# Patient Record
Sex: FEMALE | Race: WHITE | Employment: OTHER | URBAN - METROPOLITAN AREA
[De-identification: names, ages, dates, MRNs, and addresses within clinical notes are randomized per-mention and may not be internally consistent; named-entity substitution may affect disease eponyms.]

---

## 2018-04-13 ENCOUNTER — OFFICE VISIT (OUTPATIENT)
Dept: FAMILY MEDICINE CLINIC | Age: 63
End: 2018-04-13
Payer: COMMERCIAL

## 2018-04-13 ENCOUNTER — HOSPITAL ENCOUNTER (OUTPATIENT)
Age: 63
Discharge: HOME | End: 2018-04-15
Payer: COMMERCIAL

## 2018-04-13 VITALS
RESPIRATION RATE: 16 BRPM | HEART RATE: 108 BPM | BODY MASS INDEX: 35.71 KG/M2 | TEMPERATURE: 97.7 F | DIASTOLIC BLOOD PRESSURE: 60 MMHG | OXYGEN SATURATION: 98 % | SYSTOLIC BLOOD PRESSURE: 116 MMHG | WEIGHT: 228 LBS

## 2018-04-13 DIAGNOSIS — R53.81 PHYSICAL DECONDITIONING: ICD-10-CM

## 2018-04-13 DIAGNOSIS — R41.3 MEMORY LOSS: ICD-10-CM

## 2018-04-13 DIAGNOSIS — E11.9 TYPE 2 DIABETES MELLITUS WITHOUT COMPLICATION, WITH LONG-TERM CURRENT USE OF INSULIN (HCC): Primary | Chronic | ICD-10-CM

## 2018-04-13 DIAGNOSIS — E55.9 VITAMIN D DEFICIENCY: ICD-10-CM

## 2018-04-13 DIAGNOSIS — E11.43 GASTROPARESIS DUE TO DM (HCC): ICD-10-CM

## 2018-04-13 DIAGNOSIS — R29.6 FREQUENT FALLS: ICD-10-CM

## 2018-04-13 DIAGNOSIS — E78.2 MIXED HYPERLIPIDEMIA: Chronic | ICD-10-CM

## 2018-04-13 DIAGNOSIS — Z79.4 TYPE 2 DIABETES MELLITUS WITHOUT COMPLICATION, WITH LONG-TERM CURRENT USE OF INSULIN (HCC): Primary | Chronic | ICD-10-CM

## 2018-04-13 DIAGNOSIS — G62.9 NEUROPATHY: ICD-10-CM

## 2018-04-13 DIAGNOSIS — K31.84 GASTROPARESIS DUE TO DM (HCC): ICD-10-CM

## 2018-04-13 DIAGNOSIS — R53.83 FATIGUE, UNSPECIFIED TYPE: ICD-10-CM

## 2018-04-13 DIAGNOSIS — K21.9 GASTROESOPHAGEAL REFLUX DISEASE, ESOPHAGITIS PRESENCE NOT SPECIFIED: ICD-10-CM

## 2018-04-13 DIAGNOSIS — I10 HTN (HYPERTENSION), BENIGN: Chronic | ICD-10-CM

## 2018-04-13 LAB
ALBUMIN SERPL-MCNC: 3.1 G/DL (ref 3.5–5.2)
ALP BLD-CCNC: 105 U/L (ref 35–104)
ALT SERPL-CCNC: 35 U/L (ref 0–32)
ANION GAP SERPL CALCULATED.3IONS-SCNC: 20 MMOL/L (ref 7–16)
AST SERPL-CCNC: 69 U/L (ref 0–31)
BASOPHILS ABSOLUTE: 0.06 E9/L (ref 0–0.2)
BASOPHILS RELATIVE PERCENT: 0.8 % (ref 0–2)
BILIRUB SERPL-MCNC: 1.5 MG/DL (ref 0–1.2)
BUN BLDV-MCNC: 16 MG/DL (ref 8–23)
CALCIUM SERPL-MCNC: 9.3 MG/DL (ref 8.6–10.2)
CHLORIDE BLD-SCNC: 99 MMOL/L (ref 98–107)
CHOLESTEROL, TOTAL: 169 MG/DL (ref 0–199)
CO2: 19 MMOL/L (ref 22–29)
CREAT SERPL-MCNC: 0.7 MG/DL (ref 0.5–1)
EOSINOPHILS ABSOLUTE: 0.08 E9/L (ref 0.05–0.5)
EOSINOPHILS RELATIVE PERCENT: 1.1 % (ref 0–6)
GFR AFRICAN AMERICAN: >60
GFR NON-AFRICAN AMERICAN: >60 ML/MIN/1.73
GLUCOSE BLD-MCNC: 371 MG/DL (ref 74–109)
HBA1C MFR BLD: 9.9 %
HCT VFR BLD CALC: 43.2 % (ref 34–48)
HDLC SERPL-MCNC: 39 MG/DL
HEMOGLOBIN: 14.4 G/DL (ref 11.5–15.5)
IMMATURE GRANULOCYTES #: 0.05 E9/L
IMMATURE GRANULOCYTES %: 0.7 % (ref 0–5)
IRON SATURATION: 59 % (ref 15–50)
IRON: 174 MCG/DL (ref 37–145)
LDL CHOLESTEROL CALCULATED: 96 MG/DL (ref 0–99)
LYMPHOCYTES ABSOLUTE: 2.35 E9/L (ref 1.5–4)
LYMPHOCYTES RELATIVE PERCENT: 32.2 % (ref 20–42)
MCH RBC QN AUTO: 32.1 PG (ref 26–35)
MCHC RBC AUTO-ENTMCNC: 33.3 % (ref 32–34.5)
MCV RBC AUTO: 96.2 FL (ref 80–99.9)
MONOCYTES ABSOLUTE: 0.49 E9/L (ref 0.1–0.95)
MONOCYTES RELATIVE PERCENT: 6.7 % (ref 2–12)
NEUTROPHILS ABSOLUTE: 4.27 E9/L (ref 1.8–7.3)
NEUTROPHILS RELATIVE PERCENT: 58.5 % (ref 43–80)
PDW BLD-RTO: 14.1 FL (ref 11.5–15)
PLATELET # BLD: 177 E9/L (ref 130–450)
PMV BLD AUTO: 11.9 FL (ref 7–12)
POTASSIUM SERPL-SCNC: 3.9 MMOL/L (ref 3.5–5)
RBC # BLD: 4.49 E12/L (ref 3.5–5.5)
SODIUM BLD-SCNC: 138 MMOL/L (ref 132–146)
TOTAL IRON BINDING CAPACITY: 295 MCG/DL (ref 250–450)
TOTAL PROTEIN: 7.8 G/DL (ref 6.4–8.3)
TRIGL SERPL-MCNC: 168 MG/DL (ref 0–149)
TSH SERPL DL<=0.05 MIU/L-ACNC: 3.96 UIU/ML (ref 0.27–4.2)
VLDLC SERPL CALC-MCNC: 34 MG/DL
WBC # BLD: 7.3 E9/L (ref 4.5–11.5)

## 2018-04-13 PROCEDURE — 83550 IRON BINDING TEST: CPT

## 2018-04-13 PROCEDURE — 1036F TOBACCO NON-USER: CPT | Performed by: FAMILY MEDICINE

## 2018-04-13 PROCEDURE — 82306 VITAMIN D 25 HYDROXY: CPT

## 2018-04-13 PROCEDURE — 82746 ASSAY OF FOLIC ACID SERUM: CPT

## 2018-04-13 PROCEDURE — 99204 OFFICE O/P NEW MOD 45 MIN: CPT | Performed by: FAMILY MEDICINE

## 2018-04-13 PROCEDURE — 84443 ASSAY THYROID STIM HORMONE: CPT

## 2018-04-13 PROCEDURE — 80061 LIPID PANEL: CPT

## 2018-04-13 PROCEDURE — G8427 DOCREV CUR MEDS BY ELIG CLIN: HCPCS | Performed by: FAMILY MEDICINE

## 2018-04-13 PROCEDURE — 83036 HEMOGLOBIN GLYCOSYLATED A1C: CPT | Performed by: FAMILY MEDICINE

## 2018-04-13 PROCEDURE — 3046F HEMOGLOBIN A1C LEVEL >9.0%: CPT | Performed by: FAMILY MEDICINE

## 2018-04-13 PROCEDURE — 3014F SCREEN MAMMO DOC REV: CPT | Performed by: FAMILY MEDICINE

## 2018-04-13 PROCEDURE — 80053 COMPREHEN METABOLIC PANEL: CPT

## 2018-04-13 PROCEDURE — 83540 ASSAY OF IRON: CPT

## 2018-04-13 PROCEDURE — 82607 VITAMIN B-12: CPT

## 2018-04-13 PROCEDURE — 85025 COMPLETE CBC W/AUTO DIFF WBC: CPT

## 2018-04-13 PROCEDURE — 2022F DILAT RTA XM EVC RTNOPTHY: CPT | Performed by: FAMILY MEDICINE

## 2018-04-13 PROCEDURE — 3017F COLORECTAL CA SCREEN DOC REV: CPT | Performed by: FAMILY MEDICINE

## 2018-04-13 PROCEDURE — G8417 CALC BMI ABV UP PARAM F/U: HCPCS | Performed by: FAMILY MEDICINE

## 2018-04-13 RX ORDER — OMEPRAZOLE 40 MG/1
40 CAPSULE, DELAYED RELEASE ORAL
Qty: 30 CAPSULE | Refills: 3 | Status: SHIPPED | OUTPATIENT
Start: 2018-04-13 | End: 2018-07-31 | Stop reason: SDUPTHER

## 2018-04-13 ASSESSMENT — ENCOUNTER SYMPTOMS
CONSTIPATION: 1
DIARRHEA: 0
VOMITING: 0
WHEEZING: 1
BACK PAIN: 1
NAUSEA: 1
ABDOMINAL PAIN: 1

## 2018-04-13 ASSESSMENT — PATIENT HEALTH QUESTIONNAIRE - PHQ9
SUM OF ALL RESPONSES TO PHQ9 QUESTIONS 1 & 2: 1
1. LITTLE INTEREST OR PLEASURE IN DOING THINGS: 1
2. FEELING DOWN, DEPRESSED OR HOPELESS: 0
SUM OF ALL RESPONSES TO PHQ QUESTIONS 1-9: 1

## 2018-04-14 LAB
FOLATE: 12 NG/ML (ref 4.8–24.2)
VITAMIN B-12: 480 PG/ML (ref 211–946)

## 2018-04-15 LAB — VITAMIN D 25-HYDROXY: 33 NG/ML (ref 30–100)

## 2018-04-26 RX ORDER — INSULIN GLARGINE 100 [IU]/ML
48 INJECTION, SOLUTION SUBCUTANEOUS NIGHTLY
Qty: 1 VIAL | Refills: 0 | Status: SHIPPED | OUTPATIENT
Start: 2018-04-26 | End: 2018-05-02 | Stop reason: CLARIF

## 2018-04-27 ENCOUNTER — HOSPITAL ENCOUNTER (OUTPATIENT)
Dept: MRI IMAGING | Age: 63
Discharge: HOME | End: 2018-04-29
Payer: COMMERCIAL

## 2018-04-27 DIAGNOSIS — R41.3 MEMORY LOSS: ICD-10-CM

## 2018-04-27 DIAGNOSIS — R29.6 FREQUENT FALLS: ICD-10-CM

## 2018-04-27 PROCEDURE — 70551 MRI BRAIN STEM W/O DYE: CPT

## 2018-05-02 ENCOUNTER — TELEPHONE (OUTPATIENT)
Dept: FAMILY MEDICINE CLINIC | Age: 63
End: 2018-05-02

## 2018-05-03 ENCOUNTER — TELEPHONE (OUTPATIENT)
Dept: FAMILY MEDICINE CLINIC | Age: 63
End: 2018-05-03

## 2018-05-03 RX ORDER — SIMVASTATIN 20 MG
20 TABLET ORAL NIGHTLY
Qty: 30 TABLET | Refills: 2 | Status: SHIPPED | OUTPATIENT
Start: 2018-05-03 | End: 2018-07-22 | Stop reason: SDUPTHER

## 2018-05-17 ENCOUNTER — OFFICE VISIT (OUTPATIENT)
Dept: FAMILY MEDICINE CLINIC | Age: 63
End: 2018-05-17
Payer: COMMERCIAL

## 2018-05-17 VITALS
DIASTOLIC BLOOD PRESSURE: 69 MMHG | WEIGHT: 230 LBS | RESPIRATION RATE: 16 BRPM | OXYGEN SATURATION: 93 % | BODY MASS INDEX: 36.1 KG/M2 | SYSTOLIC BLOOD PRESSURE: 124 MMHG | HEIGHT: 67 IN | HEART RATE: 106 BPM

## 2018-05-17 DIAGNOSIS — Z91.81 AT RISK FOR FALL DUE TO COMORBID CONDITION: ICD-10-CM

## 2018-05-17 DIAGNOSIS — I10 HTN (HYPERTENSION), BENIGN: Chronic | ICD-10-CM

## 2018-05-17 DIAGNOSIS — Z12.31 ENCOUNTER FOR SCREENING MAMMOGRAM FOR BREAST CANCER: ICD-10-CM

## 2018-05-17 DIAGNOSIS — E78.2 MIXED HYPERLIPIDEMIA: Chronic | ICD-10-CM

## 2018-05-17 DIAGNOSIS — Z12.11 SCREENING FOR COLON CANCER: ICD-10-CM

## 2018-05-17 DIAGNOSIS — E11.9 TYPE 2 DIABETES MELLITUS WITHOUT COMPLICATION, WITH LONG-TERM CURRENT USE OF INSULIN (HCC): Primary | Chronic | ICD-10-CM

## 2018-05-17 DIAGNOSIS — Z79.4 TYPE 2 DIABETES MELLITUS WITHOUT COMPLICATION, WITH LONG-TERM CURRENT USE OF INSULIN (HCC): Primary | Chronic | ICD-10-CM

## 2018-05-17 DIAGNOSIS — K59.00 CONSTIPATION, UNSPECIFIED CONSTIPATION TYPE: ICD-10-CM

## 2018-05-17 LAB
CREATININE URINE POCT: 200
MICROALBUMIN/CREAT 24H UR: 80 MG/G{CREAT}
MICROALBUMIN/CREAT UR-RTO: NORMAL

## 2018-05-17 PROCEDURE — 1036F TOBACCO NON-USER: CPT | Performed by: FAMILY MEDICINE

## 2018-05-17 PROCEDURE — 99214 OFFICE O/P EST MOD 30 MIN: CPT | Performed by: FAMILY MEDICINE

## 2018-05-17 PROCEDURE — 82044 UR ALBUMIN SEMIQUANTITATIVE: CPT | Performed by: FAMILY MEDICINE

## 2018-05-17 PROCEDURE — G8427 DOCREV CUR MEDS BY ELIG CLIN: HCPCS | Performed by: FAMILY MEDICINE

## 2018-05-17 PROCEDURE — 2022F DILAT RTA XM EVC RTNOPTHY: CPT | Performed by: FAMILY MEDICINE

## 2018-05-17 PROCEDURE — G8417 CALC BMI ABV UP PARAM F/U: HCPCS | Performed by: FAMILY MEDICINE

## 2018-05-17 PROCEDURE — 3017F COLORECTAL CA SCREEN DOC REV: CPT | Performed by: FAMILY MEDICINE

## 2018-05-17 PROCEDURE — 3046F HEMOGLOBIN A1C LEVEL >9.0%: CPT | Performed by: FAMILY MEDICINE

## 2018-05-17 RX ORDER — VENLAFAXINE HYDROCHLORIDE 37.5 MG/1
CAPSULE, EXTENDED RELEASE ORAL
Refills: 0 | COMMUNITY
Start: 2018-05-07 | End: 2018-06-08 | Stop reason: SDUPTHER

## 2018-05-17 RX ORDER — POLYETHYLENE GLYCOL 3350 17 G/17G
17 POWDER, FOR SOLUTION ORAL DAILY
Qty: 510 G | Refills: 0 | Status: SHIPPED | OUTPATIENT
Start: 2018-05-17 | End: 2018-06-16

## 2018-05-17 RX ORDER — METFORMIN HYDROCHLORIDE 500 MG/1
TABLET, EXTENDED RELEASE ORAL
Refills: 1 | COMMUNITY
Start: 2018-05-07 | End: 2018-11-12 | Stop reason: SDUPTHER

## 2018-05-18 ENCOUNTER — TELEPHONE (OUTPATIENT)
Dept: FAMILY MEDICINE CLINIC | Age: 63
End: 2018-05-18

## 2018-05-18 ASSESSMENT — ENCOUNTER SYMPTOMS
BACK PAIN: 1
WHEEZING: 0
NAUSEA: 1
SINUS PRESSURE: 0
ABDOMINAL PAIN: 1
CONSTIPATION: 1
VOMITING: 0
SINUS PAIN: 0
DIARRHEA: 0

## 2018-05-22 ENCOUNTER — TELEPHONE (OUTPATIENT)
Dept: FAMILY MEDICINE CLINIC | Age: 63
End: 2018-05-22

## 2018-05-25 ENCOUNTER — TELEPHONE (OUTPATIENT)
Dept: FAMILY MEDICINE CLINIC | Age: 63
End: 2018-05-25

## 2018-05-25 RX ORDER — MELOXICAM 7.5 MG/1
7.5 TABLET ORAL 2 TIMES DAILY
Qty: 60 TABLET | Refills: 3 | Status: SHIPPED | OUTPATIENT
Start: 2018-05-25 | End: 2018-07-13 | Stop reason: ALTCHOICE

## 2018-05-25 RX ORDER — CYCLOBENZAPRINE HCL 5 MG
5 TABLET ORAL 3 TIMES DAILY PRN
Qty: 30 TABLET | Refills: 0 | Status: SHIPPED | OUTPATIENT
Start: 2018-05-25 | End: 2018-06-08 | Stop reason: SDUPTHER

## 2018-06-08 ENCOUNTER — TELEPHONE (OUTPATIENT)
Dept: FAMILY MEDICINE CLINIC | Age: 63
End: 2018-06-08

## 2018-06-08 RX ORDER — CYCLOBENZAPRINE HCL 5 MG
5 TABLET ORAL 3 TIMES DAILY PRN
Qty: 30 TABLET | Refills: 0 | Status: SHIPPED | OUTPATIENT
Start: 2018-06-08 | End: 2018-06-18

## 2018-06-08 RX ORDER — LISINOPRIL 20 MG/1
20 TABLET ORAL DAILY
Qty: 30 TABLET | Refills: 2 | Status: SHIPPED | OUTPATIENT
Start: 2018-06-08 | End: 2018-08-29 | Stop reason: SDUPTHER

## 2018-06-08 RX ORDER — VENLAFAXINE HYDROCHLORIDE 37.5 MG/1
CAPSULE, EXTENDED RELEASE ORAL
Qty: 30 CAPSULE | Refills: 2 | Status: SHIPPED | OUTPATIENT
Start: 2018-06-08 | End: 2018-09-21 | Stop reason: SDUPTHER

## 2018-06-14 ENCOUNTER — TELEPHONE (OUTPATIENT)
Dept: FAMILY MEDICINE CLINIC | Age: 63
End: 2018-06-14

## 2018-06-14 DIAGNOSIS — G89.29 OTHER CHRONIC PAIN: Primary | ICD-10-CM

## 2018-06-21 ENCOUNTER — TELEPHONE (OUTPATIENT)
Dept: ADMINISTRATIVE | Age: 63
End: 2018-06-21

## 2018-07-08 ENCOUNTER — APPOINTMENT (OUTPATIENT)
Dept: ULTRASOUND IMAGING | Age: 63
End: 2018-07-08
Payer: COMMERCIAL

## 2018-07-08 ENCOUNTER — APPOINTMENT (OUTPATIENT)
Dept: CT IMAGING | Age: 63
End: 2018-07-08
Payer: COMMERCIAL

## 2018-07-08 ENCOUNTER — HOSPITAL ENCOUNTER (EMERGENCY)
Age: 63
Discharge: HOME | End: 2018-07-08
Attending: EMERGENCY MEDICINE
Payer: COMMERCIAL

## 2018-07-08 VITALS
RESPIRATION RATE: 16 BRPM | HEART RATE: 106 BPM | WEIGHT: 235 LBS | HEIGHT: 67 IN | DIASTOLIC BLOOD PRESSURE: 112 MMHG | SYSTOLIC BLOOD PRESSURE: 188 MMHG | BODY MASS INDEX: 36.88 KG/M2 | TEMPERATURE: 98.3 F | OXYGEN SATURATION: 91 %

## 2018-07-08 DIAGNOSIS — R18.8 CIRRHOSIS OF LIVER WITH ASCITES, UNSPECIFIED HEPATIC CIRRHOSIS TYPE (HCC): ICD-10-CM

## 2018-07-08 DIAGNOSIS — R60.9 PERIPHERAL EDEMA: ICD-10-CM

## 2018-07-08 DIAGNOSIS — N39.0 URINARY TRACT INFECTION WITHOUT HEMATURIA, SITE UNSPECIFIED: Primary | ICD-10-CM

## 2018-07-08 DIAGNOSIS — K74.60 CIRRHOSIS OF LIVER WITH ASCITES, UNSPECIFIED HEPATIC CIRRHOSIS TYPE (HCC): ICD-10-CM

## 2018-07-08 LAB
ALBUMIN SERPL-MCNC: 2.9 G/DL (ref 3.5–5.2)
ALP BLD-CCNC: 115 U/L (ref 35–104)
ALT SERPL-CCNC: 22 U/L (ref 0–32)
ANION GAP SERPL CALCULATED.3IONS-SCNC: 12 MMOL/L (ref 7–16)
AST SERPL-CCNC: 40 U/L (ref 0–31)
BACTERIA: ABNORMAL /HPF
BILIRUB SERPL-MCNC: 1.2 MG/DL (ref 0–1.2)
BILIRUBIN URINE: NEGATIVE
BLOOD, URINE: ABNORMAL
BUN BLDV-MCNC: 10 MG/DL (ref 8–23)
CALCIUM SERPL-MCNC: 8.6 MG/DL (ref 8.6–10.2)
CHLORIDE BLD-SCNC: 102 MMOL/L (ref 98–107)
CLARITY: ABNORMAL
CO2: 24 MMOL/L (ref 22–29)
COLOR: ABNORMAL
CREAT SERPL-MCNC: 0.6 MG/DL (ref 0.5–1)
EKG ATRIAL RATE: 107 BPM
EKG P AXIS: 51 DEGREES
EKG P-R INTERVAL: 150 MS
EKG Q-T INTERVAL: 368 MS
EKG QRS DURATION: 78 MS
EKG QTC CALCULATION (BAZETT): 491 MS
EKG R AXIS: -2 DEGREES
EKG T AXIS: 65 DEGREES
EKG VENTRICULAR RATE: 107 BPM
EPITHELIAL CELLS, UA: ABNORMAL /HPF
GFR AFRICAN AMERICAN: >60
GFR NON-AFRICAN AMERICAN: >60 ML/MIN/1.73
GLUCOSE BLD-MCNC: 278 MG/DL (ref 74–109)
GLUCOSE URINE: >=1000 MG/DL
HCT VFR BLD CALC: 39.7 % (ref 34–48)
HEMOGLOBIN: 13.6 G/DL (ref 11.5–15.5)
KETONES, URINE: NEGATIVE MG/DL
LACTIC ACID: 3.2 MMOL/L (ref 0.5–2.2)
LEUKOCYTE ESTERASE, URINE: ABNORMAL
LIPASE: 38 U/L (ref 13–60)
MCH RBC QN AUTO: 32.5 PG (ref 26–35)
MCHC RBC AUTO-ENTMCNC: 34.3 % (ref 32–34.5)
MCV RBC AUTO: 95 FL (ref 80–99.9)
NITRITE, URINE: POSITIVE
PDW BLD-RTO: 14.6 FL (ref 11.5–15)
PH UA: 6 (ref 5–9)
PLATELET # BLD: 156 E9/L (ref 130–450)
PMV BLD AUTO: 10.5 FL (ref 7–12)
POTASSIUM SERPL-SCNC: 3.7 MMOL/L (ref 3.5–5)
PROTEIN UA: NEGATIVE MG/DL
RBC # BLD: 4.18 E12/L (ref 3.5–5.5)
RBC UA: ABNORMAL /HPF (ref 0–2)
SODIUM BLD-SCNC: 138 MMOL/L (ref 132–146)
SPECIFIC GRAVITY UA: >=1.03 (ref 1–1.03)
TOTAL PROTEIN: 7.4 G/DL (ref 6.4–8.3)
TROPONIN: <0.01 NG/ML (ref 0–0.03)
UROBILINOGEN, URINE: 4 E.U./DL
WBC # BLD: 5.7 E9/L (ref 4.5–11.5)
WBC UA: ABNORMAL /HPF (ref 0–5)

## 2018-07-08 PROCEDURE — 93970 EXTREMITY STUDY: CPT

## 2018-07-08 PROCEDURE — 6360000002 HC RX W HCPCS: Performed by: EMERGENCY MEDICINE

## 2018-07-08 PROCEDURE — 93005 ELECTROCARDIOGRAM TRACING: CPT | Performed by: PHYSICIAN ASSISTANT

## 2018-07-08 PROCEDURE — 99284 EMERGENCY DEPT VISIT MOD MDM: CPT

## 2018-07-08 PROCEDURE — 84484 ASSAY OF TROPONIN QUANT: CPT

## 2018-07-08 PROCEDURE — 83690 ASSAY OF LIPASE: CPT

## 2018-07-08 PROCEDURE — 81001 URINALYSIS AUTO W/SCOPE: CPT

## 2018-07-08 PROCEDURE — 74177 CT ABD & PELVIS W/CONTRAST: CPT

## 2018-07-08 PROCEDURE — 80053 COMPREHEN METABOLIC PANEL: CPT

## 2018-07-08 PROCEDURE — 6360000004 HC RX CONTRAST MEDICATION: Performed by: RADIOLOGY

## 2018-07-08 PROCEDURE — 2580000003 HC RX 258: Performed by: EMERGENCY MEDICINE

## 2018-07-08 PROCEDURE — 85027 COMPLETE CBC AUTOMATED: CPT

## 2018-07-08 PROCEDURE — 96365 THER/PROPH/DIAG IV INF INIT: CPT

## 2018-07-08 PROCEDURE — 83605 ASSAY OF LACTIC ACID: CPT

## 2018-07-08 RX ORDER — ONDANSETRON 4 MG/1
4 TABLET, FILM COATED ORAL EVERY 8 HOURS PRN
Qty: 20 TABLET | Refills: 0 | Status: SHIPPED | OUTPATIENT
Start: 2018-07-08 | End: 2018-10-22

## 2018-07-08 RX ORDER — FUROSEMIDE 20 MG/1
40 TABLET ORAL DAILY
Qty: 7 TABLET | Refills: 0 | Status: SHIPPED | OUTPATIENT
Start: 2018-07-08 | End: 2018-07-13 | Stop reason: SDUPTHER

## 2018-07-08 RX ORDER — CEFDINIR 300 MG/1
300 CAPSULE ORAL 2 TIMES DAILY
Qty: 20 CAPSULE | Refills: 0 | Status: SHIPPED | OUTPATIENT
Start: 2018-07-08 | End: 2018-07-18

## 2018-07-08 RX ORDER — 0.9 % SODIUM CHLORIDE 0.9 %
1000 INTRAVENOUS SOLUTION INTRAVENOUS ONCE
Status: COMPLETED | OUTPATIENT
Start: 2018-07-08 | End: 2018-07-08

## 2018-07-08 RX ADMIN — SODIUM CHLORIDE 1000 ML: 9 INJECTION, SOLUTION INTRAVENOUS at 20:00

## 2018-07-08 RX ADMIN — CEFTRIAXONE 1 G: 1 INJECTION, POWDER, FOR SOLUTION INTRAMUSCULAR; INTRAVENOUS at 21:29

## 2018-07-08 RX ADMIN — IOPAMIDOL 110 ML: 755 INJECTION, SOLUTION INTRAVENOUS at 20:58

## 2018-07-08 ASSESSMENT — PAIN SCALES - GENERAL: PAINLEVEL_OUTOF10: 10

## 2018-07-08 ASSESSMENT — PAIN DESCRIPTION - PAIN TYPE: TYPE: ACUTE PAIN

## 2018-07-08 NOTE — ED PROVIDER NOTES
HPI:   Tello Casey is a 58 y.o. female presenting to the ED for abdominal pain, bloated, leg swelling, dysuria, beginning a few months ago. The complaint has been constant, moderate in severity, and worsened by nothing. Pt is having leg swelling for a couple of months but for the past couple of days her abdomen and bilateral legs began to swell. pt states she is having difficulty urinating. Pt has diabetes. pt has urinary urgency and states she has an appointment for this week with her PCP. Pt reports her psychic told her to come in so she decided to listen and be seen. Pt denies fever, chills, nausea, vomiting, diarrhea, LOC, SOB, cough, hematuria, headache, dizziness. ROS:   Pertinent positives and negatives are stated within HPI, all other systems reviewed and are negative.    --------------------------------------------- PAST HISTORY ---------------------------------------------  Past Medical History:  has a past medical history of Diabetes mellitus ([facility] [address] Hyperlipidemia; Hypertension; and Thyroid disease. Past Surgical History:  has a past surgical history that includes Hysterectomy and Cholecystectomy. Social History:  reports that she has never smoked. She has never used smokeless tobacco. She reports that she does not drink alcohol or use drugs. Family History: family history includes Diabetes in her sister; Heart Disease in her sister; Heart Disease (age of onset: 28) in her mother. The patients home medications have been reviewed.     Allergies: Metformin and Sertraline    -------------------------------------------------- RESULTS -------------------------------------------------  All laboratory and radiology results have been personally reviewed by myself   LABS:  Results for orders placed or performed during the hospital encounter of 07/08/18   CBC   Result Value Ref Range    WBC 5.7 4.5 - 11.5 E9/L    RBC 4.18 3.50 - 5.50 E12/L    Hemoglobin 13.6 11.5 - 15.5 g/dL    Hematocrit 39.7 treated with Rocephin for UTI, given Omnicef for home. Patient with bloating no real abdominal pain here. Findings consistent with cirrhosis. Patient will follow up with primary care physician. We instructed her to go to gastroenterology 2. We told her that she should be seen by both within the next 2-3 days. We gave her warning signs for when to return. Paracentesis was offered but she deferred    Counseling: The emergency provider has spoken with the patient and discussed todays results, in addition to providing specific details for the plan of care and counseling regarding the diagnosis and prognosis. Questions are answered at this time and they are agreeable with the plan.      --------------------------------- IMPRESSION AND DISPOSITION ---------------------------------    IMPRESSION  1. Urinary tract infection without hematuria, site unspecified    2. Peripheral edema    3. Cirrhosis of liver with ascites, unspecified hepatic cirrhosis type ([facility] [address]        DISPOSITION  Disposition: Discharge to home  Patient condition is good    7/8/18, 7:21 PM.    This note is prepared by Clotilde Smith, acting as Scribe for DEV Bai 19, DO:  The scribe's documentation has been prepared under my direction and personally reviewed by me in its entirety. I confirm that the note above accurately reflects all work, treatment, procedures, and medical decision making performed by me.            Brett Arroyo DO  07/09/18 [address] [facility],[address] Merlinda Sine, DO  07/09/18 0112

## 2018-07-08 NOTE — ED NOTES
FIRST PROVIDER CONTACT ASSESSMENT NOTE      Department of Emergency Medicine   7/8/18  7:12 PM    Chief Complaint: Abdominal Pain (pt having leg swelling for a couple of months but for the past couple of days her abdomen and bilateral legs began to swell. pt states she is having difficulty urinating); Bloated; Leg Swelling; and Dysuria      History of Present Illness:    Zak Silva is a 58 y.o. female who presents to the ED by private car for abdominal pain / leg swelling   Focused Screening Exam:  Constitutional:  Alert, appears stated age and is in no distress.   Heart rrr   Lungs Clear     *ALLERGIES*     Metformin and Sertraline     ED Triage Vitals [07/08/18 1911]   BP Temp Temp Source Pulse Resp SpO2 Height Weight   (!) 192/93 98.9 °F (37.2 °C) Oral 110 14 94 % 5' 7\" (1.702 m) 235 lb (106.6 kg)        Initial Plan of Care:  Initiate Treatment-Testing, Proceed toTreatment Area When Bed Available for ED Attending/MLP to Continue Care    -----------------END OF FIRST PROVIDER CONTACT ASSESSMENT NOTE--------------  Electronically signed by AMENA Cary   DD: 7/8/18     AMENA Cary  07/08/18 1913

## 2018-07-09 NOTE — ED NOTES
Dr. Maulik Martinez notified of patient's VS, especially, BP, and he is OK with her being discharged.      Tami Canseco RN  07/08/18 [id number]

## 2018-07-09 NOTE — ED NOTES
Discharge instructions given, medications and follow up instructions reviewed. Patient verbalized understanding, no other noted or stated problems at this time. Patient will follow up with physicians as directed.         Bakari Liao RN  07/08/18 0702

## 2018-07-13 ENCOUNTER — HOSPITAL ENCOUNTER (OUTPATIENT)
Age: 63
Discharge: HOME | End: 2018-07-15
Payer: COMMERCIAL

## 2018-07-13 ENCOUNTER — OFFICE VISIT (OUTPATIENT)
Dept: FAMILY MEDICINE CLINIC | Age: 63
End: 2018-07-13
Payer: COMMERCIAL

## 2018-07-13 VITALS
HEART RATE: 107 BPM | SYSTOLIC BLOOD PRESSURE: 134 MMHG | OXYGEN SATURATION: 98 % | RESPIRATION RATE: 20 BRPM | BODY MASS INDEX: 37.04 KG/M2 | HEIGHT: 67 IN | DIASTOLIC BLOOD PRESSURE: 78 MMHG | WEIGHT: 236 LBS | TEMPERATURE: 98 F

## 2018-07-13 DIAGNOSIS — K74.60 CIRRHOSIS OF LIVER WITH ASCITES, UNSPECIFIED HEPATIC CIRRHOSIS TYPE (HCC): ICD-10-CM

## 2018-07-13 DIAGNOSIS — Z12.31 ENCOUNTER FOR SCREENING MAMMOGRAM FOR BREAST CANCER: ICD-10-CM

## 2018-07-13 DIAGNOSIS — E11.9 TYPE 2 DIABETES MELLITUS WITHOUT COMPLICATION, WITH LONG-TERM CURRENT USE OF INSULIN (HCC): Primary | Chronic | ICD-10-CM

## 2018-07-13 DIAGNOSIS — R18.8 CIRRHOSIS OF LIVER WITH ASCITES, UNSPECIFIED HEPATIC CIRRHOSIS TYPE (HCC): ICD-10-CM

## 2018-07-13 DIAGNOSIS — I10 HTN (HYPERTENSION), BENIGN: Chronic | ICD-10-CM

## 2018-07-13 DIAGNOSIS — E78.2 MIXED HYPERLIPIDEMIA: Chronic | ICD-10-CM

## 2018-07-13 DIAGNOSIS — J31.0 CHRONIC RHINITIS, UNSPECIFIED TYPE: ICD-10-CM

## 2018-07-13 DIAGNOSIS — Z79.4 TYPE 2 DIABETES MELLITUS WITHOUT COMPLICATION, WITH LONG-TERM CURRENT USE OF INSULIN (HCC): Primary | Chronic | ICD-10-CM

## 2018-07-13 DIAGNOSIS — N30.00 ACUTE CYSTITIS WITHOUT HEMATURIA: ICD-10-CM

## 2018-07-13 LAB
ANION GAP SERPL CALCULATED.3IONS-SCNC: 13 MMOL/L (ref 7–16)
BILIRUBIN, POC: NORMAL
BLOOD URINE, POC: NORMAL
BUN BLDV-MCNC: 7 MG/DL (ref 8–23)
CALCIUM SERPL-MCNC: 8.4 MG/DL (ref 8.6–10.2)
CHLORIDE BLD-SCNC: 98 MMOL/L (ref 98–107)
CLARITY, POC: NORMAL
CO2: 24 MMOL/L (ref 22–29)
COLOR, POC: YELLOW
CREAT SERPL-MCNC: 0.6 MG/DL (ref 0.5–1)
GFR AFRICAN AMERICAN: >60
GFR NON-AFRICAN AMERICAN: >60 ML/MIN/1.73
GLUCOSE BLD-MCNC: 276 MG/DL (ref 74–109)
GLUCOSE URINE, POC: 500
HBA1C MFR BLD: 8.1 %
KETONES, POC: NORMAL
LEUKOCYTE EST, POC: NORMAL
NITRITE, POC: NORMAL
PH, POC: 5.5
POTASSIUM SERPL-SCNC: 4.5 MMOL/L (ref 3.5–5)
PROTEIN, POC: NORMAL
SODIUM BLD-SCNC: 135 MMOL/L (ref 132–146)
SPECIFIC GRAVITY, POC: 1.02
UROBILINOGEN, POC: 1

## 2018-07-13 PROCEDURE — G8417 CALC BMI ABV UP PARAM F/U: HCPCS | Performed by: FAMILY MEDICINE

## 2018-07-13 PROCEDURE — 87088 URINE BACTERIA CULTURE: CPT

## 2018-07-13 PROCEDURE — 1036F TOBACCO NON-USER: CPT | Performed by: FAMILY MEDICINE

## 2018-07-13 PROCEDURE — 83036 HEMOGLOBIN GLYCOSYLATED A1C: CPT | Performed by: FAMILY MEDICINE

## 2018-07-13 PROCEDURE — 81002 URINALYSIS NONAUTO W/O SCOPE: CPT | Performed by: FAMILY MEDICINE

## 2018-07-13 PROCEDURE — G8427 DOCREV CUR MEDS BY ELIG CLIN: HCPCS | Performed by: FAMILY MEDICINE

## 2018-07-13 PROCEDURE — 99214 OFFICE O/P EST MOD 30 MIN: CPT | Performed by: FAMILY MEDICINE

## 2018-07-13 PROCEDURE — 3017F COLORECTAL CA SCREEN DOC REV: CPT | Performed by: FAMILY MEDICINE

## 2018-07-13 PROCEDURE — 80048 BASIC METABOLIC PNL TOTAL CA: CPT

## 2018-07-13 PROCEDURE — 2022F DILAT RTA XM EVC RTNOPTHY: CPT | Performed by: FAMILY MEDICINE

## 2018-07-13 PROCEDURE — 3045F PR MOST RECENT HEMOGLOBIN A1C LEVEL 7.0-9.0%: CPT | Performed by: FAMILY MEDICINE

## 2018-07-13 RX ORDER — AMITRIPTYLINE HYDROCHLORIDE 10 MG/1
10 TABLET, FILM COATED ORAL NIGHTLY PRN
Qty: 30 TABLET | Refills: 3 | Status: SHIPPED | OUTPATIENT
Start: 2018-07-13 | End: 2018-09-14 | Stop reason: SDUPTHER

## 2018-07-13 RX ORDER — MONTELUKAST SODIUM 10 MG/1
10 TABLET ORAL NIGHTLY
Qty: 30 TABLET | Refills: 2 | Status: SHIPPED | OUTPATIENT
Start: 2018-07-13 | End: 2018-10-05 | Stop reason: SDUPTHER

## 2018-07-13 RX ORDER — FUROSEMIDE 20 MG/1
40 TABLET ORAL DAILY
Qty: 60 TABLET | Refills: 2 | Status: SHIPPED | OUTPATIENT
Start: 2018-07-13 | End: 2018-08-31 | Stop reason: ALTCHOICE

## 2018-07-13 NOTE — PROGRESS NOTES
06/27/1969    DTaP/Tdap/Td vaccine (1 - Tdap) 06/27/1973    Pneumococcal med risk (1 of 1 - PPSV23) 06/27/1973    Breast cancer screen  06/27/2004    Shingles Vaccine (1 of 2 - 2 Dose Series) 06/27/2004    Cervical cancer screen  07/20/2015           REVIEW OF SYSTEMS  Review of Systems   Constitutional: Positive for fatigue. Negative for chills and fever. HENT: Negative for congestion, postnasal drip, sinus pain and sinus pressure. Eyes: Positive for visual disturbance. Respiratory: Negative for wheezing. Cardiovascular: Positive for leg swelling. Negative for chest pain. Gastrointestinal: Positive for abdominal distention, abdominal pain, constipation and nausea. Negative for diarrhea and vomiting. Genitourinary: Positive for dysuria and frequency. Musculoskeletal: Positive for arthralgias, back pain, gait problem and neck pain. Skin: Negative for rash. Allergic/Immunologic: Negative for environmental allergies. Neurological: Positive for dizziness, weakness, light-headedness and numbness. Negative for syncope. Psychiatric/Behavioral: Positive for dysphoric mood. The patient is not nervous/anxious. PHYSICAL EXAM  /78   Pulse 107   Temp 98 °F (36.7 °C) (Oral)   Resp 20   Ht 5' 7\" (1.702 m)   Wt 236 lb (107 kg)   SpO2 98%   BMI 36.96 kg/m²   Physical Exam   Constitutional: She is oriented to person, place, and time. She appears well-developed and well-nourished. obese   HENT:   Head: Normocephalic and atraumatic. Right Ear: External ear normal.   Left Ear: External ear normal.   Nose: Nose normal.   Mouth/Throat: Oropharynx is clear and moist.   TMs intact bilaterally with no erythema or effusion. Eyes: Conjunctivae and EOM are normal.   Neck: Normal range of motion. Neck supple. No thyromegaly present. Cardiovascular: Normal rate, regular rhythm and normal heart sounds. Exam reveals no gallop and no friction rub. No murmur heard.   Pulmonary/Chest: Effort Acute cystitis without hematuria  POCT Urinalysis no Micro    Urine Culture   3. HTN (hypertension), benign     4. Mixed hyperlipidemia     5. Cirrhosis of liver with ascites, unspecified hepatic cirrhosis type (HCC)  furosemide (LASIX) 20 MG tablet    BASIC METABOLIC PANEL   6. Chronic rhinitis, unspecified type  montelukast (SINGULAIR) 10 MG tablet   7. Encounter for screening mammogram for breast cancer       A1c is at 8.1 today. We discussed her DM in detail today. She has not been taking her medication as prescribed as she states she cannot take care of her self when she is in so much pain. She wishes to be referred to pain management. Her pain is all over. I again discussed with her the importance of taking medication as prescribed and her chronic conditions. She has GI appointment. BP under good control today. Continue lasix, will obtain BMP to ensure no K supplementation needed. Obtain urine culture as well to ensure UTI being treated appropriately. Return in one month for re-evaluation. Problem list reviewed and simplified/updated  HM reviewed today and counseled as appropriate    Call or go to ED immediately if symptoms worsen or persist.  Future Appointments  Date Time Provider Erick Klein   8/17/2018 2:00 PM DO Woodrow Reinoso [facility]     Or sooner if necessary.       Educational materials and/or home exercises printed for patient's review and were included in patient instructions on his/her After Visit Summary and given to patient at the end of visit.       Counseled regarding above diagnosis, including possible risks and complications,  especially if left uncontrolled.     Counseled regarding the possible side effects, risks, benefits and alternatives to treatment; patient and/or guardian verbalizes understanding, agrees, feels comfortable with and wishes to proceed with above treatment plan.     Advised patient to call with any new medication issues, and read all Rx info from pharmacy to assure aware of all possible risks and side effects of medication before taking.     Reviewed age and gender appropriate health screening exams and vaccinations. Advised patient regarding importance of keeping up with recommended health maintenance and to schedule as soon as possible if overdue, as this is important in assessing for undiagnosed pathology, especially cancer, as well as protecting against potentially harmful/life threatening disease.          Patient and/or guardian verbalizes understanding and agrees with above counseling, assessment and plan.     All questions answered. Abilio Pavon, [address], DO  7/13/18    NOTE: This report was transcribed using voice recognition software.  Every effort was made to ensure accuracy; however, inadvertent computerized transcription errors may be present

## 2018-07-15 LAB — URINE CULTURE, ROUTINE: NORMAL

## 2018-07-16 PROBLEM — K74.60 CIRRHOSIS OF LIVER WITH ASCITES (HCC): Status: ACTIVE | Noted: 2018-07-16

## 2018-07-16 PROBLEM — R18.8 CIRRHOSIS OF LIVER WITH ASCITES (HCC): Status: ACTIVE | Noted: 2018-07-16

## 2018-07-16 ASSESSMENT — ENCOUNTER SYMPTOMS
NAUSEA: 1
DIARRHEA: 0
ABDOMINAL PAIN: 1
BACK PAIN: 1
SINUS PAIN: 0
ABDOMINAL DISTENTION: 1
SINUS PRESSURE: 0
WHEEZING: 0
VOMITING: 0
CONSTIPATION: 1

## 2018-07-17 RX ORDER — INSULIN ASPART 100 [IU]/ML
INJECTION, SOLUTION INTRAVENOUS; SUBCUTANEOUS
Qty: 5 PEN | Refills: 3 | Status: SHIPPED | OUTPATIENT
Start: 2018-07-17 | End: 2018-10-17 | Stop reason: SDUPTHER

## 2018-07-23 RX ORDER — SIMVASTATIN 20 MG
TABLET ORAL
Qty: 30 TABLET | Refills: 2 | Status: ON HOLD | OUTPATIENT
Start: 2018-07-23 | End: 2018-09-12 | Stop reason: HOSPADM

## 2018-07-31 DIAGNOSIS — K21.9 GASTROESOPHAGEAL REFLUX DISEASE, ESOPHAGITIS PRESENCE NOT SPECIFIED: ICD-10-CM

## 2018-08-01 RX ORDER — OMEPRAZOLE 40 MG/1
40 CAPSULE, DELAYED RELEASE ORAL
Qty: 30 CAPSULE | Refills: 3 | Status: SHIPPED | OUTPATIENT
Start: 2018-08-01 | End: 2018-09-14

## 2018-08-14 ENCOUNTER — OFFICE VISIT (OUTPATIENT)
Dept: PAIN MANAGEMENT | Age: 63
End: 2018-08-14
Payer: COMMERCIAL

## 2018-08-14 ENCOUNTER — HOSPITAL ENCOUNTER (OUTPATIENT)
Age: 63
Discharge: HOME | End: 2018-08-16
Payer: COMMERCIAL

## 2018-08-14 VITALS
DIASTOLIC BLOOD PRESSURE: 84 MMHG | HEIGHT: 67 IN | WEIGHT: 223 LBS | HEART RATE: 88 BPM | SYSTOLIC BLOOD PRESSURE: 138 MMHG | BODY MASS INDEX: 35 KG/M2 | TEMPERATURE: 98.6 F | RESPIRATION RATE: 18 BRPM

## 2018-08-14 DIAGNOSIS — M47.816 LUMBAR FACET ARTHROPATHY: ICD-10-CM

## 2018-08-14 DIAGNOSIS — M19.012 PRIMARY OSTEOARTHRITIS OF BOTH SHOULDERS: ICD-10-CM

## 2018-08-14 DIAGNOSIS — M47.812 CERVICAL FACET JOINT SYNDROME: ICD-10-CM

## 2018-08-14 DIAGNOSIS — M54.16 LUMBAR RADICULOPATHY: ICD-10-CM

## 2018-08-14 DIAGNOSIS — M16.0 PRIMARY OSTEOARTHRITIS OF BOTH HIPS: ICD-10-CM

## 2018-08-14 DIAGNOSIS — M47.816 LUMBAR SPONDYLOSIS: ICD-10-CM

## 2018-08-14 DIAGNOSIS — M19.011 PRIMARY OSTEOARTHRITIS OF BOTH SHOULDERS: ICD-10-CM

## 2018-08-14 DIAGNOSIS — M47.812 SPONDYLOSIS OF CERVICAL REGION WITHOUT MYELOPATHY OR RADICULOPATHY: Primary | ICD-10-CM

## 2018-08-14 LAB — SPECIFIC GRAVITY UA: 1.01 (ref 1–1.03)

## 2018-08-14 PROCEDURE — G0480 DRUG TEST DEF 1-7 CLASSES: HCPCS

## 2018-08-14 PROCEDURE — G8427 DOCREV CUR MEDS BY ELIG CLIN: HCPCS | Performed by: PAIN MEDICINE

## 2018-08-14 PROCEDURE — 80307 DRUG TEST PRSMV CHEM ANLYZR: CPT

## 2018-08-14 PROCEDURE — G8417 CALC BMI ABV UP PARAM F/U: HCPCS | Performed by: PAIN MEDICINE

## 2018-08-14 PROCEDURE — 81005 URINALYSIS: CPT

## 2018-08-14 PROCEDURE — 1036F TOBACCO NON-USER: CPT | Performed by: PAIN MEDICINE

## 2018-08-14 PROCEDURE — 99204 OFFICE O/P NEW MOD 45 MIN: CPT | Performed by: PAIN MEDICINE

## 2018-08-14 PROCEDURE — 3017F COLORECTAL CA SCREEN DOC REV: CPT | Performed by: PAIN MEDICINE

## 2018-08-14 NOTE — PROGRESS NOTES
[address]        [address] [facility], [place], [address] [facility]      [phone]          Consult Note      Patient:  ARIS Greenberg 1955    Date of Service:  18    Requesting Physician:  Anu Mcconnell DO    Reason for Consult:      Patient presents with complaints of neck/shoulder/low back and Right pain that started a long time ago     HISTORY OF PRESENT ILLNESS:      Pain is constant and is described as aching. Pain does radiate to right thigh. She  does not have numbness, tingling, weakness and does not have bladder or bowel dysfunction. Alleviating factors include: ice. Aggravating factors include:  walking, standing, bending, lying down. She has been on anticoagulation medications to include ASA and has not been on herbal supplements. She is diabetic. Imaging: none    Previous treatments: medications. .      Past Medical History:   Diagnosis Date    Diabetes mellitus ([facility] [address]     Hyperlipidemia     Hypertension     Liver cirrhosis ([facility] [address]     Thyroid disease        Past Surgical History:   Procedure Laterality Date    CHOLECYSTECTOMY      GALLBLADDER SURGERY      HYSTERECTOMY      TONSILLECTOMY         Prior to Admission medications    Medication Sig Start Date End Date Taking?  Authorizing Provider   omeprazole (PRILOSEC) 40 MG delayed release capsule TAKE 1 CAPSULE BY MOUTH DAILY (WITH BREAKFAST) 18  Yes Annie Betancourt DO   pantoprazole (PROTONIX) 40 MG tablet  18  Yes Historical Provider, MD   polyethylene glycol (GLYCOLAX) powder  18  Yes Historical Provider, MD   simvastatin (ZOCOR) 20 MG tablet TAKE 1 TABLET BY MOUTH EVERY DAY IN THE EVENING 18  Yes Annie Betancourt DO   NOVOLOG FLEXPEN 100 UNIT/ML injection pen USE FOR SLIDING SCALE 3 TIMES DAILY BEFORE MEALS **MAX 60 UNITS PER DAY** 18  Yes Annie Betancourt DO   montelukast (SINGULAIR) 10 MG tablet Take 1 tablet by mouth nightly 18  Yes Annie Betancourt, DO   amitriptyline (ELAVIL) 10 MG tablet Take 1 tablet by mouth nightly as needed for Sleep 7/13/18  Yes Annie Betancourt DO   ondansetron (ZOFRAN) 4 MG tablet Take 1 tablet by mouth every 8 hours as needed for Nausea or Vomiting 7/8/18  Yes Finn Stoddard, DO   lisinopril (PRINIVIL;ZESTRIL) 20 MG tablet Take 1 tablet by mouth daily 6/8/18  Yes Annie Betancourt DO   venlafaxine (EFFEXOR XR) 37.5 MG extended release capsule TAKE 1 CAPSULE BY MOUTH EVERY DAY PLEASE SCHEDULE APPT 6/8/18  Yes Jael Chapa, DO   metFORMIN (GLUCOPHAGE-XR) 500 MG extended release tablet TAKE 1 TABLET BY MOUTH EVERY DAY 5/7/18  Yes Historical Provider, MD   insulin glargine (TOUJEO SOLOSTAR) 300 UNIT/ML injection pen Inject 48 Units into the skin nightly 5/2/18  Yes Annie Betancourt DO   acetaminophen 650 MG TABS Take 650 mg by mouth every 4 hours as needed for Fever (Temp greater than 100.5, for pain score 1-3). 12/18/14  Yes Samantha Pandya MD   gabapentin (NEURONTIN) 300 MG capsule Take 300 mg by mouth 4 times daily. Yes Historical Provider, MD   hydrochlorothiazide (HYDRODIURIL) 25 MG tablet Take 25 mg by mouth daily. Yes Historical Provider, MD   furosemide (LASIX) 20 MG tablet Take 2 tablets by mouth daily for 30 doses 7/13/18 8/12/18  Annie Betancourt DO       Allergies   Allergen Reactions    Metformin      GI Upset    Sertraline Other (See Comments)       Social History     Social History    Marital status: Legally      Spouse name: N/A    Number of children: N/A    Years of education: N/A     Occupational History    Not on file.      Social History Main Topics    Smoking status: Never Smoker    Smokeless tobacco: Never Used    Alcohol use No    Drug use: No    Sexual activity: Not on file     Other Topics Concern    Not on file     Social History Narrative    No narrative on file       Family History   Problem Relation Age of Onset    Heart Disease Mother 28    Heart Disease Sister     Diabetes Sister        REVIEW OF SYSTEMS:     Patient specifically denies fever/chills, chest pain, shortness of breath, new bowel or bladder complaints. All other review of systems was negative. PHYSICAL EXAMINATION:      /84   Pulse 88   Temp 98.6 °F (37 °C)   Resp 18   Ht 5' 7\" (1.702 m)   Wt 223 lb (101.2 kg)   BMI 34.93 kg/m²     General:      General appearance:   pleasant and well-hydrated. , in mild distress and A & O x3  Build:Overweight  Function:Rises from a seated position with difficulty    HEENT:    Head:normocephalic and atraumatic  Pupils:regular, round and equal.  Sclera: icterus absent,     Lungs:    Breathing:Breathing Pattern: regular, no distress    Abdomen:    Shape:obese and non-distended  Tenderness:none  Guarding:none    Cervical spine:    Inspection:normal  Palpation:tenderness paravertebral muscles, facet loading, left, right and positive. Range of motion:abnormal moderately flexion, extension rotation bilateral and is  painful. Lumbar spine:    Spine inspection:normal   CVA tenderness:No   Palpation:tenderness paravertebral muscles, facet loading, left, right, positive and tenderness. Right worse than Left  Range of motion:abnormal moderately Lateral bending, flexion, extension rotation bilateral and is  Painful.  Right worse than Left    Musculoskeletal:    Trigger points in trapezius:absent bilaterally  Trigger points in rhomboids:absent bilaterally  Trigger points in Paraveteral:absent bilaterally  Trigger points in supraspinatus/infraspinatus:absent  Spurling's:negative right, negative left    Duncan's:negative right, negative left   SI joint tenderness:negative right, negative left              AILX test:not done right, not done             left  Piriformis tenderness:negative right, negative left  Trochanteric bursa tenderness:negative right, negative left  SLR:negative right, negative left, sitting     Extremities:    Tremors:None bilaterally

## 2018-08-14 NOTE — PROGRESS NOTES
8/14/2018    Tello Casey presents to the [address] [place] [address] on 8/14/2018. Oscar Hatch is complaining of pain in back neck hips and leg rt . The pain is constant. The pain is described as aching. Pain is rated today at a 9 on the VAS scale. She took her last dose of neurontin  on 08/7/2018.       She has not been on anticoagulation medication  /84   Pulse 88   Temp 98.6 °F (37 °C)   Resp 18   Ht 5' 7\" (1.702 m)   Wt 223 lb (101.2 kg)   BMI 34.93 kg/m²

## 2018-08-17 ENCOUNTER — OFFICE VISIT (OUTPATIENT)
Dept: FAMILY MEDICINE CLINIC | Age: 63
End: 2018-08-17
Payer: COMMERCIAL

## 2018-08-17 VITALS
HEART RATE: 88 BPM | OXYGEN SATURATION: 98 % | BODY MASS INDEX: 34.53 KG/M2 | WEIGHT: 220 LBS | DIASTOLIC BLOOD PRESSURE: 86 MMHG | HEIGHT: 67 IN | SYSTOLIC BLOOD PRESSURE: 132 MMHG

## 2018-08-17 DIAGNOSIS — E11.9 TYPE 2 DIABETES MELLITUS WITHOUT COMPLICATION, WITH LONG-TERM CURRENT USE OF INSULIN (HCC): Primary | Chronic | ICD-10-CM

## 2018-08-17 DIAGNOSIS — Z12.31 ENCOUNTER FOR SCREENING MAMMOGRAM FOR BREAST CANCER: ICD-10-CM

## 2018-08-17 DIAGNOSIS — R18.8 CIRRHOSIS OF LIVER WITH ASCITES, UNSPECIFIED HEPATIC CIRRHOSIS TYPE (HCC): ICD-10-CM

## 2018-08-17 DIAGNOSIS — M47.812 CERVICAL FACET JOINT SYNDROME: ICD-10-CM

## 2018-08-17 DIAGNOSIS — Z79.4 TYPE 2 DIABETES MELLITUS WITHOUT COMPLICATION, WITH LONG-TERM CURRENT USE OF INSULIN (HCC): Primary | Chronic | ICD-10-CM

## 2018-08-17 DIAGNOSIS — K74.60 CIRRHOSIS OF LIVER WITH ASCITES, UNSPECIFIED HEPATIC CIRRHOSIS TYPE (HCC): ICD-10-CM

## 2018-08-17 DIAGNOSIS — B36.9 FUNGAL SKIN INFECTION: ICD-10-CM

## 2018-08-17 LAB
7-AMINOCLONAZEPAM, URINE: <5 NG/ML
ALPHA-HYDROXYALPRAZOLAM, URINE: <5 NG/ML
ALPHA-HYDROXYMIDAZOLAM, URINE: <20 NG/ML
ALPRAZOLAM, URINE: <5 NG/ML
CHLORDIAZEPOXIDE, URINE: <20 NG/ML
CLONAZEPAM, URINE: <5 NG/ML
DIAZEPAM, URINE: <20 NG/ML
LORAZEPAM, URINE: <20 NG/ML
Lab: NORMAL
MIDAZOLAM, URINE: <20 NG/ML
NORDIAZEPAM, URINE: <20 NG/ML
OXAZEPAM, URINE: <20 NG/ML
REPORT: NORMAL
TEMAZEPAM, URINE: <20 NG/ML
THIS TEST SENT TO: NORMAL

## 2018-08-17 PROCEDURE — G8427 DOCREV CUR MEDS BY ELIG CLIN: HCPCS | Performed by: FAMILY MEDICINE

## 2018-08-17 PROCEDURE — 3045F PR MOST RECENT HEMOGLOBIN A1C LEVEL 7.0-9.0%: CPT | Performed by: FAMILY MEDICINE

## 2018-08-17 PROCEDURE — 2022F DILAT RTA XM EVC RTNOPTHY: CPT | Performed by: FAMILY MEDICINE

## 2018-08-17 PROCEDURE — 3017F COLORECTAL CA SCREEN DOC REV: CPT | Performed by: FAMILY MEDICINE

## 2018-08-17 PROCEDURE — 1036F TOBACCO NON-USER: CPT | Performed by: FAMILY MEDICINE

## 2018-08-17 PROCEDURE — 99214 OFFICE O/P EST MOD 30 MIN: CPT | Performed by: FAMILY MEDICINE

## 2018-08-17 PROCEDURE — G8417 CALC BMI ABV UP PARAM F/U: HCPCS | Performed by: FAMILY MEDICINE

## 2018-08-17 RX ORDER — NYSTATIN 100000 U/G
CREAM TOPICAL
Qty: 1 TUBE | Refills: 0 | Status: SHIPPED | OUTPATIENT
Start: 2018-08-17 | End: 2018-10-22

## 2018-08-17 NOTE — PROGRESS NOTES
Mayur Montes  : 1955    Chief Complaint:     Chief Complaint   Patient presents with    Diabetes       HPI  Mayur Montes 58 y.o. presents for   Chief Complaint   Patient presents with    Diabetes     Diabetes  Patient has not been taking her insulin as prescribed. She has been taking her short-acting insulin 60 units at a time. Did explain that this is too much insulin especially if she is not checking her sugars before she takes her insulin. She has been taking her basal insulin at night 48 units nightly. She did bring in a sugar log however she is not sure which sugars are fasting in which or not. Most of the sugars are ranging in the high 200s to 300s. There is a 500 as well. She has not been watching her diet. Cirrhosis  She does follow with GI. She is going have surgery for banding of her esophageal varices. She has been feeling well. She did have a paracentesis and much fluid was drained. He feels improved since having this procedure. Fungal skin infection  She wishes for much nystatin cream as she does have the powder and has helped. She also did see pain management. He did recommend physical therapy which patient is hesitant about due to cost.    All questions were answered to patients satisfaction.     Past Medical History:   Diagnosis Date    Diabetes mellitus ([address]     Hyperlipidemia     Hypertension     Liver cirrhosis ([facility] [address]     Thyroid disease        Past Surgical History:   Procedure Laterality Date    CHOLECYSTECTOMY      GALLBLADDER SURGERY      HYSTERECTOMY      TONSILLECTOMY         Social History     Social History    Marital status: Legally      Spouse name: N/A    Number of children: N/A    Years of education: N/A     Social History Main Topics    Smoking status: Never Smoker    Smokeless tobacco: Never Used    Alcohol use No    Drug use: No    Sexual activity: Not Asked     Other Topics Concern    None     Social History Narrative    current facility-administered medications for this visit. Allergies   Allergen Reactions    Metformin      GI Upset    Sertraline Other (See Comments)       Health Maintenance Due   Topic Date Due    Hepatitis C screen  1955    Diabetic retinal exam  06/27/1964    HIV screen  06/27/1969    DTaP/Tdap/Td vaccine (1 - Tdap) 06/27/1973    Pneumococcal med risk (1 of 1 - PPSV23) 06/27/1973    Breast cancer screen  06/27/2004    Shingles Vaccine (1 of 2 - 2 Dose Series) 06/27/2004    Cervical cancer screen  07/20/2015           REVIEW OF SYSTEMS  Review of Systems   Constitutional: Positive for fatigue. Negative for chills and fever. HENT: Negative for congestion, postnasal drip, sinus pain and sinus pressure. Eyes: Negative for visual disturbance. Respiratory: Negative for wheezing. Cardiovascular: Negative for chest pain and leg swelling. Gastrointestinal: Positive for constipation. Negative for abdominal distention, abdominal pain, diarrhea, nausea and vomiting. Genitourinary: Positive for dysuria and frequency. Musculoskeletal: Positive for arthralgias, back pain, gait problem and neck pain. Skin: Negative for rash. Allergic/Immunologic: Negative for environmental allergies. Neurological: Positive for weakness and numbness. Negative for dizziness, syncope and light-headedness. Psychiatric/Behavioral: Negative for dysphoric mood. The patient is not nervous/anxious. PHYSICAL EXAM  /86   Pulse 88   Ht 5' 7\" (1.702 m)   Wt 220 lb (99.8 kg)   SpO2 98%   BMI 34.46 kg/m²   Physical Exam   Constitutional: She is oriented to person, place, and time. She appears well-developed and well-nourished. obese   HENT:   Head: Normocephalic and atraumatic. Right Ear: External ear normal.   Left Ear: External ear normal.   Nose: Nose normal.   Mouth/Throat: Oropharynx is clear and moist.   TMs intact bilaterally with no erythema or effusion.    Eyes: Conjunctivae and EOM are normal.   Neck: Normal range of motion. Neck supple. No thyromegaly present. Cardiovascular: Normal rate, regular rhythm and normal heart sounds. Exam reveals no gallop and no friction rub. No murmur heard. Pulmonary/Chest: Effort normal and breath sounds normal. She has no wheezes. Abdominal: Soft. She exhibits no distension and no mass. There is no tenderness. There is no rebound and no guarding. Musculoskeletal: Normal range of motion. She exhibits edema (1+ LE). She exhibits no tenderness. Lymphadenopathy:     She has no cervical adenopathy. Neurological: She is alert and oriented to person, place, and time. Uses cane currently   Skin: Skin is warm and dry. No rash noted. Psychiatric: She has a normal mood and affect. Her behavior is normal.   Nursing note and vitals reviewed. Laboratory:   All laboratory and radiology results have been personally reviewed by myself    Lab Results   Component Value Date     07/13/2018    K 4.5 07/13/2018    CL 98 07/13/2018    CO2 24 07/13/2018    BUN 7 07/13/2018    CREATININE 0.6 07/13/2018    PROT 7.4 07/08/2018    LABALBU 2.9 07/08/2018    CALCIUM 8.4 07/13/2018    GFRAA >60 07/13/2018    LABGLOM >60 07/13/2018    GLUCOSE 276 07/13/2018    AST 40 07/08/2018    ALT 22 07/08/2018    ALKPHOS 115 07/08/2018    BILITOT 1.2 07/08/2018    TSH 3.960 04/13/2018    CHOL 169 04/13/2018    TRIG 168 04/13/2018    HDL 39 04/13/2018    LDLCALC 96 04/13/2018    LABA1C 8.1 07/13/2018        Lab Results   Component Value Date    CHOL 169 04/13/2018    CHOL 169 12/18/2014     Lab Results   Component Value Date    TRIG 168 (H) 04/13/2018    TRIG 148 12/18/2014     Lab Results   Component Value Date    HDL 39 04/13/2018    HDL 42 12/18/2014     Lab Results   Component Value Date    LDLCALC 96 04/13/2018    LDLCALC 97 12/18/2014       Lab Results   Component Value Date    LABA1C 8.1 07/13/2018    LABA1C 9.9 04/13/2018     Lab Results   Component Value Date LDLCALC 96 04/13/2018    CREATININE 0.6 07/13/2018       ASSESSMENT/PLAN:     Diagnosis Orders   1. Type 2 diabetes mellitus without complication, with long-term current use of insulin (HCC)  Discuss medications and insulin dosing in detail today. At this point, I do recommend sliding scale insulin rather than her taking 60 units of the short acting at one time. We'll also increase Toujeo to 55 units at night. She'll follow-up in one month for reevaluation she'll also call with her sugar readings in the meantime. 2. Cirrhosis of liver with ascites, unspecified hepatic cirrhosis type ([facility] [address]  follows with GI much improved since paracentesis is going to get banding surgery as well      3. Fungal skin infection  Nystatin cream given today      4. Cervical facet joint syndrome ([facility] [address]  Follows with pain management is going to physical therapy      5. Encounter for screening mammogram for breast cancer  WALT Digital Screen Bilateral [[id number]]       Problem list reviewed and simplified/updated  HM reviewed today and counseled as appropriate    Call or go to ED immediately if symptoms worsen or persist.  Future Appointments  Date Time Provider Erick Klein   10/2/2018 2:00 PM Agatha Tan MD [facility]   10/18/2018 2:00 PM DO Woodrow Arnett [facility]     Or sooner if necessary.       Educational materials and/or home exercises printed for patient's review and were included in patient instructions on his/her After Visit Summary and given to patient at the end of visit.       Counseled regarding above diagnosis, including possible risks and complications,  especially if left uncontrolled.     Counseled regarding the possible side effects, risks, benefits and alternatives to treatment; patient and/or guardian verbalizes understanding, agrees, feels comfortable with and wishes to proceed with above treatment plan.     Advised patient to call with any new medication issues, and read all Rx info from pharmacy

## 2018-08-18 LAB
6AM URINE: <10 NG/ML
CODEINE, URINE: <20 NG/ML
HYDROCODONE, URINE: <20 NG/ML
HYDROMORPHONE, URINE: <20 NG/ML
MORPHINE URINE: <20 NG/ML
NORHYDROCODONE, URINE: <20 NG/ML
NOROXYCODONE, URINE: <20 NG/ML
NOROXYMORPHONE, URINE: <20 NG/ML
OXYCODONE, URINE CONFIRMATION: <20 NG/ML
OXYMORPHONE, URINE: <20 NG/ML

## 2018-08-20 ASSESSMENT — ENCOUNTER SYMPTOMS
DIARRHEA: 0
SINUS PRESSURE: 0
CONSTIPATION: 1
ABDOMINAL DISTENTION: 0
WHEEZING: 0
ABDOMINAL PAIN: 0
NAUSEA: 0
SINUS PAIN: 0
BACK PAIN: 1
VOMITING: 0

## 2018-08-22 ENCOUNTER — HOSPITAL ENCOUNTER (OUTPATIENT)
Dept: GENERAL RADIOLOGY | Age: 63
Discharge: HOME | End: 2018-08-24
Payer: COMMERCIAL

## 2018-08-22 ENCOUNTER — NURSE ONLY (OUTPATIENT)
Dept: FAMILY MEDICINE CLINIC | Age: 63
End: 2018-08-22
Payer: COMMERCIAL

## 2018-08-22 ENCOUNTER — TELEPHONE (OUTPATIENT)
Dept: FAMILY MEDICINE CLINIC | Age: 63
End: 2018-08-22

## 2018-08-22 DIAGNOSIS — Z12.31 ENCOUNTER FOR SCREENING MAMMOGRAM FOR BREAST CANCER: ICD-10-CM

## 2018-08-22 DIAGNOSIS — R35.0 URINE FREQUENCY: Primary | ICD-10-CM

## 2018-08-22 LAB
BILIRUBIN, POC: NORMAL
BLOOD URINE, POC: NORMAL
CLARITY, POC: NORMAL
COLOR, POC: YELLOW
GLUCOSE URINE, POC: >=1000
KETONES, POC: NORMAL
LEUKOCYTE EST, POC: NORMAL
NITRITE, POC: NEGATIVE
PH, POC: 5.5
PROTEIN, POC: >=300
SPECIFIC GRAVITY, POC: >=1.03
UROBILINOGEN, POC: 1

## 2018-08-22 PROCEDURE — 81002 URINALYSIS NONAUTO W/O SCOPE: CPT | Performed by: FAMILY MEDICINE

## 2018-08-22 PROCEDURE — 77063 BREAST TOMOSYNTHESIS BI: CPT

## 2018-08-22 RX ORDER — SULFAMETHOXAZOLE AND TRIMETHOPRIM 800; 160 MG/1; MG/1
1 TABLET ORAL 2 TIMES DAILY
Qty: 6 TABLET | Refills: 0 | Status: SHIPPED | OUTPATIENT
Start: 2018-08-22 | End: 2018-08-25

## 2018-08-27 ENCOUNTER — TELEPHONE (OUTPATIENT)
Dept: FAMILY MEDICINE CLINIC | Age: 63
End: 2018-08-27

## 2018-08-28 ENCOUNTER — TELEPHONE (OUTPATIENT)
Dept: FAMILY MEDICINE CLINIC | Age: 63
End: 2018-08-28

## 2018-08-28 ENCOUNTER — HOSPITAL ENCOUNTER (OUTPATIENT)
Dept: PHYSICAL THERAPY | Age: 63
Setting detail: THERAPIES SERIES
Discharge: HOME | End: 2018-08-28
Payer: COMMERCIAL

## 2018-08-28 PROCEDURE — 97162 PT EVAL MOD COMPLEX 30 MIN: CPT

## 2018-08-28 PROCEDURE — G8978 MOBILITY CURRENT STATUS: HCPCS

## 2018-08-28 PROCEDURE — G8979 MOBILITY GOAL STATUS: HCPCS

## 2018-08-28 RX ORDER — BLOOD-GLUCOSE METER
1 KIT MISCELLANEOUS DAILY PRN
Qty: 1 KIT | Refills: 0 | Status: SHIPPED | OUTPATIENT
Start: 2018-08-28 | End: 2019-09-18

## 2018-08-28 NOTE — PROGRESS NOTES
Physical Therapy  Initial Assessment  Date: 2018  Patient Name: Abel Allison  MRN: [id number]  : 1955          Restrictions       Subjective   General  Chart Reviewed: Yes  Patient assessed for rehabilitation services?: Yes  Additional Pertinent Hx: Patient presents to PT with complaint of back/hip pain, dysfunctional gait and hx of falls or near falls. Patient is a diabetic andcites \"neuropathy\" as one reason for her altered gait and falls. Has consulted with pain management. Has orderes for xrays but these have not been done to date. Family / Caregiver Present: Yes  Referring Practitioner: Dr Maryann White   Referral Date : 18  Diagnosis: Back Pain   Follows Commands: Within Functional Limits  PT Visit Information  Onset Date: 18  PT Insurance Information: [employer]  Subjective  Subjective: Current complaints: Pain in the sacral region and bilateral LE's in the knee area. Patient reports difficulty with ambulation and balance   Pain Screening  Patient Currently in Pain: Yes  Vital Signs  Patient Currently in Pain: Yes    Vision/Hearing  Vision  Vision: Within Functional Limits  Hearing  Hearing: Within functional limits    Orientation  Orientation  Overall Orientation Status: Within Functional Limits    Social/Functional History  Social/Functional History  Lives With: Son  Type of Home: House  Home Layout: Two level; Work area in [employer] From: [employer] Responsibilities: Yes  Active : No  Occupation: Retired  Objective     Observation/Palpation  Posture: Fair  Observation: Patient was guarded and uncertain when doing transitional movements, transfers and ambulation. Steps were small with slow mayra. Patient sought hand holds or external support frequently.  Patient also expressed feeling dizzy several times while doing transitional movements In spite of this patient was able to ambulate with only SBA to CGAand perform transfers and other functional movements     AROM RLE (degrees)  RLE AROM: WFL  AROM LLE (degrees)  LLE AROM : WFL  Spine  Lumbar: Limited all ranges due to patient's expressed feeling of fear of falling due to poor balance     Strength RLE  Comment: 3+ to 4-/5   Strength LLE  Comment: 3+ to 4-/5   Strength Other  Other: trunk/core 3 to 3+/5   Tone RLE  RLE Tone: Normotonic  Tone LLE  LLE Tone: Normotonic  Motor Control  Gross Motor?: WFL     Sensation  Overall Sensation Status: Impaired                                     Assessment   Conditions Requiring Skilled Therapeutic Intervention  Body structures, Functions, Activity limitations: Decreased functional mobility ; Decreased endurance;Decreased ROM; Decreased strength;Decreased coordination  Assessment: Dysfunctional gait with general LE weakness and altered sensation bilateral lower legs and feet   Prognosis: Fair  Decision Making: Medium Complexity  REQUIRES PT FOLLOW UP: Yes         Plan   Plan  Times per week: 2  Plan weeks: 5  Current Treatment Recommendations: Strengthening, Balance Training, Functional Mobility Training, Home Exercise Program, Modalities    G-Code  PT G-Codes  Functional Limitation: Mobility: Walking and moving around  Mobility: Walking and Moving Around Current Status (): At least 20 percent but less than 40 percent impaired, limited or restricted  Mobility: Walking and Moving Around Goal Status ():  At least 1 percent but less than 20 percent impaired, limited or restricted    OutComes Score                                           Goals          Therapy Time   Individual Concurrent Group Co-treatment   Time In 1300         Time Out 1350         Minutes 50         Timed Code Treatment Minutes: 27 Minutes       Jonna Uribe PT

## 2018-08-29 RX ORDER — LISINOPRIL 20 MG/1
TABLET ORAL
Qty: 30 TABLET | Refills: 2 | Status: SHIPPED | OUTPATIENT
Start: 2018-08-29 | End: 2018-10-18 | Stop reason: SDUPTHER

## 2018-08-30 ENCOUNTER — HOSPITAL ENCOUNTER (OUTPATIENT)
Dept: PHYSICAL THERAPY | Age: 63
Setting detail: THERAPIES SERIES
Discharge: HOME | End: 2018-08-30
Payer: COMMERCIAL

## 2018-08-30 ENCOUNTER — OFFICE VISIT (OUTPATIENT)
Dept: FAMILY MEDICINE CLINIC | Age: 63
End: 2018-08-30
Payer: COMMERCIAL

## 2018-08-30 VITALS
RESPIRATION RATE: 16 BRPM | DIASTOLIC BLOOD PRESSURE: 71 MMHG | HEART RATE: 102 BPM | BODY MASS INDEX: 33.59 KG/M2 | OXYGEN SATURATION: 96 % | HEIGHT: 67 IN | WEIGHT: 214 LBS | SYSTOLIC BLOOD PRESSURE: 108 MMHG

## 2018-08-30 DIAGNOSIS — B35.1 ONYCHOMYCOSIS: ICD-10-CM

## 2018-08-30 DIAGNOSIS — E11.42 TYPE 2 DIABETES MELLITUS WITH DIABETIC POLYNEUROPATHY, WITH LONG-TERM CURRENT USE OF INSULIN (HCC): Primary | Chronic | ICD-10-CM

## 2018-08-30 DIAGNOSIS — L60.0 INGROWN LEFT BIG TOENAIL: ICD-10-CM

## 2018-08-30 DIAGNOSIS — Z79.4 TYPE 2 DIABETES MELLITUS WITH DIABETIC POLYNEUROPATHY, WITH LONG-TERM CURRENT USE OF INSULIN (HCC): Primary | Chronic | ICD-10-CM

## 2018-08-30 PROCEDURE — 99214 OFFICE O/P EST MOD 30 MIN: CPT | Performed by: FAMILY MEDICINE

## 2018-08-30 PROCEDURE — 3017F COLORECTAL CA SCREEN DOC REV: CPT | Performed by: FAMILY MEDICINE

## 2018-08-30 PROCEDURE — 1036F TOBACCO NON-USER: CPT | Performed by: FAMILY MEDICINE

## 2018-08-30 PROCEDURE — G8417 CALC BMI ABV UP PARAM F/U: HCPCS | Performed by: FAMILY MEDICINE

## 2018-08-30 PROCEDURE — 2022F DILAT RTA XM EVC RTNOPTHY: CPT | Performed by: FAMILY MEDICINE

## 2018-08-30 PROCEDURE — G8427 DOCREV CUR MEDS BY ELIG CLIN: HCPCS | Performed by: FAMILY MEDICINE

## 2018-08-30 PROCEDURE — 3045F PR MOST RECENT HEMOGLOBIN A1C LEVEL 7.0-9.0%: CPT | Performed by: FAMILY MEDICINE

## 2018-08-30 RX ORDER — LANCETS 28 GAUGE
1 EACH MISCELLANEOUS DAILY
Qty: 100 EACH | Refills: 3 | Status: SHIPPED | OUTPATIENT
Start: 2018-08-30 | End: 2019-09-18

## 2018-08-30 NOTE — PROGRESS NOTES
lisinopril (PRINIVIL;ZESTRIL) 20 MG tablet TAKE 1 TABLET BY MOUTH EVERY DAY 30 tablet 2    glucose monitoring kit (FREESTYLE) monitoring kit 1 kit by Does not apply route daily as needed (glucose) 1 kit 0    insulin glargine (TOUJEO SOLOSTAR) 300 UNIT/ML injection pen Inject 55 Units into the skin nightly 3 pen 3    nystatin (MYCOSTATIN) 330827 UNIT/GM cream Apply topically 2 times daily. 1 Tube 0    Insulin Pen Needle 31G X 5 MM MISC 1 each by Does not apply route daily 100 each 3    omeprazole (PRILOSEC) 40 MG delayed release capsule TAKE 1 CAPSULE BY MOUTH DAILY (WITH BREAKFAST) 30 capsule 3    pantoprazole (PROTONIX) 40 MG tablet       polyethylene glycol (GLYCOLAX) powder       simvastatin (ZOCOR) 20 MG tablet TAKE 1 TABLET BY MOUTH EVERY DAY IN THE EVENING 30 tablet 2    NOVOLOG FLEXPEN 100 UNIT/ML injection pen USE FOR SLIDING SCALE 3 TIMES DAILY BEFORE MEALS **MAX 60 UNITS PER DAY** 5 pen 3    montelukast (SINGULAIR) 10 MG tablet Take 1 tablet by mouth nightly 30 tablet 2    amitriptyline (ELAVIL) 10 MG tablet Take 1 tablet by mouth nightly as needed for Sleep 30 tablet 3    furosemide (LASIX) 20 MG tablet Take 2 tablets by mouth daily for 30 doses 60 tablet 2    ondansetron (ZOFRAN) 4 MG tablet Take 1 tablet by mouth every 8 hours as needed for Nausea or Vomiting 20 tablet 0    venlafaxine (EFFEXOR XR) 37.5 MG extended release capsule TAKE 1 CAPSULE BY MOUTH EVERY DAY PLEASE SCHEDULE APPT 30 capsule 2    metFORMIN (GLUCOPHAGE-XR) 500 MG extended release tablet TAKE 1 TABLET BY MOUTH EVERY DAY  1    acetaminophen 650 MG TABS Take 650 mg by mouth every 4 hours as needed for Fever (Temp greater than 100.5, for pain score 1-3). 120 tablet 3    gabapentin (NEURONTIN) 300 MG capsule Take 300 mg by mouth 4 times daily.  hydrochlorothiazide (HYDRODIURIL) 25 MG tablet Take 25 mg by mouth daily. No current facility-administered medications for this visit.         Allergies   Allergen Reactions    Metformin      GI Upset    Sertraline Other (See Comments)       Health Maintenance Due   Topic Date Due    Hepatitis C screen  1955    Diabetic retinal exam  06/27/1964    HIV screen  06/27/1969    DTaP/Tdap/Td vaccine (1 - Tdap) 06/27/1973    Pneumococcal med risk (1 of 1 - PPSV23) 06/27/1973    Shingles Vaccine (1 of 2 - 2 Dose Series) 06/27/2004    Cervical cancer screen  07/20/2015           REVIEW OF SYSTEMS  Review of Systems   Constitutional: Positive for fatigue. Negative for chills and fever. HENT: Negative for congestion, postnasal drip, sinus pain and sinus pressure. Eyes: Negative for visual disturbance. Respiratory: Negative for wheezing. Cardiovascular: Negative for chest pain and leg swelling. Gastrointestinal: Positive for constipation. Negative for abdominal distention, abdominal pain, diarrhea, nausea and vomiting. Genitourinary: Negative for dysuria and frequency. Musculoskeletal: Positive for arthralgias, back pain, gait problem and neck pain. Left toe issue   Skin: Negative for rash. Allergic/Immunologic: Negative for environmental allergies. Neurological: Positive for numbness. Negative for dizziness, syncope, weakness and light-headedness. Psychiatric/Behavioral: Negative for dysphoric mood. The patient is not nervous/anxious. PHYSICAL EXAM  /71 (Site: Left Arm)   Pulse 102   Resp 16   Ht 5' 7\" (1.702 m)   Wt 214 lb (97.1 kg)   SpO2 96%   BMI 33.52 kg/m²   Physical Exam   Constitutional: She is oriented to person, place, and time. She appears well-developed and well-nourished. obese   HENT:   Head: Normocephalic and atraumatic. Right Ear: External ear normal.   Left Ear: External ear normal.   Nose: Nose normal.   Mouth/Throat: Oropharynx is clear and moist.   TMs intact bilaterally with no erythema or effusion. Eyes: Conjunctivae and EOM are normal.   Neck: Normal range of motion. Neck supple.    Cardiovascular: Normal rate, regular rhythm and normal heart sounds. Exam reveals no gallop and no friction rub. No murmur heard. Pulmonary/Chest: Effort normal and breath sounds normal. She has no wheezes. Abdominal: Soft. She exhibits no distension and no mass. There is no tenderness. There is no rebound and no guarding. Musculoskeletal: Normal range of motion. She exhibits edema (1+ LE). She exhibits no tenderness. Neurological: She is alert and oriented to person, place, and time. Uses cane currently   Skin: Skin is warm and dry. No rash noted. Onychomycosis noted bilateral toes. Left great toe does have a blood blister on the medial portion. Difficult to assess if there is erythema to the blister. There is an ingrown toenail noted as well medial portion of the toe   Psychiatric: She has a normal mood and affect. Her behavior is normal.   Nursing note and vitals reviewed. Laboratory:   All laboratory and radiology results have been personally reviewed by myself    Lab Results   Component Value Date     07/13/2018    K 4.5 07/13/2018    CL 98 07/13/2018    CO2 24 07/13/2018    BUN 7 07/13/2018    CREATININE 0.6 07/13/2018    PROT 7.4 07/08/2018    LABALBU 2.9 07/08/2018    CALCIUM 8.4 07/13/2018    GFRAA >60 07/13/2018    LABGLOM >60 07/13/2018    GLUCOSE 276 07/13/2018    AST 40 07/08/2018    ALT 22 07/08/2018    ALKPHOS 115 07/08/2018    BILITOT 1.2 07/08/2018    TSH 3.960 04/13/2018    CHOL 169 04/13/2018    TRIG 168 04/13/2018    HDL 39 04/13/2018    LDLCALC 96 04/13/2018    LABA1C 8.1 07/13/2018        Lab Results   Component Value Date    CHOL 169 04/13/2018    CHOL 169 12/18/2014     Lab Results   Component Value Date    TRIG 168 (H) 04/13/2018    TRIG 148 12/18/2014     Lab Results   Component Value Date    HDL 39 04/13/2018    HDL 42 12/18/2014     Lab Results   Component Value Date    LDLCALC 96 04/13/2018    LDLCALC 97 12/18/2014       Lab Results   Component Value Date    LABA1C 8.1

## 2018-08-30 NOTE — PROGRESS NOTES
[facility]  Phone: [phone] Fax: [phone]     Physical Therapy  Cancellation/No-show Note  Patient Name:  Suman Linn  :  1955   Date:  2018    For today's appointment patient:  [x]  Cancelled  []  Rescheduled appointment  []  No-show     Reason given by patient:  []  Patient ill  []  Conflicting appointment  []  No transportation    []  Conflict with work  []  No reason given  [x]  Other:     Comments: Next PT appointment 2018      Electronically signed by:  Sheila Pugh, [address] [place] [address]

## 2018-08-31 ASSESSMENT — ENCOUNTER SYMPTOMS
NAUSEA: 0
WHEEZING: 0
SINUS PRESSURE: 0
SINUS PAIN: 0
DIARRHEA: 0
ABDOMINAL DISTENTION: 0
CONSTIPATION: 1
ABDOMINAL PAIN: 0
VOMITING: 0
BACK PAIN: 1

## 2018-09-05 ENCOUNTER — HOSPITAL ENCOUNTER (OUTPATIENT)
Dept: PHYSICAL THERAPY | Age: 63
Setting detail: THERAPIES SERIES
Discharge: HOME | End: 2018-09-05
Payer: COMMERCIAL

## 2018-09-07 ENCOUNTER — APPOINTMENT (OUTPATIENT)
Dept: GENERAL RADIOLOGY | Age: 63
DRG: 064 | End: 2018-09-07
Payer: COMMERCIAL

## 2018-09-07 ENCOUNTER — APPOINTMENT (OUTPATIENT)
Dept: CT IMAGING | Age: 63
DRG: 064 | End: 2018-09-07
Payer: COMMERCIAL

## 2018-09-07 ENCOUNTER — HOSPITAL ENCOUNTER (INPATIENT)
Age: 63
LOS: 4 days | Discharge: HOME HEALTH | DRG: 064 | End: 2018-09-12
Attending: EMERGENCY MEDICINE | Admitting: INTERNAL MEDICINE
Payer: COMMERCIAL

## 2018-09-07 DIAGNOSIS — L03.90 CELLULITIS, UNSPECIFIED CELLULITIS SITE: ICD-10-CM

## 2018-09-07 DIAGNOSIS — A41.9 SEPTICEMIA (HCC): ICD-10-CM

## 2018-09-07 DIAGNOSIS — R50.9 FEVER, UNSPECIFIED FEVER CAUSE: ICD-10-CM

## 2018-09-07 DIAGNOSIS — R56.9 SEIZURE (HCC): ICD-10-CM

## 2018-09-07 DIAGNOSIS — R41.89 UNRESPONSIVE EPISODE: Primary | ICD-10-CM

## 2018-09-07 LAB
ALBUMIN SERPL-MCNC: 3.3 G/DL (ref 3.5–5.2)
ALP BLD-CCNC: 161 U/L (ref 35–104)
ALT SERPL-CCNC: 38 U/L (ref 0–32)
ANION GAP SERPL CALCULATED.3IONS-SCNC: 14 MMOL/L (ref 7–16)
APTT: 32.1 SEC (ref 24.5–35.1)
AST SERPL-CCNC: 85 U/L (ref 0–31)
BASOPHILS ABSOLUTE: 0.06 E9/L (ref 0–0.2)
BASOPHILS RELATIVE PERCENT: 0.5 % (ref 0–2)
BILIRUB SERPL-MCNC: 1.3 MG/DL (ref 0–1.2)
BUN BLDV-MCNC: 10 MG/DL (ref 8–23)
CALCIUM SERPL-MCNC: 9.2 MG/DL (ref 8.6–10.2)
CHLORIDE BLD-SCNC: 94 MMOL/L (ref 98–107)
CHP ED QC CHECK: YES
CO2: 21 MMOL/L (ref 22–29)
CREAT SERPL-MCNC: 0.6 MG/DL (ref 0.5–1)
EKG ATRIAL RATE: 122 BPM
EKG P AXIS: 63 DEGREES
EKG P-R INTERVAL: 148 MS
EKG Q-T INTERVAL: 324 MS
EKG QRS DURATION: 84 MS
EKG QTC CALCULATION (BAZETT): 461 MS
EKG R AXIS: -6 DEGREES
EKG T AXIS: 76 DEGREES
EKG VENTRICULAR RATE: 122 BPM
EOSINOPHILS ABSOLUTE: 0.02 E9/L (ref 0.05–0.5)
EOSINOPHILS RELATIVE PERCENT: 0.2 % (ref 0–6)
GFR AFRICAN AMERICAN: >60
GFR NON-AFRICAN AMERICAN: >60 ML/MIN/1.73
GLUCOSE BLD-MCNC: 363 MG/DL (ref 74–109)
GLUCOSE BLD-MCNC: 365 MG/DL
HCT VFR BLD CALC: 43.6 % (ref 34–48)
HEMOGLOBIN: 15.2 G/DL (ref 11.5–15.5)
IMMATURE GRANULOCYTES #: 0.08 E9/L
IMMATURE GRANULOCYTES %: 0.7 % (ref 0–5)
INR BLD: 1.3
LYMPHOCYTES ABSOLUTE: 1.4 E9/L (ref 1.5–4)
LYMPHOCYTES RELATIVE PERCENT: 11.6 % (ref 20–42)
MCH RBC QN AUTO: 32.6 PG (ref 26–35)
MCHC RBC AUTO-ENTMCNC: 34.9 % (ref 32–34.5)
MCV RBC AUTO: 93.6 FL (ref 80–99.9)
METER GLUCOSE: 365 MG/DL (ref 70–110)
MONOCYTES ABSOLUTE: 0.58 E9/L (ref 0.1–0.95)
MONOCYTES RELATIVE PERCENT: 4.8 % (ref 2–12)
NEUTROPHILS ABSOLUTE: 9.97 E9/L (ref 1.8–7.3)
NEUTROPHILS RELATIVE PERCENT: 82.2 % (ref 43–80)
PDW BLD-RTO: 13.6 FL (ref 11.5–15)
PLATELET # BLD: 138 E9/L (ref 130–450)
PMV BLD AUTO: 11.3 FL (ref 7–12)
POTASSIUM SERPL-SCNC: 4.7 MMOL/L (ref 3.5–5)
PROTHROMBIN TIME: 15 SEC (ref 9.3–12.4)
RBC # BLD: 4.66 E12/L (ref 3.5–5.5)
SODIUM BLD-SCNC: 129 MMOL/L (ref 132–146)
TOTAL PROTEIN: 8.5 G/DL (ref 6.4–8.3)
TROPONIN: <0.01 NG/ML (ref 0–0.03)
WBC # BLD: 12.1 E9/L (ref 4.5–11.5)

## 2018-09-07 PROCEDURE — 71045 X-RAY EXAM CHEST 1 VIEW: CPT

## 2018-09-07 PROCEDURE — 84484 ASSAY OF TROPONIN QUANT: CPT

## 2018-09-07 PROCEDURE — 96374 THER/PROPH/DIAG INJ IV PUSH: CPT

## 2018-09-07 PROCEDURE — 6360000004 HC RX CONTRAST MEDICATION: Performed by: RADIOLOGY

## 2018-09-07 PROCEDURE — 80053 COMPREHEN METABOLIC PANEL: CPT

## 2018-09-07 PROCEDURE — 82962 GLUCOSE BLOOD TEST: CPT

## 2018-09-07 PROCEDURE — 70496 CT ANGIOGRAPHY HEAD: CPT

## 2018-09-07 PROCEDURE — 0042T CT BRAIN PERFUSION: CPT

## 2018-09-07 PROCEDURE — 85025 COMPLETE CBC W/AUTO DIFF WBC: CPT

## 2018-09-07 PROCEDURE — 82550 ASSAY OF CK (CPK): CPT

## 2018-09-07 PROCEDURE — 87077 CULTURE AEROBIC IDENTIFY: CPT

## 2018-09-07 PROCEDURE — 93005 ELECTROCARDIOGRAM TRACING: CPT | Performed by: EMERGENCY MEDICINE

## 2018-09-07 PROCEDURE — 82140 ASSAY OF AMMONIA: CPT

## 2018-09-07 PROCEDURE — 85730 THROMBOPLASTIN TIME PARTIAL: CPT

## 2018-09-07 PROCEDURE — 36415 COLL VENOUS BLD VENIPUNCTURE: CPT

## 2018-09-07 PROCEDURE — 87186 SC STD MICRODIL/AGAR DIL: CPT

## 2018-09-07 PROCEDURE — 70498 CT ANGIOGRAPHY NECK: CPT

## 2018-09-07 PROCEDURE — 99285 EMERGENCY DEPT VISIT HI MDM: CPT

## 2018-09-07 PROCEDURE — 87149 DNA/RNA DIRECT PROBE: CPT

## 2018-09-07 PROCEDURE — 85610 PROTHROMBIN TIME: CPT

## 2018-09-07 PROCEDURE — 87040 BLOOD CULTURE FOR BACTERIA: CPT

## 2018-09-07 PROCEDURE — 70450 CT HEAD/BRAIN W/O DYE: CPT

## 2018-09-07 PROCEDURE — 83690 ASSAY OF LIPASE: CPT

## 2018-09-07 RX ORDER — ACETAMINOPHEN 500 MG
1000 TABLET ORAL ONCE
Status: COMPLETED | OUTPATIENT
Start: 2018-09-07 | End: 2018-09-08

## 2018-09-07 RX ORDER — SODIUM CHLORIDE 9 MG/ML
INJECTION, SOLUTION INTRAVENOUS CONTINUOUS
Status: DISCONTINUED | OUTPATIENT
Start: 2018-09-07 | End: 2018-09-12

## 2018-09-07 RX ORDER — LEVETIRACETAM 10 MG/ML
INJECTION INTRAVASCULAR
Status: COMPLETED
Start: 2018-09-07 | End: 2018-09-07

## 2018-09-07 RX ORDER — 0.9 % SODIUM CHLORIDE 0.9 %
1000 INTRAVENOUS SOLUTION INTRAVENOUS ONCE
Status: COMPLETED | OUTPATIENT
Start: 2018-09-07 | End: 2018-09-08

## 2018-09-07 RX ORDER — LEVETIRACETAM 10 MG/ML
1000 INJECTION INTRAVASCULAR ONCE
Status: COMPLETED | OUTPATIENT
Start: 2018-09-07 | End: 2018-09-07

## 2018-09-07 RX ADMIN — LEVETIRACETAM 1000 MG: 10 INJECTION INTRAVASCULAR at 23:03

## 2018-09-07 RX ADMIN — IOPAMIDOL 100 ML: 755 INJECTION, SOLUTION INTRAVENOUS at 22:30

## 2018-09-07 NOTE — FLOWSHEET NOTE
[facility]  Phone: [phone] Fax: [phone]     Physical Therapy  Out Patient Initial Evaluation    Date:  2018     Patient Name:  Hazel Medel    :  1955  MRN: [id number]    DIAGNOSIS:  Multi Site Pain Issues   EVALUATION DATE:  2018   REFERRING PHYSICIAN:  Dr Dodd Breaker:  2018     PROBLEMS FOUND DURING EVALUATION  · Pain: Affects multiple sites including, but not limited to, lumbar and LS areas, bilateral knees and bilateral shoulders. Pain is rated as Constant with intensity and focus area of pain varying   · Altered/dysfunctional gait  · Deficits of strength bilateral LE's   · Deficits of Coordination and Balance     SHORT TERM GOALS  · Patient will be able to engage in consistent and progressive active exercise while reporting no significant increase in acute back pain   · Establish HEP    LONG TERM GOALS  · Patient will be able to maintain ADL's and functional mobility while being able to effectively control back pain at 3/10 or less  · Gait Quality will be Fair or better over functional distances on a consistent basis   · Strength Bilateral LE's Fair or better  · General coordination and Balance Fair or better  · Patient will be able to maintain and self direct an appropriate follow up independent exercise program     PATIENT GOALS  · Control pain and improve mobility     REHAB POTENTIAL:  Fair    FREQUENCY/DURATION:  2 times per week 5 weeks     PLAN OF CARE:  Progressive exercise Gait/Balance activity Postural education HEP       Thank you for the opportunity to work with your patient. If you have questions or comments, please feel free to contact me by phone or fax.       Electronically Signed by Mendel Lenis PT [id number]        ___________________________________  2018     Physician     Date

## 2018-09-08 ENCOUNTER — APPOINTMENT (OUTPATIENT)
Dept: ULTRASOUND IMAGING | Age: 63
DRG: 064 | End: 2018-09-08
Payer: COMMERCIAL

## 2018-09-08 ENCOUNTER — APPOINTMENT (OUTPATIENT)
Dept: CT IMAGING | Age: 63
DRG: 064 | End: 2018-09-08
Payer: COMMERCIAL

## 2018-09-08 PROBLEM — M47.816 LUMBAR SPONDYLOSIS: Chronic | Status: ACTIVE | Noted: 2018-08-14

## 2018-09-08 PROBLEM — R41.82 ALTERED MENTAL STATUS: Status: ACTIVE | Noted: 2018-09-08

## 2018-09-08 PROBLEM — R18.8 CIRRHOSIS OF LIVER WITH ASCITES (HCC): Chronic | Status: ACTIVE | Noted: 2018-07-16

## 2018-09-08 PROBLEM — M16.0 PRIMARY OSTEOARTHRITIS OF BOTH HIPS: Chronic | Status: ACTIVE | Noted: 2018-08-14

## 2018-09-08 PROBLEM — K74.60 CIRRHOSIS OF LIVER WITH ASCITES (HCC): Chronic | Status: ACTIVE | Noted: 2018-07-16

## 2018-09-08 PROBLEM — M54.16 LUMBAR RADICULOPATHY: Chronic | Status: ACTIVE | Noted: 2018-08-14

## 2018-09-08 PROBLEM — M47.816 LUMBAR FACET ARTHROPATHY: Chronic | Status: ACTIVE | Noted: 2018-08-14

## 2018-09-08 LAB
AMMONIA: <10 UMOL/L (ref 11–51)
BACTERIA: ABNORMAL /HPF
BILIRUBIN URINE: NEGATIVE
BLOOD, URINE: ABNORMAL
CLARITY: ABNORMAL
COLOR: YELLOW
GLUCOSE URINE: >=1000 MG/DL
KETONES, URINE: ABNORMAL MG/DL
LACTIC ACID: 2.3 MMOL/L (ref 0.5–2.2)
LACTIC ACID: 2.9 MMOL/L (ref 0.5–2.2)
LACTIC ACID: 3.9 MMOL/L (ref 0.5–2.2)
LACTIC ACID: 4.2 MMOL/L (ref 0.5–2.2)
LEUKOCYTE ESTERASE, URINE: NEGATIVE
LIPASE: 25 U/L (ref 13–60)
LIPASE: 35 U/L (ref 13–60)
METER GLUCOSE: 245 MG/DL (ref 70–110)
METER GLUCOSE: 262 MG/DL (ref 70–110)
METER GLUCOSE: 307 MG/DL (ref 70–110)
METER GLUCOSE: 361 MG/DL (ref 70–110)
NITRITE, URINE: NEGATIVE
PH UA: 6 (ref 5–9)
PROTEIN UA: NEGATIVE MG/DL
RBC UA: ABNORMAL /HPF (ref 0–2)
SPECIFIC GRAVITY UA: 1.01 (ref 1–1.03)
TOTAL CK: 148 U/L (ref 20–180)
UROBILINOGEN, URINE: 0.2 E.U./DL
WBC UA: ABNORMAL /HPF (ref 0–5)

## 2018-09-08 PROCEDURE — 70450 CT HEAD/BRAIN W/O DYE: CPT

## 2018-09-08 PROCEDURE — 36415 COLL VENOUS BLD VENIPUNCTURE: CPT

## 2018-09-08 PROCEDURE — 93971 EXTREMITY STUDY: CPT

## 2018-09-08 PROCEDURE — 2060000000 HC ICU INTERMEDIATE R&B

## 2018-09-08 PROCEDURE — 87186 SC STD MICRODIL/AGAR DIL: CPT

## 2018-09-08 PROCEDURE — 2580000003 HC RX 258: Performed by: EMERGENCY MEDICINE

## 2018-09-08 PROCEDURE — 2580000003 HC RX 258: Performed by: RADIOLOGY

## 2018-09-08 PROCEDURE — 99221 1ST HOSP IP/OBS SF/LOW 40: CPT | Performed by: SURGERY

## 2018-09-08 PROCEDURE — 70498 CT ANGIOGRAPHY NECK: CPT

## 2018-09-08 PROCEDURE — 70496 CT ANGIOGRAPHY HEAD: CPT

## 2018-09-08 PROCEDURE — 6360000002 HC RX W HCPCS: Performed by: INTERNAL MEDICINE

## 2018-09-08 PROCEDURE — 83690 ASSAY OF LIPASE: CPT

## 2018-09-08 PROCEDURE — 6360000002 HC RX W HCPCS: Performed by: EMERGENCY MEDICINE

## 2018-09-08 PROCEDURE — 81001 URINALYSIS AUTO W/SCOPE: CPT

## 2018-09-08 PROCEDURE — 82962 GLUCOSE BLOOD TEST: CPT

## 2018-09-08 PROCEDURE — 87088 URINE BACTERIA CULTURE: CPT

## 2018-09-08 PROCEDURE — 93880 EXTRACRANIAL BILAT STUDY: CPT

## 2018-09-08 PROCEDURE — 6370000000 HC RX 637 (ALT 250 FOR IP): Performed by: EMERGENCY MEDICINE

## 2018-09-08 PROCEDURE — 83605 ASSAY OF LACTIC ACID: CPT

## 2018-09-08 PROCEDURE — 97161 PT EVAL LOW COMPLEX 20 MIN: CPT

## 2018-09-08 PROCEDURE — 99253 IP/OBS CNSLTJ NEW/EST LOW 45: CPT | Performed by: PSYCHIATRY & NEUROLOGY

## 2018-09-08 PROCEDURE — 74177 CT ABD & PELVIS W/CONTRAST: CPT

## 2018-09-08 PROCEDURE — 96375 TX/PRO/DX INJ NEW DRUG ADDON: CPT

## 2018-09-08 PROCEDURE — 87077 CULTURE AEROBIC IDENTIFY: CPT

## 2018-09-08 PROCEDURE — 6370000000 HC RX 637 (ALT 250 FOR IP): Performed by: INTERNAL MEDICINE

## 2018-09-08 PROCEDURE — 6360000004 HC RX CONTRAST MEDICATION: Performed by: RADIOLOGY

## 2018-09-08 PROCEDURE — 6370000000 HC RX 637 (ALT 250 FOR IP): Performed by: PSYCHIATRY & NEUROLOGY

## 2018-09-08 RX ORDER — METHIMAZOLE 5 MG/1
2.5 TABLET ORAL
Status: DISCONTINUED | OUTPATIENT
Start: 2018-09-08 | End: 2018-09-12 | Stop reason: HOSPADM

## 2018-09-08 RX ORDER — VENLAFAXINE HYDROCHLORIDE 37.5 MG/1
37.5 CAPSULE, EXTENDED RELEASE ORAL
Status: DISCONTINUED | OUTPATIENT
Start: 2018-09-08 | End: 2018-09-12 | Stop reason: HOSPADM

## 2018-09-08 RX ORDER — 0.9 % SODIUM CHLORIDE 0.9 %
30 INTRAVENOUS SOLUTION INTRAVENOUS ONCE
Status: DISCONTINUED | OUTPATIENT
Start: 2018-09-08 | End: 2018-09-12 | Stop reason: HOSPADM

## 2018-09-08 RX ORDER — SODIUM CHLORIDE 0.9 % (FLUSH) 0.9 %
10 SYRINGE (ML) INJECTION EVERY 12 HOURS SCHEDULED
Status: DISCONTINUED | OUTPATIENT
Start: 2018-09-08 | End: 2018-09-12 | Stop reason: HOSPADM

## 2018-09-08 RX ORDER — DEXTROSE MONOHYDRATE 25 G/50ML
12.5 INJECTION, SOLUTION INTRAVENOUS PRN
Status: DISCONTINUED | OUTPATIENT
Start: 2018-09-08 | End: 2018-09-12 | Stop reason: HOSPADM

## 2018-09-08 RX ORDER — SODIUM CHLORIDE 0.9 % (FLUSH) 0.9 %
10 SYRINGE (ML) INJECTION PRN
Status: COMPLETED | OUTPATIENT
Start: 2018-09-08 | End: 2018-09-08

## 2018-09-08 RX ORDER — ACETAMINOPHEN 325 MG/1
650 TABLET ORAL EVERY 4 HOURS PRN
Status: DISCONTINUED | OUTPATIENT
Start: 2018-09-08 | End: 2018-09-12 | Stop reason: HOSPADM

## 2018-09-08 RX ORDER — 0.9 % SODIUM CHLORIDE 0.9 %
1000 INTRAVENOUS SOLUTION INTRAVENOUS ONCE
Status: COMPLETED | OUTPATIENT
Start: 2018-09-08 | End: 2018-09-08

## 2018-09-08 RX ORDER — LORAZEPAM 2 MG/ML
1 INJECTION INTRAMUSCULAR ONCE
Status: COMPLETED | OUTPATIENT
Start: 2018-09-08 | End: 2018-09-09

## 2018-09-08 RX ORDER — LISINOPRIL 20 MG/1
20 TABLET ORAL DAILY
Status: DISCONTINUED | OUTPATIENT
Start: 2018-09-08 | End: 2018-09-12 | Stop reason: HOSPADM

## 2018-09-08 RX ORDER — ASPIRIN 81 MG/1
81 TABLET ORAL DAILY
Status: DISCONTINUED | OUTPATIENT
Start: 2018-09-08 | End: 2018-09-08

## 2018-09-08 RX ORDER — CLOPIDOGREL 300 MG/1
300 TABLET, FILM COATED ORAL ONCE
Status: COMPLETED | OUTPATIENT
Start: 2018-09-08 | End: 2018-09-08

## 2018-09-08 RX ORDER — INSULIN GLARGINE 100 [IU]/ML
44 INJECTION, SOLUTION SUBCUTANEOUS NIGHTLY
Status: DISCONTINUED | OUTPATIENT
Start: 2018-09-08 | End: 2018-09-12 | Stop reason: HOSPADM

## 2018-09-08 RX ORDER — SIMVASTATIN 20 MG
20 TABLET ORAL NIGHTLY
Status: DISCONTINUED | OUTPATIENT
Start: 2018-09-08 | End: 2018-09-09

## 2018-09-08 RX ORDER — DEXTROSE MONOHYDRATE 50 MG/ML
100 INJECTION, SOLUTION INTRAVENOUS PRN
Status: DISCONTINUED | OUTPATIENT
Start: 2018-09-08 | End: 2018-09-12 | Stop reason: HOSPADM

## 2018-09-08 RX ORDER — ONDANSETRON 2 MG/ML
4 INJECTION INTRAMUSCULAR; INTRAVENOUS EVERY 6 HOURS PRN
Status: DISCONTINUED | OUTPATIENT
Start: 2018-09-08 | End: 2018-09-12 | Stop reason: HOSPADM

## 2018-09-08 RX ORDER — NICOTINE POLACRILEX 4 MG
15 LOZENGE BUCCAL PRN
Status: DISCONTINUED | OUTPATIENT
Start: 2018-09-08 | End: 2018-09-12 | Stop reason: HOSPADM

## 2018-09-08 RX ORDER — MELOXICAM 7.5 MG/1
7.5 TABLET ORAL DAILY
COMMUNITY
End: 2018-09-20 | Stop reason: SDUPTHER

## 2018-09-08 RX ORDER — LABETALOL HYDROCHLORIDE 5 MG/ML
10 INJECTION, SOLUTION INTRAVENOUS EVERY 4 HOURS PRN
Status: DISCONTINUED | OUTPATIENT
Start: 2018-09-08 | End: 2018-09-12 | Stop reason: HOSPADM

## 2018-09-08 RX ORDER — PANTOPRAZOLE SODIUM 40 MG/1
40 TABLET, DELAYED RELEASE ORAL
Status: DISCONTINUED | OUTPATIENT
Start: 2018-09-08 | End: 2018-09-12 | Stop reason: HOSPADM

## 2018-09-08 RX ORDER — CLOPIDOGREL BISULFATE 75 MG/1
75 TABLET ORAL DAILY
Status: DISCONTINUED | OUTPATIENT
Start: 2018-09-09 | End: 2018-09-12 | Stop reason: HOSPADM

## 2018-09-08 RX ORDER — MONTELUKAST SODIUM 10 MG/1
10 TABLET ORAL NIGHTLY
Status: DISCONTINUED | OUTPATIENT
Start: 2018-09-08 | End: 2018-09-12 | Stop reason: HOSPADM

## 2018-09-08 RX ORDER — SODIUM CHLORIDE 0.9 % (FLUSH) 0.9 %
10 SYRINGE (ML) INJECTION
Status: ACTIVE | OUTPATIENT
Start: 2018-09-08 | End: 2018-09-08

## 2018-09-08 RX ORDER — HYDROCHLOROTHIAZIDE 25 MG/1
25 TABLET ORAL DAILY
Status: DISCONTINUED | OUTPATIENT
Start: 2018-09-08 | End: 2018-09-12 | Stop reason: HOSPADM

## 2018-09-08 RX ORDER — SODIUM CHLORIDE 0.9 % (FLUSH) 0.9 %
10 SYRINGE (ML) INJECTION PRN
Status: DISCONTINUED | OUTPATIENT
Start: 2018-09-08 | End: 2018-09-12 | Stop reason: HOSPADM

## 2018-09-08 RX ADMIN — INSULIN LISPRO 10 UNITS: 100 INJECTION, SOLUTION INTRAVENOUS; SUBCUTANEOUS at 09:39

## 2018-09-08 RX ADMIN — SODIUM CHLORIDE: 9 INJECTION, SOLUTION INTRAVENOUS at 04:33

## 2018-09-08 RX ADMIN — INSULIN LISPRO 3 UNITS: 100 INJECTION, SOLUTION INTRAVENOUS; SUBCUTANEOUS at 21:34

## 2018-09-08 RX ADMIN — LISINOPRIL 20 MG: 20 TABLET ORAL at 09:39

## 2018-09-08 RX ADMIN — SODIUM CHLORIDE 1000 ML: 9 INJECTION, SOLUTION INTRAVENOUS at 01:15

## 2018-09-08 RX ADMIN — ENOXAPARIN SODIUM 40 MG: 40 INJECTION SUBCUTANEOUS at 09:40

## 2018-09-08 RX ADMIN — IOPAMIDOL 60 ML: 755 INJECTION, SOLUTION INTRAVENOUS at 21:13

## 2018-09-08 RX ADMIN — HYDROCHLOROTHIAZIDE 25 MG: 25 TABLET ORAL at 09:39

## 2018-09-08 RX ADMIN — INSULIN LISPRO 4 UNITS: 100 INJECTION, SOLUTION INTRAVENOUS; SUBCUTANEOUS at 16:34

## 2018-09-08 RX ADMIN — CLOPIDOGREL BISULFATE 300 MG: 300 TABLET, FILM COATED ORAL at 23:18

## 2018-09-08 RX ADMIN — MONTELUKAST SODIUM 10 MG: 10 TABLET, FILM COATED ORAL at 21:34

## 2018-09-08 RX ADMIN — Medication 10 ML: at 02:12

## 2018-09-08 RX ADMIN — SODIUM CHLORIDE 1000 ML: 9 INJECTION, SOLUTION INTRAVENOUS at 01:31

## 2018-09-08 RX ADMIN — INSULIN GLARGINE 44 UNITS: 100 INJECTION, SOLUTION SUBCUTANEOUS at 21:34

## 2018-09-08 RX ADMIN — VENLAFAXINE HYDROCHLORIDE 37.5 MG: 37.5 CAPSULE, EXTENDED RELEASE ORAL at 09:39

## 2018-09-08 RX ADMIN — PANTOPRAZOLE SODIUM 40 MG: 40 TABLET, DELAYED RELEASE ORAL at 06:06

## 2018-09-08 RX ADMIN — ONDANSETRON 4 MG: 2 INJECTION INTRAMUSCULAR; INTRAVENOUS at 08:56

## 2018-09-08 RX ADMIN — ASPIRIN 81 MG: 81 TABLET ORAL at 10:19

## 2018-09-08 RX ADMIN — PIPERACILLIN SODIUM AND TAZOBACTAM SODIUM 4.5 G: 4; .5 INJECTION, POWDER, LYOPHILIZED, FOR SOLUTION INTRAVENOUS at 03:04

## 2018-09-08 RX ADMIN — IOPAMIDOL 100 ML: 755 INJECTION, SOLUTION INTRAVENOUS at 02:11

## 2018-09-08 RX ADMIN — ACETAMINOPHEN 650 MG: 325 TABLET, FILM COATED ORAL at 21:36

## 2018-09-08 RX ADMIN — VANCOMYCIN HYDROCHLORIDE 2000 MG: 10 INJECTION, POWDER, LYOPHILIZED, FOR SOLUTION INTRAVENOUS at 04:33

## 2018-09-08 RX ADMIN — METHIMAZOLE 2.5 MG: 5 TABLET ORAL at 04:33

## 2018-09-08 RX ADMIN — ACETAMINOPHEN 1000 MG: 500 TABLET, FILM COATED ORAL at 00:15

## 2018-09-08 RX ADMIN — INSULIN LISPRO 8 UNITS: 100 INJECTION, SOLUTION INTRAVENOUS; SUBCUTANEOUS at 13:52

## 2018-09-08 RX ADMIN — SIMVASTATIN 20 MG: 20 TABLET, FILM COATED ORAL at 21:34

## 2018-09-08 ASSESSMENT — PAIN SCALES - GENERAL
PAINLEVEL_OUTOF10: 0
PAINLEVEL_OUTOF10: 0
PAINLEVEL_OUTOF10: 9
PAINLEVEL_OUTOF10: 0

## 2018-09-08 ASSESSMENT — PAIN DESCRIPTION - DESCRIPTORS: DESCRIPTORS: ACHING;DISCOMFORT

## 2018-09-08 ASSESSMENT — PAIN DESCRIPTION - LOCATION: LOCATION: LEG;HIP

## 2018-09-08 ASSESSMENT — PAIN DESCRIPTION - PAIN TYPE: TYPE: ACUTE PAIN

## 2018-09-08 ASSESSMENT — PAIN DESCRIPTION - ORIENTATION: ORIENTATION: RIGHT;LEFT

## 2018-09-08 NOTE — PROGRESS NOTES
Dr. Ary Jenkins consulted for vascular, pt added to census. Dr. James Farris consulted for neurology, pt added to census.

## 2018-09-08 NOTE — ED NOTES
Assumed care of pt. VS obtained. Swallow screen completed. Pt drowsy. When asked what year it was, pt states \"6282\". Pt has strong grasp to right hand, not following command with left hand or bilateral legs. Pt medicated for fever. Pt made aware physician ordered gaming, pt clearly refusing. Son at bedside. Pt awaiting dispo.       Valri Galeazzi, RN  09/08/18 9854

## 2018-09-08 NOTE — H&P
Department of Internal Medicine  MD Svetlana Fisher  [id number]  1955  CHIEF COMPLAINT:  Altered mental status   History Obtained From:  patient, electronic medical record  HISTORY OF PRESENT ILLNESS:    The patient is a 58 y.o. female who presents with possible CVA. She was in the theater with her son watching movie and went unresponsive and EMS was called. She gained consciousness soon and apparently had expressive aphasia and right sided weakness. No previous h/o TIA, CVA or seizures. She denies any fever, chest pain or SOB. No nausea or vomiting    Past Medical History:        Diagnosis Date    Diabetes mellitus ([facility] [address]     GERD (gastroesophageal reflux disease)     Hyperlipidemia     Hypertension     Liver cirrhosis (HCC)     Polyp of esophagus     Thyroid disease      Past Surgical History:        Procedure Laterality Date    CHOLECYSTECTOMY      COLONOSCOPY      HYSTERECTOMY      TONSILLECTOMY      UPPER GASTROINTESTINAL ENDOSCOPY       Meds at Home:  Prescriptions Prior to Admission: meloxicam (MOBIC) 7.5 MG tablet, Take 7.5 mg by mouth daily  furosemide (LASIX) 20 MG tablet, Take 20 mg by mouth 2 times daily  Milk Thistle 1000 MG CAPS, Take by mouth daily Ld 9/4/2018  methimazole (TAPAZOLE) 5 MG tablet, Take 2.5 mg by mouth three times a week mon-wed-fri  FREESTYLE LANCETS MISC, 1 each by Does not apply route daily  blood glucose test strips (ASCENSIA AUTODISC VI;ONE TOUCH ULTRA TEST VI) strip, 1 each by In Vitro route daily As needed. lisinopril (PRINIVIL;ZESTRIL) 20 MG tablet, TAKE 1 TABLET BY MOUTH EVERY DAY  glucose monitoring kit (FREESTYLE) monitoring kit, 1 kit by Does not apply route daily as needed (glucose)  insulin glargine (TOUJEO SOLOSTAR) 300 UNIT/ML injection pen, Inject 55 Units into the skin nightly  nystatin (MYCOSTATIN) 292294 UNIT/GM cream, Apply topically 2 times daily.   Insulin Pen Needle 31G X 5 MM MISC, 1 each by Does not apply route daily  omeprazole (PRILOSEC) 40 MG delayed release capsule, TAKE 1 CAPSULE BY MOUTH DAILY (WITH BREAKFAST)  polyethylene glycol (GLYCOLAX) powder, daily   simvastatin (ZOCOR) 20 MG tablet, TAKE 1 TABLET BY MOUTH EVERY DAY IN THE EVENING  NOVOLOG FLEXPEN 100 UNIT/ML injection pen, USE FOR SLIDING SCALE 3 TIMES DAILY BEFORE MEALS **MAX 60 UNITS PER DAY**  montelukast (SINGULAIR) 10 MG tablet, Take 1 tablet by mouth nightly  amitriptyline (ELAVIL) 10 MG tablet, Take 1 tablet by mouth nightly as needed for Sleep  ondansetron (ZOFRAN) 4 MG tablet, Take 1 tablet by mouth every 8 hours as needed for Nausea or Vomiting  venlafaxine (EFFEXOR XR) 37.5 MG extended release capsule, TAKE 1 CAPSULE BY MOUTH EVERY DAY PLEASE SCHEDULE APPT  metFORMIN (GLUCOPHAGE-XR) 500 MG extended release tablet, TAKE 1 TABLET BY MOUTH EVERY DAY  gabapentin (NEURONTIN) 300 MG capsule, Take 300 mg by mouth 4 times daily. Instructed to take am of procedure. hydrochlorothiazide (HYDRODIURIL) 25 MG tablet, Take 25 mg by mouth daily. Current Medications:     hydrochlorothiazide  25 mg Oral Daily    insulin glargine  44 Units Subcutaneous Nightly    lisinopril  20 mg Oral Daily    methimazole  2.5 mg Oral Once per day on Mon Wed Fri    montelukast  10 mg Oral Nightly    pantoprazole  40 mg Oral QAM AC    simvastatin  20 mg Oral Nightly    venlafaxine  37.5 mg Oral Daily with breakfast    sodium chloride flush  10 mL Intravenous 2 times per day    aspirin  81 mg Oral Daily    enoxaparin  40 mg Subcutaneous Daily    insulin lispro  0-12 Units Subcutaneous TID WC    insulin lispro  0-6 Units Subcutaneous Nightly    sodium chloride  30 mL/kg Intravenous Once      Allergies:    Patient has no known allergies.   Social History:    Social History     Social History    Marital status:      Spouse name: N/A    Number of children: N/A    Years of education: N/A     Social History Main Topics    Smoking status: Never Smoker    Smokeless awake, alert, not in apparent acute distress. EYES:  No pallor or icterus. ENT:  Normocephalic, without obvious abnormality, atraumatic, tongue moist  NECK:  supple. JVD not appreciated  LUNGS:  No increased work of breathing, clear to auscultation bilaterally  CHEST: unremarkable  CARDIOVASCULAR:  S1 S2 regular rate and rhythm  ABDOMEN:  Normal bowel sounds, soft, non-tender, no masses palpable  MUSCULOSKELETAL:  Moves all extremeties equally bilaterally. NEUROLOGIC:  Awake, alert and oriented. Minimal rle weakness, speech good  SKIN:  unremarkable  EXTREMETIES: atraumatic, no cyanosis or edema  Rectal: deferred  Genitalia:  deferred  DATA:    Recent Labs      18   2221   WBC  12.1*   HGB  15.2   PLT  138     Recent Labs      18   2220  18   2221   NA   --   129*   K   --   4.7   CL   --   94*   CO2   --   21*   BUN   --   10   CREATININE   --   0.6   GLUCOSE  365  363*     Recent Labs      18   2221   AST  85*   ALT  38*   ALKPHOS  161*   BILITOT  1.3*     Recent Labs      18   2221   CKTOTAL  148   TROPONINI  <0.01     No results for input(s): BNP in the last 72 hours. Recent Labs      18   2221   PROT  8.5*   INR  1.3     Recent Labs      18   2221   APTT  32.1     Lab Results   Component Value Date    TSH 3.960 2018     Ct Head Wo Contrast    Result Date: 2018  Patient MRN:  [id number] : 1955 Age: 58 years Gender: Female Order Date:  2018 10:30 PM TECHNIQUE/NUMBER OF IMAGES/COMPARISON/CLINICAL HISTORY: CT brain Sequential axial images were obtained sagittal and coronal reconstructions 67-year-old female patient with history of stroke. FINDINGS: There are unremarkable appearance for peripheral CSF space and ventricular system. There is no focal mass effect or midline shift. There is evidence for a sizable area of an acute or recent insult in progression to the brain parenchyma.  There is no indication for an acute intracranial hemorrhagic event. No indication for an acute intracranial process. Ct Abdomen Pelvis W Iv Contrast Additional Contrast? None    Result Date: 9/8/2018  Initial report created on 9/8/2018 2:41:40 AM EDT EXAM:   CT Abdomen and Pelvis With Intravenous Contrast CLINICAL HISTORY:   58years old, female; Pain; Abdominal pain; Localized; Right TECHNIQUE:   Axial computed tomography images of the abdomen and pelvis with intravenous contrast.  All CT scans at this facility use at least one of these dose optimization techniques: automated exposure control; mA and/or kV adjustment per patient size (includes targeted exams where dose is matched to clinical indication); or iterative reconstruction. Coronal and sagittal reformatted images were created and reviewed. CONTRAST:   100 mL of ISOVUE 370 administered intravenously. COMPARISON:   CT ABDOMEN PELVIS W CONTRAST 7/8/2018 8:50 PM FINDINGS:   Lung bases:  Subtle haziness is seen within the lower hemithoraces bilaterally. Mildly increased linear markings present bilaterally as well. These findings could represent mild pulmonary edema. ABDOMEN:   Liver: There is a mild nodular contour of the liver suggesting cirrhosis. There is a small amount of fluid seen adjacent to the liver. Gallbladder and bile ducts: There are gallstones seen within the contracted gallbladder. There are no inflammatory changes seen to suggest was diagnosed. No ductal dilation. Pancreas: There is some haziness seen adjacent to the pancreatic tail. Some strandy opacities are seen in Gerota's fascia bilaterally. Pancreatitis cannot be excluded. No ductal dilation. Spleen:  Unremarkable. No splenomegaly. Adrenals:  Unremarkable. No mass. Kidneys and ureters: There is no evidence for ureteral obstruction. Stomach and bowel:  Diverticula are present on the descending and sigmoid colon. There are no findings to suggest diverticulitis. No obstruction.   PELVIS:   Appendix:  The appendix is visualized and is normal in configuration. Bladder:  A Mckeon catheter is present. There is intraluminal air density present likely secondary to catheterization. However, cystitis cannot be entirely excluded in the appropriate clinical setting. Reproductive:  Status post hysterectomy. ABDOMEN and PELVIS:   Intraperitoneal space:  Unremarkable. No free air. No significant fluid collection. Bones/joints:  No acute fracture. No dislocation. Soft tissues:  Unremarkable. Vasculature:  Unremarkable. No abdominal aortic aneurysm. Lymph nodes: There are small mesenteric lymph nodes seen that appear below CT criteria for lymphadenopathy. Mild mesenteric lymphadenitis cannot be entirely excluded. 1.  Haziness is seen adjacent to the pancreatic tail. Pancreatitis cannot be excluded. 2.  There is a nodular contour of the liver suggesting cirrhosis. Small amount of fluid seen adjacent to the area   3. Appendix is visualized and is normal in configuration. 4.  Small lymph nodes are seen within the right flank possibly represent mild mesenteric lymphadenitis. 5.  Gallstones are seen within the contracted gallbladder. 6.  Haziness seen in the lower hemithoraces and increased linear markings in the lung bases could represent mild pulmonary edema. This report has been electronically signed by Johny Shine MD.    Xr Chest Portable    Result Date: 2018  Patient MRN:  [id number] : 1955 Age: 58 years Gender: Female Order Date:  2018 10:30 PM TECHNIQUE/NUMBER OF IMAGES/COMPARISON/CLINICAL HISTORY: Chest AP 1 image one view Comparison study of 2014 History weakness stroke. FINDINGS: Lungs are normally expanded. No infiltrates, consolidations or pleural effusions are observed. There is no perihilar vascular congestion. There is no pleural effusions     No acute cardiopulmonary process.     Cta Neck W Contrast    Result Date: 2018  Patient MRN:  [id number] : 1955 2018  Patient MRN:  [id number] : 1955 Age: 58 years Gender: Female Order Date:  2018 10:30 PM TECHNIQUE/NUMBER OF IMAGES/COMPARISON/CLINICAL HISTORY: CT brain perfusion After IV contrast administration, multi sequential axial images were obtained for computer generation of color map images of the CT perfusion parameters: MTT, TTD, CBF, CBV. History stroke FINDINGS: There is a diffuse increased mean transit time throughout both cerebral hemispheres not forming a specific vascular territory which are also matching to a certain degree by the time to drain. There appears to be slightly more prominent increase in the mean transit time towards the left temporal region but also seen to the prior to the degree in the right temporal area. These appear symmetric and also with some diminished cerebral blood flow in both sides. However there is is no compromise cerebral blood flow. There are some abnormalities in the CT perfusion parameters but fairly diffuse loss of the brain parenchyma both cerebral hemispheres, although more prominent in some areas, seen in the temporal regions, left more than right. As these findings are not limited to a specific vascular territory, they appear to be related to imbalance of the cerebral circulation. These findings will be correlated with the CTA head/CTA neck findings.        Medications     dextrose      sodium chloride 100 mL/hr at 18 0433      hydrochlorothiazide  25 mg Oral Daily    insulin glargine  44 Units Subcutaneous Nightly    lisinopril  20 mg Oral Daily    methimazole  2.5 mg Oral Once per day on     montelukast  10 mg Oral Nightly    pantoprazole  40 mg Oral QAM AC    simvastatin  20 mg Oral Nightly    venlafaxine  37.5 mg Oral Daily with breakfast    sodium chloride flush  10 mL Intravenous 2 times per day    aspirin  81 mg Oral Daily    enoxaparin  40 mg Subcutaneous Daily    insulin lispro  0-12 Units Subcutaneous TID WC    insulin lispro  0-6 Units Subcutaneous Nightly    sodium chloride  30 mL/kg Intravenous Once      DIET CARB CONTROL; Carb Control: 4 carbs/meal (approximate 1800 kcals/day); No Added Salt (3-4 GM)    ASSESSMENT  PLAN/RECOMMENDATIONS:  -Altered mental status ? TIA  -Rt ICA stenosis vascular surgery consulted  -Cirrhosis of liver with ascites ([facility] [address]  -Diabetes mellitus, type 2 ([facility] [address]  -History of hyperthyroidism  -HTN (hypertension), benign  -Mixed hyperlipidemia  -Primary osteoarthritis of both hips  Consult neuro  PT ordered   DVT  prophylaxis        Electronically signed by Chet Pena MD on 9/8/2018 at 11:24 AM

## 2018-09-08 NOTE — VIRTUAL HEALTH
components within normal limits   POCT GLUCOSE - Normal   TROPONIN   APTT   URINALYSIS   LACTIC ACID, PLASMA   AMMONIA       IV tPA INCLUSION criteria met No:  Diagnosis of ischemic stroke causing a measurable neurologic deficit. Time from last known well(or stroke onset) is:  *Less than 3 hours or  *Between 3-4.5 hours and the patient is > or = [de-identified]years old, on no anticoagulants       regardless of INR, NIHSS equal or < 25 and has no history of prior stroke with diabetes. *Adult aged 25 years or older. The following EXCLUSION criteria/contraindications were noted: Uncertain is stroke. Assessment:  Working Diagnosis: Stroke mimic    Unclear etiology of episodic. Possible seizure given sudden unresponsiveness and urinary incontinence. No perfusion deficits on CTP. Recommendations:    No TPA as uncertain of stroke diagnosis. This patient IS NOT a candidate for endovascular treatment    Recommend admission for additional workup include MRI and EEG. Consultation completed by Tanja Koyanagi via telephone.       Electronically signed by Tanja Koyanagi on 9/7/2018 at 10:59 PM

## 2018-09-08 NOTE — CONSULTS
Vascular Surgery Consult Note      Chief Complaint: Possibility of recent stroke    HISTORY OF PRESENT ILLNESS:                The patient is a 58 y.o. female who presents to the hospital currently the patient still is experiencing right arm and right leg weakness. Her expressive a fascia has improved however she still is having some trouble expressing her thoughts. She denies any current chest pain shortness of breath or discomfort. She believes she had one episode of this approximate 1-2 months ago. She was worked up in the emergency room and at that time TPA was not administered it was felt that the patient possibly had a seizure secondary to her altered mental status and urinary incontinence.  This was via the tele-stroke assessment    Past Medical History:   Diagnosis Date    Diabetes mellitus ([facility] [address]     GERD (gastroesophageal reflux disease)     Hyperlipidemia     Hypertension     Liver cirrhosis ([facility] [address]     Polyp of esophagus     Thyroid disease         Past Surgical History:   Procedure Laterality Date    CHOLECYSTECTOMY      COLONOSCOPY      HYSTERECTOMY      TONSILLECTOMY      UPPER GASTROINTESTINAL ENDOSCOPY         Current Medications:     Current Facility-Administered Medications:     hydrochlorothiazide (HYDRODIURIL) tablet 25 mg, 25 mg, Oral, Daily, Juaquin Eaton MD, 25 mg at 09/08/18 0939    insulin glargine (LANTUS) injection vial 44 Units, 44 Units, Subcutaneous, Nightly, Juaquin Eaton MD    lisinopril (PRINIVIL;ZESTRIL) tablet 20 mg, 20 mg, Oral, Daily, Juaquin Eaton MD, 20 mg at 09/08/18 8611    methimazole (TAPAZOLE) tablet 2.5 mg, 2.5 mg, Oral, Once per day on Mon Wed Fri, Juaquin Eaton MD, 2.5 mg at 09/08/18 0433    montelukast (SINGULAIR) tablet 10 mg, 10 mg, Oral, Nightly, Juaquin Eaton MD    pantoprazole (PROTONIX) tablet 40 mg, 40 mg, Oral, QAM AC, Juaquin Eaton MD, 40 mg at 09/08/18 0606    ondansetron (ZOFRAN) injection 4 mg, 4 mg, Intravenous, Q6H PRN, Sunita Syed MD, 4 mg at 09/08/18 0746    simvastatin (ZOCOR) tablet 20 mg, 20 mg, Oral, Nightly, Sunita Syed MD    venlafaxine [facility] XR) extended release capsule 37.5 mg, 37.5 mg, Oral, Daily with breakfast, Sunita Syed MD, 37.5 mg at 09/08/18 9114    sodium chloride flush 0.9 % injection 10 mL, 10 mL, Intravenous, 2 times per day, Sunita Syed MD    sodium chloride flush 0.9 % injection 10 mL, 10 mL, Intravenous, PRN, Sunita Syed MD    aspirin EC tablet 81 mg, 81 mg, Oral, Daily, Sunita Syed MD, 81 mg at 09/08/18 1019    enoxaparin (LOVENOX) injection 40 mg, 40 mg, Subcutaneous, Daily, Sunita Syed MD, 40 mg at 09/08/18 0940    acetaminophen (TYLENOL) tablet 650 mg, 650 mg, Oral, Q4H PRN, Sunita Syed MD    insulin lispro (HUMALOG) injection vial 0-12 Units, 0-12 Units, Subcutaneous, TID WC, Sunita Syed MD, 10 Units at 09/08/18 3949    insulin lispro (HUMALOG) injection vial 0-6 Units, 0-6 Units, Subcutaneous, Nightly, Sunita Syed MD    glucose (GLUTOSE) 40 % oral gel 15 g, 15 g, Oral, PRN, Sunita Syed MD    dextrose 50 % solution 12.5 g, 12.5 g, Intravenous, PRN, Sunita Syed MD    glucagon (rDNA) injection 1 mg, 1 mg, Intramuscular, PRN, Sunita Syed MD    dextrose 5 % solution, 100 mL/hr, Intravenous, PRN, Sunita Syed MD    labetalol (NORMODYNE;TRANDATE) injection 10 mg, 10 mg, Intravenous, Q4H PRN, Sunita Syed MD    0.9 % sodium chloride bolus, 30 mL/kg, Intravenous, Once, Neymar Plata MD    0.9 % sodium chloride infusion, , Intravenous, Continuous, Neymar Plata MD, Last Rate: 100 mL/hr at 09/08/18 1829    Allergies:  Patient has no known allergies. Social History     Social History    Marital status:      Spouse name: N/A    Number of children: N/A    Years of education: N/A     Occupational History    Not on file.      Social History Main Topics    Smoking status: Never Smoker    Smokeless tobacco: Trachea is midline no jugular venous distention is noted no masses are noted. Normal range of motion   LUNGS:  No leg breathing or wheezing   CARDIOVASCULAR: Regular rate and rhythm  ABDOMEN: Soft nontender no rebound or guarding, positive bowel sounds no pulsatile abdominal masses. No organosplenomegaly that I can appreciate. EXTREMITIES: Right upper extremity demonstrates 4-5 motor. Right lower extremity demonstrates 4-5 motor. Palpable brachial radial pulses are noted bilaterally. DP and PTs are palpable at 1-2+. Motor and sensation are intact except for diminished strength C prior statement  MUSKULOSKELETAL  adequately aligned spine, please refer to extremities   NEURO: Grossly aphasia is noted. But the majority of her speech appears to be adequate. See above   Psychiatric: Mental examination revealed the patient was oriented to person, place, and time. The patient was able to demonstrate adequate judgment and reason, without any hallucinations abnormal affect or abnormal behavior on today's exam.      LABS:    Lab Results   Component Value Date    WBC 12.1 (H) 09/07/2018    HGB 15.2 09/07/2018    HCT 43.6 09/07/2018     09/07/2018    PROTIME 15.0 (H) 09/07/2018    INR 1.3 09/07/2018    APTT 32.1 09/07/2018    K 4.7 09/07/2018    BUN 10 09/07/2018    CREATININE 0.6 09/07/2018       RADIOLOGY:  CT Head WO Contrast   Final Result   No indication for an acute intracranial process. CT Brain Perfusion   Final Result      There are some abnormalities in the CT perfusion parameters but fairly   diffuse loss of the brain parenchyma both cerebral hemispheres,   although more prominent in some areas, seen in the temporal regions,   left more than right. As these findings are not limited to a specific vascular territory,   they appear to be related to imbalance of the cerebral circulation. These findings will be correlated with the CTA head/CTA neck findings.             CTA NECK W CONTRAST   Final Result   Severe anatomic stenosis in the most proximal left ICA. Moderate anatomic stenosis in the proximal right ICA. Preliminary report given to Dr. Rossana iWlkes. ALERT:  THIS IS AN ABNORMAL REPORT      CT ABDOMEN PELVIS W IV CONTRAST Additional Contrast? None   Final Result     1. Haziness is seen adjacent to the pancreatic tail. Pancreatitis cannot be    excluded. 2.  There is a nodular contour of the liver suggesting cirrhosis. Small    amount of fluid seen adjacent to the area     3. Appendix is visualized and is normal in configuration. 4.  Small lymph nodes are seen within the right flank possibly represent mild    mesenteric lymphadenitis. 5.  Gallstones are seen within the contracted gallbladder. 6.  Haziness seen in the lower hemithoraces and increased linear markings in    the lung bases could represent mild pulmonary edema. This report has been electronically signed by Corrinne Kicks MD.      XR CHEST PORTABLE   Final Result   No acute cardiopulmonary process. CTA HEAD W CONTRAST    (Results Pending)   MRI Brain WO Contrast    (Results Pending)   MRA Head WO Contrast    (Results Pending)   MRA Neck WO Contrast    (Results Pending)       IMPRESSION:   Active Hospital Problems    Diagnosis    Altered mental status [R41.82]    Primary osteoarthritis of both hips [M16.0]    Cirrhosis of liver with ascites ([address] [K74.60, R18.8]    History of hyperthyroidism [Z86.39]    Diabetes mellitus, type 2 ([address] [E11.9]    HTN (hypertension), benign [I10]    Mixed hyperlipidemia [E78.2]       PLAN:  #1 right upper and right lower extremity weakness with improving expressive aphasia. At this point I reviewed her carotid studies lateral calcific atherosclerotic disease at the level of the carotid bulb and internal carotid vessel. It's hard to delineate the exact degree of stenosis.  Plan is carotid ultrasound examination continue with risk reduction therapy will see what

## 2018-09-08 NOTE — ED PROVIDER NOTES
Department of Emergency Medicine   ED  Provider Note  Admit Date/RoomTime: 9/7/2018 10:14 PM  ED Room: 15/15        9/7/18  10:18 PM    Stroke Alert called: Yes    HISTORY OF PRESENT ILLNESS:  (Nurses Notes Reviewed)    Chief Complaint:   Cerebrovascular Accident (unresponsive episode in movie theater, last well known at 2130, ems reports left sided weakness. )      Source of history provided by:  patient and EMS personnel. History/Exam Limitations: none. Pierre Collins is a 58 y.o. old female presenting to the emergency department by EMS for possible stroke. Patient was with her son watching a movie. Last well-known was 45 minutes prior to arrival. EMS reports the patient went unresponsive. They were called, on arrival, she did lose control of her bladder, there concern she had expressive aphasia and a right sided weakness. No previous history of seizures. She is on aspirin for anticoagulation. No other information is available at this time. Code Status on file: No Order. NIH Stroke Scale at time of initial evaluation:   NIH/MNHISS Stroke Scale  Interval: Hand-off/transfer  Level of Consciousness (1a. ): Drowsy  LOC Questions (1b. ): Answers one correctly  LOC Commands (1c. ): Obeys both correctly  Best Gaze (2. ): Normal  Visual (3. ): No visual loss  Facial Palsy (4. ): Normal  Motor Arm, Left (5a. ): Some effort against gravity  Motor Arm, Right (5b. ): Drift, but does not hit bed  Motor Leg, Left (6a. ): Some effort against gravity  Motor Leg, Right (6b. ): Some effort against gravity  Limb Ataxia (7. ): Absent  Sensory (8. ): Normal  Best Language (9. ): Mild to moderate aphasia  Dysarthria (10. ): Normal  Extinction and Inattention (11): No neglect  Total: 10      Past Medical History:  has a past medical history of Diabetes mellitus ([address] GERD (gastroesophageal reflux disease); Hyperlipidemia; Hypertension; Liver cirrhosis ([address] Polyp of esophagus; and Thyroid disease.     Past Surgical glycol (GLYCOLAX) powder daily  7/19/18   Historical Provider, MD   simvastatin (ZOCOR) 20 MG tablet TAKE 1 TABLET BY MOUTH EVERY DAY IN THE EVENING 7/23/18   Annie Betancourt DO   NOVOLOG FLEXPEN 100 UNIT/ML injection pen USE FOR SLIDING SCALE 3 TIMES DAILY BEFORE MEALS **MAX 60 UNITS PER DAY** 7/17/18   Annie Betancourt DO   montelukast (SINGULAIR) 10 MG tablet Take 1 tablet by mouth nightly 7/13/18   Annie Betancourt DO   amitriptyline (ELAVIL) 10 MG tablet Take 1 tablet by mouth nightly as needed for Sleep 7/13/18   Estee Betancourt DO   ondansetron (ZOFRAN) 4 MG tablet Take 1 tablet by mouth every 8 hours as needed for Nausea or Vomiting 7/8/18   Rebecca Stoddard,    venlafaxine (EFFEXOR XR) 37.5 MG extended release capsule TAKE 1 CAPSULE BY MOUTH EVERY DAY PLEASE SCHEDULE APPT 6/8/18   Estee Betancourt DO   metFORMIN (GLUCOPHAGE-XR) 500 MG extended release tablet TAKE 1 TABLET BY MOUTH EVERY DAY 5/7/18   Historical Provider, MD   gabapentin (NEURONTIN) 300 MG capsule Take 300 mg by mouth 4 times daily. Instructed to take am of procedure. Historical Provider, MD   hydrochlorothiazide (HYDRODIURIL) 25 MG tablet Take 25 mg by mouth daily. Historical Provider, MD       Allergies: Patient has no known allergies. Review of Systems:   Pertinent positives and negatives are stated within HPI, all other systems reviewed and are negative.    ---------------------------------------------------PHYSICAL EXAM--------------------------------------    Constitutional/General: Alert and oriented x3, well appearing, non toxic in NAD  Head: Normocephalic and atraumatic  Eyes: PERRL, EOMI  Mouth: Oropharynx clear, handling secretions, no trismus. Neck: Supple, full ROM, non tender to palpation in the midline, no stridor, no crepitus, no meningeal signs  Pulmonary: Lungs clear to auscultation bilaterally, no wheezes, rales, or rhonchi. Not in respiratory distress  Cardiovascular:  Regular rate. Regular rhythm.  No 85 (H) 0 - 31 U/L   Troponin   Result Value Ref Range    Troponin <0.01 0.00 - 0.03 ng/mL   Protime-INR   Result Value Ref Range    Protime 15.0 (H) 9.3 - 12.4 sec    INR 1.3    APTT   Result Value Ref Range    aPTT 32.1 24.5 - 35.1 sec   Urinalysis   Result Value Ref Range    Color, UA Yellow Straw/Yellow    Clarity, UA SLCLOUDY Clear    Glucose, Ur >=1000 (A) Negative mg/dL    Bilirubin Urine Negative Negative    Ketones, Urine TRACE (A) Negative mg/dL    Specific Gravity, UA 1.010 1.005 - 1.030    Blood, Urine TRACE (A) Negative    pH, UA 6.0 5.0 - 9.0    Protein, UA Negative Negative mg/dL    Urobilinogen, Urine 0.2 <2.0 E.U./dL    Nitrite, Urine Negative Negative    Leukocyte Esterase, Urine Negative Negative   Lactic Acid, Plasma   Result Value Ref Range    Lactic Acid 4.2 (HH) 0.5 - 2.2 mmol/L   AMMONIA   Result Value Ref Range    Ammonia <10.0 (L) 11.0 - 51.0 umol/L   Microscopic Urinalysis   Result Value Ref Range    WBC, UA 2-5 0 - 5 /HPF    RBC, UA 1-3 0 - 2 /HPF    Bacteria, UA MANY (A) /HPF   POCT Glucose   Result Value Ref Range    Glucose 365 mg/dL    QC OK? yes    POCT Glucose   Result Value Ref Range    Meter Glucose 365 (H) 70 - 110 mg/dL       RADIOLOGY:  Interpreted by Radiologist.  CT ABDOMEN PELVIS W IV CONTRAST Additional Contrast? None   Final Result     1. Haziness is seen adjacent to the pancreatic tail. Pancreatitis cannot be    excluded. 2.  There is a nodular contour of the liver suggesting cirrhosis. Small    amount of fluid seen adjacent to the area     3. Appendix is visualized and is normal in configuration. 4.  Small lymph nodes are seen within the right flank possibly represent mild    mesenteric lymphadenitis. 5.  Gallstones are seen within the contracted gallbladder. 6.  Haziness seen in the lower hemithoraces and increased linear markings in    the lung bases could represent mild pulmonary edema.       This report has been electronically signed by Norma Castillo Domingo Gutierrez MD.      XR CHEST PORTABLE   Final Result   No acute cardiopulmonary process. CTA NECK W CONTRAST   Final Result   Severe anatomic stenosis in the most proximal left ICA. Moderate anatomic stenosis in the proximal right ICA. Preliminary report given to Dr. Rakel Mckee. ALERT:  THIS IS AN ABNORMAL REPORT      CT Head WO Contrast   Final Result   No indication for an acute intracranial process. CT Brain Perfusion   Final Result      There are some abnormalities in the CT perfusion parameters but fairly   diffuse loss of the brain parenchyma both cerebral hemispheres,   although more prominent in some areas, seen in the temporal regions,   left more than right. As these findings are not limited to a specific vascular territory,   they appear to be related to imbalance of the cerebral circulation. These findings will be correlated with the CTA head/CTA neck findings. CTA HEAD W CONTRAST    (Results Pending)       EKG Interpretation  Interpreted by emergency department physician. Sinus, rate 103, no STEMI        ------------------------- NURSING NOTES AND VITALS REVIEWED ---------------------------   The nursing notes within the ED encounter and vital signs as below have been reviewed. /66   Pulse 115   Temp 98.9 °F (37.2 °C)   Resp 25   SpO2 97%   Oxygen Saturation Interpretation: Normal    The patients available past medical records and past encounters were reviewed.         ------------------------------ ED COURSE/MEDICAL DECISION MAKING----------------------  Medications   0.9 % sodium chloride infusion (not administered)   0.9 % sodium chloride bolus (1,000 mLs Intravenous New Bag 9/8/18 0115)   cefepime (MAXIPIME) 2 g IVPB minibag (not administered)   iopamidol (ISOVUE-370) 76 % injection 100 mL (100 mLs Intravenous Given 9/7/18 2230)   levetiracetam (KEPPRA) 1000 mg/100 mL IVPB (0 mg Intravenous Stopped 9/7/18 2318)   acetaminophen (TYLENOL) tablet 1,000

## 2018-09-08 NOTE — CONSULTS
Inpatient consult to Neurology  Consult performed by: Nadya Berry ordered by: Luis BROWN        Neurology Consult Note    9/8/2018     REASON FOR CONSULTATION: speech problem     HISTORY OF PRESENT ILLNESS:   A 58year old woman was in movie theater when she developed speech difficulty. Per RNs she fluctuates since then. She says the left side is affected (which I guess is related to her confusion of right and left). Throughout the interview she can hold a normal conversations but sometimes she says something unintelligible. She referred to a year as \"two hundred 4 and 3\". Past Medical History:   Diagnosis Date    Diabetes mellitus ([facility]     GERD (gastroesophageal reflux disease)     Hyperlipidemia     Hypertension     Liver cirrhosis (HCC)     Polyp of esophagus     Thyroid disease         Past Surgical History:   Procedure Laterality Date    CHOLECYSTECTOMY      COLONOSCOPY      HYSTERECTOMY      TONSILLECTOMY      UPPER GASTROINTESTINAL ENDOSCOPY         Prior to Admission medications    Medication Sig Start Date End Date Taking? Authorizing Provider   meloxicam (MOBIC) 7.5 MG tablet Take 7.5 mg by mouth daily   Yes Historical Provider, MD   furosemide (LASIX) 20 MG tablet Take 20 mg by mouth 2 times daily    Historical Provider, MD   Milk Thistle 1000 MG CAPS Take by mouth daily Ld 9/4/2018    Historical Provider, MD   methimazole (TAPAZOLE) 5 MG tablet Take 2.5 mg by mouth three times a week mon-wed-fri    Historical Provider, MD   FREESTYLE LANCETS MISC 1 each by Does not apply route daily 8/30/18   Annie Betancourt DO   blood glucose test strips (ASCENSIA AUTODISC VI;ONE TOUCH ULTRA TEST VI) strip 1 each by In Vitro route daily As needed.  8/30/18   Annie Betancourt DO   lisinopril (PRINIVIL;ZESTRIL) 20 MG tablet TAKE 1 TABLET BY MOUTH EVERY DAY 8/29/18   Annie Betancourt DO   glucose monitoring kit (FREESTYLE) monitoring kit 1 kit by Does not apply route daily as needed (glucose) 8/28/18   Annie Betancourt DO   insulin glargine (TOUJEO SOLOSTAR) 300 UNIT/ML injection pen Inject 55 Units into the skin nightly 8/17/18   Annie Betancourt DO   nystatin (MYCOSTATIN) 410726 UNIT/GM cream Apply topically 2 times daily. 8/17/18   Annie Betancourt DO   Insulin Pen Needle 31G X 5 MM MISC 1 each by Does not apply route daily 8/15/18   Annie Betancourt DO   omeprazole (PRILOSEC) 40 MG delayed release capsule TAKE 1 CAPSULE BY MOUTH DAILY (WITH BREAKFAST) 8/1/18   Annie Betancourt DO   polyethylene glycol (GLYCOLAX) powder daily  7/19/18   Historical Provider, MD   simvastatin (ZOCOR) 20 MG tablet TAKE 1 TABLET BY MOUTH EVERY DAY IN THE EVENING 7/23/18   Annie Betancourt DO   NOVOLOG FLEXPEN 100 UNIT/ML injection pen USE FOR SLIDING SCALE 3 TIMES DAILY BEFORE MEALS **MAX 60 UNITS PER DAY** 7/17/18   Annie Betancourt DO   montelukast (SINGULAIR) 10 MG tablet Take 1 tablet by mouth nightly 7/13/18   Annie Betancourt DO   amitriptyline (ELAVIL) 10 MG tablet Take 1 tablet by mouth nightly as needed for Sleep 7/13/18   Annie Betancourt DO   ondansetron (ZOFRAN) 4 MG tablet Take 1 tablet by mouth every 8 hours as needed for Nausea or Vomiting 7/8/18   Deanna Stoddard, DO   venlafaxine (EFFEXOR XR) 37.5 MG extended release capsule TAKE 1 CAPSULE BY MOUTH EVERY DAY PLEASE SCHEDULE APPT 6/8/18   Louie Betancourt,    metFORMIN (GLUCOPHAGE-XR) 500 MG extended release tablet TAKE 1 TABLET BY MOUTH EVERY DAY 5/7/18   Historical Provider, MD   gabapentin (NEURONTIN) 300 MG capsule Take 300 mg by mouth 4 times daily. Instructed to take am of procedure. Historical Provider, MD   hydrochlorothiazide (HYDRODIURIL) 25 MG tablet Take 25 mg by mouth daily. Historical Provider, MD       Allergies:  Patient has no known allergies.      Family History   Problem Relation Age of Onset    Heart Disease Mother 28    Heart Disease Sister     Diabetes Sister        Social History   Substance Use Topics    Smoking status: Never Smoker    Smokeless tobacco: Never Used    Alcohol use No          ROS:  GENERAL:  No weight change, change in appetite, fever or chills. HEENT:  No headache, blurred or double vision. CARDIOPULMONARY:  No chest pain, palpitations or shortness of breath. GASTROINTESTINAL:  No anorexia, nausea or vomiting, no diarrhea  GENITOURINARY:  No dysuria  ENDOCRINE:  No heat/cold intolerance, no excessive thirst.  HEMATOLOGY/ONCOLOGY:  No bruising or bleeding. MUSCULOSKELETAL: No swelling. PSYCHIATRIC:  No change in personality, affect or depression. EXAMINATION:  /69   Pulse 110   Temp 99.2 °F (37.3 °C) (Temporal)   Resp 20   Ht 5' 7\" (1.702 m)   Wt 212 lb (96.2 kg)   SpO2 95%   BMI 33.20 kg/m²   GEN: NAD  HEENT: Neck supple, no carotid, ophthalmic or ocular bruits  Cardiovascular: RRR, 2+ distal pulses x 4 ext  Pulm: CTA  Abd: +BS, Soft, ND/NT   Ext: No L.E. edema bilat     AAOx3, follows commands, has aphasia. Called hammock in stroke card as \"swer giuseppe\". PERRL, EOMI, VFF, normal saccades and pursuit, no gaze preference/nystagmus. Intact facial sensation, subtle right sided central facial pasly. Intact hearing bilaterally. Tongue midline. Symmetric palate elevation. Neck muscles and shoulder shrug 5/5. Sensation: intact all over to LT, no neglect. Motor: Normal bulk and tone, Right upper and lower ext drift/orbiting. DTRs: +2 biceps/triceps/BR/Knees, +1 ankles, plantars down B/L. Coordination: intact F-N cannot do H-S.     ASSESSMENT:  Aphasia + right sided hemiparesis since last night  LDL was 96 in April 2018. HbA1C was 8.1 in July 218. She had a brain MRI in April 2018 for frequent falls. No acute intracranial disease and minimal microvascular ischemic disease  At home she in on Zocor 20 mg daily. Here she was started on ASA 81 mg. CTH today did not show any acute intracranial disease.  CTA however showed severe anatomic stenosis in the most proximal left

## 2018-09-08 NOTE — PROGRESS NOTES
Physical Therapy    Facility/Department: [facility]  Initial Assessment    NAME: Pierre Collins  : 1955  MRN: [id number]    Date of Service: 2018  Evaluating Therapist: Luci Leyden, DPT  Equipment Recommendation is to be determined at rehab     Room #: 4092N   DIAGNOSIS: AMS  PRECAUTIONS: falls    Social:  Pt lives with her son in a 2 floor plan 13 steps and 2 rails (wide) to second floor bed and bath. Prior to admission pt walked with no AD but does admit to falls in the past due to low back pain. Initial Evaluation  Date: 18  Treatment  Date:   Short Term/ Long Term   Goals   AM-PAC raw score      Was pt agreeable to Eval/treatment? yes     Does pt have pain? Pt reports chronic low back pain      Bed Mobility  Rolling: min A  Supine to sit: min A   Sit to supine: min A   Scooting: min A   SBA   Transfers Sit to stand: Min A   Stand to sit: min A   Stand pivot: min  A  SBA   Ambulation    3' feet with no AD with min A  >150' with AD as needed and SBA   Stair negotiation: ascended and descended  TBA  13 steps with 1 rail with SBA     Pt is alert and Oriented x3 with slowed and slurred speech. She reports that she is still having trouble \"getting her words out\"   LUE and LLE AROM is WFL and strength is 4/5 throughout     R shoulder AROM is grossly 75% elbow, wrist, hand are WFL  RLE AROM is grossly 75% throughout     RUE strength:  Shoulder 3-/5 elbow, wrist, hand 4-/5  RLE strength is 3-/5 throughout        ASSESSMENT  Pt displays functional ability as noted in the objective portion of this evaluation. Comments/Treatment:  Pt with latent processing and states that she is still having trouble \"saying what she wants\". L great toe and distal tip of 2nd toe are discolored and black. Pt needed assistance going from supine to sitting due to posterior lean at times. Min A with transfers and stand pivot transfer from bed to chair.   Pt reports decreased pain in low back

## 2018-09-09 ENCOUNTER — APPOINTMENT (OUTPATIENT)
Dept: MRI IMAGING | Age: 63
DRG: 064 | End: 2018-09-09
Payer: COMMERCIAL

## 2018-09-09 LAB
ANION GAP SERPL CALCULATED.3IONS-SCNC: 15 MMOL/L (ref 7–16)
BUN BLDV-MCNC: 9 MG/DL (ref 8–23)
CALCIUM SERPL-MCNC: 8.1 MG/DL (ref 8.6–10.2)
CHLORIDE BLD-SCNC: 95 MMOL/L (ref 98–107)
CHOLESTEROL, TOTAL: 127 MG/DL (ref 0–199)
CO2: 22 MMOL/L (ref 22–29)
CREAT SERPL-MCNC: 0.6 MG/DL (ref 0.5–1)
GFR AFRICAN AMERICAN: >60
GFR NON-AFRICAN AMERICAN: >60 ML/MIN/1.73
GLUCOSE BLD-MCNC: 206 MG/DL (ref 74–109)
HBA1C MFR BLD: 10.4 % (ref 4–5.6)
HCT VFR BLD CALC: 37.7 % (ref 34–48)
HDLC SERPL-MCNC: 31 MG/DL
HEMOGLOBIN: 12.8 G/DL (ref 11.5–15.5)
LACTIC ACID: 1.7 MMOL/L (ref 0.5–2.2)
LDL CHOLESTEROL CALCULATED: 69 MG/DL (ref 0–99)
MCH RBC QN AUTO: 32.8 PG (ref 26–35)
MCHC RBC AUTO-ENTMCNC: 34 % (ref 32–34.5)
MCV RBC AUTO: 96.7 FL (ref 80–99.9)
METER GLUCOSE: 186 MG/DL (ref 70–110)
METER GLUCOSE: 228 MG/DL (ref 70–110)
METER GLUCOSE: 239 MG/DL (ref 70–110)
METER GLUCOSE: 252 MG/DL (ref 70–110)
PDW BLD-RTO: 14.3 FL (ref 11.5–15)
PLATELET # BLD: 104 E9/L (ref 130–450)
PMV BLD AUTO: 11.4 FL (ref 7–12)
POTASSIUM SERPL-SCNC: 3.6 MMOL/L (ref 3.5–5)
RBC # BLD: 3.9 E12/L (ref 3.5–5.5)
SODIUM BLD-SCNC: 132 MMOL/L (ref 132–146)
TRIGL SERPL-MCNC: 136 MG/DL (ref 0–149)
VLDLC SERPL CALC-MCNC: 27 MG/DL
WBC # BLD: 7.7 E9/L (ref 4.5–11.5)

## 2018-09-09 PROCEDURE — G8988 SELF CARE GOAL STATUS: HCPCS

## 2018-09-09 PROCEDURE — 85027 COMPLETE CBC AUTOMATED: CPT

## 2018-09-09 PROCEDURE — 6370000000 HC RX 637 (ALT 250 FOR IP): Performed by: CLINICAL NURSE SPECIALIST

## 2018-09-09 PROCEDURE — 80061 LIPID PANEL: CPT

## 2018-09-09 PROCEDURE — 97166 OT EVAL MOD COMPLEX 45 MIN: CPT

## 2018-09-09 PROCEDURE — G8987 SELF CARE CURRENT STATUS: HCPCS

## 2018-09-09 PROCEDURE — 87040 BLOOD CULTURE FOR BACTERIA: CPT

## 2018-09-09 PROCEDURE — 80048 BASIC METABOLIC PNL TOTAL CA: CPT

## 2018-09-09 PROCEDURE — 83036 HEMOGLOBIN GLYCOSYLATED A1C: CPT

## 2018-09-09 PROCEDURE — 70551 MRI BRAIN STEM W/O DYE: CPT

## 2018-09-09 PROCEDURE — 2060000000 HC ICU INTERMEDIATE R&B

## 2018-09-09 PROCEDURE — 97535 SELF CARE MNGMENT TRAINING: CPT

## 2018-09-09 PROCEDURE — 70544 MR ANGIOGRAPHY HEAD W/O DYE: CPT

## 2018-09-09 PROCEDURE — 36415 COLL VENOUS BLD VENIPUNCTURE: CPT

## 2018-09-09 PROCEDURE — 70547 MR ANGIOGRAPHY NECK W/O DYE: CPT

## 2018-09-09 PROCEDURE — 2580000003 HC RX 258: Performed by: INTERNAL MEDICINE

## 2018-09-09 PROCEDURE — 6360000002 HC RX W HCPCS: Performed by: INTERNAL MEDICINE

## 2018-09-09 PROCEDURE — 6370000000 HC RX 637 (ALT 250 FOR IP): Performed by: PSYCHIATRY & NEUROLOGY

## 2018-09-09 PROCEDURE — 82962 GLUCOSE BLOOD TEST: CPT

## 2018-09-09 PROCEDURE — 6360000002 HC RX W HCPCS: Performed by: PSYCHIATRY & NEUROLOGY

## 2018-09-09 PROCEDURE — 6370000000 HC RX 637 (ALT 250 FOR IP): Performed by: INTERNAL MEDICINE

## 2018-09-09 PROCEDURE — 99232 SBSQ HOSP IP/OBS MODERATE 35: CPT | Performed by: CLINICAL NURSE SPECIALIST

## 2018-09-09 RX ORDER — ATORVASTATIN CALCIUM 40 MG/1
40 TABLET, FILM COATED ORAL NIGHTLY
Status: DISCONTINUED | OUTPATIENT
Start: 2018-09-09 | End: 2018-09-12 | Stop reason: HOSPADM

## 2018-09-09 RX ADMIN — HYDROCHLOROTHIAZIDE 25 MG: 25 TABLET ORAL at 10:44

## 2018-09-09 RX ADMIN — LORAZEPAM 1 MG: 2 INJECTION INTRAMUSCULAR; INTRAVENOUS at 07:25

## 2018-09-09 RX ADMIN — ASPIRIN 325 MG: 325 TABLET, COATED ORAL at 10:44

## 2018-09-09 RX ADMIN — CLOPIDOGREL 75 MG: 75 TABLET, FILM COATED ORAL at 10:44

## 2018-09-09 RX ADMIN — Medication 10 ML: at 21:15

## 2018-09-09 RX ADMIN — INSULIN LISPRO 2 UNITS: 100 INJECTION, SOLUTION INTRAVENOUS; SUBCUTANEOUS at 21:26

## 2018-09-09 RX ADMIN — CEFEPIME HYDROCHLORIDE 2 G: 2 INJECTION, POWDER, FOR SOLUTION INTRAVENOUS at 21:15

## 2018-09-09 RX ADMIN — LISINOPRIL 20 MG: 20 TABLET ORAL at 10:43

## 2018-09-09 RX ADMIN — INSULIN LISPRO 2 UNITS: 100 INJECTION, SOLUTION INTRAVENOUS; SUBCUTANEOUS at 10:45

## 2018-09-09 RX ADMIN — ATORVASTATIN CALCIUM 40 MG: 40 TABLET, FILM COATED ORAL at 21:15

## 2018-09-09 RX ADMIN — Medication 10 ML: at 10:44

## 2018-09-09 RX ADMIN — ENOXAPARIN SODIUM 40 MG: 40 INJECTION SUBCUTANEOUS at 10:44

## 2018-09-09 RX ADMIN — PANTOPRAZOLE SODIUM 40 MG: 40 TABLET, DELAYED RELEASE ORAL at 06:13

## 2018-09-09 RX ADMIN — VENLAFAXINE HYDROCHLORIDE 37.5 MG: 37.5 CAPSULE, EXTENDED RELEASE ORAL at 10:43

## 2018-09-09 RX ADMIN — MONTELUKAST SODIUM 10 MG: 10 TABLET, FILM COATED ORAL at 21:15

## 2018-09-09 RX ADMIN — INSULIN LISPRO 6 UNITS: 100 INJECTION, SOLUTION INTRAVENOUS; SUBCUTANEOUS at 16:48

## 2018-09-09 RX ADMIN — VANCOMYCIN HYDROCHLORIDE 1000 MG: 1 INJECTION, POWDER, LYOPHILIZED, FOR SOLUTION INTRAVENOUS at 21:15

## 2018-09-09 RX ADMIN — INSULIN GLARGINE 44 UNITS: 100 INJECTION, SOLUTION SUBCUTANEOUS at 21:26

## 2018-09-09 ASSESSMENT — PAIN SCALES - GENERAL
PAINLEVEL_OUTOF10: 0

## 2018-09-09 NOTE — PROGRESS NOTES
Occupational Therapy Initial Evaluation     Date:2018  Patient Name: Re Cheatham  MRN: [id number]  : 1955  Room: [address] [place], [place] [address]     Evaluating OT: Mark West OTR/L [id number]     AM PAC ADL Score:  15/24  G code: CK     Diagnosis: AMS Possible L Lacunar Infarct   Surgery: none   Past Medical History: DM, HTN   Precautions: Falls      Home Living: Pt lives with son  in a 2 story home  with 4 steps to enter and one hand  Rail, bed/bath on 2nd floor   Bathroom setup: tub/shower   Equipment owned: none     Prior Level of Function: pt was independent  with ADLs independent  with IADLs; using no device  for ambulation. Driving: pt does not currently drive   Occupation: pt is retired     Pain Level: pt c/o on mild headache  this session     Cognition: oriented x 2 needed cues to correctly identify the date ; follows 2 step directions with cues. Pt has latent processing and some word finding problems   Fair  Problem solving skill  Good  Memory   Fair  Sequencing  Additional Comments: pt had difficulty following verbal directions and with sequencing when up and using the ww     Sensory:   Hearing: WFL  Vision: WFL     Glasses: wears them all the time      UE Assessment:  Hand Dominance: Right [x]  Left []     ROM  Strength  Additional Info:    RUE  AROM shld flex 100 degrees   shld abd 80   Elbow,wrist and hand WFL  Proximal - 3/5  Distal 3+/5 Good   and Fair  FMC/dexterity noted during ADL tasks     LUE WFL  4-/5 Good   and good  FMC/dexterity noted during ADL tasks   Sensation: WFL   Tone: none   Edema:none     Functional Assessment:   Initial  Status  Comments   Feeding  Min A- assist to open small packages on try     Grooming  Min A     Upper Body Dressing Min A      Lower Body Dressing Mod A - assist to don slipper socks and for standing balance when pulling up brief     Bathing NT    Toileting  NT- catheter in place     Bed Mobility  Supine to Sit: Min A   Sit to Supine:Antonio     Functional Transfers Rehab Potential: good     Patient / Family Goal: to go home     Short term goals  Time Frame: 5-7 days   Goal 1: improve self feeding to set up   Goal 2: improve grooming to set up   Goal 3: improve UB dressing to set up  Goal 4: improve functional transfers to [facility] with assistive device as needed   Goal 4: educate pt on ROM strength/coordinaton exercises to RUE     Patient and/or family understands diagnosis, prognosis and plan of care: yes    [] Malnutrition indicators have been identified and nursing has been notified to ensure a dietitian consult is ordered.      Time in: 1000   Time out: 1030   Total Tx Time: 30   eval  Mins: 10   tx  mins :826 [facility] OTR/L [id number]

## 2018-09-09 NOTE — PROGRESS NOTES
Mayur Montes is a 58 y.o. right handed female     Patient was admitted with right sided weakness and aphasia   Over the previous day, her s/s have improved and worsened and then improved again   She was started on DAPT    Vascular surgery has been monitoring her left carotid stenosis  MRI obtained - read by radiology as unremarkable for an acute event   Careful investigation questions a tiny infarct left internal capsule eluding to a stuttering lacunar syndrome     Her son is at the bedisde and has noted improvement this morning    Patient agrees   Speaking well and has noted improved weakness on her right side    She does report being about \"12%\" improved     No chest pain or palpitations  No SOB  No vertigo, lightheadedness or loss of consciousness  No falls, tripping or stumbling  No incontinence of bowels or bladder  No itching or bruising appreciated    ROS otherwise negative     Prior to Visit Medications    Medication Sig Taking? Authorizing Provider   meloxicam (MOBIC) 7.5 MG tablet Take 7.5 mg by mouth daily Yes Historical Provider, MD   furosemide (LASIX) 20 MG tablet Take 20 mg by mouth 2 times daily  Historical Provider, MD   Milk Thistle 1000 MG CAPS Take by mouth daily Ld 9/4/2018  Historical Provider, MD   methimazole (TAPAZOLE) 5 MG tablet Take 2.5 mg by mouth three times a week mon-wed-fri  Historical Provider, MD   FREESTYLE LANCETS MISC 1 each by Does not apply route daily  Annie Betancourt DO   blood glucose test strips (ASCENSIA AUTODISC VI;ONE TOUCH ULTRA TEST VI) strip 1 each by In Vitro route daily As needed.   Annie Betancourt DO   lisinopril (PRINIVIL;ZESTRIL) 20 MG tablet TAKE 1 TABLET BY MOUTH EVERY DAY  Annie Betancourt DO   glucose monitoring kit (FREESTYLE) monitoring kit 1 kit by Does not apply route daily as needed (glucose)  Annie Betancourt DO   insulin glargine (TOUJEO SOLOSTAR) 300 UNIT/ML injection pen Inject 55 Units into the skin nightly  Annie Betancourt DO   nystatin (MYCOSTATIN) 758438 UNIT/GM cream Apply topically 2 times daily. Annie Betancourt DO   Insulin Pen Needle 31G X 5 MM MISC 1 each by Does not apply route daily  Jaspreet Bobo DO   omeprazole (PRILOSEC) 40 MG delayed release capsule TAKE 1 CAPSULE BY MOUTH DAILY (WITH BREAKFAST)  Annie Betancourt DO   polyethylene glycol (GLYCOLAX) powder daily   Historical Provider, MD   simvastatin (ZOCOR) 20 MG tablet TAKE 1 TABLET BY MOUTH EVERY DAY IN THE EVENING  Annie Betancourt DO   NOVOLOG FLEXPEN 100 UNIT/ML injection pen USE FOR SLIDING SCALE 3 TIMES DAILY BEFORE MEALS **MAX 60 UNITS PER DAY**  Annie Betancourt DO   montelukast (SINGULAIR) 10 MG tablet Take 1 tablet by mouth nightly  Annie Betancourt DO   amitriptyline (ELAVIL) 10 MG tablet Take 1 tablet by mouth nightly as needed for Sleep  Jaspreet Bobo DO   ondansetron (ZOFRAN) 4 MG tablet Take 1 tablet by mouth every 8 hours as needed for Nausea or Vomiting  Salvador Stoddard,    venlafaxine (EFFEXOR XR) 37.5 MG extended release capsule TAKE 1 CAPSULE BY MOUTH EVERY DAY PLEASE SCHEDULE APPT  Annie Betancourt DO   metFORMIN (GLUCOPHAGE-XR) 500 MG extended release tablet TAKE 1 TABLET BY MOUTH EVERY DAY  Historical Provider, MD   gabapentin (NEURONTIN) 300 MG capsule Take 300 mg by mouth 4 times daily. Instructed to take am of procedure. Historical Provider, MD   hydrochlorothiazide (HYDRODIURIL) 25 MG tablet Take 25 mg by mouth daily.   Historical Provider, MD       Allergies as of 09/07/2018    (No Known Allergies)       Objective:     BP (!) 148/78   Pulse 93   Temp 99.1 °F (37.3 °C) (Temporal)   Resp 20   Ht 5' 7\" (1.702 m)   Wt 212 lb (96.2 kg)   SpO2 96%   BMI 33.20 kg/m²      General appearance: alert, appears stated age and cooperative  Head: Normocephalic, without obvious abnormality, atraumatic  Neck: no adenopathy, no carotid bruit and limited ROM  Lungs: clear to auscultation bilaterally  Heart: regular rate and rhythm  Extremities: no options

## 2018-09-09 NOTE — PROGRESS NOTES
Terrence sent to Dr. Shaista Laurent notifying him in change in neuro status/speech and RRT to be called.

## 2018-09-09 NOTE — PROGRESS NOTES
no rebound or guarding positive bowel sounds   Extremity: Right upper extremity weakness right lower extremity weakness. Palpable bilateral DP and PT pulses. See skin examination for great toe exam  NeuroExpressive aphasia and right-sided weakness see above    LABS:    Lab Results   Component Value Date    WBC 7.7 09/09/2018    HGB 12.8 09/09/2018    HCT 37.7 09/09/2018     (L) 09/09/2018    PROTIME 15.0 (H) 09/07/2018    INR 1.3 09/07/2018    APTT 32.1 09/07/2018    K 3.6 09/09/2018    BUN 9 09/09/2018    CREATININE 0.6 09/09/2018       RADIOLOGY:  MRA Neck WO Contrast   Final Result   1. Moderate atherosclerotic disease . Severely compromised study   secondary to patient motion   2. Estimated stenosis by NASCET criteria is is not hemodynamically   significant               MRA Head WO Contrast   Final Result   Unremarkable MRA of the head. MRI Brain WO Contrast   Final Result   Diffuse atrophy likely age related   Findings compatible with small vessel ischemic changes. CTA HEAD W CONTRAST   Final Result   Unremarkable CTA of the brain. CTA NECK W CONTRAST   Final Result   Severe atherosclerosis for a very short length in the most proximal   segment of the left internal carotid artery. Moderate atherosclerosis between 50-70% in the proximal segment of the   right internal carotid artery. CT Head WO Contrast   Final Result      1. There are 2 vague areas of difficult to perceive of hypodensity in   the brain parenchyma one located in subcortical region of the left   posterior frontal convexity bladder in the deep left basal ganglia   region. 2. They can represent acute ischemic insult. They were not conspicuous   seen on the previous study of September 7, at 22:25.       ALERT:  THIS IS AN ABNORMAL REPORT      US DUP LOWER EXTREMITY LEFT XIOMARA   Final Result   No evidence for deep venous thrombosis               US CAROTID ARTERY BILATERAL   Final Result Atherosclerotic disease. Estimated stenosis by NASCET criteria in the proximal right carotid   artery is between 50-69%   Estimated stenosis by NASCET criteria in the proximal left carotid   artery is between 70% and 99%. CT Head WO Contrast   Final Result   No indication for an acute intracranial process. CT Brain Perfusion   Final Result      There are some abnormalities in the CT perfusion parameters but fairly   diffuse loss of the brain parenchyma both cerebral hemispheres,   although more prominent in some areas, seen in the temporal regions,   left more than right. As these findings are not limited to a specific vascular territory,   they appear to be related to imbalance of the cerebral circulation. These findings will be correlated with the CTA head/CTA neck findings. CTA NECK W CONTRAST   Final Result   Severe anatomic stenosis in the most proximal left ICA. Moderate anatomic stenosis in the proximal right ICA. Preliminary report given to Dr. Rakel Mckee. ALERT:  THIS IS AN ABNORMAL REPORT      CT ABDOMEN PELVIS W IV CONTRAST Additional Contrast? None   Final Result     1. Haziness is seen adjacent to the pancreatic tail. Pancreatitis cannot be    excluded. 2.  There is a nodular contour of the liver suggesting cirrhosis. Small    amount of fluid seen adjacent to the area     3. Appendix is visualized and is normal in configuration. 4.  Small lymph nodes are seen within the right flank possibly represent mild    mesenteric lymphadenitis. 5.  Gallstones are seen within the contracted gallbladder. 6.  Haziness seen in the lower hemithoraces and increased linear markings in    the lung bases could represent mild pulmonary edema. This report has been electronically signed by Shelbie Tidwell MD.      XR CHEST PORTABLE   Final Result   No acute cardiopulmonary process.       CTA HEAD W CONTRAST    (Results Pending)       · ASSESSMENT/PLAN:#1

## 2018-09-09 NOTE — PROGRESS NOTES
diverticulitis. No obstruction. PELVIS:   Appendix:  The appendix is visualized and is normal in configuration. Bladder:  A Mckeon catheter is present. There is intraluminal air density present likely secondary to catheterization. However, cystitis cannot be entirely excluded in the appropriate clinical setting. Reproductive:  Status post hysterectomy. ABDOMEN and PELVIS:   Intraperitoneal space:  Unremarkable. No free air. No significant fluid collection. Bones/joints:  No acute fracture. No dislocation. Soft tissues:  Unremarkable. Vasculature:  Unremarkable. No abdominal aortic aneurysm. Lymph nodes: There are small mesenteric lymph nodes seen that appear below CT criteria for lymphadenopathy. Mild mesenteric lymphadenitis cannot be entirely excluded. 1.  Haziness is seen adjacent to the pancreatic tail. Pancreatitis cannot be excluded. 2.  There is a nodular contour of the liver suggesting cirrhosis. Small amount of fluid seen adjacent to the area   3. Appendix is visualized and is normal in configuration. 4.  Small lymph nodes are seen within the right flank possibly represent mild mesenteric lymphadenitis. 5.  Gallstones are seen within the contracted gallbladder. 6.  Haziness seen in the lower hemithoraces and increased linear markings in the lung bases could represent mild pulmonary edema. This report has been electronically signed by Becki Oro MD.    Xr Chest Portable    Result Date: 2018  Patient MRN:  [id number] : 1955 Age: 58 years Gender: Female Order Date:  2018 10:30 PM TECHNIQUE/NUMBER OF IMAGES/COMPARISON/CLINICAL HISTORY: Chest AP 1 image one view Comparison study of 2014 History weakness stroke. FINDINGS: Lungs are normally expanded. No infiltrates, consolidations or pleural effusions are observed. There is no perihilar vascular congestion. There is no pleural effusions     No acute cardiopulmonary process.     [address] [place] [address] Contrast    Result Date: 2018  Patient MRN:  [id number] : 1955 Age: 58 years Gender: Female Order Date:  2018 9:15 PM TECHNIQUE/NUMBER OF IMAGES/COMPARISON/CLINICAL HISTORY: CT of the After IV contrast or sedation sequential axial images were obtained sagittal and coronal reconstructions History is stroke. This study is part of the CT of the head. Reviewed the recent CTA of the neck dated the . FINDINGS: As observed on the previous study, there is a point of the severe anatomic stenosis in the most proximal segment of the left ICA which can be quantified close to 90% for a very short length. This is in the area where heavy calcified plaques are present. The stenosis caused by noncalcified plaque. In the proximal right vertebral artery is a moderate central stenosis. This can be quantified between 50-70%. There is no evidence for obstruction of the vertebral arteries. The left vertebral artery is a very small vessel developmental basis. Severe atherosclerosis for a very short length in the most proximal segment of the left internal carotid artery. Moderate atherosclerosis between 50-70% in the proximal segment of the right internal carotid artery. Cta Neck W Contrast    Result Date: 2018  Patient MRN:  [id number] : 1955 Age: 58 years Gender: Female Order Date:  2018 10:30 PM TECHNIQUE/NUMBER OF IMAGES/COMPARISON/CLINICAL HISTORY: CT abdomen After IV contrast administration axial images were obtained with sagittal and coronal MIP reconstructions of the major arteries of the neck including 3-D volume rendering images. Images: [id number]. FINDINGS: Right carotid arterial system: Atherosclerotic changes are seen in the right bulbar region, anatomic stenosis is in moderate degree of separate peripheral close to 50 % 1 accounting proximal and distal vessel diameter. Left carotid arterial system: Calcified plaques are seen in the left frontal region towards the ICA.  Additional no calcified plaques are also seen in that area. There appears to be early or severe stenosis for a very short length in the most proximal segment of the left ICA. Right vertebral artery: There is no obstruction or significant degree of stenosis from the origin of the vessel to the formation of the posterior arch. See the dominant vessel in the formation of the basilar artery. Left vertebral artery: Origin is directly from the arch of the aorta. There is known dominant vessel. There is no evidence for obstruction. Is difficult to be traced after the entrance into the lower, it branches very early, one of the branches is the left PICA. This represents a relatively variation. Arch of the aorta has unremarkable appearance. Minimal groundglass densities seen in the upper lungs adequately represent minimal pneumonitis or some degree of alveolitis or even developing pulmonary edema to be correlated clinically. Arch of the aorta appears unremarkable Parotid glands, submandibular glands and airways appear unremarkable and as well the thyroid gland. Severe anatomic stenosis in the most proximal left ICA. Moderate anatomic stenosis in the proximal right ICA. Preliminary report given to Dr. Kiel Banda. ALERT:  THIS IS AN ABNORMAL REPORT    Us Dup Lower Extremity Left Xiomara    Result Date: 2018  Patient MRN:  [id number] : 1955 Age: 58 years Gender: Female Order Date:  2018 3:00 PM EXAM: US DUP LOWER EXTREMITY LEFT XIOMARA NUMBER OF IMAGES:  31 INDICATION:  LEG SWELLING, PAIN, DVT SUSPECTED COMPARISON: None There is no evidence for deep venous thrombosis There is good compressibility, there is good augmentation, there is good color flow.      No evidence for deep venous thrombosis     Ct Brain Perfusion    Result Date: 2018  Patient MRN:  [id number] : 1955 Age: 58 years Gender: Female Order Date:  2018 10:30 PM TECHNIQUE/NUMBER OF IMAGES/COMPARISON/CLINICAL HISTORY: CT brain perfusion After IV contrast and M3 branches. Right anterior cerebral artery: There is normal appearance for the right A1 and A2 segments. There is a normal-appearing anterior communicating artery. Left internal carotid arterial system: There is no evidence for obstruction or stenosis from the entrance into the petrous bone to the intracranial bifurcation. Some calcifications are seen in the cavernous segment but there is no anatomic stenosis. Left middle cerebral artery: There is normal appearance for the left M1 M2 and M3 branches. Left anterior cerebral artery: There is normal appearance for the left A1 and A2 branches. Vertebrobasilar arterial system: Dominant artery is the right vertebral artery. The basilar artery has normal appearance. There is a dominant right posterior communicating artery which supplies the right P2 circulation. The tip of the basilar artery continues as a dominant left P1 segment. There is minimal contribution from a left posterior communicating artery. The left P1 forms the left P2 circulation. No indication for disruption of the blood-brain barrier. Unremarkable CTA of the brain. Us Carotid Artery Bilateral    Result Date: 2018  Patient MRN:  [id number] : 1955 Age: 58 years Gender: Female Order Date:  2018 3:00 PM EXAM: US CAROTID ARTERY BILATERAL NUMBER OF IMAGES:  48 INDICATION: Stenosis COMPARISON: CTA of the neck FINDINGS: Velocities are within normal limits. ICA\CCA ratios are within normal limits. The right vertebral artery doppler images demonstrate  antegrade flow. The left vertebral artery doppler images demonstrate  antegrade flow. Grey scale images demonstrate severe plaque identified in the right and left carotid arteries. Atherosclerotic disease. Estimated stenosis by NASCET criteria in the proximal right carotid artery is between 50-69% Estimated stenosis by NASCET criteria in the proximal left carotid artery is between 70% and 99%.        Medications     dextrose      sodium chloride 100 mL/hr at 09/08/18 0433      hydrochlorothiazide  25 mg Oral Daily    insulin glargine  44 Units Subcutaneous Nightly    lisinopril  20 mg Oral Daily    methimazole  2.5 mg Oral Once per day on Mon Wed Fri    montelukast  10 mg Oral Nightly    pantoprazole  40 mg Oral QAM AC    simvastatin  20 mg Oral Nightly    venlafaxine  37.5 mg Oral Daily with breakfast    sodium chloride flush  10 mL Intravenous 2 times per day    enoxaparin  40 mg Subcutaneous Daily    insulin lispro  0-12 Units Subcutaneous TID WC    insulin lispro  0-6 Units Subcutaneous Nightly    sodium chloride  30 mL/kg Intravenous Once    aspirin  325 mg Oral Daily    clopidogrel  75 mg Oral Daily      DIET CARB CONTROL; Carb Control: 4 carbs/meal (approximate 1800 kcals/day); No Added Salt (3-4 GM)    ASSESSMENT  PLAN/RECOMMENDATIONS:  -Altered mental status ? TIA  -Rt ICA stenosis vascular surgery consulted  -Cirrhosis of liver with ascites ([facility] [address]  -Diabetes mellitus, type 2 ([facility] [address]  -History of hyperthyroidism  -HTN (hypertension), benign  -Mixed hyperlipidemia  -Primary osteoarthritis of both hips  Consulted neuro  PT ordered   DVT  prophylaxis      Electronically signed by Poonam Kelly MD on 9/9/2018 at 11:51 AM

## 2018-09-10 PROBLEM — N39.0 UTI DUE TO KLEBSIELLA SPECIES: Status: ACTIVE | Noted: 2018-09-10

## 2018-09-10 PROBLEM — B96.89 UTI DUE TO KLEBSIELLA SPECIES: Status: ACTIVE | Noted: 2018-09-10

## 2018-09-10 PROBLEM — R78.81 BACTEREMIA DUE TO GRAM-NEGATIVE BACTERIA: Status: ACTIVE | Noted: 2018-09-10

## 2018-09-10 LAB
METER GLUCOSE: 190 MG/DL (ref 70–110)
METER GLUCOSE: 219 MG/DL (ref 70–110)
METER GLUCOSE: 260 MG/DL (ref 70–110)
METER GLUCOSE: 299 MG/DL (ref 70–110)
ORGANISM: ABNORMAL
URINE CULTURE, ROUTINE: ABNORMAL
URINE CULTURE, ROUTINE: ABNORMAL

## 2018-09-10 PROCEDURE — 6360000002 HC RX W HCPCS: Performed by: INTERNAL MEDICINE

## 2018-09-10 PROCEDURE — 6370000000 HC RX 637 (ALT 250 FOR IP): Performed by: INTERNAL MEDICINE

## 2018-09-10 PROCEDURE — 82962 GLUCOSE BLOOD TEST: CPT

## 2018-09-10 PROCEDURE — 99232 SBSQ HOSP IP/OBS MODERATE 35: CPT | Performed by: NURSE PRACTITIONER

## 2018-09-10 PROCEDURE — 2580000003 HC RX 258: Performed by: INTERNAL MEDICINE

## 2018-09-10 PROCEDURE — 2060000000 HC ICU INTERMEDIATE R&B

## 2018-09-10 PROCEDURE — 97535 SELF CARE MNGMENT TRAINING: CPT

## 2018-09-10 PROCEDURE — 97530 THERAPEUTIC ACTIVITIES: CPT

## 2018-09-10 PROCEDURE — 6370000000 HC RX 637 (ALT 250 FOR IP): Performed by: CLINICAL NURSE SPECIALIST

## 2018-09-10 PROCEDURE — 2580000003 HC RX 258: Performed by: EMERGENCY MEDICINE

## 2018-09-10 PROCEDURE — 6370000000 HC RX 637 (ALT 250 FOR IP): Performed by: PSYCHIATRY & NEUROLOGY

## 2018-09-10 RX ADMIN — SODIUM CHLORIDE: 9 INJECTION, SOLUTION INTRAVENOUS at 13:28

## 2018-09-10 RX ADMIN — ENOXAPARIN SODIUM 40 MG: 40 INJECTION SUBCUTANEOUS at 08:07

## 2018-09-10 RX ADMIN — Medication 10 ML: at 20:45

## 2018-09-10 RX ADMIN — INSULIN LISPRO 6 UNITS: 100 INJECTION, SOLUTION INTRAVENOUS; SUBCUTANEOUS at 16:57

## 2018-09-10 RX ADMIN — INSULIN LISPRO 2 UNITS: 100 INJECTION, SOLUTION INTRAVENOUS; SUBCUTANEOUS at 08:10

## 2018-09-10 RX ADMIN — INSULIN GLARGINE 44 UNITS: 100 INJECTION, SOLUTION SUBCUTANEOUS at 20:46

## 2018-09-10 RX ADMIN — ASPIRIN 325 MG: 325 TABLET, COATED ORAL at 08:07

## 2018-09-10 RX ADMIN — HYDROCHLOROTHIAZIDE 25 MG: 25 TABLET ORAL at 08:07

## 2018-09-10 RX ADMIN — INSULIN LISPRO 4 UNITS: 100 INJECTION, SOLUTION INTRAVENOUS; SUBCUTANEOUS at 11:29

## 2018-09-10 RX ADMIN — MONTELUKAST SODIUM 10 MG: 10 TABLET, FILM COATED ORAL at 20:44

## 2018-09-10 RX ADMIN — ATORVASTATIN CALCIUM 40 MG: 40 TABLET, FILM COATED ORAL at 20:44

## 2018-09-10 RX ADMIN — VENLAFAXINE HYDROCHLORIDE 37.5 MG: 37.5 CAPSULE, EXTENDED RELEASE ORAL at 08:07

## 2018-09-10 RX ADMIN — CEFTRIAXONE SODIUM 2 G: 2 INJECTION, POWDER, FOR SOLUTION INTRAMUSCULAR; INTRAVENOUS at 16:55

## 2018-09-10 RX ADMIN — PANTOPRAZOLE SODIUM 40 MG: 40 TABLET, DELAYED RELEASE ORAL at 05:18

## 2018-09-10 RX ADMIN — LISINOPRIL 20 MG: 20 TABLET ORAL at 08:07

## 2018-09-10 RX ADMIN — INSULIN LISPRO 3 UNITS: 100 INJECTION, SOLUTION INTRAVENOUS; SUBCUTANEOUS at 20:46

## 2018-09-10 RX ADMIN — CLOPIDOGREL 75 MG: 75 TABLET, FILM COATED ORAL at 08:07

## 2018-09-10 RX ADMIN — CEFEPIME HYDROCHLORIDE 2 G: 2 INJECTION, POWDER, FOR SOLUTION INTRAVENOUS at 08:07

## 2018-09-10 RX ADMIN — METHIMAZOLE 2.5 MG: 5 TABLET ORAL at 08:07

## 2018-09-10 RX ADMIN — SODIUM CHLORIDE: 9 INJECTION, SOLUTION INTRAVENOUS at 23:52

## 2018-09-10 ASSESSMENT — PAIN SCALES - GENERAL
PAINLEVEL_OUTOF10: 0

## 2018-09-10 NOTE — PROGRESS NOTES
8/29/18   Annie Betancourt DO   glucose monitoring kit (FREESTYLE) monitoring kit 1 kit by Does not apply route daily as needed (glucose) 8/28/18   Annie Betancourt DO   insulin glargine (TOUJEO SOLOSTAR) 300 UNIT/ML injection pen Inject 55 Units into the skin nightly 8/17/18   Annie Betancourt DO   nystatin (MYCOSTATIN) 652502 UNIT/GM cream Apply topically 2 times daily. 8/17/18   Annie Betancourt DO   Insulin Pen Needle 31G X 5 MM MISC 1 each by Does not apply route daily 8/15/18   Annie Betancourt DO   omeprazole (PRILOSEC) 40 MG delayed release capsule TAKE 1 CAPSULE BY MOUTH DAILY (WITH BREAKFAST) 8/1/18   Annie Betancourt DO   polyethylene glycol (GLYCOLAX) powder daily  7/19/18   Historical Provider, MD   simvastatin (ZOCOR) 20 MG tablet TAKE 1 TABLET BY MOUTH EVERY DAY IN THE EVENING 7/23/18   Annie Betancourt DO   NOVOLOG FLEXPEN 100 UNIT/ML injection pen USE FOR SLIDING SCALE 3 TIMES DAILY BEFORE MEALS **MAX 60 UNITS PER DAY** 7/17/18   Annie Betancourt DO   montelukast (SINGULAIR) 10 MG tablet Take 1 tablet by mouth nightly 7/13/18   Annie Betancourt DO   amitriptyline (ELAVIL) 10 MG tablet Take 1 tablet by mouth nightly as needed for Sleep 7/13/18   Annie Betancourt DO   ondansetron (ZOFRAN) 4 MG tablet Take 1 tablet by mouth every 8 hours as needed for Nausea or Vomiting 7/8/18   Neema Stoddard, DO   venlafaxine (EFFEXOR XR) 37.5 MG extended release capsule TAKE 1 CAPSULE BY MOUTH EVERY DAY PLEASE SCHEDULE APPT 6/8/18   Daryle Hoyle Mellott, DO   metFORMIN (GLUCOPHAGE-XR) 500 MG extended release tablet TAKE 1 TABLET BY MOUTH EVERY DAY 5/7/18   Historical Provider, MD   gabapentin (NEURONTIN) 300 MG capsule Take 300 mg by mouth 4 times daily. Instructed to take am of procedure. Historical Provider, MD   hydrochlorothiazide (HYDRODIURIL) 25 MG tablet Take 25 mg by mouth daily. Historical Provider, MD       Allergies:  Patient has no known allergies.     Social History:   TOBACCO:   reports that she polyps  · Neck: supple and non-tender without mass, no thyromegaly or thyroid nodules, no cervical lymphadenopathy  · Cardio- RR no MRG  · pulm- CTAB  · Abdomen: soft, non-tender, non-distended, normal bowel sounds, no masses or organomegaly  · Extremities: no cyanosis, clubbing or edema  · Musculoskeletal: normal range of motion, no joint swelling, deformity or tenderness  · Neurologic: reflexes normal and symmetric, no cranial nerve deficit, gait, coordination and speech normal   Labs and Imaging Studies   Basic Labs  Recent Labs      09/07/18 2220 09/07/18 2221 09/09/18 0618   NA   --   129*  132   K   --   4.7  3.6   CL   --   94*  95*   CO2   --   21*  22   BUN   --   10  9   CREATININE   --   0.6  0.6   GLUCOSE  365  363*  206*   CALCIUM   --   9.2  8.1*       Recent Labs      09/07/18 2221 09/09/18 0618   WBC  12.1*  7.7   RBC  4.66  3.90   HGB  15.2  12.8   HCT  43.6  37.7   MCV  93.6  96.7   MCH  32.6  32.8   MCHC  34.9*  34.0   RDW  13.6  14.3   PLT  138  104*   MPV  11.3  11.4       CBC:   Lab Results   Component Value Date    WBC 7.7 09/09/2018    RBC 3.90 09/09/2018    HGB 12.8 09/09/2018    HCT 37.7 09/09/2018    MCV 96.7 09/09/2018    RDW 14.3 09/09/2018     09/09/2018     U/A:  No components found for: Leif Ou, USPGRAV, UPH, UPROTEIN, UGLUCOSE, UKETONE, UBILI, UBLOOD, [place], [place], [place], [facility], [place], [facility], [place], [facility], [place]    Imaging Studies:  CT abdomen reviewed- pancreatic edema? MICROBIOLOGY- BC- 1 SET CONS  2 ND SET- GNB  UC- Klebsiella    Assessment and Plan     Anjali Koenig is a 58 y.o. female  Who presents with bacteremia and UTI.      1.severe sepsis with polymicrobial bacteremia (cons and GNB)- LLE cellulitis, pt has no lines/hardware- CONS only in 1 set and no Gi symptoms,she reports dysuria and had UT sxs for 2 months she has LFT elvelation and CVA tenderness on the right   -rpt BC, Switch to iv rocephin 2 g q24   -DC cefepime

## 2018-09-10 NOTE — PROGRESS NOTES
Mobility  Supine to Sit: Min A   Sit to Supine:Antonio  SBA  Supine to sit    Functional Transfers Sit to stand- Min A  SBA-Min A  Due to safety,, Educated pt on safety with proper hand placement, pt is slightly impulsive      Functional Mobility Min/Mod A -pt had loss of balance and decreased ability to sequence directions to use ww  Min A   With mod cues for walker management, tolerating household distance       Sit balance: SBA Fair+  Stand balance: CGA using walker   Endurance/Activity tolerance: Fair+    Comments: Upon arrival pt was supine in bed visiting with her son & agreed to participate. At end of session pt was seated in chair with son still present, tray table in front, call light within reach. Encouraged pt to stay up in chair for a while to increase her overall tolerance. · Pt has made Good progress towards set goals. · Continue with current plan of care    Time in: 3:15pm  Time out: 3:38pm  Total Tx Time: 23 minutes    Aung Leach. [address], HUNT/L [id number]  I have read the above note and agree with the documentation.   Mark West OTR/L [id number]

## 2018-09-10 NOTE — PROGRESS NOTES
importance of increasing activity and performing ex as instructed to promote an optimal outcome in her recovery . Pt reported understanding     Pt was left B/S chair with call light left by patient. Son present     Chair/bed alarm: NA     Time in: 2500  Time out: 1538      Continue with physical therapy current plan of care.     Manuel Chang PTA [id number]

## 2018-09-10 NOTE — PROGRESS NOTES
Subjective:  Feeling better, no weakness No CP, SOB, F, V, D, P      Objective:    /81   Pulse 102   Temp 98.2 °F (36.8 °C) (Temporal)   Resp 16   Ht 5' 7\" (1.702 m)   Wt 212 lb (96.2 kg)   SpO2 96%   BMI 33.20 kg/m²     24HR INTAKE/OUTPUT:    Intake/Output Summary (Last 24 hours) at 09/10/18 0830  Last data filed at 09/10/18 0631   Gross per 24 hour   Intake             1933 ml   Output             2550 ml   Net             -617 ml       Heart:  RRR, no murmurs, gallops, or rubs.   Lungs:  CTA bilaterally, no wheeze, rales or rhonchi  Abd: bowel sounds present, nontender, nondistended, no masses  Extrem:  No clubbing, cyanosis, or edema    Most Recent Labs  Lab Results   Component Value Date    WBC 7.7 09/09/2018    HGB 12.8 09/09/2018    HCT 37.7 09/09/2018     (L) 09/09/2018     09/09/2018    K 3.6 09/09/2018    CL 95 (L) 09/09/2018    CREATININE 0.6 09/09/2018    BUN 9 09/09/2018    CO2 22 09/09/2018    GLUCOSE 206 (H) 09/09/2018    ALT 38 (H) 09/07/2018    AST 85 (H) 09/07/2018    INR 1.3 09/07/2018    TSH 3.960 04/13/2018    LABA1C 10.4 (H) 09/09/2018       Assessment    Patient Active Problem List   Diagnosis    Diabetes mellitus, type 2 (HCC)    HTN (hypertension), benign    Mixed hyperlipidemia    History of hyperthyroidism    Cirrhosis of liver with ascites (HCC)    Spondylosis of cervical region without myelopathy or radiculopathy    Cervical facet joint syndrome (HCC)    Lumbar facet arthropathy (HCC)    Lumbar radiculopathy    Primary osteoarthritis of both hips    Lumbar spondylosis    Altered mental status    Unresponsive episode    Bacteremia/UTI     Plan:      -Altered mental status/weakness-- ?TIA, neuro following  -UTI/Bacteremia on Cefepime, ID consulted  -Rt ICA stenosis vascular surgery consulted  -Cirrhosis of liver with ascites ([facility] [address]  -Diabetes mellitus, type 2 ([address]  -History of hyperthyroidism  -HTN (hypertension), benign  -Mixed

## 2018-09-11 ENCOUNTER — APPOINTMENT (OUTPATIENT)
Dept: GENERAL RADIOLOGY | Age: 63
DRG: 064 | End: 2018-09-11
Payer: COMMERCIAL

## 2018-09-11 ENCOUNTER — APPOINTMENT (OUTPATIENT)
Dept: INTERVENTIONAL RADIOLOGY/VASCULAR | Age: 63
DRG: 064 | End: 2018-09-11
Payer: COMMERCIAL

## 2018-09-11 LAB
METER GLUCOSE: 156 MG/DL (ref 70–110)
METER GLUCOSE: 221 MG/DL (ref 70–110)
METER GLUCOSE: 247 MG/DL (ref 70–110)
METER GLUCOSE: 282 MG/DL (ref 70–110)
TSH SERPL DL<=0.05 MIU/L-ACNC: 6.45 UIU/ML (ref 0.27–4.2)

## 2018-09-11 PROCEDURE — 6370000000 HC RX 637 (ALT 250 FOR IP): Performed by: PSYCHIATRY & NEUROLOGY

## 2018-09-11 PROCEDURE — 73630 X-RAY EXAM OF FOOT: CPT

## 2018-09-11 PROCEDURE — 6370000000 HC RX 637 (ALT 250 FOR IP): Performed by: CLINICAL NURSE SPECIALIST

## 2018-09-11 PROCEDURE — 99232 SBSQ HOSP IP/OBS MODERATE 35: CPT | Performed by: NURSE PRACTITIONER

## 2018-09-11 PROCEDURE — 6360000002 HC RX W HCPCS: Performed by: INTERNAL MEDICINE

## 2018-09-11 PROCEDURE — 36415 COLL VENOUS BLD VENIPUNCTURE: CPT

## 2018-09-11 PROCEDURE — 6370000000 HC RX 637 (ALT 250 FOR IP): Performed by: INTERNAL MEDICINE

## 2018-09-11 PROCEDURE — 2580000003 HC RX 258: Performed by: EMERGENCY MEDICINE

## 2018-09-11 PROCEDURE — 2580000003 HC RX 258: Performed by: INTERNAL MEDICINE

## 2018-09-11 PROCEDURE — 6370000000 HC RX 637 (ALT 250 FOR IP): Performed by: PODIATRIST

## 2018-09-11 PROCEDURE — 93922 UPR/L XTREMITY ART 2 LEVELS: CPT

## 2018-09-11 PROCEDURE — 97535 SELF CARE MNGMENT TRAINING: CPT

## 2018-09-11 PROCEDURE — 2060000000 HC ICU INTERMEDIATE R&B

## 2018-09-11 PROCEDURE — 97530 THERAPEUTIC ACTIVITIES: CPT

## 2018-09-11 PROCEDURE — 82962 GLUCOSE BLOOD TEST: CPT

## 2018-09-11 PROCEDURE — 80074 ACUTE HEPATITIS PANEL: CPT

## 2018-09-11 PROCEDURE — 99232 SBSQ HOSP IP/OBS MODERATE 35: CPT | Performed by: SURGERY

## 2018-09-11 PROCEDURE — 84443 ASSAY THYROID STIM HORMONE: CPT

## 2018-09-11 PROCEDURE — 6370000000 HC RX 637 (ALT 250 FOR IP)

## 2018-09-11 RX ORDER — ASPIRIN 325 MG
TABLET ORAL
Status: DISPENSED
Start: 2018-09-11 | End: 2018-09-11

## 2018-09-11 RX ORDER — IBUPROFEN 200 MG
400 TABLET ORAL EVERY 6 HOURS PRN
Status: DISCONTINUED | OUTPATIENT
Start: 2018-09-11 | End: 2018-09-12 | Stop reason: HOSPADM

## 2018-09-11 RX ADMIN — LISINOPRIL 20 MG: 20 TABLET ORAL at 09:59

## 2018-09-11 RX ADMIN — INSULIN LISPRO 2 UNITS: 100 INJECTION, SOLUTION INTRAVENOUS; SUBCUTANEOUS at 10:01

## 2018-09-11 RX ADMIN — ATORVASTATIN CALCIUM 40 MG: 40 TABLET, FILM COATED ORAL at 20:53

## 2018-09-11 RX ADMIN — CEFTRIAXONE SODIUM 2 G: 2 INJECTION, POWDER, FOR SOLUTION INTRAMUSCULAR; INTRAVENOUS at 18:05

## 2018-09-11 RX ADMIN — HYDROCHLOROTHIAZIDE 25 MG: 25 TABLET ORAL at 09:59

## 2018-09-11 RX ADMIN — IBUPROFEN 400 MG: 200 TABLET, FILM COATED ORAL at 20:53

## 2018-09-11 RX ADMIN — IBUPROFEN 400 MG: 200 TABLET, FILM COATED ORAL at 11:42

## 2018-09-11 RX ADMIN — Medication 10 ML: at 20:54

## 2018-09-11 RX ADMIN — COLLAGENASE SANTYL: 250 OINTMENT TOPICAL at 10:14

## 2018-09-11 RX ADMIN — PANTOPRAZOLE SODIUM 40 MG: 40 TABLET, DELAYED RELEASE ORAL at 05:32

## 2018-09-11 RX ADMIN — INSULIN LISPRO 4 UNITS: 100 INJECTION, SOLUTION INTRAVENOUS; SUBCUTANEOUS at 11:29

## 2018-09-11 RX ADMIN — INSULIN LISPRO 3 UNITS: 100 INJECTION, SOLUTION INTRAVENOUS; SUBCUTANEOUS at 21:01

## 2018-09-11 RX ADMIN — ASPIRIN 325 MG: 325 TABLET, COATED ORAL at 10:06

## 2018-09-11 RX ADMIN — VENLAFAXINE HYDROCHLORIDE 37.5 MG: 37.5 CAPSULE, EXTENDED RELEASE ORAL at 10:00

## 2018-09-11 RX ADMIN — Medication 10 ML: at 10:02

## 2018-09-11 RX ADMIN — ENOXAPARIN SODIUM 40 MG: 40 INJECTION SUBCUTANEOUS at 10:00

## 2018-09-11 RX ADMIN — INSULIN GLARGINE 44 UNITS: 100 INJECTION, SOLUTION SUBCUTANEOUS at 21:01

## 2018-09-11 RX ADMIN — CLOPIDOGREL 75 MG: 75 TABLET, FILM COATED ORAL at 10:00

## 2018-09-11 RX ADMIN — MONTELUKAST SODIUM 10 MG: 10 TABLET, FILM COATED ORAL at 20:53

## 2018-09-11 RX ADMIN — INSULIN LISPRO 4 UNITS: 100 INJECTION, SOLUTION INTRAVENOUS; SUBCUTANEOUS at 18:05

## 2018-09-11 RX ADMIN — SODIUM CHLORIDE: 9 INJECTION, SOLUTION INTRAVENOUS at 09:59

## 2018-09-11 RX ADMIN — VANCOMYCIN HYDROCHLORIDE 1.5 G: 10 INJECTION, POWDER, LYOPHILIZED, FOR SOLUTION INTRAVENOUS at 14:33

## 2018-09-11 ASSESSMENT — PAIN DESCRIPTION - PAIN TYPE: TYPE: ACUTE PAIN

## 2018-09-11 ASSESSMENT — PAIN SCALES - GENERAL
PAINLEVEL_OUTOF10: 0
PAINLEVEL_OUTOF10: 6
PAINLEVEL_OUTOF10: 0
PAINLEVEL_OUTOF10: 9

## 2018-09-11 ASSESSMENT — PAIN DESCRIPTION - DESCRIPTORS: DESCRIPTORS: ACHING;CONSTANT;DISCOMFORT

## 2018-09-11 ASSESSMENT — PAIN DESCRIPTION - ORIENTATION: ORIENTATION: LEFT

## 2018-09-11 ASSESSMENT — PAIN DESCRIPTION - LOCATION: LOCATION: LEG

## 2018-09-11 ASSESSMENT — PAIN DESCRIPTION - PROGRESSION: CLINICAL_PROGRESSION: GRADUALLY WORSENING

## 2018-09-11 NOTE — PROGRESS NOTES
Consult received,chart reviewed. Will discuss with Dr. Ab Murphy and will advise. Thank you for consult.

## 2018-09-11 NOTE — PROGRESS NOTES
[facility]  [facility] Medicine Residency Program  Progress Note  - House Team 1    Patient:  Reanna Mendenhall 58 y.o. female MRN: [id number]     Date of Service: 9/11/2018     CC: UTI    Subjective       The patient was met lying in bed. Today she complains of a burning and squeezing sensation in her left lower leg. She says the pain increases when she is trying to walk. She also describes a left sided pain \"from inside\" around T6 anteriorly. She denies itching, fevers, chills, constipation or diarrhea. Objective     Physical Exam:  · Vitals: BP (!) 179/83   Pulse 95   Temp 97.3 °F (36.3 °C) (Temporal)   Resp 18   Ht 5' 7\" (1.702 m)   Wt 212 lb (96.2 kg)   SpO2 91%   BMI 33.20 kg/m²     · I & O - 24hr: No intake/output data recorded. · General Appearance: alert, appears stated age and cooperative  · HEENT:  Head: Normocephalic, no lesions, without obvious abnormality. · Lung: clear to auscultation bilaterally  · Heart: regular rate and rhythm, S1, S2 normal, no murmur, click, rub or gallop  · Abdomen: soft, non-tender; bowel sounds normal; no masses,  no organomegaly  · Extremities:  extremities normal, atraumatic, no cyanosis or edema  · Musculokeletal: No joint swelling, no muscle tenderness. ROM normal in all joints of extremities.    · Neurologic: Mental status: Alert, oriented, thought content appropriate    Pertinent Labs & Imaging Studies     CBC with Differential:    Lab Results   Component Value Date    WBC 7.7 09/09/2018    RBC 3.90 09/09/2018    HGB 12.8 09/09/2018    HCT 37.7 09/09/2018     09/09/2018    MCV 96.7 09/09/2018    MCH 32.8 09/09/2018    MCHC 34.0 09/09/2018    RDW 14.3 09/09/2018    LYMPHOPCT 11.6 09/07/2018    MONOPCT 4.8 09/07/2018    BASOPCT 0.5 09/07/2018    MONOSABS 0.58 09/07/2018    LYMPHSABS 1.40 09/07/2018    EOSABS 0.02 09/07/2018    BASOSABS 0.06 09/07/2018     Hepatic Function Panel:    Lab Results   Component Value Date    ALKPHOS 161 09/07/2018    ALT 38 09/07/2018    AST 85 09/07/2018    PROT 8.5 09/07/2018    BILITOT 1.3 09/07/2018    LABALBU 3.3 09/07/2018       Assessment and Plan     Maury Leija is a 58 y.o. female  Who presents with UTI and possible bacteremia. 1.Bacteremia/Sepsis    -Left lower leg cellulitis    -right CVA tenderness   -3 UTIs in 2 months   -has no lines/hardware   - con- only in 1 set and no GI symptoms              -awaiting repeat blood cultures   -on IV rocephin 2 g q24               - awaiting hepatitis panel  2. Left lower leg cellulitis   -deeper red today, same surface area   -on IV rocephin 2g q24  3.  Left toe blisters and wounds 2/2 diabetic neuropathy   -podiatry onboard and treating this afternoon      [address] [facility], [place]  Attending physician: Dr. Matthew Carranza

## 2018-09-11 NOTE — CONSULTS
[address]                   [address] [facility], [address]                                   CONSULTATION    PATIENT NAME: Cece Kelley                      :        1955  MED REC NO:   [id number]                            ROOM:       8507  ACCOUNT NO:   [de-identified]                           ADMIT DATE: 2018  00:00  PROVIDER:     Taras Henry DPM    CONSULT DATE:  2018    PRIMARY CARE PROVIDER:  Liz Christina DO.    HISTORY OF PRESENT ILLNESS:  This 54-year-old female presents today with  trauma, left first and second digits when she was cutting her nails, which  caused this trauma. Wound  developed. She is diabetic. The patient has  no other pedal complaints at this time. PAST MEDICAL HISTORY:  Includes thyroid disease, polyp of the esophagus,  liver cirrhosis, hypertension, hyperlipidemia, GERD, diabetes mellitus. PAST FAMILY HISTORY:  Please see chart. PAST SOCIAL HISTORY:  Does not smoke. MEDICATIONS:  Please see chart. ALLERGIES:  No known drug allergies. OBJECTIVE:  VITAL SIGNS:  Temperature 97.9, pulse 98, respiratory rate 16, blood  pressure 142/84. VASCULAR:  DP and PT are 1/4 bilaterally. CFT is less than 3. Warm to  cold temperature noted. NEUROLOGIC:  Diminished, bilateral feet. DERMATOLOGIC:  Left first and second digits showed positive eschars. There  is no pus. There is no odor. There is no increase in temperature. Pain  on palpation is noted. ASSESSMENT:  1. Trauma of first and second digits, ulcerations. 2.  Pain in limb, left first and second digits. 3.  Edema, left first and second digits. 4.  NIDDM with neuropathy. PLAN:  1. Evaluation and management. 2.  Educate. 3.  Noninvasive arterial studies. 4.  _____ apply Santyl, 4 x 4 and Edin dressing lightly, left first and  second digits daily. Heel cushions are needed to bilateral heels.   X-rays  needed of left foot to help to evaluate, manage, and treat accordingly and  we will follow. If there are any problems or questions, please contact  myself, Dr. Jose Lew, as soon as possible. Thank you for the consultation.         Kevin Wallace DPM    D: 09/11/2018 7:09:44       T: 09/11/2018 8:45:52     [email or web address]  Job#: [id number]     Doc#: [id number]    CC:

## 2018-09-11 NOTE — PROGRESS NOTES
OT BEDSIDE TREATMENT NOTE      Date:2018  Patient Name: Renita Munguia  MRN: [id number]  : 1955  Room: [facility]7/850-A     Per OT Eval:   Evaluating OT: Jayjay MCKINLEY/L [id number]      AM PAC ADL Score:  20/24    G code: CK      Diagnosis: AMS Possible L Lacunar Infarct   Surgery: none   Past Medical History: DM, HTN   Precautions: Falls      Home Living: Pt lives with son  in a 2 story home  with 4 steps to enter and one hand  Rail, bed/bath on 2nd floor   Bathroom setup: tub/shower   Equipment owned: none      Prior Level of Function: pt was independent  with ADLs independent  with IADLs; using no device  for ambulation. Driving: pt does not currently drive   Occupation: pt is retired     Pain: denied any pain, reported mild weakness in her R leg only     Cognition: Alert & oriented x 3, pleasant & cooperative     Goals:  Goal 1: improve self feeding to set up   Goal 2: improve grooming to set up   Goal 3: improve UB dressing to set up  Goal 4: improve functional transfers to [facility] with assistive device as needed   Goal 4: educate pt on ROM strength/coordinaton exercises to RUE     Functional Assessment:    Initial  Status  Current Status    Feeding  Min A- assist to open small packages on try  Independent  Seated in chair   Grooming  Min A  SBA  Standing at the sink to wash her hands. Seated pt combed hair. Upper Body Dressing Min A   SBA  [facility] gown as a robe        Lower Body Dressing Mod A - assist to don slipper socks and for standing balance when pulling up brief  Min A  Don/doff L sock and demo'd ability to doff R sock.       Bathing NT  NT   Toileting  NT- catheter in place  Supervision  Completed while seated.       Bed Mobility  Supine to Sit: Min A   Sit to Supine:Antonio  SBA  Supine to sit    Functional Transfers Sit to stand- Min A  SBA  Commode transfer completed with SBA     Functional Mobility Min/Mod A -pt had loss of balance and decreased ability to sequence directions to

## 2018-09-11 NOTE — PROGRESS NOTES
Physical Therapy  Facility/Department: [address] NEURO IMCU  Daily Treatment Note  NAME: Nina Argueta  : 1955  MRN: [id number]    Date of Service: 2018  Evaluating Therapist: Darrion Vásquez DPT  Equipment Recommendation is to be determined at rehab      Room #: 6685F            DIAGNOSIS: AMS  PRECAUTIONS: falls     Social:  Pt lives with her son in a 2 floor plan 13 steps and 2 rails (wide) to second floor bed and bath. Prior to admission pt walked with no AD but does admit to falls in the past due to low back pain.                  Initial Evaluation  Date: 18  Treatment  Date:  18 Short Term/ Long Term   Goals   AM-PAC raw score      Was pt agreeable to Eval/treatment? yes  yes     Does pt have pain? Pt reports chronic low back pain  9/10 L side     Bed Mobility  Rolling: min A  Supine to sit: min A   Sit to supine: min A   Scooting: min A  Rolling: SBA  Supine to sit: SBA   Sit to supine: NT  Scooting: SBA  SBA   Transfers Sit to stand: Min A   Stand to sit: min A   Stand pivot: min  A Sit to stand: SBA   Stand to sit: SBA   Stand pivot: SBA w/ ww  SBA   Ambulation    3' feet with no AD with min A 100 ft with ww with SBA >150' with AD as needed and SBA   Stair negotiation: ascended and descended  TBA  TBA 13 steps with 1 rail with SBA       Patient education  Pt was educated on hand placement during transfers    Patient response to education:   Pt verbalized understanding Pt demonstrated skill Pt requires further education in this area   x X w/ cues x     Additional Comments: Pt supine in bed upon entering room. Pt sat EOB approx 5 minutes with supervision. Pt performed 2x10 reps LAQ, ankle pumps and seated hip flexion. Pt required cueing for hand placement during sit<>stand transfers. Pt ambulated with slow steady gait with no lob noted requiring occasional cues for ww management while negotiating obstacles in room.  Pt left seated in bedside chair with all needs met and

## 2018-09-11 NOTE — PLAN OF CARE
Problem: Falls - Risk of:  Goal: Will remain free from falls  Will remain free from falls   Outcome: Met This Shift      Problem: Neurological  Goal: Maximum potential motor/sensory/cognitive function  Outcome: Met This Shift

## 2018-09-11 NOTE — PROGRESS NOTES
Subjective:  Feeling better. Notes difficulty sleeping last night. Pain sin l leg No CP, SOB, F, V, D, P     Objective:    BP (!) 160/88   Pulse 98   Temp 98.5 °F (36.9 °C) (Oral)   Resp 18   Ht 5' 7\" (1.702 m)   Wt 212 lb (96.2 kg)   SpO2 95%   BMI 33.20 kg/m²     24HR INTAKE/OUTPUT:    Intake/Output Summary (Last 24 hours) at 09/11/18 1143  Last data filed at 09/11/18 0410   Gross per 24 hour   Intake             1690 ml   Output             2600 ml   Net             -910 ml       Heart:  RRR, no murmurs, gallops, or rubs.   Lungs:  CTA bilaterally, no wheeze, rales or rhonchi  Abd: bowel sounds present, nontender, nondistended, no masses  Extrem:  No clubbing, cyanosis, or edema    Most Recent Labs  Lab Results   Component Value Date    WBC 7.7 09/09/2018    HGB 12.8 09/09/2018    HCT 37.7 09/09/2018     (L) 09/09/2018     09/09/2018    K 3.6 09/09/2018    CL 95 (L) 09/09/2018    CREATININE 0.6 09/09/2018    BUN 9 09/09/2018    CO2 22 09/09/2018    GLUCOSE 206 (H) 09/09/2018    ALT 38 (H) 09/07/2018    AST 85 (H) 09/07/2018    INR 1.3 09/07/2018    TSH 3.960 04/13/2018    LABA1C 10.4 (H) 09/09/2018       Assessment    Patient Active Problem List   Diagnosis    Diabetes mellitus, type 2 ([address]    HTN (hypertension), benign    Mixed hyperlipidemia    History of hyperthyroidism    Cirrhosis of liver with ascites (HCC)    Spondylosis of cervical region without myelopathy or radiculopathy    Cervical facet joint syndrome (HCC)    Lumbar facet arthropathy (HCC)    Lumbar radiculopathy    Primary osteoarthritis of both hips    Lumbar spondylosis    Altered mental status    Unresponsive episode    Stroke due to embolism of left middle cerebral artery ([address]    UTI due to Klebsiella species    Bacteremia due to Gram-negative bacteria       Plan:    -Altered mental status/weakness-- ?TIA, neuro following  -sepsis/UTI/Bacteremia/LLE cellulits/wounds changed to Rocephin, ID consulted  -Rt ICA stenosis vascular surgery consulted-->conservative mgmt  -Cirrhosis of liver with ascites ([facility]  -Diabetes mellitus, type 2 ([facility] [address] [facility]  -History of hyperthyroidism  -HTN (hypertension), benign  -Mixed hyperlipidemia  -Primary osteoarthritis of both hips  ASA/Plavix  Cont dm meds/ MBS/SSI  PMR ordered, LLE US ordered  D/C planning  PT/OT ordered   DVT  prophylaxis      Alicia Ortiz MD  11:43 AM  9/11/2018

## 2018-09-11 NOTE — PROGRESS NOTES
Vascular Surgery Progress Note    Pt is being seen in f/u today regarding recent stroke    Subjective:  Marika Del Valle is a 58 y.o. female overall she states she's feeling better. She states she's able to speak without much difficulty anymore. She has right-sided weakness that remains. But otherwise she is doing well new finding his left lower extremity cellulitis. This is a new finding on physical examination as well. This was not present before.     Current Medications:    dextrose      sodium chloride 100 mL/hr at 09/11/18 0959      ibuprofen, ondansetron, sodium chloride flush, acetaminophen, glucose, dextrose, glucagon (rDNA), dextrose, labetalol    collagenase   Topical Daily    aspirin        vancomycin  1,500 mg Intravenous Q12H    cefTRIAXone (ROCEPHIN) IV  2 g Intravenous Q24H    atorvastatin  40 mg Oral Nightly    hydrochlorothiazide  25 mg Oral Daily    insulin glargine  44 Units Subcutaneous Nightly    lisinopril  20 mg Oral Daily    methimazole  2.5 mg Oral Once per day on Mon Wed Fri    montelukast  10 mg Oral Nightly    pantoprazole  40 mg Oral QAM AC    venlafaxine  37.5 mg Oral Daily with breakfast    sodium chloride flush  10 mL Intravenous 2 times per day    enoxaparin  40 mg Subcutaneous Daily    insulin lispro  0-12 Units Subcutaneous TID WC    insulin lispro  0-6 Units Subcutaneous Nightly    sodium chloride  30 mL/kg Intravenous Once    aspirin  325 mg Oral Daily    clopidogrel  75 mg Oral Daily        PHYSICAL EXAM:    BP (!) 160/88   Pulse 98   Temp 98.5 °F (36.9 °C) (Oral)   Resp 18   Ht 5' 7\" (1.702 m)   Wt 212 lb (96.2 kg)   SpO2 95%   BMI 33.20 kg/m²     Intake/Output Summary (Last 24 hours) at 09/11/18 1456  Last data filed at 09/11/18 1341   Gross per 24 hour   Intake             1810 ml   Output             1200 ml   Net              610 ml          General: Alert and oriented answers questions appropriately currently no acute distress   Skin: Skin: Is warm normal in configuration. 4.  Small lymph nodes are seen within the right flank possibly represent mild    mesenteric lymphadenitis. 5.  Gallstones are seen within the contracted gallbladder. 6.  Haziness seen in the lower hemithoraces and increased linear markings in    the lung bases could represent mild pulmonary edema. This report has been electronically signed by Shiraz Bo MD.      XR CHEST PORTABLE   Final Result   No acute cardiopulmonary process. VL MARY BILATERAL LIMITED 1-2 LEVELS    (Results Pending)   XR FOOT LEFT (MIN 3 VIEWS)    (Results Pending)       ASSESSMENT/PLAN:   · Problem right-sided weakness with intermittent aphasia. At this point she has improved from the prior examination. She's currently on dual antiplatelet therapy. I reviewed the MRI scans demonstrating no evidence of acute stroke. I've also read the neurology notes which they feel is possibly a left internal capsule infarct with stuttering glucan or syndrome. From our standpoint we'll continue to follow. Left extremity cellulitis this is a new finding. She had a small area of discoloration of her toe she has palpable bilateral DP and PT pulses. She's currently on antibiotics.  We'll defer to primary service        Electronically signed by Dayana Metz MD on 9/11/2018 at 2:56 PM

## 2018-09-12 VITALS
OXYGEN SATURATION: 97 % | DIASTOLIC BLOOD PRESSURE: 76 MMHG | SYSTOLIC BLOOD PRESSURE: 158 MMHG | HEIGHT: 67 IN | HEART RATE: 84 BPM | BODY MASS INDEX: 33.27 KG/M2 | TEMPERATURE: 98 F | RESPIRATION RATE: 18 BRPM | WEIGHT: 212 LBS

## 2018-09-12 PROBLEM — A41.9 SEPSIS (HCC): Status: RESOLVED | Noted: 2018-09-12 | Resolved: 2018-09-12

## 2018-09-12 PROBLEM — A41.9 SEPSIS (HCC): Status: ACTIVE | Noted: 2018-09-12

## 2018-09-12 LAB
HAV IGM SER IA-ACNC: NORMAL
HEPATITIS B CORE IGM ANTIBODY: NORMAL
HEPATITIS B SURFACE ANTIGEN INTERPRETATION: NORMAL
HEPATITIS C ANTIBODY INTERPRETATION: NORMAL
LV EF: 65 %
LVEF MODALITY: NORMAL
METER GLUCOSE: 203 MG/DL (ref 70–110)
METER GLUCOSE: 210 MG/DL (ref 70–110)
T4 FREE: 1.17 NG/DL (ref 0.93–1.7)
VANCOMYCIN TROUGH: 12 MCG/ML (ref 5–16)

## 2018-09-12 PROCEDURE — 2580000003 HC RX 258: Performed by: INTERNAL MEDICINE

## 2018-09-12 PROCEDURE — 82962 GLUCOSE BLOOD TEST: CPT

## 2018-09-12 PROCEDURE — 2500000003 HC RX 250 WO HCPCS: Performed by: INTERNAL MEDICINE

## 2018-09-12 PROCEDURE — 6360000002 HC RX W HCPCS: Performed by: INTERNAL MEDICINE

## 2018-09-12 PROCEDURE — 36415 COLL VENOUS BLD VENIPUNCTURE: CPT

## 2018-09-12 PROCEDURE — 99232 SBSQ HOSP IP/OBS MODERATE 35: CPT | Performed by: NURSE PRACTITIONER

## 2018-09-12 PROCEDURE — 6370000000 HC RX 637 (ALT 250 FOR IP): Performed by: PSYCHIATRY & NEUROLOGY

## 2018-09-12 PROCEDURE — 6370000000 HC RX 637 (ALT 250 FOR IP): Performed by: INTERNAL MEDICINE

## 2018-09-12 PROCEDURE — 84439 ASSAY OF FREE THYROXINE: CPT

## 2018-09-12 PROCEDURE — 93306 TTE W/DOPPLER COMPLETE: CPT

## 2018-09-12 PROCEDURE — 99232 SBSQ HOSP IP/OBS MODERATE 35: CPT | Performed by: SURGERY

## 2018-09-12 PROCEDURE — 2580000003 HC RX 258: Performed by: EMERGENCY MEDICINE

## 2018-09-12 PROCEDURE — 80202 ASSAY OF VANCOMYCIN: CPT

## 2018-09-12 RX ORDER — HYDROCHLOROTHIAZIDE 25 MG/1
25 TABLET ORAL DAILY
Qty: 30 TABLET | Refills: 3 | Status: SHIPPED | OUTPATIENT
Start: 2018-09-13 | End: 2018-10-12 | Stop reason: SINTOL

## 2018-09-12 RX ORDER — ASPIRIN 81 MG/1
81 TABLET ORAL DAILY
Qty: 90 TABLET | Refills: 1 | Status: SHIPPED | OUTPATIENT
Start: 2018-09-13 | End: 2020-02-05

## 2018-09-12 RX ORDER — CLOPIDOGREL BISULFATE 75 MG/1
75 TABLET ORAL DAILY
Qty: 30 TABLET | Refills: 3 | Status: SHIPPED | OUTPATIENT
Start: 2018-09-13 | End: 2018-12-12 | Stop reason: SDUPTHER

## 2018-09-12 RX ORDER — ATORVASTATIN CALCIUM 40 MG/1
40 TABLET, FILM COATED ORAL NIGHTLY
Qty: 30 TABLET | Refills: 3 | Status: SHIPPED | OUTPATIENT
Start: 2018-09-12 | End: 2018-12-26 | Stop reason: SDUPTHER

## 2018-09-12 RX ORDER — POLYETHYLENE GLYCOL 3350 17 G/17G
17 POWDER, FOR SOLUTION ORAL DAILY PRN
Status: DISCONTINUED | OUTPATIENT
Start: 2018-09-12 | End: 2018-09-12 | Stop reason: HOSPADM

## 2018-09-12 RX ORDER — AMOXICILLIN AND CLAVULANATE POTASSIUM 562.5; 437.5; 62.5 MG/1; MG/1; MG/1
1 TABLET, FILM COATED, EXTENDED RELEASE ORAL 2 TIMES DAILY
Qty: 24 TABLET | Refills: 0 | Status: SHIPPED | OUTPATIENT
Start: 2018-09-12 | End: 2018-09-12 | Stop reason: HOSPADM

## 2018-09-12 RX ORDER — LACTULOSE 10 G/15ML
30 SOLUTION ORAL DAILY
Status: ON HOLD | COMMUNITY
End: 2019-01-14 | Stop reason: HOSPADM

## 2018-09-12 RX ORDER — AMOXICILLIN AND CLAVULANATE POTASSIUM 562.5; 437.5; 62.5 MG/1; MG/1; MG/1
1 TABLET, FILM COATED, EXTENDED RELEASE ORAL 2 TIMES DAILY
Qty: 24 TABLET | Refills: 0 | Status: SHIPPED | OUTPATIENT
Start: 2018-09-12 | End: 2018-09-13

## 2018-09-12 RX ADMIN — CLOPIDOGREL 75 MG: 75 TABLET, FILM COATED ORAL at 07:47

## 2018-09-12 RX ADMIN — ENOXAPARIN SODIUM 40 MG: 40 INJECTION SUBCUTANEOUS at 07:47

## 2018-09-12 RX ADMIN — CEFTRIAXONE SODIUM 2 G: 2 INJECTION, POWDER, FOR SOLUTION INTRAMUSCULAR; INTRAVENOUS at 13:31

## 2018-09-12 RX ADMIN — METHIMAZOLE 2.5 MG: 5 TABLET ORAL at 07:47

## 2018-09-12 RX ADMIN — SODIUM CHLORIDE: 9 INJECTION, SOLUTION INTRAVENOUS at 10:16

## 2018-09-12 RX ADMIN — PANTOPRAZOLE SODIUM 40 MG: 40 TABLET, DELAYED RELEASE ORAL at 06:36

## 2018-09-12 RX ADMIN — ASPIRIN 325 MG: 325 TABLET, COATED ORAL at 07:47

## 2018-09-12 RX ADMIN — VANCOMYCIN HYDROCHLORIDE 1.5 G: 10 INJECTION, POWDER, LYOPHILIZED, FOR SOLUTION INTRAVENOUS at 02:23

## 2018-09-12 RX ADMIN — VENLAFAXINE HYDROCHLORIDE 37.5 MG: 37.5 CAPSULE, EXTENDED RELEASE ORAL at 07:46

## 2018-09-12 RX ADMIN — HYDROCHLOROTHIAZIDE 25 MG: 25 TABLET ORAL at 07:47

## 2018-09-12 RX ADMIN — INSULIN LISPRO 4 UNITS: 100 INJECTION, SOLUTION INTRAVENOUS; SUBCUTANEOUS at 06:40

## 2018-09-12 RX ADMIN — VANCOMYCIN HYDROCHLORIDE 1.5 G: 10 INJECTION, POWDER, LYOPHILIZED, FOR SOLUTION INTRAVENOUS at 13:31

## 2018-09-12 RX ADMIN — COLLAGENASE SANTYL: 250 OINTMENT TOPICAL at 07:47

## 2018-09-12 RX ADMIN — INSULIN LISPRO 4 UNITS: 100 INJECTION, SOLUTION INTRAVENOUS; SUBCUTANEOUS at 11:44

## 2018-09-12 RX ADMIN — LABETALOL HYDROCHLORIDE 10 MG: 5 INJECTION INTRAVENOUS at 11:44

## 2018-09-12 RX ADMIN — LISINOPRIL 20 MG: 20 TABLET ORAL at 07:47

## 2018-09-12 RX ADMIN — POLYETHYLENE GLYCOL 3350 17 G: 17 POWDER, FOR SOLUTION ORAL at 12:24

## 2018-09-12 RX ADMIN — IBUPROFEN 400 MG: 200 TABLET, FILM COATED ORAL at 07:47

## 2018-09-12 ASSESSMENT — PAIN SCALES - GENERAL
PAINLEVEL_OUTOF10: 0
PAINLEVEL_OUTOF10: 0
PAINLEVEL_OUTOF10: 4

## 2018-09-12 ASSESSMENT — PAIN DESCRIPTION - PROGRESSION
CLINICAL_PROGRESSION: GRADUALLY WORSENING
CLINICAL_PROGRESSION: GRADUALLY WORSENING

## 2018-09-12 NOTE — PROGRESS NOTES
09/07/2018    ALT 38 09/07/2018    AST 85 09/07/2018    PROT 8.5 09/07/2018    BILITOT 1.3 09/07/2018    LABALBU 3.3 09/07/2018        Assessment and Plan     Gissel Foley is a 58 y.o. female  Who presents with UTI, possible bacteremia, and now cellulitis. 1.Bacteremia/Sepsis               -Left lower leg cellulitis               -right CVA tenderness              -3 UTIs in 2 months              -has no lines/hardware              - con- only in 1 set and no GI symptoms              -awaiting repeat blood cultures              -on IV rocephin 2 g q24, add vanco 1.5 in D5 q12              - for discharge can switch to oral Augmentin extended-release 1 g twice daily for 12 days more  2. Left lower leg cellulitis              -deeper red today, same surface area              -on IV rocephin 2g q24  3.  Left toe blisters and wounds 2/2 diabetic neuropathy              -podiatry onboard    Original note by  Idalia Burris, MS3  Necessarily irritating and changes made by myself   José Luis Campos M.D.

## 2018-09-12 NOTE — PROGRESS NOTES
Tello Casey is a 58 y.o. right handed female     No family at the bedside today     (+)rash to leg with pain   (+)R sided weakness   No chest pain or palpitations  No SOB  No vertigo, lightheadedness or loss of consciousness  No falls, tripping or stumbling  No incontinence of bowels or bladder  No itching or bruising appreciated    ROS otherwise negative     Objective:     BP (!) 185/92   Pulse 87   Temp 98.1 °F (36.7 °C)   Resp 20   Ht 5' 7\" (1.702 m)   Wt 212 lb (96.2 kg)   SpO2 94%   BMI 33.20 kg/m²      General appearance: alert, appears stated age and cooperative  Head: Normocephalic, without obvious abnormality, atraumatic  Neck: no adenopathy, no carotid bruit and limited ROM  Lungs: clear to auscultation bilaterally  Heart: regular rate and rhythm  Extremities: no cyanosis or edema  Pulses: 2+ and symmetric  Skin: no rashes or lesions    Mental Status: Alert, oriented x4, thought content appropriate    Speech: clear  Language: no aphasia noted for several days     Follows all 1, 2 and 3-step commands    No anomia or paraphasias appreciated     Cranial Nerves:  I: smell    II: visual acuity     II: visual fields Full to finger counting    II: pupils PERRL   III,VII: ptosis None   III,IV,VI: extraocular muscles  EOMI without nystagmus    V: mastication Normal   V: facial light touch sensation  Normal today   V,VII: corneal reflex     VII: facial muscle function - upper  Normal   VII: facial muscle function - lower Normal   VIII: hearing Normal   IX: soft palate elevation  Normal   IX,X: gag reflex    XI: trapezius strength  5/5   XI: sternocleidomastoid strength 5/5   XI: neck extension strength  5/5   XII: tongue strength  Normal     Motor:  LUE, LLE 5/5  RUE 4/5, RLE 3+/5  (+) RLE drift appreciated  Normal bulk and tone    Sensory:  LT appreciated in all limbs symmetrically     Coordination:   FN, FFM and ALESSIA symmetrical    Gait not tested    No Babinski    Laboratory/Radiology:     Results for diffuse loss of the brain parenchyma both cerebral hemispheres,   although more prominent in some areas, seen in the temporal regions,   left more than right. As these findings are not limited to a specific vascular territory,   they appear to be related to imbalance of the cerebral circulation. These findings will be correlated with the CTA head/CTA neck findings. CTA NECK W CONTRAST   Final Result   Severe anatomic stenosis in the most proximal left ICA. Moderate anatomic stenosis in the proximal right ICA. Preliminary report given to Dr. Cooper Shannon. ALERT:  THIS IS AN ABNORMAL REPORT      CT ABDOMEN PELVIS W IV CONTRAST Additional Contrast? None   Final Result     1. Haziness is seen adjacent to the pancreatic tail. Pancreatitis cannot be    excluded. 2.  There is a nodular contour of the liver suggesting cirrhosis. Small    amount of fluid seen adjacent to the area     3. Appendix is visualized and is normal in configuration. 4.  Small lymph nodes are seen within the right flank possibly represent mild    mesenteric lymphadenitis. 5.  Gallstones are seen within the contracted gallbladder. 6.  Haziness seen in the lower hemithoraces and increased linear markings in    the lung bases could represent mild pulmonary edema. This report has been electronically signed by Livia Oliveira MD.      XR CHEST PORTABLE   Final Result   No acute cardiopulmonary process. CTA HEAD W CONTRAST    (Results Pending)     MRI Brain:  Diffuse atrophy likely age related  Findings compatible with small vessel ischemic changes. CTA Neck:  Severe atherosclerosis for a very short length in the most proximal  segment of the left internal carotid artery.     Moderate atherosclerosis between 50-70% in the proximal segment of the  right internal carotid artery. I personally reviewed the patient's lab and imaging studies at this time.     Assessment:     Patient was admitted with

## 2018-09-12 NOTE — PROGRESS NOTES
Vascular Surgery Progress Note    Pt is being seen in f/u today regarding carotid artery disease    Subjective:  Ryanne Powers is a 58 y.o. female carotid artery disease. She has a history of recent dysphagia. At this point she states is improved significantly. She still has residual right-sided weakness in her arm and right leg. She was also found to have cellulitis of the left lower extremity. I'd seen her prior to her echocardiogram    Current Medications:    dextrose        polyethylene glycol, ibuprofen, ondansetron, sodium chloride flush, acetaminophen, glucose, dextrose, glucagon (rDNA), dextrose, labetalol    collagenase   Topical Daily    vancomycin  1,500 mg Intravenous Q12H    cefTRIAXone (ROCEPHIN) IV  2 g Intravenous Q24H    atorvastatin  40 mg Oral Nightly    hydrochlorothiazide  25 mg Oral Daily    insulin glargine  44 Units Subcutaneous Nightly    lisinopril  20 mg Oral Daily    methimazole  2.5 mg Oral Once per day on Mon Wed Fri    montelukast  10 mg Oral Nightly    pantoprazole  40 mg Oral QAM AC    venlafaxine  37.5 mg Oral Daily with breakfast    sodium chloride flush  10 mL Intravenous 2 times per day    enoxaparin  40 mg Subcutaneous Daily    insulin lispro  0-12 Units Subcutaneous TID WC    insulin lispro  0-6 Units Subcutaneous Nightly    sodium chloride  30 mL/kg Intravenous Once    aspirin  325 mg Oral Daily    clopidogrel  75 mg Oral Daily        PHYSICAL EXAM:    BP (!) 158/76   Pulse 84   Temp 98 °F (36.7 °C) (Oral)   Resp 18   Ht 5' 7\" (1.702 m)   Wt 212 lb (96.2 kg)   SpO2 97%   BMI 33.20 kg/m²     Intake/Output Summary (Last 24 hours) at 09/12/18 1721  Last data filed at 09/12/18 1410   Gross per 24 hour   Intake             3664 ml   Output                0 ml   Net             3664 ml          General: Alert and oriented answers questions appropriate she was no acute distress her speech was appropriate   Skin: Warm and dry.  Left lower extremity the   right internal carotid artery. CT Head WO Contrast   Final Result      1. There are 2 vague areas of difficult to perceive of hypodensity in   the brain parenchyma one located in subcortical region of the left   posterior frontal convexity bladder in the deep left basal ganglia   region. 2. They can represent acute ischemic insult. They were not conspicuous   seen on the previous study of September 7, at 22:25. ALERT:  THIS IS AN ABNORMAL REPORT      US DUP LOWER EXTREMITY LEFT XIOMARA   Final Result   No evidence for deep venous thrombosis               US CAROTID ARTERY BILATERAL   Final Result   Atherosclerotic disease. Estimated stenosis by NASCET criteria in the proximal right carotid   artery is between 50-69%   Estimated stenosis by NASCET criteria in the proximal left carotid   artery is between 70% and 99%. CT Head WO Contrast   Final Result   No indication for an acute intracranial process. CT Brain Perfusion   Final Result      There are some abnormalities in the CT perfusion parameters but fairly   diffuse loss of the brain parenchyma both cerebral hemispheres,   although more prominent in some areas, seen in the temporal regions,   left more than right. As these findings are not limited to a specific vascular territory,   they appear to be related to imbalance of the cerebral circulation. These findings will be correlated with the CTA head/CTA neck findings. CTA NECK W CONTRAST   Final Result   Severe anatomic stenosis in the most proximal left ICA. Moderate anatomic stenosis in the proximal right ICA. Preliminary report given to Dr. Grady Salvador. ALERT:  THIS IS AN ABNORMAL REPORT      CT ABDOMEN PELVIS W IV CONTRAST Additional Contrast? None   Final Result     1. Haziness is seen adjacent to the pancreatic tail. Pancreatitis cannot be    excluded. 2.  There is a nodular contour of the liver suggesting cirrhosis.   Small amount of fluid seen adjacent to the area     3. Appendix is visualized and is normal in configuration. 4.  Small lymph nodes are seen within the right flank possibly represent mild    mesenteric lymphadenitis. 5.  Gallstones are seen within the contracted gallbladder. 6.  Haziness seen in the lower hemithoraces and increased linear markings in    the lung bases could represent mild pulmonary edema. This report has been electronically signed by Ary Pappsa MD.      XR CHEST PORTABLE   Final Result   No acute cardiopulmonary process. VL MARY BILATERAL LIMITED 1-2 LEVELS    (Results Pending)       ASSESSMENT/PLAN:   · #1 recent stroke. At this point we'll follow up in the office we'll see how she does in the next week or so I reviewed her carotid studies while she was in the hospital I also discussed this with the patient and the patient's family. Currently she will remain on risk reduction therapy.         Electronically signed by Mari Rowe MD on 9/12/2018 at 5:21 PM

## 2018-09-12 NOTE — PROGRESS NOTES
Subjective:  Feeling better No CP, SOB, F, V, D, P     Objective:    BP (!) 185/92   Pulse 87   Temp 98.1 °F (36.7 °C)   Resp 20   Ht 5' 7\" (1.702 m)   Wt 212 lb (96.2 kg)   SpO2 94%   BMI 33.20 kg/m²     24HR INTAKE/OUTPUT:    Intake/Output Summary (Last 24 hours) at 09/12/18 1035  Last data filed at 09/12/18 0529   Gross per 24 hour   Intake             3484 ml   Output                0 ml   Net             3484 ml       Heart:  RRR, no murmurs, gallops, or rubs. Lungs:  CTA bilaterally, no wheeze, rales or rhonchi  Abd: bowel sounds present, nontender, nondistended, no masses  Extrem:  No clubbing, cyanosis, or edema    Most Recent Labs  Lab Results   Component Value Date    WBC 7.7 09/09/2018    HGB 12.8 09/09/2018    HCT 37.7 09/09/2018     (L) 09/09/2018     09/09/2018    K 3.6 09/09/2018    CL 95 (L) 09/09/2018    CREATININE 0.6 09/09/2018    BUN 9 09/09/2018    CO2 22 09/09/2018    GLUCOSE 206 (H) 09/09/2018    ALT 38 (H) 09/07/2018    AST 85 (H) 09/07/2018    INR 1.3 09/07/2018    TSH 6.450 (H) 09/11/2018    LABA1C 10.4 (H) 09/09/2018       Assessment    Principal Problem:    Altered mental status  Active Problems:    UTI due to Klebsiella species    Diabetes mellitus, type 2 (HCC)    HTN (hypertension), benign    Mixed hyperlipidemia    History of hyperthyroidism    Cirrhosis of liver with ascites (HCC)    Primary osteoarthritis of both hips    Unresponsive episode    Stroke due to embolism of left middle cerebral artery (HCC)    Sepsis ([facility] [address]  Resolved Problems:    * No resolved hospital problems.  *      Plan:    -Altered mental status/weakness-- Stroke on MRI per neuro, neuro following  -sepsis/UTI/LLE cellulits/wounds changed to Rocephin/ VancID consulted  -Rt ICA stenosis vascular surgery consulted-->conservative mgmt  -Cirrhosis of liver with ascites ([address]  -Diabetes mellitus, type 2 ([address] poorly controlled  -History of hyperthyroidism  -HTN (hypertension), benign  -Mixed

## 2018-09-12 NOTE — PROGRESS NOTES
Patient's son called regarding Amoxicillin dose, says he could not get prescription filled as insurance would not pay for this dose. Instructed patient's son to try to get ahold of Dr Islas Pap office regarding prescription, he states he has a number from the card she gave him; otherwise try to get ahold of Dr Cherry Jackson regarding the situation.

## 2018-09-13 ENCOUNTER — TELEPHONE (OUTPATIENT)
Dept: FAMILY MEDICINE CLINIC | Age: 63
End: 2018-09-13

## 2018-09-13 PROBLEM — I65.29 CAROTID STENOSIS: Status: ACTIVE | Noted: 2018-09-13

## 2018-09-13 LAB
BLOOD CULTURE, ROUTINE: ABNORMAL
BLOOD CULTURE, ROUTINE: ABNORMAL
CULTURE, BLOOD 2: ABNORMAL
CULTURE, BLOOD 2: ABNORMAL
ORGANISM: ABNORMAL

## 2018-09-13 RX ORDER — AMOXICILLIN AND CLAVULANATE POTASSIUM 875; 125 MG/1; MG/1
1 TABLET, FILM COATED ORAL 2 TIMES DAILY WITH MEALS
Qty: 24 TABLET | Refills: 0 | Status: SHIPPED | OUTPATIENT
Start: 2018-09-13 | End: 2018-09-25

## 2018-09-13 NOTE — DISCHARGE SUMMARY
Physician Discharge Summary     Patient ID:  Nina Argueta  [id number]  58 y.o.  1955    Admit date: 9/7/2018    Discharge date and time: 9/12/2018    Admission Diagnoses:   Patient Active Problem List   Diagnosis    Diabetes mellitus, type 2 ([facility] [address]    HTN (hypertension), benign    Mixed hyperlipidemia    History of hyperthyroidism    Cirrhosis of liver with ascites ([facility] [address]    Spondylosis of cervical region without myelopathy or radiculopathy    Cervical facet joint syndrome (HCC)    Lumbar facet arthropathy (HCC)    Lumbar radiculopathy    Primary osteoarthritis of both hips    Lumbar spondylosis    Altered mental status    Stroke due to embolism of left middle cerebral artery ([facility] [address]    UTI due to Klebsiella species    Carotid stenosis       Discharge Diagnoses: stroke, carotid stenosis, UTI, Cellulits    Consults: ID, neurology and vascular surgery    Procedures: none    Hospital Course: Admitted w change in MS. Found to have UTI, small ischemic stroke, cellulitis. Treated w IV Cefipime and Rocephin. D/C'd on Augmentin. ASA and Plavix per neuro and vascular.     Discharge Exam:  See progress note from today    Disposition: home    Patient Instructions:   Discharge Medication List as of 9/12/2018  5:02 PM      START taking these medications    Details   amoxicillin-clavulanate (AUGMENTIN XR) 1000-62.5 MG per extended release tablet Take 1 tablet by mouth 2 times daily for 12 days, Disp-24 tablet, R-0Normal      aspirin 81 MG EC tablet Take 1 tablet by mouth daily, Disp-90 tablet, R-1Normal      atorvastatin (LIPITOR) 40 MG tablet Take 1 tablet by mouth nightly, Disp-30 tablet, R-3Normal      clopidogrel (PLAVIX) 75 MG tablet Take 1 tablet by mouth daily, Disp-30 tablet, R-3Normal         CONTINUE these medications which have CHANGED    Details   hydrochlorothiazide (HYDRODIURIL) 25 MG tablet Take 1 tablet by mouth daily, Disp-30 tablet, R-3Normal         CONTINUE these medications which have NOT SCHEDULE APPT, Disp-30 capsule, R-2Normal      metFORMIN (GLUCOPHAGE-XR) 500 MG extended release tablet TAKE 1 TABLET BY MOUTH EVERY DAY, R-1Historical Med         STOP taking these medications       furosemide (LASIX) 20 MG tablet Comments:   Reason for Stopping:         simvastatin (ZOCOR) 20 MG tablet Comments:   Reason for Stopping:         gabapentin (NEURONTIN) 300 MG capsule Comments:   Reason for Stopping:             Activity: activity as tolerated  Diet: diabetic diet    Follow-up with PCP, neuro, Podiatry, Vacular in 2-4 wks.     Note that over 30 minutes was spent in preparing discharge papers, discussing discharge with patient, medication review, etc.    Signed:  Yung Henry MD  9/13/2018  7:33 AM

## 2018-09-14 ENCOUNTER — OFFICE VISIT (OUTPATIENT)
Dept: FAMILY MEDICINE CLINIC | Age: 63
End: 2018-09-14
Payer: COMMERCIAL

## 2018-09-14 ENCOUNTER — TELEPHONE (OUTPATIENT)
Dept: FAMILY MEDICINE CLINIC | Age: 63
End: 2018-09-14

## 2018-09-14 VITALS
WEIGHT: 215 LBS | SYSTOLIC BLOOD PRESSURE: 98 MMHG | DIASTOLIC BLOOD PRESSURE: 64 MMHG | HEART RATE: 96 BPM | BODY MASS INDEX: 33.67 KG/M2 | OXYGEN SATURATION: 98 % | RESPIRATION RATE: 18 BRPM

## 2018-09-14 DIAGNOSIS — E11.42 TYPE 2 DIABETES MELLITUS WITH DIABETIC POLYNEUROPATHY, WITH LONG-TERM CURRENT USE OF INSULIN (HCC): Chronic | ICD-10-CM

## 2018-09-14 DIAGNOSIS — Z79.4 TYPE 2 DIABETES MELLITUS WITH DIABETIC POLYNEUROPATHY, WITH LONG-TERM CURRENT USE OF INSULIN (HCC): Chronic | ICD-10-CM

## 2018-09-14 DIAGNOSIS — I65.23 BILATERAL CAROTID ARTERY STENOSIS: ICD-10-CM

## 2018-09-14 DIAGNOSIS — I63.412 STROKE DUE TO EMBOLISM OF LEFT MIDDLE CEREBRAL ARTERY (HCC): ICD-10-CM

## 2018-09-14 DIAGNOSIS — R18.8 CIRRHOSIS OF LIVER WITH ASCITES, UNSPECIFIED HEPATIC CIRRHOSIS TYPE (HCC): Chronic | ICD-10-CM

## 2018-09-14 DIAGNOSIS — K74.60 CIRRHOSIS OF LIVER WITH ASCITES, UNSPECIFIED HEPATIC CIRRHOSIS TYPE (HCC): Chronic | ICD-10-CM

## 2018-09-14 DIAGNOSIS — I10 HTN (HYPERTENSION), BENIGN: Chronic | ICD-10-CM

## 2018-09-14 DIAGNOSIS — Z09 HOSPITAL DISCHARGE FOLLOW-UP: Primary | ICD-10-CM

## 2018-09-14 DIAGNOSIS — N30.00 ACUTE CYSTITIS WITHOUT HEMATURIA: ICD-10-CM

## 2018-09-14 DIAGNOSIS — Z86.39 HISTORY OF HYPERTHYROIDISM: Chronic | ICD-10-CM

## 2018-09-14 LAB
BLOOD CULTURE, ROUTINE: NORMAL
CULTURE, BLOOD 2: NORMAL

## 2018-09-14 PROCEDURE — 99496 TRANSJ CARE MGMT HIGH F2F 7D: CPT | Performed by: FAMILY MEDICINE

## 2018-09-14 PROCEDURE — 1111F DSCHRG MED/CURRENT MED MERGE: CPT | Performed by: FAMILY MEDICINE

## 2018-09-14 RX ORDER — AMITRIPTYLINE HYDROCHLORIDE 25 MG/1
25 TABLET, FILM COATED ORAL NIGHTLY PRN
Qty: 30 TABLET | Refills: 2 | Status: SHIPPED | OUTPATIENT
Start: 2018-09-14 | End: 2018-10-31 | Stop reason: SDUPTHER

## 2018-09-14 ASSESSMENT — ENCOUNTER SYMPTOMS
ABDOMINAL DISTENTION: 0
SINUS PAIN: 0
CONSTIPATION: 1
DIARRHEA: 0
ABDOMINAL PAIN: 0
NAUSEA: 0
WHEEZING: 0
BACK PAIN: 1
VOMITING: 0
SINUS PRESSURE: 0

## 2018-09-14 NOTE — PROGRESS NOTES
Positive for arthralgias, back pain, gait problem and neck pain. Left toe issue   Skin: Negative for rash. Allergic/Immunologic: Negative for environmental allergies. Neurological: Positive for numbness. Negative for dizziness, syncope, weakness and light-headedness. Psychiatric/Behavioral: Negative for dysphoric mood. The patient is not nervous/anxious. Vitals:    09/14/18 1324   BP: 98/64   Site: Left Upper Arm   Pulse: 96   Resp: 18   SpO2: 98%   Weight: 215 lb (97.5 kg)     Body mass index is 33.67 kg/m². Wt Readings from Last 3 Encounters:   09/14/18 215 lb (97.5 kg)   09/08/18 212 lb (96.2 kg)   08/30/18 214 lb (97.1 kg)     BP Readings from Last 3 Encounters:   09/14/18 98/64   09/12/18 (!) 158/76   08/30/18 108/71       Physical Exam   Constitutional: She is oriented to person, place, and time. She appears well-developed and well-nourished. obese   HENT:   Head: Normocephalic and atraumatic. Right Ear: External ear normal.   Left Ear: External ear normal.   Nose: Nose normal.   Mouth/Throat: Oropharynx is clear and moist.   TMs intact bilaterally with no erythema or effusion. Eyes: Conjunctivae and EOM are normal.   Neck: Normal range of motion. Neck supple. Cardiovascular: Normal rate, regular rhythm and normal heart sounds. Exam reveals no gallop and no friction rub. No murmur heard. Pulmonary/Chest: Effort normal and breath sounds normal. She has no wheezes. Abdominal: Soft. She exhibits no distension and no mass. There is no tenderness. There is no rebound and no guarding. Musculoskeletal: Normal range of motion. She exhibits edema (1+ LE). She exhibits no tenderness. ROM intact throughout   Neurological: She is alert and oriented to person, place, and time. Uses cane currently. Muscle strength 5/5 bilateral UE and LE. Sensation grossly intact throughout   Skin: Skin is warm and dry. No rash noted. Onychomycosis noted bilateral toes.     Psychiatric: She has a

## 2018-09-17 ENCOUNTER — TELEPHONE (OUTPATIENT)
Dept: FAMILY MEDICINE CLINIC | Age: 63
End: 2018-09-17

## 2018-09-18 NOTE — CONSULTS
Inpatient consult to Physical Medicine Rehab  Consult performed by: George Bernstein ordered by: Aurelio Caban      Case discussed with inpatient rehab admissions coordinator. Chart reviewed. Please see the rehab admissions coordinators note for details.      Vincent Mendez MD

## 2018-09-21 ENCOUNTER — TELEPHONE (OUTPATIENT)
Dept: FAMILY MEDICINE CLINIC | Age: 63
End: 2018-09-21

## 2018-09-21 RX ORDER — VENLAFAXINE HYDROCHLORIDE 37.5 MG/1
CAPSULE, EXTENDED RELEASE ORAL
Qty: 30 CAPSULE | Refills: 2 | Status: SHIPPED | OUTPATIENT
Start: 2018-09-21 | End: 2018-11-06 | Stop reason: SDUPTHER

## 2018-09-21 RX ORDER — MELOXICAM 7.5 MG/1
TABLET ORAL
Qty: 30 TABLET | Refills: 3 | Status: SHIPPED | OUTPATIENT
Start: 2018-09-21 | End: 2018-10-05

## 2018-09-24 ENCOUNTER — TELEPHONE (OUTPATIENT)
Dept: FAMILY MEDICINE CLINIC | Age: 63
End: 2018-09-24

## 2018-09-24 NOTE — TELEPHONE ENCOUNTER
Pt son left vm states pt wounds on legs are better but pt now has wound starting on her toe and not sure if she needs an abx or not. Please advise.

## 2018-09-25 ENCOUNTER — APPOINTMENT (OUTPATIENT)
Dept: GENERAL RADIOLOGY | Age: 63
End: 2018-09-25
Payer: COMMERCIAL

## 2018-09-25 ENCOUNTER — HOSPITAL ENCOUNTER (EMERGENCY)
Age: 63
Discharge: HOME | End: 2018-09-25
Attending: EMERGENCY MEDICINE
Payer: COMMERCIAL

## 2018-09-25 ENCOUNTER — APPOINTMENT (OUTPATIENT)
Dept: CT IMAGING | Age: 63
End: 2018-09-25
Payer: COMMERCIAL

## 2018-09-25 ENCOUNTER — TELEPHONE (OUTPATIENT)
Dept: FAMILY MEDICINE CLINIC | Age: 63
End: 2018-09-25

## 2018-09-25 VITALS
WEIGHT: 212 LBS | TEMPERATURE: 98.4 F | RESPIRATION RATE: 16 BRPM | SYSTOLIC BLOOD PRESSURE: 114 MMHG | HEART RATE: 98 BPM | BODY MASS INDEX: 33.27 KG/M2 | DIASTOLIC BLOOD PRESSURE: 60 MMHG | HEIGHT: 67 IN | OXYGEN SATURATION: 93 %

## 2018-09-25 DIAGNOSIS — S09.90XA CLOSED HEAD INJURY, INITIAL ENCOUNTER: Primary | ICD-10-CM

## 2018-09-25 PROCEDURE — 99284 EMERGENCY DEPT VISIT MOD MDM: CPT

## 2018-09-25 PROCEDURE — 70450 CT HEAD/BRAIN W/O DYE: CPT

## 2018-09-25 PROCEDURE — 73502 X-RAY EXAM HIP UNI 2-3 VIEWS: CPT

## 2018-09-25 PROCEDURE — 72220 X-RAY EXAM SACRUM TAILBONE: CPT

## 2018-09-25 RX ORDER — INSULIN GLARGINE 300 U/ML
INJECTION, SOLUTION SUBCUTANEOUS
Qty: 5 PEN | Refills: 3 | Status: SHIPPED | OUTPATIENT
Start: 2018-09-25 | End: 2018-09-26 | Stop reason: SDUPTHER

## 2018-09-25 ASSESSMENT — PAIN SCALES - GENERAL: PAINLEVEL_OUTOF10: 5

## 2018-09-25 ASSESSMENT — PAIN DESCRIPTION - ONSET: ONSET: SUDDEN

## 2018-09-25 ASSESSMENT — PAIN DESCRIPTION - ORIENTATION: ORIENTATION: LEFT

## 2018-09-25 ASSESSMENT — PAIN DESCRIPTION - FREQUENCY: FREQUENCY: CONTINUOUS

## 2018-09-25 ASSESSMENT — PAIN DESCRIPTION - LOCATION: LOCATION: COCCYX;BUTTOCKS

## 2018-09-25 ASSESSMENT — PAIN DESCRIPTION - PAIN TYPE: TYPE: ACUTE PAIN

## 2018-09-25 ASSESSMENT — PAIN DESCRIPTION - DESCRIPTORS: DESCRIPTORS: SORE

## 2018-09-25 ASSESSMENT — PAIN DESCRIPTION - PROGRESSION: CLINICAL_PROGRESSION: NOT CHANGED

## 2018-09-25 NOTE — ED PROVIDER NOTES
Department of Emergency Medicine   ED  Provider Note  Admit Date/RoomTime: 9/25/2018 12:17 PM  ED Room: 18/18                HPI:  9/25/18, Time: 12:35 PM  .       Bibi Wheatley is a 58 y.o. female presenting to the ED after a fall, beginning prior to arrival.  The complaint has been constant, mild in severity, and worsened by nothing. Mechanical fall, landed on left buttocks area and hit head. No loc. On plavix. Denies neuro sx. Denies other injuries. Glascow Coma Scale at time of initial examination  Best Eye Response 4 - Opens eyes on own   Best Verbal Response 5 - Alert and oriented   Best Motor Response 6 - Follows simple motor commands   Total 15       Review of Systems:   Pertinent positives and negatives are stated within HPI, all other systems reviewed and are negative.              --------------------------------------------- PAST HISTORY ---------------------------------------------  Past Medical History:  has a past medical history of Cerebral artery occlusion with cerebral infarction ([facility] [address] Diabetes mellitus ([facility] [address] GERD (gastroesophageal reflux disease); Hyperlipidemia; Hypertension; Liver cirrhosis ([facility] [address] Polyp of esophagus; and Thyroid disease. Past Surgical History:  has a past surgical history that includes Hysterectomy; Cholecystectomy; Tonsillectomy; Colonoscopy; and Upper gastrointestinal endoscopy. Social History:  reports that she has never smoked. She has never used smokeless tobacco. She reports that she does not drink alcohol or use drugs. Family History: family history includes Diabetes in her sister; Heart Disease in her sister; Heart Disease (age of onset: 28) in her mother. The patients home medications have been reviewed. Allergies: Patient has no known allergies. ------------------------- NURSING NOTES AND VITALS REVIEWED ---------------------------   The nursing notes within the ED encounter and vital signs as below have been reviewed.    BP ---------------------------------------------------PHYSICAL EXAM--------------------------------------      Constitutional/General: Alert and oriented x3, well appearing, non toxic in NAD  Head: Normocephalic and atraumatic  Eyes: PERRL, EOMI  Mouth: Oropharynx clear, handling secretions, no trismus. No dental trauma, no oral trauma  Neck:  No crepitus, no palpable lacerations, abrasions, deformities, or stepoffs. Back: No midline cervical, thoracic, lumbar spine tenderness. Tenderness along the sacrum and left gluteus. No Stepoffs, abrasions, lacerations, or deformities. Pulmonary: Lungs clear to auscultation bilaterally, no wheezes, rales, or rhonchi. Not in respiratory distress  Chest: no chest wall tenderness, no crepitus  Cardiovascular:  Regular rate and rhythm, no murmurs, gallops, or rubs. 2+ distal pulses  Abdomen: Soft, non tender, non distended, +BS, no rebound, guarding, or rigidity. No pulsatile masses appreciated  Extremities: Moves all extremities x 4. Warm and well perfused, no clubbing, cyanosis, or edema. Capillary refill <3 seconds  Skin: warm and dry without rash  Neurologic: GCS 15, CN 2-12 grossly intact, no focal deficits, symmetric strength 5/5 in the upper and lower extremities bilaterally        ------------------------------ ED COURSE/MEDICAL DECISION MAKING----------------------  Medications - No data to display      Medical Decision Making:    Fall  Imaging reassuring  She feels fine  Normal neuro exam        This patient's ED course included: re-evaluation prior to disposition, complex medical decision making and emergency management and a personal history and physicial eaxmination    This patient has remained hemodynamically stable during their ED course. Counseling: The emergency provider has spoken with the patient and discussed todays results, in addition to providing specific details for the plan of care and counseling regarding the diagnosis and prognosis.

## 2018-09-26 ENCOUNTER — OFFICE VISIT (OUTPATIENT)
Dept: FAMILY MEDICINE CLINIC | Age: 63
End: 2018-09-26
Payer: COMMERCIAL

## 2018-09-26 VITALS
RESPIRATION RATE: 14 BRPM | HEART RATE: 102 BPM | OXYGEN SATURATION: 93 % | BODY MASS INDEX: 31.71 KG/M2 | DIASTOLIC BLOOD PRESSURE: 60 MMHG | WEIGHT: 202 LBS | TEMPERATURE: 97.5 F | SYSTOLIC BLOOD PRESSURE: 104 MMHG | HEIGHT: 67 IN

## 2018-09-26 DIAGNOSIS — M16.12 PRIMARY OSTEOARTHRITIS OF LEFT HIP: ICD-10-CM

## 2018-09-26 DIAGNOSIS — M54.16 LUMBAR RADICULOPATHY: Chronic | ICD-10-CM

## 2018-09-26 DIAGNOSIS — Z91.81 HISTORY OF RECENT FALL: Primary | ICD-10-CM

## 2018-09-26 PROCEDURE — G8417 CALC BMI ABV UP PARAM F/U: HCPCS | Performed by: NURSE PRACTITIONER

## 2018-09-26 PROCEDURE — G8427 DOCREV CUR MEDS BY ELIG CLIN: HCPCS | Performed by: NURSE PRACTITIONER

## 2018-09-26 PROCEDURE — 3017F COLORECTAL CA SCREEN DOC REV: CPT | Performed by: NURSE PRACTITIONER

## 2018-09-26 PROCEDURE — 1111F DSCHRG MED/CURRENT MED MERGE: CPT | Performed by: NURSE PRACTITIONER

## 2018-09-26 PROCEDURE — 99214 OFFICE O/P EST MOD 30 MIN: CPT | Performed by: NURSE PRACTITIONER

## 2018-09-26 PROCEDURE — 1036F TOBACCO NON-USER: CPT | Performed by: NURSE PRACTITIONER

## 2018-09-26 PROCEDURE — G8598 ASA/ANTIPLAT THER USED: HCPCS | Performed by: NURSE PRACTITIONER

## 2018-09-26 RX ORDER — TRAMADOL HYDROCHLORIDE 50 MG/1
50 TABLET ORAL EVERY 8 HOURS PRN
Qty: 21 TABLET | Refills: 0 | Status: SHIPPED | OUTPATIENT
Start: 2018-09-26 | End: 2018-10-03

## 2018-09-26 NOTE — PATIENT INSTRUCTIONS
Patient Education          tramadol  Pronunciation:  TRAM a dol  Brand:  ConZip, Ultram, Ultram ER  What is the most important information I should know about tramadol? You should not take tramadol if you have severe breathing problems, a blockage in your stomach or intestines, or if you have recently used alcohol, sedatives, tranquilizers, narcotic medication, or an MAO inhibitor (isocarboxazid, linezolid, methylene blue injection, phenelzine, rasagiline, selegiline, tranylcypromine, and others). Tramadol can slow or stop your breathing, and may be habit-forming. MISUSE OF THIS MEDICINE CAN CAUSE ADDICTION, OVERDOSE, OR DEATH, especially in a child or other person using the medicine without a prescription. Tramadol should not be given to a child younger than 15years old. Ultram ER should not be given to anyone younger than 25years old. Taking tramadol during pregnancy may cause life-threatening withdrawal symptoms in the . Fatal side effects can occur if you use tramadol with alcohol, or with other drugs that cause drowsiness or slow your breathing. What is tramadol? Tramadol is a narcotic-like pain reliever. Tramadol is used to treat moderate to severe pain. The extended-release form of this medicine is for around-the-clock treatment of pain. This form of tramadol is not  for use on an as-needed basis for pain. Tramadol may also be used for purposes not listed in this medication guide. What should I discuss with my healthcare provider before taking tramadol? You should not take tramadol if you are allergic to it, or if you have:  · severe asthma or breathing problems;  · a blockage in your stomach or intestines;  · if you have recently used alcohol, sedatives, tranquilizers, or narcotic medications; or  · if you have used an MAO inhibitor in the past 14 days (such as isocarboxazid, linezolid, methylene blue injection, phenelzine, rasagiline, selegiline, or tranylcypromine).   Tramadol should coma.  What should I avoid while taking tramadol? Do not drink alcohol. Dangerous side effects or death could occur. Tramadol may impair your thinking or reactions. Avoid driving or operating machinery until you know how this medicine will affect you. Dizziness or severe drowsiness can cause falls or other accidents. What are the possible side effects of tramadol? Get emergency medical help if you have signs of an allergic reaction (hives, difficult breathing, swelling in your face or throat) or a severe skin reaction (fever, sore throat, burning in your eyes, skin pain, red or purple skin rash that spreads and causes blistering and peeling). Like other narcotic medicines, tramadol can slow your breathing. Death may occur if breathing becomes too weak. A person caring for you should seek emergency medical attention if you have slow breathing with long pauses, blue colored lips, or if you are hard to wake up. Call your doctor at once if you have:  · noisy breathing, sighing, shallow breathing;  · a slow heart rate or weak pulse;  · a light-headed feeling, like you might pass out;  · seizure (convulsions);  · missed menstrual periods;  · impotence, sexual problems, loss of interest in sex; or  · low cortisol levels --nausea, vomiting, loss of appetite, dizziness, worsening tiredness or weakness. Seek medical attention right away if you have symptoms of serotonin syndrome, such as: agitation, hallucinations, fever, sweating, shivering, fast heart rate, muscle stiffness, twitching, loss of coordination, nausea, vomiting, or diarrhea. Serious side effects may be more likely in older adults and those who are overweight, malnourished, or debilitated. Long-term use of opioid medication may affect fertility (ability to have children) in men or women. It is not known whether opioid effects on fertility are permanent.   Common side effects may include:  · headache, dizziness, drowsiness, tired feeling;  · constipation, diarrhea, nausea, vomiting, stomach pain;  · feeling nervous or anxious; or  · itching, sweating, flushing (warmth, redness, or tingly feeling). This is not a complete list of side effects and others may occur. Call your doctor for medical advice about side effects. You may report side effects to FDA at 1-523-FDA-6717. What other drugs will affect tramadol? Narcotic (opioid) medication can interact with many other drugs and cause dangerous side effects or death. Be sure your doctor knows if you also use:  · other narcotic medications --opioid pain medicine or prescription cough medicine;  · a sedative like Valium --diazepam, alprazolam, lorazepam, Ativan, Klonopin, Restoril, Tranxene, Versed, Xanax, and others;  · drugs that make you sleepy or slow your breathing --a sleeping pill, muscle relaxer, tranquilizer, antidepressant, or antipsychotic medicine; o  · drugs that affect serotonin levels in your body --medicine for depression, Parkinson's disease, migraine headaches, serious infections, or prevention of nausea and vomiting. This list is not complete. Other drugs may interact with tramadol, including prescription and over-the-counter medicines, vitamins, and herbal products. Not all possible interactions are listed in this medication guide. Where can I get more information? Your pharmacist can provide more information about tramadol. Remember, keep this and all other medicines out of the reach of children, never share your medicines with others, and use this medication only for the indication prescribed. Every effort has been made to ensure that the information provided by [address] is accurate, up-to-date, and complete, but no guarantee is made to that effect. Drug information contained herein may be time sensitive.  [facility] information has been compiled for use by healthcare practitioners and consumers in the [place] and therefore [facility] does not warrant that uses outside of the [place] are appropriate, unless specifically indicated otherwise. [facility] drug information does not endorse drugs, diagnose patients or recommend therapy. [employer] drug information is an informational resource designed to assist licensed healthcare practitioners in caring for their patients and/or to serve consumers viewing this service as a supplement to, and not a substitute for, the expertise, skill, knowledge and judgment of healthcare practitioners. The absence of a warning for a given drug or drug combination in no way should be construed to indicate that the drug or drug combination is safe, effective or appropriate for any given patient. [facility] does not assume any responsibility for any aspect of healthcare administered with the aid of information [facility] provides. The information contained herein is not intended to cover all possible uses, directions, precautions, warnings, drug interactions, allergic reactions, or adverse effects. If you have questions about the drugs you are taking, check with your doctor, nurse or pharmacist.  Copyright 1721-4874 [address]: .. Revision date: 12/12/2017. Care instructions adapted under license by [facility] ([facility]). If you have questions about a medical condition or this instruction, always ask your healthcare professional. [place] [address] any warranty or liability for your use of this information.

## 2018-09-28 ENCOUNTER — TELEPHONE (OUTPATIENT)
Dept: VASCULAR SURGERY | Age: 63
End: 2018-09-28

## 2018-09-30 ASSESSMENT — ENCOUNTER SYMPTOMS
COLOR CHANGE: 0
EYE ITCHING: 0
SINUS PRESSURE: 0
VOICE CHANGE: 0
COUGH: 0
SINUS PAIN: 0
WHEEZING: 0
SORE THROAT: 0
EYE PAIN: 0
VOMITING: 0
RHINORRHEA: 0
EYE REDNESS: 0
BACK PAIN: 1
PHOTOPHOBIA: 0
TROUBLE SWALLOWING: 0
CONSTIPATION: 0
DIARRHEA: 0
SHORTNESS OF BREATH: 0
ABDOMINAL PAIN: 0

## 2018-10-01 ENCOUNTER — APPOINTMENT (OUTPATIENT)
Dept: CT IMAGING | Age: 63
End: 2018-10-01
Payer: COMMERCIAL

## 2018-10-01 ENCOUNTER — HOSPITAL ENCOUNTER (EMERGENCY)
Age: 63
Discharge: HOME | End: 2018-10-01
Attending: EMERGENCY MEDICINE
Payer: COMMERCIAL

## 2018-10-01 VITALS
HEART RATE: 96 BPM | HEIGHT: 67 IN | DIASTOLIC BLOOD PRESSURE: 71 MMHG | RESPIRATION RATE: 16 BRPM | OXYGEN SATURATION: 94 % | WEIGHT: 202 LBS | SYSTOLIC BLOOD PRESSURE: 137 MMHG | BODY MASS INDEX: 31.71 KG/M2 | TEMPERATURE: 97.7 F

## 2018-10-01 DIAGNOSIS — I95.1 ORTHOSTATIC HYPOTENSION: ICD-10-CM

## 2018-10-01 DIAGNOSIS — N30.00 ACUTE CYSTITIS WITHOUT HEMATURIA: ICD-10-CM

## 2018-10-01 DIAGNOSIS — G45.9 TIA (TRANSIENT ISCHEMIC ATTACK): Primary | ICD-10-CM

## 2018-10-01 LAB
ANION GAP SERPL CALCULATED.3IONS-SCNC: 16 MMOL/L (ref 7–16)
APTT: 33.7 SEC (ref 24.5–35.1)
BACTERIA: ABNORMAL /HPF
BASOPHILS ABSOLUTE: 0.05 E9/L (ref 0–0.2)
BASOPHILS RELATIVE PERCENT: 0.8 % (ref 0–2)
BILIRUBIN URINE: NEGATIVE
BLOOD, URINE: ABNORMAL
BUN BLDV-MCNC: 15 MG/DL (ref 8–23)
CALCIUM SERPL-MCNC: 9.6 MG/DL (ref 8.6–10.2)
CASTS: ABNORMAL /LPF
CHLORIDE BLD-SCNC: 93 MMOL/L (ref 98–107)
CLARITY: ABNORMAL
CO2: 22 MMOL/L (ref 22–29)
COLOR: ABNORMAL
CREAT SERPL-MCNC: 0.9 MG/DL (ref 0.5–1)
EKG ATRIAL RATE: 97 BPM
EKG P AXIS: 51 DEGREES
EKG P-R INTERVAL: 150 MS
EKG Q-T INTERVAL: 404 MS
EKG QRS DURATION: 94 MS
EKG QTC CALCULATION (BAZETT): 513 MS
EKG R AXIS: -7 DEGREES
EKG T AXIS: 58 DEGREES
EKG VENTRICULAR RATE: 97 BPM
EOSINOPHILS ABSOLUTE: 0.09 E9/L (ref 0.05–0.5)
EOSINOPHILS RELATIVE PERCENT: 1.4 % (ref 0–6)
EPITHELIAL CELLS, UA: ABNORMAL /HPF
GFR AFRICAN AMERICAN: >60
GFR NON-AFRICAN AMERICAN: >60 ML/MIN/1.73
GLUCOSE BLD-MCNC: 332 MG/DL (ref 74–109)
GLUCOSE URINE: 100 MG/DL
HCT VFR BLD CALC: 39.5 % (ref 34–48)
HEMOGLOBIN: 13.6 G/DL (ref 11.5–15.5)
IMMATURE GRANULOCYTES #: 0.03 E9/L
IMMATURE GRANULOCYTES %: 0.5 % (ref 0–5)
INR BLD: 1.2
KETONES, URINE: ABNORMAL MG/DL
LEUKOCYTE ESTERASE, URINE: ABNORMAL
LYMPHOCYTES ABSOLUTE: 2.03 E9/L (ref 1.5–4)
LYMPHOCYTES RELATIVE PERCENT: 31.8 % (ref 20–42)
MCH RBC QN AUTO: 33.2 PG (ref 26–35)
MCHC RBC AUTO-ENTMCNC: 34.4 % (ref 32–34.5)
MCV RBC AUTO: 96.3 FL (ref 80–99.9)
MONOCYTES ABSOLUTE: 0.61 E9/L (ref 0.1–0.95)
MONOCYTES RELATIVE PERCENT: 9.6 % (ref 2–12)
NEUTROPHILS ABSOLUTE: 3.57 E9/L (ref 1.8–7.3)
NEUTROPHILS RELATIVE PERCENT: 55.9 % (ref 43–80)
NITRITE, URINE: NEGATIVE
PDW BLD-RTO: 14.8 FL (ref 11.5–15)
PH UA: 5.5 (ref 5–9)
PLATELET # BLD: 155 E9/L (ref 130–450)
PMV BLD AUTO: 12.5 FL (ref 7–12)
POTASSIUM SERPL-SCNC: 4.2 MMOL/L (ref 3.5–5)
PROTEIN UA: 30 MG/DL
PROTHROMBIN TIME: 13.9 SEC (ref 9.3–12.4)
RBC # BLD: 4.1 E12/L (ref 3.5–5.5)
RBC UA: ABNORMAL /HPF (ref 0–2)
SODIUM BLD-SCNC: 131 MMOL/L (ref 132–146)
SPECIFIC GRAVITY UA: >=1.03 (ref 1–1.03)
TROPONIN: <0.01 NG/ML (ref 0–0.03)
UROBILINOGEN, URINE: 2 E.U./DL
WBC # BLD: 6.4 E9/L (ref 4.5–11.5)
WBC UA: >20 /HPF (ref 0–5)

## 2018-10-01 PROCEDURE — 81001 URINALYSIS AUTO W/SCOPE: CPT

## 2018-10-01 PROCEDURE — 84484 ASSAY OF TROPONIN QUANT: CPT

## 2018-10-01 PROCEDURE — 70450 CT HEAD/BRAIN W/O DYE: CPT

## 2018-10-01 PROCEDURE — 85730 THROMBOPLASTIN TIME PARTIAL: CPT

## 2018-10-01 PROCEDURE — 2580000003 HC RX 258: Performed by: EMERGENCY MEDICINE

## 2018-10-01 PROCEDURE — 80048 BASIC METABOLIC PNL TOTAL CA: CPT

## 2018-10-01 PROCEDURE — 85025 COMPLETE CBC W/AUTO DIFF WBC: CPT

## 2018-10-01 PROCEDURE — 85610 PROTHROMBIN TIME: CPT

## 2018-10-01 PROCEDURE — 6360000002 HC RX W HCPCS: Performed by: EMERGENCY MEDICINE

## 2018-10-01 PROCEDURE — 99285 EMERGENCY DEPT VISIT HI MDM: CPT

## 2018-10-01 PROCEDURE — 96365 THER/PROPH/DIAG IV INF INIT: CPT

## 2018-10-01 RX ORDER — CEFDINIR 300 MG/1
300 CAPSULE ORAL 2 TIMES DAILY
Qty: 20 CAPSULE | Refills: 0 | Status: SHIPPED | OUTPATIENT
Start: 2018-10-01 | End: 2018-10-11

## 2018-10-01 RX ADMIN — CEFTRIAXONE 1 G: 1 INJECTION, POWDER, FOR SOLUTION INTRAMUSCULAR; INTRAVENOUS at 15:32

## 2018-10-01 ASSESSMENT — PAIN DESCRIPTION - LOCATION: LOCATION: BACK

## 2018-10-01 ASSESSMENT — ENCOUNTER SYMPTOMS
BACK PAIN: 0
NAUSEA: 0
VOMITING: 0
COUGH: 0
SHORTNESS OF BREATH: 0
BLOOD IN STOOL: 0
ABDOMINAL PAIN: 0

## 2018-10-01 ASSESSMENT — PAIN SCALES - WONG BAKER: WONGBAKER_NUMERICALRESPONSE: 2

## 2018-10-01 ASSESSMENT — PAIN DESCRIPTION - PAIN TYPE: TYPE: ACUTE PAIN

## 2018-10-01 ASSESSMENT — PAIN SCALES - GENERAL: PAINLEVEL_OUTOF10: 8

## 2018-10-01 ASSESSMENT — PAIN DESCRIPTION - DESCRIPTORS: DESCRIPTORS: CRAMPING

## 2018-10-02 ENCOUNTER — OFFICE VISIT (OUTPATIENT)
Dept: PAIN MANAGEMENT | Age: 63
End: 2018-10-02
Payer: COMMERCIAL

## 2018-10-02 VITALS
SYSTOLIC BLOOD PRESSURE: 120 MMHG | HEART RATE: 103 BPM | BODY MASS INDEX: 31.71 KG/M2 | RESPIRATION RATE: 18 BRPM | WEIGHT: 202 LBS | HEIGHT: 67 IN | DIASTOLIC BLOOD PRESSURE: 73 MMHG | OXYGEN SATURATION: 98 %

## 2018-10-02 DIAGNOSIS — M54.16 LUMBAR RADICULOPATHY: ICD-10-CM

## 2018-10-02 DIAGNOSIS — M47.816 LUMBAR SPONDYLOSIS: ICD-10-CM

## 2018-10-02 DIAGNOSIS — M47.816 LUMBAR FACET ARTHROPATHY: ICD-10-CM

## 2018-10-02 DIAGNOSIS — M47.812 CERVICAL FACET JOINT SYNDROME: ICD-10-CM

## 2018-10-02 DIAGNOSIS — M16.0 PRIMARY OSTEOARTHRITIS OF BOTH HIPS: ICD-10-CM

## 2018-10-02 DIAGNOSIS — M47.812 SPONDYLOSIS OF CERVICAL REGION WITHOUT MYELOPATHY OR RADICULOPATHY: Primary | ICD-10-CM

## 2018-10-02 PROCEDURE — 1036F TOBACCO NON-USER: CPT | Performed by: PAIN MEDICINE

## 2018-10-02 PROCEDURE — G8417 CALC BMI ABV UP PARAM F/U: HCPCS | Performed by: PAIN MEDICINE

## 2018-10-02 PROCEDURE — 3017F COLORECTAL CA SCREEN DOC REV: CPT | Performed by: PAIN MEDICINE

## 2018-10-02 PROCEDURE — G8598 ASA/ANTIPLAT THER USED: HCPCS | Performed by: PAIN MEDICINE

## 2018-10-02 PROCEDURE — G8427 DOCREV CUR MEDS BY ELIG CLIN: HCPCS | Performed by: PAIN MEDICINE

## 2018-10-02 PROCEDURE — G8484 FLU IMMUNIZE NO ADMIN: HCPCS | Performed by: PAIN MEDICINE

## 2018-10-02 PROCEDURE — 99214 OFFICE O/P EST MOD 30 MIN: CPT | Performed by: PAIN MEDICINE

## 2018-10-02 PROCEDURE — 1111F DSCHRG MED/CURRENT MED MERGE: CPT | Performed by: PAIN MEDICINE

## 2018-10-02 RX ORDER — METHIMAZOLE 5 MG/1
2.5 TABLET ORAL
Qty: 12 TABLET | Refills: 2 | Status: SHIPPED | OUTPATIENT
Start: 2018-10-03 | End: 2018-10-22

## 2018-10-03 ENCOUNTER — OFFICE VISIT (OUTPATIENT)
Dept: VASCULAR SURGERY | Age: 63
End: 2018-10-03
Payer: COMMERCIAL

## 2018-10-03 VITALS — SYSTOLIC BLOOD PRESSURE: 110 MMHG | HEART RATE: 84 BPM | DIASTOLIC BLOOD PRESSURE: 70 MMHG

## 2018-10-03 DIAGNOSIS — I65.22 STENOSIS OF LEFT CAROTID ARTERY: Primary | ICD-10-CM

## 2018-10-03 PROCEDURE — G8427 DOCREV CUR MEDS BY ELIG CLIN: HCPCS | Performed by: SURGERY

## 2018-10-03 PROCEDURE — 3017F COLORECTAL CA SCREEN DOC REV: CPT | Performed by: SURGERY

## 2018-10-03 PROCEDURE — G8417 CALC BMI ABV UP PARAM F/U: HCPCS | Performed by: SURGERY

## 2018-10-03 PROCEDURE — 99213 OFFICE O/P EST LOW 20 MIN: CPT | Performed by: SURGERY

## 2018-10-03 PROCEDURE — 1111F DSCHRG MED/CURRENT MED MERGE: CPT | Performed by: SURGERY

## 2018-10-03 PROCEDURE — G8598 ASA/ANTIPLAT THER USED: HCPCS | Performed by: SURGERY

## 2018-10-03 PROCEDURE — 1036F TOBACCO NON-USER: CPT | Performed by: SURGERY

## 2018-10-03 PROCEDURE — G8482 FLU IMMUNIZE ORDER/ADMIN: HCPCS | Performed by: SURGERY

## 2018-10-04 ENCOUNTER — TELEPHONE (OUTPATIENT)
Dept: ADMINISTRATIVE | Age: 63
End: 2018-10-04

## 2018-10-05 DIAGNOSIS — I63.9 CEREBROVASCULAR ACCIDENT (CVA), UNSPECIFIED MECHANISM (HCC): Primary | ICD-10-CM

## 2018-10-05 DIAGNOSIS — R18.8 CIRRHOSIS OF LIVER WITH ASCITES, UNSPECIFIED HEPATIC CIRRHOSIS TYPE (HCC): ICD-10-CM

## 2018-10-05 DIAGNOSIS — K74.60 CIRRHOSIS OF LIVER WITH ASCITES, UNSPECIFIED HEPATIC CIRRHOSIS TYPE (HCC): ICD-10-CM

## 2018-10-05 DIAGNOSIS — J31.0 CHRONIC RHINITIS: ICD-10-CM

## 2018-10-05 RX ORDER — FUROSEMIDE 20 MG/1
TABLET ORAL
Qty: 60 TABLET | Refills: 2 | OUTPATIENT
Start: 2018-10-05

## 2018-10-05 RX ORDER — MONTELUKAST SODIUM 10 MG/1
TABLET ORAL
Qty: 30 TABLET | Refills: 2 | Status: SHIPPED | OUTPATIENT
Start: 2018-10-05 | End: 2018-10-31 | Stop reason: SDUPTHER

## 2018-10-05 NOTE — TELEPHONE ENCOUNTER
Ayesha from [facility] called was trying to order patient a bedside commode for patient from [facility], [facility] is requesting a order and DX of need from PCP to be faxed to them at [phone], please advise

## 2018-10-05 NOTE — TELEPHONE ENCOUNTER
Med list updated - mobic removed   She is not on lasix and this was stopped in the hospital - this was removed from med list  Bedside commode ordered

## 2018-10-10 ENCOUNTER — TELEPHONE (OUTPATIENT)
Dept: FAMILY MEDICINE CLINIC | Age: 63
End: 2018-10-10

## 2018-10-10 DIAGNOSIS — I63.9 CEREBROVASCULAR ACCIDENT (CVA), UNSPECIFIED MECHANISM (HCC): Primary | ICD-10-CM

## 2018-10-10 NOTE — PROGRESS NOTES
Vascular Surgery Outpatient Progress Note      Chief Complaint   Patient presents with    Circulatory Problem     Follow up carotid stenosis       HISTORY OF PRESENT ILLNESS:                This is a 58 y.o. female who presents to the hospital currently not experiencing any more right arm or right leg weakness. Her expressive a fascia has improved. She denies any current chest pain shortness of breath or discomfort. She believes she had one episode of this approximate 1-2 months ago as well as a new symptom that brought her to the hospital for admission. Duane Bourdon She was worked up in the emergency room and at that time TPA was not administered it was felt that the patient possibly had a seizure secondary to her altered mental status and urinary incontinence. This was via the tele-stroke assessment. She now presents with a history of the above and denies any new right-sided left-sided weakness numbness or vision changes. She is currently ambulating. She also had a history of cellulitis of the left lower extremity which is resolved. She was identified to have a critical high-grade left carotid artery stenosis on CT angiogram which was reviewed with the patient and the family at the bedside during today's office visit    Past Medical History:        Diagnosis Date    Cerebral artery occlusion with cerebral infarction ([address]     Diabetes mellitus ([address]     GERD (gastroesophageal reflux disease)     Hyperlipidemia     Hypertension     Liver cirrhosis ([address]     Polyp of esophagus     Thyroid disease      Past Surgical History:        Procedure Laterality Date    CHOLECYSTECTOMY      COLONOSCOPY      HYSTERECTOMY      TONSILLECTOMY      UPPER GASTROINTESTINAL ENDOSCOPY       Current Medications:   Prior to Admission medications    Medication Sig Start Date End Date Taking?  Authorizing Provider   methimazole (TAPAZOLE) 5 MG tablet Take 0.5 tablets by mouth three times a week mon-wed-fri 10/3/18  Yes Adan Bryson

## 2018-10-12 ENCOUNTER — OFFICE VISIT (OUTPATIENT)
Dept: CARDIOLOGY CLINIC | Age: 63
End: 2018-10-12
Payer: COMMERCIAL

## 2018-10-12 VITALS
HEIGHT: 67 IN | BODY MASS INDEX: 31.39 KG/M2 | WEIGHT: 200 LBS | HEART RATE: 109 BPM | DIASTOLIC BLOOD PRESSURE: 40 MMHG | SYSTOLIC BLOOD PRESSURE: 72 MMHG

## 2018-10-12 DIAGNOSIS — E11.42 TYPE 2 DIABETES MELLITUS WITH DIABETIC POLYNEUROPATHY, WITH LONG-TERM CURRENT USE OF INSULIN (HCC): Chronic | ICD-10-CM

## 2018-10-12 DIAGNOSIS — Z79.4 TYPE 2 DIABETES MELLITUS WITH DIABETIC POLYNEUROPATHY, WITH LONG-TERM CURRENT USE OF INSULIN (HCC): Chronic | ICD-10-CM

## 2018-10-12 DIAGNOSIS — E66.8 MODERATE OBESITY: ICD-10-CM

## 2018-10-12 DIAGNOSIS — Z01.810 PREOPERATIVE CARDIOVASCULAR EXAMINATION: ICD-10-CM

## 2018-10-12 DIAGNOSIS — I10 HTN (HYPERTENSION), BENIGN: Chronic | ICD-10-CM

## 2018-10-12 DIAGNOSIS — I65.22 STENOSIS OF LEFT CAROTID ARTERY: Primary | ICD-10-CM

## 2018-10-12 DIAGNOSIS — I63.422 CEREBROVASCULAR ACCIDENT (CVA) DUE TO EMBOLISM OF LEFT ANTERIOR CEREBRAL ARTERY (HCC): ICD-10-CM

## 2018-10-12 DIAGNOSIS — E78.2 MIXED HYPERLIPIDEMIA: Chronic | ICD-10-CM

## 2018-10-12 PROCEDURE — 93000 ELECTROCARDIOGRAM COMPLETE: CPT | Performed by: INTERNAL MEDICINE

## 2018-10-12 PROCEDURE — G8417 CALC BMI ABV UP PARAM F/U: HCPCS | Performed by: INTERNAL MEDICINE

## 2018-10-12 PROCEDURE — G8427 DOCREV CUR MEDS BY ELIG CLIN: HCPCS | Performed by: INTERNAL MEDICINE

## 2018-10-12 PROCEDURE — G8484 FLU IMMUNIZE NO ADMIN: HCPCS | Performed by: INTERNAL MEDICINE

## 2018-10-12 PROCEDURE — 99244 OFF/OP CNSLTJ NEW/EST MOD 40: CPT | Performed by: INTERNAL MEDICINE

## 2018-10-12 PROCEDURE — 2022F DILAT RTA XM EVC RTNOPTHY: CPT | Performed by: INTERNAL MEDICINE

## 2018-10-12 PROCEDURE — 3017F COLORECTAL CA SCREEN DOC REV: CPT | Performed by: INTERNAL MEDICINE

## 2018-10-12 RX ORDER — TRAMADOL HYDROCHLORIDE 50 MG/1
50 TABLET ORAL EVERY 6 HOURS PRN
Status: ON HOLD | COMMUNITY
End: 2018-10-29 | Stop reason: ALTCHOICE

## 2018-10-12 NOTE — PROGRESS NOTES
Surgical History:  Past Surgical History:   Procedure Laterality Date    CHOLECYSTECTOMY      COLONOSCOPY      HYSTERECTOMY      TONSILLECTOMY      UPPER GASTROINTESTINAL ENDOSCOPY         Family History:  Family History   Problem Relation Age of Onset    Heart Disease Mother 28    Heart Disease Sister     Diabetes Sister        Social History:  Social History     Social History    Marital status:      Spouse name: N/A    Number of children: N/A    Years of education: N/A     Occupational History    Not on file. Social History Main Topics    Smoking status: Never Smoker    Smokeless tobacco: Never Used    Alcohol use No    Drug use: No    Sexual activity: Not on file     Other Topics Concern    Not on file     Social History Narrative    No narrative on file       Allergies:  No Known Allergies    Current Medications:  Current Outpatient Prescriptions   Medication Sig Dispense Refill    traMADol (ULTRAM) 50 MG tablet Take 50 mg by mouth every 6 hours as needed for Pain. [facility] Misc. Devices (ADJUST BATH/SHOWER SEAT) MISC Use daily 1 each 0    montelukast (SINGULAIR) 10 MG tablet TAKE 1 TABLET BY MOUTH EVERY DAY AT NIGHT 30 tablet 2    Misc.  Devices (COMMODE BEDSIDE) MISC Use daily 1 each 0    methimazole (TAPAZOLE) 5 MG tablet Take 0.5 tablets by mouth three times a week mon-wed-fri 12 tablet 2    insulin glargine (TOUJEO SOLOSTAR) 300 UNIT/ML injection pen Inject 60 Units into the skin nightly 1 pen 0    venlafaxine (EFFEXOR XR) 37.5 MG extended release capsule TAKE ONE CAPSULE BY MOUTH EVERY DAY 30 capsule 2    amitriptyline (ELAVIL) 25 MG tablet Take 1 tablet by mouth nightly as needed for Sleep 30 tablet 2    lactulose (CHRONULAC) 10 GM/15ML solution Take 30 g by mouth daily      aspirin 81 MG EC tablet Take 1 tablet by mouth daily 90 tablet 1    atorvastatin (LIPITOR) 40 MG tablet Take 1 tablet by mouth nightly 30 tablet 3    clopidogrel (PLAVIX) 75 MG tablet Take 1

## 2018-10-15 ENCOUNTER — TELEPHONE (OUTPATIENT)
Dept: VASCULAR SURGERY | Age: 63
End: 2018-10-15

## 2018-10-17 ENCOUNTER — HOSPITAL ENCOUNTER (OUTPATIENT)
Dept: GENERAL RADIOLOGY | Age: 63
Discharge: HOME | End: 2018-10-19
Payer: COMMERCIAL

## 2018-10-17 ENCOUNTER — OFFICE VISIT (OUTPATIENT)
Dept: ENDOCRINOLOGY | Age: 63
End: 2018-10-17
Payer: COMMERCIAL

## 2018-10-17 ENCOUNTER — HOSPITAL ENCOUNTER (OUTPATIENT)
Age: 63
Discharge: HOME | End: 2018-10-19
Payer: COMMERCIAL

## 2018-10-17 ENCOUNTER — HOSPITAL ENCOUNTER (OUTPATIENT)
Age: 63
Discharge: HOME | End: 2018-10-17
Payer: COMMERCIAL

## 2018-10-17 VITALS
HEIGHT: 67 IN | HEART RATE: 95 BPM | OXYGEN SATURATION: 99 % | WEIGHT: 201 LBS | BODY MASS INDEX: 31.55 KG/M2 | SYSTOLIC BLOOD PRESSURE: 106 MMHG | DIASTOLIC BLOOD PRESSURE: 70 MMHG

## 2018-10-17 DIAGNOSIS — E05.90 HYPERTHYROIDISM: Primary | ICD-10-CM

## 2018-10-17 DIAGNOSIS — E05.90 HYPERTHYROIDISM: ICD-10-CM

## 2018-10-17 DIAGNOSIS — M47.816 LUMBAR SPONDYLOSIS: ICD-10-CM

## 2018-10-17 DIAGNOSIS — M47.812 SPONDYLOSIS OF CERVICAL REGION WITHOUT MYELOPATHY OR RADICULOPATHY: ICD-10-CM

## 2018-10-17 LAB
ANION GAP SERPL CALCULATED.3IONS-SCNC: 14 MMOL/L (ref 7–16)
BUN BLDV-MCNC: 14 MG/DL (ref 8–23)
CALCIUM SERPL-MCNC: 9.3 MG/DL (ref 8.6–10.2)
CHLORIDE BLD-SCNC: 95 MMOL/L (ref 98–107)
CO2: 25 MMOL/L (ref 22–29)
CREAT SERPL-MCNC: 0.7 MG/DL (ref 0.5–1)
CREATININE URINE: 125 MG/DL (ref 29–226)
GFR AFRICAN AMERICAN: >60
GFR NON-AFRICAN AMERICAN: >60 ML/MIN/1.73
GLUCOSE BLD-MCNC: 403 MG/DL (ref 74–109)
HBA1C MFR BLD: 10 % (ref 4–5.6)
MICROALBUMIN UR-MCNC: 273.5 MG/L
MICROALBUMIN/CREAT UR-RTO: 218.8 (ref 0–30)
POTASSIUM SERPL-SCNC: 3.9 MMOL/L (ref 3.5–5)
SODIUM BLD-SCNC: 134 MMOL/L (ref 132–146)
T4 FREE: 1.21 NG/DL (ref 0.93–1.7)
TSH SERPL DL<=0.05 MIU/L-ACNC: 6.29 UIU/ML (ref 0.27–4.2)

## 2018-10-17 PROCEDURE — 36415 COLL VENOUS BLD VENIPUNCTURE: CPT

## 2018-10-17 PROCEDURE — 72040 X-RAY EXAM NECK SPINE 2-3 VW: CPT

## 2018-10-17 PROCEDURE — 84443 ASSAY THYROID STIM HORMONE: CPT

## 2018-10-17 PROCEDURE — 82570 ASSAY OF URINE CREATININE: CPT

## 2018-10-17 PROCEDURE — 72100 X-RAY EXAM L-S SPINE 2/3 VWS: CPT

## 2018-10-17 PROCEDURE — 86800 THYROGLOBULIN ANTIBODY: CPT

## 2018-10-17 PROCEDURE — 80048 BASIC METABOLIC PNL TOTAL CA: CPT

## 2018-10-17 PROCEDURE — 83036 HEMOGLOBIN GLYCOSYLATED A1C: CPT

## 2018-10-17 PROCEDURE — 84439 ASSAY OF FREE THYROXINE: CPT

## 2018-10-17 PROCEDURE — 82044 UR ALBUMIN SEMIQUANTITATIVE: CPT

## 2018-10-17 PROCEDURE — 84445 ASSAY OF TSI GLOBULIN: CPT

## 2018-10-17 PROCEDURE — 86376 MICROSOMAL ANTIBODY EACH: CPT

## 2018-10-17 PROCEDURE — 99204 OFFICE O/P NEW MOD 45 MIN: CPT | Performed by: INTERNAL MEDICINE

## 2018-10-17 NOTE — LETTER
JVD.  Pulm: Clear equal air entry no added sounds, no wheezing or rhonchi    CVS: S1 + S2, no murmur, no heave. Dorsalis pedis pulse palpable but weak   Abd: soft lax, no tenderness, no organomegaly, audible bowel sounds. Skin: warm, no lesions, no rash. + callus, no Ulcers, + acanthosis nigricans   Neuro: CN intact, Monofilament sensation decreased bilateral , muscle power weaker from previous stroke   Psych: normal mood, and affect      Review of Laboratory Data:  I have reviewed the following:  Lab Results   Component Value Date/Time    WBC 6.4 10/01/2018 01:11 PM    RBC 4.10 10/01/2018 01:11 PM    HGB 13.6 10/01/2018 01:11 PM    HCT 39.5 10/01/2018 01:11 PM    MCV 96.3 10/01/2018 01:11 PM    MCH 33.2 10/01/2018 01:11 PM    MCHC 34.4 10/01/2018 01:11 PM    RDW 14.8 10/01/2018 01:11 PM     10/01/2018 01:11 PM    MPV 12.5 (H) 10/01/2018 01:11 PM      Lab Results   Component Value Date/Time     (L) 10/01/2018 01:11 PM    K 4.2 10/01/2018 01:11 PM    CO2 22 10/01/2018 01:11 PM    BUN 15 10/01/2018 01:11 PM    CALCIUM 9.6 10/01/2018 01:11 PM      Lab Results   Component Value Date    LABA1C 10.4 09/09/2018    GLUCOSE 332 10/01/2018    MALBCR  05/17/2018     Lab Results   Component Value Date    CHOL 127 09/09/2018    CHOL 169 04/13/2018    CHOL 169 12/18/2014    TRIG 136 09/09/2018    TRIG 168 04/13/2018    TRIG 148 12/18/2014    HDL 31 09/09/2018    HDL 39 04/13/2018    HDL 42 12/18/2014     Lab Results   Component Value Date    VITD25 33 04/13/2018       All labs medical records and images were reviewed independently     Additional Data Reviewed: Individual visualization of point-of-care blood glucose levels and medications doses    ASSESSMENT & RECOMMENDATIONS   Jerel Hernandez, a 58 y.o.-old female seen in for the following issues     Diabetes Mellitus Type 2    · Patient's diabetes is uncontrol.  A1c 10.4   · Continue Metformin 500 mg daily (wasnt able to tolerate higher dose in the past)

## 2018-10-17 NOTE — PROGRESS NOTES
ENDOCRINOLOGY CLINIC NOTE    Date of Service: 10/17/2018    Medical Records Reviewed:   Inpatient records, outpatient records, outside records     Care Team:  Primary Care Physician: Sarah Hassan, [address], DO. Provider: Dolores Duron MD  Other provider(s):            Reason for the visit:  poorly controlled type II DM, hyperthyroidism      Type of Visit:  New visit     History of Present Illness: The history is provided by the patient. No  was used. Accuracy of the patient data is excellent. Agus Petty is a very pleasant 58 y.o. female seen in Endocrine clinic today for diabetes management     Agus Petty was diagnosed with diabetes at age 51   Currently on Toujeo 60 units at bedtime, Novolog sliding scale 8-10 units before meals, Metformin 500 mg daily   Wasn't able to tolerate higher dose   The patient has been checking blood sugar twice day. Readings usually running in 200s   A1c 10.4%   Patient has had no hypoglycemic episodes   The patient has been mindful of what has been eating and following diabetic diet as encouraged  I reviewed current medications and the patient has no issues with diabetes medications   eye exam once a year and denied any h/o diabetic retinopathy   Seeing podiatrist every 6 months and also performs her own foot care  Microvascular complications:  No Retinopathy, + Nephropathy + Neuropathy   Macrovascular complications: no CAD, no PVD, + Stroke (9/2018)   Receives Flushot every year  Up to date with the Pneumonia vaccine     Regarding hyperthyroidism   The patient was diagnosed with hyperthyroidism in 2012 and has been on Methimazole since diagnosed   She is currently on Methimazole 2.5 mg three days a week  Component 9/11/2018   TSH 6.450 (H)     Thyroid US 4/2/2012  Overall thyroid is normal size. There is a 14 mm somewhat ill-defined border, slight hypoechoic possible nodule lower pole right lobe.   Remainder right left lobes and isthmus shows no other focal hair loss  Neck: denied any neck swelling, difficulty swallowing,   Cardio-pulmonary: No CP, SOB or palpitation, No orthopnea or PND. No cough or wheezing. GI: No N/V/D, no constipation, No abdominal pain, no melena or hematochezia   : Denied any dysuria, hematuria, flank pain, discharge, or incontinence. Skin: denied any rash, ulcer, Hirsute, or hyperpigmentation. MSK: denied any joint deformity, joint pain/swelling, muscle pain, or back pain. Neuro: no numbness, no tingling, no weakness, _  OBJECTIVE    /70 (Site: Left Upper Arm, Position: Sitting, Cuff Size: Large Adult)   Pulse 95   Ht 5' 7\" (1.702 m)   Wt 201 lb (91.2 kg)   SpO2 99%   BMI 31.48 kg/m²   BP Readings from Last 4 Encounters:   10/17/18 106/70   10/12/18 (!) 72/40   10/03/18 110/70   10/02/18 120/73     Wt Readings from Last 6 Encounters:   10/17/18 201 lb (91.2 kg)   10/12/18 200 lb (90.7 kg)   10/02/18 202 lb (91.6 kg)   10/01/18 202 lb (91.6 kg)   09/26/18 202 lb (91.6 kg)   09/25/18 212 lb (96.2 kg)       Physical examination:  General: awake alert, oriented x3, no abnormal position or movements. Obese   HEENT: normocephalic non-traumatic, no exophthalmos   Neck: supple, no LN enlargement, no thyromegaly, no thyroid tenderness, no JVD. Pulm: Clear equal air entry no added sounds, no wheezing or rhonchi    CVS: S1 + S2, no murmur, no heave. Dorsalis pedis pulse palpable but weak   Abd: soft lax, no tenderness, no organomegaly, audible bowel sounds.    Skin: warm, no lesions, no rash. + callus, no Ulcers, + acanthosis nigricans   Neuro: CN intact, Monofilament sensation decreased bilateral , muscle power weaker from previous stroke   Psych: normal mood, and affect      Review of Laboratory Data:  I have reviewed the following:  Lab Results   Component Value Date/Time    WBC 6.4 10/01/2018 01:11 PM    RBC 4.10 10/01/2018 01:11 PM    HGB 13.6 10/01/2018 01:11 PM    HCT 39.5 10/01/2018 01:11 PM    MCV 96.3 10/01/2018 01:11 PM

## 2018-10-18 ENCOUNTER — OFFICE VISIT (OUTPATIENT)
Dept: FAMILY MEDICINE CLINIC | Age: 63
End: 2018-10-18
Payer: COMMERCIAL

## 2018-10-18 ENCOUNTER — TELEPHONE (OUTPATIENT)
Dept: ENDOCRINOLOGY | Age: 63
End: 2018-10-18

## 2018-10-18 VITALS
DIASTOLIC BLOOD PRESSURE: 60 MMHG | HEART RATE: 105 BPM | OXYGEN SATURATION: 97 % | BODY MASS INDEX: 31.39 KG/M2 | HEIGHT: 67 IN | RESPIRATION RATE: 18 BRPM | SYSTOLIC BLOOD PRESSURE: 93 MMHG | WEIGHT: 200 LBS

## 2018-10-18 DIAGNOSIS — E05.90 HYPERTHYROIDISM: Primary | ICD-10-CM

## 2018-10-18 DIAGNOSIS — Z23 NEED FOR INFLUENZA VACCINATION: ICD-10-CM

## 2018-10-18 DIAGNOSIS — I65.22 STENOSIS OF LEFT CAROTID ARTERY: ICD-10-CM

## 2018-10-18 DIAGNOSIS — I63.422 CEREBROVASCULAR ACCIDENT (CVA) DUE TO EMBOLISM OF LEFT ANTERIOR CEREBRAL ARTERY (HCC): Primary | ICD-10-CM

## 2018-10-18 DIAGNOSIS — Z79.4 TYPE 2 DIABETES MELLITUS WITH DIABETIC POLYNEUROPATHY, WITH LONG-TERM CURRENT USE OF INSULIN (HCC): Chronic | ICD-10-CM

## 2018-10-18 DIAGNOSIS — E11.42 TYPE 2 DIABETES MELLITUS WITH DIABETIC POLYNEUROPATHY, WITH LONG-TERM CURRENT USE OF INSULIN (HCC): Chronic | ICD-10-CM

## 2018-10-18 DIAGNOSIS — I10 HTN (HYPERTENSION), BENIGN: Chronic | ICD-10-CM

## 2018-10-18 PROCEDURE — G8482 FLU IMMUNIZE ORDER/ADMIN: HCPCS | Performed by: FAMILY MEDICINE

## 2018-10-18 PROCEDURE — 3017F COLORECTAL CA SCREEN DOC REV: CPT | Performed by: FAMILY MEDICINE

## 2018-10-18 PROCEDURE — G8598 ASA/ANTIPLAT THER USED: HCPCS | Performed by: FAMILY MEDICINE

## 2018-10-18 PROCEDURE — G8427 DOCREV CUR MEDS BY ELIG CLIN: HCPCS | Performed by: FAMILY MEDICINE

## 2018-10-18 PROCEDURE — 2022F DILAT RTA XM EVC RTNOPTHY: CPT | Performed by: FAMILY MEDICINE

## 2018-10-18 PROCEDURE — G8417 CALC BMI ABV UP PARAM F/U: HCPCS | Performed by: FAMILY MEDICINE

## 2018-10-18 PROCEDURE — 99214 OFFICE O/P EST MOD 30 MIN: CPT | Performed by: FAMILY MEDICINE

## 2018-10-18 PROCEDURE — 90688 IIV4 VACCINE SPLT 0.5 ML IM: CPT | Performed by: FAMILY MEDICINE

## 2018-10-18 PROCEDURE — 1036F TOBACCO NON-USER: CPT | Performed by: FAMILY MEDICINE

## 2018-10-18 PROCEDURE — 3046F HEMOGLOBIN A1C LEVEL >9.0%: CPT | Performed by: FAMILY MEDICINE

## 2018-10-18 PROCEDURE — 90471 IMMUNIZATION ADMIN: CPT | Performed by: FAMILY MEDICINE

## 2018-10-18 RX ORDER — LISINOPRIL 10 MG/1
TABLET ORAL
Qty: 30 TABLET | Refills: 2 | Status: SHIPPED | OUTPATIENT
Start: 2018-10-18 | End: 2018-11-08 | Stop reason: SDUPTHER

## 2018-10-18 ASSESSMENT — ENCOUNTER SYMPTOMS
BACK PAIN: 0
EYE PAIN: 0
SINUS PRESSURE: 0
DIARRHEA: 0
SHORTNESS OF BREATH: 0
ABDOMINAL PAIN: 1
CONSTIPATION: 0
SORE THROAT: 0
COUGH: 0

## 2018-10-18 NOTE — TELEPHONE ENCOUNTER
Notify pt,  I have reviewed your recent lab results    Thyroid hormones still low. Stop Methimazole completely and repeat labs in 6-8 weeks.  Order is in   A1c was high, as we discussed at recent office visit, please send us your sugar log

## 2018-10-18 NOTE — PROGRESS NOTES
Paulo Allison  : 1955    Chief Complaint:     Chief Complaint   Patient presents with    Cellulitis     left leg    Cerebrovascular Accident     neuro referral       HPI  Paulo Allison 58 y.o. presents for   Chief Complaint   Patient presents with    Cellulitis     left leg    Cerebrovascular Accident     neuro referral     Diabetes/CVA/hypertension/carotid stenosis  She recently saw endocrinology. Did adjust her medications appropriately. She also wishes for a referral to a neurologist due to her recent CVA. She is going to have surgery for her carotid stenosis at the end of the month. Her blood pressure is well-controlled today. Medications were adjusted from recent hospital and fall in the ED. Son states that her blood pressure has been very low. They currently have been taking hydrochlorothiazide as well as lisinopril. However diuretic was recommended to be stopped per cardiology. I did explain this to patient and son today. We also will decrease lisinopril to 10 mg daily. Flu shot today. All questions were answered to patients satisfaction.     Past Medical History:   Diagnosis Date    Cerebral artery occlusion with cerebral infarction ([address]     Diabetes mellitus ([address]     GERD (gastroesophageal reflux disease)     Hyperlipidemia     Hypertension     Liver cirrhosis (HCC)     Polyp of esophagus     Thyroid disease        Past Surgical History:   Procedure Laterality Date    CHOLECYSTECTOMY      COLONOSCOPY      HYSTERECTOMY      TONSILLECTOMY      UPPER GASTROINTESTINAL ENDOSCOPY         Social History     Social History    Marital status:      Spouse name: N/A    Number of children: N/A    Years of education: N/A     Social History Main Topics    Smoking status: Never Smoker    Smokeless tobacco: Never Used    Alcohol use No    Drug use: No    Sexual activity: Not Asked     Other Topics Concern    None     Social History Narrative    None       Family History   Problem Relation Age of Onset    Heart Disease Mother 28    Diabetes Mother     Heart Disease Sister     Diabetes Sister           Current Outpatient Prescriptions   Medication Sig Dispense Refill    lisinopril (PRINIVIL;ZESTRIL) 10 MG tablet TAKE 10 mg daily. 30 tablet 2    insulin aspart (NOVOLOG FLEXPEN) 100 UNIT/ML injection pen Take 12 units before meals + sliding scale three times/day before meals. MAX 60 units daily 15 pen 3    Insulin Pen Needle (BD PEN NEEDLE BASIM U/F) 32G X 4 MM MISC 1 each by Does not apply route 4 times daily With insulin 360 each 5    traMADol (ULTRAM) 50 MG tablet Take 50 mg by mouth every 6 hours as needed for Pain. [facility] Misc. Devices (ADJUST BATH/SHOWER SEAT) MISC Use daily 1 each 0    montelukast (SINGULAIR) 10 MG tablet TAKE 1 TABLET BY MOUTH EVERY DAY AT NIGHT 30 tablet 2    Misc. Devices (COMMODE BEDSIDE) MISC Use daily 1 each 0    methimazole (TAPAZOLE) 5 MG tablet Take 0.5 tablets by mouth three times a week mon-wed-fri 12 tablet 2    insulin glargine (TOUJEO SOLOSTAR) 300 UNIT/ML injection pen Inject 60 Units into the skin nightly 1 pen 0    venlafaxine (EFFEXOR XR) 37.5 MG extended release capsule TAKE ONE CAPSULE BY MOUTH EVERY DAY 30 capsule 2    amitriptyline (ELAVIL) 25 MG tablet Take 1 tablet by mouth nightly as needed for Sleep 30 tablet 2    lactulose (CHRONULAC) 10 GM/15ML solution Take 30 g by mouth daily      aspirin 81 MG EC tablet Take 1 tablet by mouth daily 90 tablet 1    atorvastatin (LIPITOR) 40 MG tablet Take 1 tablet by mouth nightly 30 tablet 3    clopidogrel (PLAVIX) 75 MG tablet Take 1 tablet by mouth daily 30 tablet 3    Milk Thistle 1000 MG CAPS Take by mouth daily Ld 9/4/2018      FREESTYLE LANCETS MISC 1 each by Does not apply route daily 100 each 3    blood glucose test strips (ASCENSIA AUTODISC VI;ONE TOUCH ULTRA TEST VI) strip 1 each by In Vitro route daily As needed.  100 each 3    glucose monitoring kit (FREESTYLE) monitoring kit 1 kit by Does not apply route daily as needed (glucose) 1 kit 0    nystatin (MYCOSTATIN) 341261 UNIT/GM cream Apply topically 2 times daily. 1 Tube 0    polyethylene glycol (GLYCOLAX) powder daily       ondansetron (ZOFRAN) 4 MG tablet Take 1 tablet by mouth every 8 hours as needed for Nausea or Vomiting 20 tablet 0    metFORMIN (GLUCOPHAGE-XR) 500 MG extended release tablet TAKE 1 TABLET BY MOUTH EVERY DAY  1     No current facility-administered medications for this visit. No Known Allergies    Health Maintenance Due   Topic Date Due    Diabetic retinal exam  06/27/1964    HIV screen  06/27/1969    DTaP/Tdap/Td vaccine (1 - Tdap) 06/27/1973    Pneumococcal med risk (1 of 1 - PPSV23) 06/27/1973    Shingles Vaccine (1 of 2 - 2 Dose Series) 06/27/2004    Cervical cancer screen  07/20/2015           REVIEW OF SYSTEMS  Review of Systems   Constitutional: Negative for fatigue and fever. HENT: Negative for ear pain, sinus pressure, sneezing and sore throat. Eyes: Negative for pain. Respiratory: Negative for cough and shortness of breath. Cardiovascular: Negative for chest pain and leg swelling. Gastrointestinal: Positive for abdominal pain. Negative for constipation and diarrhea. Genitourinary: Negative for dysuria and urgency. Musculoskeletal: Negative for back pain and myalgias. Skin: Negative for rash. Allergic/Immunologic: Negative for food allergies. Neurological: Negative for light-headedness and headaches. Hematological: Does not bruise/bleed easily. Psychiatric/Behavioral: Negative for behavioral problems and sleep disturbance. PHYSICAL EXAM  BP 93/60 (Site: Left Upper Arm)   Pulse 105   Resp 18   Ht 5' 7\" (1.702 m)   Wt 200 lb (90.7 kg)   SpO2 97%   BMI 31.32 kg/m²   Physical Exam   Constitutional: She is oriented to person, place, and time. She appears well-developed and well-nourished. HENT:   Head: Normocephalic and atraumatic.

## 2018-10-19 LAB
THYROGLOBULIN AB: 1 IU/ML (ref 0–4)
THYROID PEROXIDASE (TPO) ABS: 464.9 IU/ML (ref 0–9)

## 2018-10-22 ENCOUNTER — ANESTHESIA EVENT (OUTPATIENT)
Dept: OPERATING ROOM | Age: 63
DRG: 038 | End: 2018-10-22
Payer: COMMERCIAL

## 2018-10-22 ENCOUNTER — HOSPITAL ENCOUNTER (OUTPATIENT)
Dept: PREADMISSION TESTING | Age: 63
Discharge: HOME | End: 2018-10-22
Payer: COMMERCIAL

## 2018-10-22 VITALS
HEIGHT: 67 IN | OXYGEN SATURATION: 94 % | BODY MASS INDEX: 32.02 KG/M2 | WEIGHT: 204 LBS | RESPIRATION RATE: 20 BRPM | HEART RATE: 100 BPM | TEMPERATURE: 97.9 F

## 2018-10-22 DIAGNOSIS — Z01.818 PRE-OP TESTING: Primary | ICD-10-CM

## 2018-10-22 LAB
ABO/RH: NORMAL
ANION GAP SERPL CALCULATED.3IONS-SCNC: 10 MMOL/L (ref 7–16)
ANTIBODY SCREEN: NORMAL
BASOPHILS ABSOLUTE: 0.05 E9/L (ref 0–0.2)
BASOPHILS RELATIVE PERCENT: 0.9 % (ref 0–2)
BUN BLDV-MCNC: 12 MG/DL (ref 8–23)
CALCIUM SERPL-MCNC: 8.9 MG/DL (ref 8.6–10.2)
CHLORIDE BLD-SCNC: 103 MMOL/L (ref 98–107)
CO2: 25 MMOL/L (ref 22–29)
CREAT SERPL-MCNC: 0.6 MG/DL (ref 0.5–1)
EOSINOPHILS ABSOLUTE: 0.25 E9/L (ref 0.05–0.5)
EOSINOPHILS RELATIVE PERCENT: 4.3 % (ref 0–6)
GFR AFRICAN AMERICAN: >60
GFR NON-AFRICAN AMERICAN: >60 ML/MIN/1.73
GLUCOSE BLD-MCNC: 289 MG/DL (ref 74–109)
HCT VFR BLD CALC: 37.4 % (ref 34–48)
HEMOGLOBIN: 12.8 G/DL (ref 11.5–15.5)
IMMATURE GRANULOCYTES #: 0.02 E9/L
IMMATURE GRANULOCYTES %: 0.3 % (ref 0–5)
LYMPHOCYTES ABSOLUTE: 2.16 E9/L (ref 1.5–4)
LYMPHOCYTES RELATIVE PERCENT: 37.1 % (ref 20–42)
MCH RBC QN AUTO: 32.8 PG (ref 26–35)
MCHC RBC AUTO-ENTMCNC: 34.2 % (ref 32–34.5)
MCV RBC AUTO: 95.9 FL (ref 80–99.9)
MONOCYTES ABSOLUTE: 0.54 E9/L (ref 0.1–0.95)
MONOCYTES RELATIVE PERCENT: 9.3 % (ref 2–12)
NEUTROPHILS ABSOLUTE: 2.8 E9/L (ref 1.8–7.3)
NEUTROPHILS RELATIVE PERCENT: 48.1 % (ref 43–80)
PDW BLD-RTO: 14.6 FL (ref 11.5–15)
PLATELET # BLD: 139 E9/L (ref 130–450)
PMV BLD AUTO: 11.5 FL (ref 7–12)
POTASSIUM SERPL-SCNC: 4 MMOL/L (ref 3.5–5)
RBC # BLD: 3.9 E12/L (ref 3.5–5.5)
SODIUM BLD-SCNC: 138 MMOL/L (ref 132–146)
WBC # BLD: 5.8 E9/L (ref 4.5–11.5)

## 2018-10-22 PROCEDURE — 87081 CULTURE SCREEN ONLY: CPT

## 2018-10-22 PROCEDURE — 86850 RBC ANTIBODY SCREEN: CPT

## 2018-10-22 PROCEDURE — 36415 COLL VENOUS BLD VENIPUNCTURE: CPT

## 2018-10-22 PROCEDURE — 86900 BLOOD TYPING SEROLOGIC ABO: CPT

## 2018-10-22 PROCEDURE — 85025 COMPLETE CBC W/AUTO DIFF WBC: CPT

## 2018-10-22 PROCEDURE — 86901 BLOOD TYPING SEROLOGIC RH(D): CPT

## 2018-10-22 PROCEDURE — 80048 BASIC METABOLIC PNL TOTAL CA: CPT

## 2018-10-22 RX ORDER — PANTOPRAZOLE SODIUM 40 MG/1
40 TABLET, DELAYED RELEASE ORAL DAILY
COMMUNITY

## 2018-10-22 RX ORDER — OMEPRAZOLE 40 MG/1
40 CAPSULE, DELAYED RELEASE ORAL DAILY
COMMUNITY
End: 2019-02-18

## 2018-10-22 RX ORDER — FUROSEMIDE 20 MG/1
20 TABLET ORAL 2 TIMES DAILY
Qty: 180 TABLET | Refills: 3 | Status: ON HOLD | OUTPATIENT
Start: 2018-10-22 | End: 2018-10-29 | Stop reason: ALTCHOICE

## 2018-10-22 ASSESSMENT — PAIN DESCRIPTION - LOCATION: LOCATION: BACK

## 2018-10-22 ASSESSMENT — PAIN DESCRIPTION - FREQUENCY: FREQUENCY: CONTINUOUS

## 2018-10-22 ASSESSMENT — PAIN SCALES - GENERAL: PAINLEVEL_OUTOF10: 3

## 2018-10-22 ASSESSMENT — PAIN DESCRIPTION - PROGRESSION: CLINICAL_PROGRESSION: NOT CHANGED

## 2018-10-22 ASSESSMENT — PAIN DESCRIPTION - ONSET: ONSET: AWAKENED FROM SLEEP

## 2018-10-22 ASSESSMENT — LIFESTYLE VARIABLES: SMOKING_STATUS: 0

## 2018-10-22 ASSESSMENT — ENCOUNTER SYMPTOMS: SHORTNESS OF BREATH: 0

## 2018-10-22 ASSESSMENT — PAIN DESCRIPTION - DESCRIPTORS: DESCRIPTORS: DISCOMFORT

## 2018-10-22 NOTE — ANESTHESIA PRE PROCEDURE
Take 12 units before meals + sliding scale three times/day before meals. MAX 60 units daily 15 pen 3    Insulin Pen Needle (BD PEN NEEDLE BASIM U/F) 32G X 4 MM MISC 1 each by Does not apply route 4 times daily With insulin 360 each 5    traMADol (ULTRAM) 50 MG tablet Take 50 mg by mouth every 6 hours as needed for Pain. Take morning of surgery with a sip of water if needed.  Misc. Devices (ADJUST BATH/SHOWER SEAT) MISC Use daily 1 each 0    montelukast (SINGULAIR) 10 MG tablet TAKE 1 TABLET BY MOUTH EVERY DAY AT NIGHT 30 tablet 2    Misc. Devices (COMMODE BEDSIDE) MISC Use daily 1 each 0    insulin glargine (TOUJEO SOLOSTAR) 300 UNIT/ML injection pen Inject 60 Units into the skin nightly (Patient taking differently: Inject 50 Units into the skin nightly Take 25 units night before procedure per anesthesia) 1 pen 0    venlafaxine (EFFEXOR XR) 37.5 MG extended release capsule TAKE ONE CAPSULE BY MOUTH EVERY DAY 30 capsule 2    amitriptyline (ELAVIL) 25 MG tablet Take 1 tablet by mouth nightly as needed for Sleep 30 tablet 2    lactulose (CHRONULAC) 10 GM/15ML solution Take 30 g by mouth daily      aspirin 81 MG EC tablet Take 1 tablet by mouth daily 90 tablet 1    atorvastatin (LIPITOR) 40 MG tablet Take 1 tablet by mouth nightly 30 tablet 3    clopidogrel (PLAVIX) 75 MG tablet Take 1 tablet by mouth daily 30 tablet 3    Milk Thistle 1000 MG CAPS Take by mouth daily Ld 10-24-18      FREESTYLE LANCETS MISC 1 each by Does not apply route daily 100 each 3    blood glucose test strips (ASCENSIA AUTODISC VI;ONE TOUCH ULTRA TEST VI) strip 1 each by In Vitro route daily As needed.  100 each 3    glucose monitoring kit (FREESTYLE) monitoring kit 1 kit by Does not apply route daily as needed (glucose) 1 kit 0    polyethylene glycol (GLYCOLAX) powder daily       metFORMIN (GLUCOPHAGE-XR) 500 MG extended release tablet TAKE 1 TABLET BY MOUTH EVERY DAY  1     No current facility-administered medications for intravenous. arterial line and BIS    Anesthetic plan and risks discussed with patient. Use of blood products discussed with patient whom consented to blood products. LAISHA DELGADILLO RN   10/22/2018    Pt seen, examined, chart reviewed, plan discussed.   No changes since seen in [facility]  10/29/2018  10:47 AM

## 2018-10-22 NOTE — PROGRESS NOTES
BMP faxed to Dr Leandro Moody for review from PAT.
needed in the pre-op area to prepare you for surgery. Please do not be discouraged if you are not greeted in the order you arrive as there are many variables that are involved in patient preparation. Your patience is greatly appreciated as you wait for your nurse. Please bring in items such as: books, magazines, newspapers, electronics, or any other items  to occupy your time in the waiting area. [x]  Delays may occur with surgery and staff will make a sincere effort to keep you informed of delays. If any delays occur with your procedure, we apologize ahead of time for your inconvenience as we recognize the value of your time.

## 2018-10-23 ENCOUNTER — TELEPHONE (OUTPATIENT)
Dept: ENDOCRINOLOGY | Age: 63
End: 2018-10-23

## 2018-10-23 ENCOUNTER — TELEPHONE (OUTPATIENT)
Dept: FAMILY MEDICINE CLINIC | Age: 63
End: 2018-10-23

## 2018-10-23 LAB
MRSA CULTURE ONLY: NORMAL
THYROID STIMULATING IMMUNOGLOBULIN: 85 %

## 2018-10-23 NOTE — TELEPHONE ENCOUNTER
Pt son left  stating pt has had black stool last night and today and not sure what to do about this.

## 2018-10-24 NOTE — TELEPHONE ENCOUNTER
Spoke with patients son Mona Esquivel as patient was sleeping. He till let her know to read her my chart message when she wakes up. If they have problems with getting on my chart they will call us back.

## 2018-10-29 ENCOUNTER — HOSPITAL ENCOUNTER (INPATIENT)
Age: 63
LOS: 1 days | Discharge: HOME HEALTH | DRG: 038 | End: 2018-10-30
Attending: SURGERY | Admitting: SURGERY
Payer: COMMERCIAL

## 2018-10-29 ENCOUNTER — ANESTHESIA (OUTPATIENT)
Dept: OPERATING ROOM | Age: 63
DRG: 038 | End: 2018-10-29
Payer: COMMERCIAL

## 2018-10-29 VITALS
OXYGEN SATURATION: 100 % | TEMPERATURE: 96.8 F | RESPIRATION RATE: 27 BRPM | DIASTOLIC BLOOD PRESSURE: 70 MMHG | SYSTOLIC BLOOD PRESSURE: 114 MMHG

## 2018-10-29 DIAGNOSIS — Z01.818 PRE-OP TESTING: ICD-10-CM

## 2018-10-29 DIAGNOSIS — Z98.890 H/O CAROTID ENDARTERECTOMY: ICD-10-CM

## 2018-10-29 DIAGNOSIS — Z79.4 TYPE 2 DIABETES MELLITUS WITH DIABETIC POLYNEUROPATHY, WITH LONG-TERM CURRENT USE OF INSULIN (HCC): Primary | Chronic | ICD-10-CM

## 2018-10-29 DIAGNOSIS — E11.42 TYPE 2 DIABETES MELLITUS WITH DIABETIC POLYNEUROPATHY, WITH LONG-TERM CURRENT USE OF INSULIN (HCC): Primary | Chronic | ICD-10-CM

## 2018-10-29 PROBLEM — I95.81 POSTPROCEDURAL HYPOTENSION: Status: ACTIVE | Noted: 2018-10-29

## 2018-10-29 PROBLEM — I65.22 LEFT CAROTID STENOSIS: Status: ACTIVE | Noted: 2018-10-29

## 2018-10-29 LAB
BACTERIA: ABNORMAL /HPF
BILIRUBIN URINE: NEGATIVE
BLOOD, URINE: NEGATIVE
CLARITY: CLEAR
COLOR: YELLOW
EPITHELIAL CELLS, UA: ABNORMAL /HPF
GLUCOSE URINE: 500 MG/DL
KETONES, URINE: NEGATIVE MG/DL
LEUKOCYTE ESTERASE, URINE: ABNORMAL
METER GLUCOSE: 223 MG/DL (ref 70–110)
METER GLUCOSE: 305 MG/DL (ref 70–110)
METER GLUCOSE: 310 MG/DL (ref 70–110)
NITRITE, URINE: NEGATIVE
PH UA: 6 (ref 5–9)
PROTEIN UA: NEGATIVE MG/DL
RBC UA: ABNORMAL /HPF (ref 0–2)
SPECIFIC GRAVITY UA: 1.02 (ref 1–1.03)
UROBILINOGEN, URINE: 1 E.U./DL
WBC UA: ABNORMAL /HPF (ref 0–5)

## 2018-10-29 PROCEDURE — 03UL0KZ SUPPLEMENT LEFT INTERNAL CAROTID ARTERY WITH NONAUTOLOGOUS TISSUE SUBSTITUTE, OPEN APPROACH: ICD-10-PCS | Performed by: SURGERY

## 2018-10-29 PROCEDURE — 6360000002 HC RX W HCPCS: Performed by: NURSE PRACTITIONER

## 2018-10-29 PROCEDURE — 2000000000 HC ICU R&B

## 2018-10-29 PROCEDURE — 03UJ0KZ SUPPLEMENT LEFT COMMON CAROTID ARTERY WITH NONAUTOLOGOUS TISSUE SUBSTITUTE, OPEN APPROACH: ICD-10-PCS | Performed by: SURGERY

## 2018-10-29 PROCEDURE — 6360000002 HC RX W HCPCS: Performed by: SURGERY

## 2018-10-29 PROCEDURE — 2500000003 HC RX 250 WO HCPCS

## 2018-10-29 PROCEDURE — 2580000003 HC RX 258: Performed by: SURGERY

## 2018-10-29 PROCEDURE — 6360000002 HC RX W HCPCS: Performed by: STUDENT IN AN ORGANIZED HEALTH CARE EDUCATION/TRAINING PROGRAM

## 2018-10-29 PROCEDURE — 6360000002 HC RX W HCPCS

## 2018-10-29 PROCEDURE — 2580000003 HC RX 258

## 2018-10-29 PROCEDURE — 3700000000 HC ANESTHESIA ATTENDED CARE: Performed by: SURGERY

## 2018-10-29 PROCEDURE — 6370000000 HC RX 637 (ALT 250 FOR IP): Performed by: ANESTHESIOLOGY

## 2018-10-29 PROCEDURE — 3700000001 HC ADD 15 MINUTES (ANESTHESIA): Performed by: SURGERY

## 2018-10-29 PROCEDURE — 03CL0ZZ EXTIRPATION OF MATTER FROM LEFT INTERNAL CAROTID ARTERY, OPEN APPROACH: ICD-10-PCS | Performed by: SURGERY

## 2018-10-29 PROCEDURE — 03UN0KZ SUPPLEMENT LEFT EXTERNAL CAROTID ARTERY WITH NONAUTOLOGOUS TISSUE SUBSTITUTE, OPEN APPROACH: ICD-10-PCS | Performed by: SURGERY

## 2018-10-29 PROCEDURE — 03CN0ZZ EXTIRPATION OF MATTER FROM LEFT EXTERNAL CAROTID ARTERY, OPEN APPROACH: ICD-10-PCS | Performed by: SURGERY

## 2018-10-29 PROCEDURE — 6370000000 HC RX 637 (ALT 250 FOR IP): Performed by: STUDENT IN AN ORGANIZED HEALTH CARE EDUCATION/TRAINING PROGRAM

## 2018-10-29 PROCEDURE — 3600000014 HC SURGERY LEVEL 4 ADDTL 15MIN: Performed by: SURGERY

## 2018-10-29 PROCEDURE — 82962 GLUCOSE BLOOD TEST: CPT

## 2018-10-29 PROCEDURE — 88304 TISSUE EXAM BY PATHOLOGIST: CPT

## 2018-10-29 PROCEDURE — 2580000003 HC RX 258: Performed by: NURSE PRACTITIONER

## 2018-10-29 PROCEDURE — 2580000003 HC RX 258: Performed by: STUDENT IN AN ORGANIZED HEALTH CARE EDUCATION/TRAINING PROGRAM

## 2018-10-29 PROCEDURE — 3600000004 HC SURGERY LEVEL 4 BASE: Performed by: SURGERY

## 2018-10-29 PROCEDURE — 99231 SBSQ HOSP IP/OBS SF/LOW 25: CPT | Performed by: NURSE PRACTITIONER

## 2018-10-29 PROCEDURE — 2780000010 HC IMPLANT OTHER: Performed by: SURGERY

## 2018-10-29 PROCEDURE — C1768 GRAFT, VASCULAR: HCPCS | Performed by: SURGERY

## 2018-10-29 PROCEDURE — 2709999900 HC NON-CHARGEABLE SUPPLY: Performed by: SURGERY

## 2018-10-29 PROCEDURE — 03CJ0ZZ EXTIRPATION OF MATTER FROM LEFT COMMON CAROTID ARTERY, OPEN APPROACH: ICD-10-PCS | Performed by: SURGERY

## 2018-10-29 PROCEDURE — 35301 RECHANNELING OF ARTERY: CPT | Performed by: SURGERY

## 2018-10-29 PROCEDURE — 81001 URINALYSIS AUTO W/SCOPE: CPT

## 2018-10-29 PROCEDURE — 6370000000 HC RX 637 (ALT 250 FOR IP)

## 2018-10-29 DEVICE — XENOSURE BIOLOGIC PATCH, 0.8CM X 8CM, EIFU
Type: IMPLANTABLE DEVICE | Site: CAROTID | Status: FUNCTIONAL
Brand: XENOSURE BIOLOGIC PATCH

## 2018-10-29 RX ORDER — DEXTROSE MONOHYDRATE 25 G/50ML
12.5 INJECTION, SOLUTION INTRAVENOUS PRN
Status: DISCONTINUED | OUTPATIENT
Start: 2018-10-29 | End: 2018-10-30 | Stop reason: HOSPADM

## 2018-10-29 RX ORDER — LIDOCAINE HYDROCHLORIDE 20 MG/ML
INJECTION, SOLUTION INFILTRATION; PERINEURAL PRN
Status: DISCONTINUED | OUTPATIENT
Start: 2018-10-29 | End: 2018-10-29 | Stop reason: SDUPTHER

## 2018-10-29 RX ORDER — SODIUM CHLORIDE 9 MG/ML
INJECTION, SOLUTION INTRAVENOUS CONTINUOUS
Status: DISCONTINUED | OUTPATIENT
Start: 2018-10-29 | End: 2018-10-29

## 2018-10-29 RX ORDER — PROPOFOL 10 MG/ML
INJECTION, EMULSION INTRAVENOUS CONTINUOUS PRN
Status: DISCONTINUED | OUTPATIENT
Start: 2018-10-29 | End: 2018-10-29 | Stop reason: SDUPTHER

## 2018-10-29 RX ORDER — GLYCOPYRROLATE 1 MG/5 ML
SYRINGE (ML) INTRAVENOUS PRN
Status: DISCONTINUED | OUTPATIENT
Start: 2018-10-29 | End: 2018-10-29 | Stop reason: SDUPTHER

## 2018-10-29 RX ORDER — SODIUM CHLORIDE 0.9 % (FLUSH) 0.9 %
10 SYRINGE (ML) INJECTION EVERY 12 HOURS SCHEDULED
Status: DISCONTINUED | OUTPATIENT
Start: 2018-10-29 | End: 2018-10-29 | Stop reason: HOSPADM

## 2018-10-29 RX ORDER — FENTANYL CITRATE 50 UG/ML
INJECTION, SOLUTION INTRAMUSCULAR; INTRAVENOUS PRN
Status: DISCONTINUED | OUTPATIENT
Start: 2018-10-29 | End: 2018-10-29 | Stop reason: SDUPTHER

## 2018-10-29 RX ORDER — PROPOFOL 10 MG/ML
INJECTION, EMULSION INTRAVENOUS PRN
Status: DISCONTINUED | OUTPATIENT
Start: 2018-10-29 | End: 2018-10-29 | Stop reason: SDUPTHER

## 2018-10-29 RX ORDER — MIDAZOLAM HYDROCHLORIDE 1 MG/ML
INJECTION INTRAMUSCULAR; INTRAVENOUS PRN
Status: DISCONTINUED | OUTPATIENT
Start: 2018-10-29 | End: 2018-10-29 | Stop reason: SDUPTHER

## 2018-10-29 RX ORDER — SODIUM CHLORIDE 0.9 % (FLUSH) 0.9 %
10 SYRINGE (ML) INJECTION EVERY 12 HOURS SCHEDULED
Status: DISCONTINUED | OUTPATIENT
Start: 2018-10-29 | End: 2018-10-30 | Stop reason: HOSPADM

## 2018-10-29 RX ORDER — HEPARIN SODIUM 1000 [USP'U]/ML
INJECTION, SOLUTION INTRAVENOUS; SUBCUTANEOUS PRN
Status: DISCONTINUED | OUTPATIENT
Start: 2018-10-29 | End: 2018-10-29 | Stop reason: SDUPTHER

## 2018-10-29 RX ORDER — DEXTROSE MONOHYDRATE 50 MG/ML
100 INJECTION, SOLUTION INTRAVENOUS PRN
Status: DISCONTINUED | OUTPATIENT
Start: 2018-10-29 | End: 2018-10-30 | Stop reason: HOSPADM

## 2018-10-29 RX ORDER — MORPHINE SULFATE 2 MG/ML
2 INJECTION, SOLUTION INTRAMUSCULAR; INTRAVENOUS
Status: DISCONTINUED | OUTPATIENT
Start: 2018-10-29 | End: 2018-10-30

## 2018-10-29 RX ORDER — SODIUM CHLORIDE 0.9 % (FLUSH) 0.9 %
10 SYRINGE (ML) INJECTION PRN
Status: DISCONTINUED | OUTPATIENT
Start: 2018-10-29 | End: 2018-10-29 | Stop reason: HOSPADM

## 2018-10-29 RX ORDER — CEFAZOLIN SODIUM 1 G/50ML
1 SOLUTION INTRAVENOUS EVERY 8 HOURS
Status: COMPLETED | OUTPATIENT
Start: 2018-10-29 | End: 2018-10-30

## 2018-10-29 RX ORDER — SODIUM CHLORIDE 9 MG/ML
INJECTION, SOLUTION INTRAVENOUS CONTINUOUS
Status: DISCONTINUED | OUTPATIENT
Start: 2018-10-29 | End: 2018-10-30

## 2018-10-29 RX ORDER — VECURONIUM BROMIDE 1 MG/ML
INJECTION, POWDER, LYOPHILIZED, FOR SOLUTION INTRAVENOUS PRN
Status: DISCONTINUED | OUTPATIENT
Start: 2018-10-29 | End: 2018-10-29 | Stop reason: SDUPTHER

## 2018-10-29 RX ORDER — SODIUM CHLORIDE 0.9 % (FLUSH) 0.9 %
SYRINGE (ML) INJECTION
Status: COMPLETED
Start: 2018-10-29 | End: 2018-10-29

## 2018-10-29 RX ORDER — NICOTINE POLACRILEX 4 MG
15 LOZENGE BUCCAL PRN
Status: DISCONTINUED | OUTPATIENT
Start: 2018-10-29 | End: 2018-10-30 | Stop reason: HOSPADM

## 2018-10-29 RX ORDER — ONDANSETRON 2 MG/ML
INJECTION INTRAMUSCULAR; INTRAVENOUS PRN
Status: DISCONTINUED | OUTPATIENT
Start: 2018-10-29 | End: 2018-10-29 | Stop reason: SDUPTHER

## 2018-10-29 RX ORDER — SODIUM CHLORIDE 0.9 % (FLUSH) 0.9 %
10 SYRINGE (ML) INJECTION PRN
Status: DISCONTINUED | OUTPATIENT
Start: 2018-10-29 | End: 2018-10-30 | Stop reason: HOSPADM

## 2018-10-29 RX ORDER — OXYCODONE HYDROCHLORIDE AND ACETAMINOPHEN 5; 325 MG/1; MG/1
2 TABLET ORAL EVERY 4 HOURS PRN
Status: DISCONTINUED | OUTPATIENT
Start: 2018-10-29 | End: 2018-10-30 | Stop reason: HOSPADM

## 2018-10-29 RX ORDER — ONDANSETRON 2 MG/ML
4 INJECTION INTRAMUSCULAR; INTRAVENOUS EVERY 6 HOURS PRN
Status: DISCONTINUED | OUTPATIENT
Start: 2018-10-29 | End: 2018-10-30

## 2018-10-29 RX ORDER — DEXAMETHASONE SODIUM PHOSPHATE 10 MG/ML
INJECTION, SOLUTION INTRAMUSCULAR; INTRAVENOUS PRN
Status: DISCONTINUED | OUTPATIENT
Start: 2018-10-29 | End: 2018-10-29 | Stop reason: SDUPTHER

## 2018-10-29 RX ORDER — ACETAMINOPHEN 325 MG/1
650 TABLET ORAL EVERY 4 HOURS PRN
Status: DISCONTINUED | OUTPATIENT
Start: 2018-10-29 | End: 2018-10-30 | Stop reason: HOSPADM

## 2018-10-29 RX ORDER — ASPIRIN 300 MG/1
150 SUPPOSITORY RECTAL ONCE
Status: COMPLETED | OUTPATIENT
Start: 2018-10-29 | End: 2018-10-29

## 2018-10-29 RX ORDER — MORPHINE SULFATE 4 MG/ML
4 INJECTION, SOLUTION INTRAMUSCULAR; INTRAVENOUS
Status: DISCONTINUED | OUTPATIENT
Start: 2018-10-29 | End: 2018-10-30

## 2018-10-29 RX ORDER — NEOSTIGMINE METHYLSULFATE 1 MG/ML
INJECTION, SOLUTION INTRAVENOUS PRN
Status: DISCONTINUED | OUTPATIENT
Start: 2018-10-29 | End: 2018-10-29 | Stop reason: SDUPTHER

## 2018-10-29 RX ORDER — OXYCODONE HYDROCHLORIDE AND ACETAMINOPHEN 5; 325 MG/1; MG/1
1 TABLET ORAL EVERY 4 HOURS PRN
Status: DISCONTINUED | OUTPATIENT
Start: 2018-10-29 | End: 2018-10-30 | Stop reason: HOSPADM

## 2018-10-29 RX ADMIN — Medication 0.6 MG: at 15:01

## 2018-10-29 RX ADMIN — INSULIN LISPRO 8 UNITS: 100 INJECTION, SOLUTION INTRAVENOUS; SUBCUTANEOUS at 22:18

## 2018-10-29 RX ADMIN — Medication 10 ML: at 20:00

## 2018-10-29 RX ADMIN — SODIUM CHLORIDE: 9 INJECTION, SOLUTION INTRAVENOUS at 13:42

## 2018-10-29 RX ADMIN — PHENYLEPHRINE HYDROCHLORIDE 25 MCG/MIN: 10 INJECTION INTRAVENOUS at 15:48

## 2018-10-29 RX ADMIN — Medication 10 ML: at 16:20

## 2018-10-29 RX ADMIN — PHENYLEPHRINE HYDROCHLORIDE: 10 INJECTION INTRAVENOUS at 15:57

## 2018-10-29 RX ADMIN — HEPARIN SODIUM 9000 UNITS: 1000 INJECTION, SOLUTION INTRAVENOUS; SUBCUTANEOUS at 13:30

## 2018-10-29 RX ADMIN — SODIUM CHLORIDE: 9 INJECTION, SOLUTION INTRAVENOUS at 12:33

## 2018-10-29 RX ADMIN — MORPHINE SULFATE 2 MG: 2 INJECTION, SOLUTION INTRAMUSCULAR; INTRAVENOUS at 17:40

## 2018-10-29 RX ADMIN — PHENYLEPHRINE HYDROCHLORIDE 100 MCG: 10 INJECTION INTRAVENOUS at 12:40

## 2018-10-29 RX ADMIN — Medication 10 ML: at 17:40

## 2018-10-29 RX ADMIN — PHENYLEPHRINE HYDROCHLORIDE 4 MCG/MIN: 10 INJECTION INTRAMUSCULAR; INTRAVENOUS; SUBCUTANEOUS at 13:33

## 2018-10-29 RX ADMIN — PHENYLEPHRINE HYDROCHLORIDE 100 MCG: 10 INJECTION INTRAVENOUS at 13:23

## 2018-10-29 RX ADMIN — PHENYLEPHRINE HYDROCHLORIDE 200 MCG: 10 INJECTION INTRAVENOUS at 13:33

## 2018-10-29 RX ADMIN — PHENYLEPHRINE HYDROCHLORIDE 200 MCG: 10 INJECTION INTRAVENOUS at 13:32

## 2018-10-29 RX ADMIN — ASPIRIN 150 MG: 300 SUPPOSITORY RECTAL at 16:31

## 2018-10-29 RX ADMIN — VECURONIUM BROMIDE FOR INJECTION 6 MG: 1 INJECTION, POWDER, LYOPHILIZED, FOR SOLUTION INTRAVENOUS at 12:33

## 2018-10-29 RX ADMIN — PHENYLEPHRINE HYDROCHLORIDE 100 MCG: 10 INJECTION INTRAVENOUS at 13:10

## 2018-10-29 RX ADMIN — INSULIN HUMAN 5 UNITS: 100 INJECTION, SOLUTION PARENTERAL at 14:45

## 2018-10-29 RX ADMIN — FENTANYL CITRATE 100 MCG: 50 INJECTION, SOLUTION INTRAMUSCULAR; INTRAVENOUS at 12:33

## 2018-10-29 RX ADMIN — DEXAMETHASONE SODIUM PHOSPHATE 10 MG: 10 INJECTION INTRAMUSCULAR; INTRAVENOUS at 13:05

## 2018-10-29 RX ADMIN — SODIUM CHLORIDE: 9 INJECTION, SOLUTION INTRAVENOUS at 16:20

## 2018-10-29 RX ADMIN — PHENYLEPHRINE HYDROCHLORIDE 200 MCG: 10 INJECTION INTRAVENOUS at 13:30

## 2018-10-29 RX ADMIN — PHENYLEPHRINE HYDROCHLORIDE 100 MCG: 10 INJECTION INTRAVENOUS at 12:45

## 2018-10-29 RX ADMIN — Medication 3 MG: at 15:00

## 2018-10-29 RX ADMIN — INSULIN HUMAN 5 UNITS: 100 INJECTION, SOLUTION PARENTERAL at 12:00

## 2018-10-29 RX ADMIN — MIDAZOLAM HYDROCHLORIDE 2 MG: 1 INJECTION, SOLUTION INTRAMUSCULAR; INTRAVENOUS at 12:17

## 2018-10-29 RX ADMIN — VECURONIUM BROMIDE FOR INJECTION 1 MG: 1 INJECTION, POWDER, LYOPHILIZED, FOR SOLUTION INTRAVENOUS at 13:10

## 2018-10-29 RX ADMIN — ONDANSETRON HYDROCHLORIDE 4 MG: 2 INJECTION, SOLUTION INTRAMUSCULAR; INTRAVENOUS at 14:45

## 2018-10-29 RX ADMIN — PHENYLEPHRINE HYDROCHLORIDE: 10 INJECTION INTRAVENOUS at 15:56

## 2018-10-29 RX ADMIN — PROPOFOL 150 MG: 10 INJECTION, EMULSION INTRAVENOUS at 12:33

## 2018-10-29 RX ADMIN — PHENYLEPHRINE HYDROCHLORIDE 100 MCG: 10 INJECTION INTRAVENOUS at 13:05

## 2018-10-29 RX ADMIN — PHENYLEPHRINE HYDROCHLORIDE 200 MCG: 10 INJECTION INTRAVENOUS at 13:20

## 2018-10-29 RX ADMIN — PROPOFOL 75 MCG/KG/MIN: 10 INJECTION, EMULSION INTRAVENOUS at 12:41

## 2018-10-29 RX ADMIN — FENTANYL CITRATE 100 MCG: 50 INJECTION, SOLUTION INTRAMUSCULAR; INTRAVENOUS at 13:05

## 2018-10-29 RX ADMIN — PHENYLEPHRINE HYDROCHLORIDE 100 MCG: 10 INJECTION INTRAVENOUS at 13:25

## 2018-10-29 RX ADMIN — PHENYLEPHRINE HYDROCHLORIDE 100 MCG: 10 INJECTION INTRAVENOUS at 12:55

## 2018-10-29 RX ADMIN — Medication 2 G: at 12:15

## 2018-10-29 RX ADMIN — LIDOCAINE HYDROCHLORIDE 100 MG: 20 INJECTION, SOLUTION INFILTRATION; PERINEURAL at 12:33

## 2018-10-29 RX ADMIN — CEFAZOLIN SODIUM 1 G: 1 SOLUTION INTRAVENOUS at 19:53

## 2018-10-29 RX ADMIN — PHENYLEPHRINE HYDROCHLORIDE 200 MCG: 10 INJECTION INTRAVENOUS at 13:15

## 2018-10-29 ASSESSMENT — PULMONARY FUNCTION TESTS
PIF_VALUE: 0
PIF_VALUE: 6
PIF_VALUE: 0
PIF_VALUE: 14
PIF_VALUE: 27
PIF_VALUE: 25
PIF_VALUE: 29
PIF_VALUE: 17
PIF_VALUE: 25
PIF_VALUE: 29
PIF_VALUE: 6
PIF_VALUE: 31
PIF_VALUE: 14
PIF_VALUE: 26
PIF_VALUE: 25
PIF_VALUE: 0
PIF_VALUE: 27
PIF_VALUE: 1
PIF_VALUE: 0
PIF_VALUE: 27
PIF_VALUE: 26
PIF_VALUE: 26
PIF_VALUE: 6
PIF_VALUE: 24
PIF_VALUE: 0
PIF_VALUE: 25
PIF_VALUE: 25
PIF_VALUE: 0
PIF_VALUE: 0
PIF_VALUE: 25
PIF_VALUE: 25
PIF_VALUE: 26
PIF_VALUE: 29
PIF_VALUE: 26
PIF_VALUE: 15
PIF_VALUE: 25
PIF_VALUE: 26
PIF_VALUE: 26
PIF_VALUE: 14
PIF_VALUE: 18
PIF_VALUE: 26
PIF_VALUE: 26
PIF_VALUE: 27
PIF_VALUE: 25
PIF_VALUE: 19
PIF_VALUE: 26
PIF_VALUE: 25
PIF_VALUE: 25
PIF_VALUE: 23
PIF_VALUE: 2
PIF_VALUE: 0
PIF_VALUE: 27
PIF_VALUE: 28
PIF_VALUE: 27
PIF_VALUE: 27
PIF_VALUE: 23
PIF_VALUE: 18
PIF_VALUE: 0
PIF_VALUE: 27
PIF_VALUE: 17
PIF_VALUE: 26
PIF_VALUE: 25
PIF_VALUE: 21
PIF_VALUE: 25
PIF_VALUE: 26
PIF_VALUE: 3
PIF_VALUE: 27
PIF_VALUE: 24
PIF_VALUE: 0
PIF_VALUE: 0
PIF_VALUE: 24
PIF_VALUE: 17
PIF_VALUE: 26
PIF_VALUE: 25
PIF_VALUE: 25
PIF_VALUE: 9
PIF_VALUE: 0
PIF_VALUE: 29
PIF_VALUE: 25
PIF_VALUE: 26
PIF_VALUE: 4
PIF_VALUE: 25
PIF_VALUE: 24
PIF_VALUE: 29
PIF_VALUE: 27
PIF_VALUE: 24
PIF_VALUE: 26
PIF_VALUE: 26
PIF_VALUE: 1
PIF_VALUE: 29
PIF_VALUE: 26
PIF_VALUE: 25
PIF_VALUE: 8
PIF_VALUE: 26
PIF_VALUE: 0
PIF_VALUE: 4
PIF_VALUE: 17
PIF_VALUE: 23
PIF_VALUE: 0
PIF_VALUE: 26
PIF_VALUE: 29
PIF_VALUE: 26
PIF_VALUE: 27
PIF_VALUE: 25
PIF_VALUE: 25
PIF_VALUE: 28
PIF_VALUE: 27
PIF_VALUE: 26
PIF_VALUE: 27
PIF_VALUE: 15
PIF_VALUE: 27
PIF_VALUE: 0
PIF_VALUE: 15
PIF_VALUE: 29
PIF_VALUE: 7
PIF_VALUE: 0
PIF_VALUE: 24
PIF_VALUE: 32
PIF_VALUE: 27
PIF_VALUE: 0
PIF_VALUE: 24
PIF_VALUE: 27
PIF_VALUE: 25
PIF_VALUE: 27
PIF_VALUE: 26
PIF_VALUE: 25
PIF_VALUE: 29
PIF_VALUE: 0
PIF_VALUE: 27
PIF_VALUE: 25
PIF_VALUE: 25
PIF_VALUE: 24
PIF_VALUE: 26
PIF_VALUE: 28
PIF_VALUE: 25
PIF_VALUE: 23
PIF_VALUE: 32
PIF_VALUE: 0
PIF_VALUE: 26
PIF_VALUE: 30
PIF_VALUE: 27
PIF_VALUE: 18
PIF_VALUE: 26
PIF_VALUE: 6
PIF_VALUE: 18
PIF_VALUE: 24
PIF_VALUE: 32
PIF_VALUE: 6
PIF_VALUE: 24
PIF_VALUE: 27
PIF_VALUE: 25
PIF_VALUE: 24
PIF_VALUE: 29
PIF_VALUE: 27
PIF_VALUE: 0
PIF_VALUE: 18
PIF_VALUE: 18
PIF_VALUE: 25
PIF_VALUE: 0
PIF_VALUE: 25
PIF_VALUE: 27
PIF_VALUE: 25
PIF_VALUE: 25
PIF_VALUE: 23
PIF_VALUE: 25
PIF_VALUE: 26
PIF_VALUE: 17
PIF_VALUE: 25
PIF_VALUE: 17
PIF_VALUE: 0
PIF_VALUE: 0
PIF_VALUE: 23
PIF_VALUE: 27
PIF_VALUE: 6
PIF_VALUE: 26
PIF_VALUE: 27
PIF_VALUE: 24
PIF_VALUE: 6
PIF_VALUE: 0
PIF_VALUE: 0
PIF_VALUE: 14
PIF_VALUE: 19
PIF_VALUE: 26
PIF_VALUE: 26
PIF_VALUE: 0
PIF_VALUE: 23
PIF_VALUE: 27
PIF_VALUE: 27
PIF_VALUE: 26
PIF_VALUE: 27
PIF_VALUE: 6
PIF_VALUE: 23
PIF_VALUE: 27

## 2018-10-29 ASSESSMENT — PAIN DESCRIPTION - DESCRIPTORS
DESCRIPTORS: BURNING;STABBING
DESCRIPTORS: SORE;DISCOMFORT;DULL
DESCRIPTORS: SORE

## 2018-10-29 ASSESSMENT — PAIN DESCRIPTION - PROGRESSION: CLINICAL_PROGRESSION: GRADUALLY WORSENING

## 2018-10-29 ASSESSMENT — PAIN SCALES - GENERAL
PAINLEVEL_OUTOF10: 0
PAINLEVEL_OUTOF10: 0
PAINLEVEL_OUTOF10: 4
PAINLEVEL_OUTOF10: 1

## 2018-10-29 ASSESSMENT — PAIN DESCRIPTION - PAIN TYPE
TYPE: SURGICAL PAIN
TYPE: SURGICAL PAIN

## 2018-10-29 ASSESSMENT — PAIN DESCRIPTION - LOCATION
LOCATION: NECK
LOCATION: NECK

## 2018-10-29 ASSESSMENT — PAIN DESCRIPTION - FREQUENCY
FREQUENCY: INTERMITTENT
FREQUENCY: INTERMITTENT

## 2018-10-29 ASSESSMENT — PAIN - FUNCTIONAL ASSESSMENT: PAIN_FUNCTIONAL_ASSESSMENT: 0-10

## 2018-10-29 ASSESSMENT — PAIN DESCRIPTION - ONSET: ONSET: GRADUAL

## 2018-10-29 ASSESSMENT — PAIN DESCRIPTION - ORIENTATION
ORIENTATION: LEFT
ORIENTATION: LEFT

## 2018-10-29 NOTE — H&P
Vascular Surgery History & Physical Exam      Chief Complaint: left carotid stenosis    HISTORY OF PRESENT ILLNESS:                The patient is a 58 y.o. female who presents to the hospital for elective carotid endarterectomy. She was diagnosed with high grade left carotid artery stenosis during a hospitalization last month for CVA with right upper/lower extremity weakness and aphasia. She was recently seen in the ED at the beginning of the month with gait instability but after telestroke assessment was not felt to be consistent with an acute stroke--was discharged with antibiotics for +UA. She has history also of DM, GERD, HTN, HLD. She feels that her hemiparesis and her aphasia symptoms have improved.     Past Medical History:   Diagnosis Date    Cerebral artery occlusion with cerebral infarction ([facility] 09/2018    Diabetes mellitus ([facility]     GERD (gastroesophageal reflux disease)     Hyperlipidemia     Hypertension     Liver cirrhosis (HCC)     Polyp of esophagus     PONV (postoperative nausea and vomiting)     Prolonged emergence from general anesthesia     Thyroid disease     no meds        Past Surgical History:   Procedure Laterality Date    CHOLECYSTECTOMY      COLONOSCOPY      HYSTERECTOMY  2003    TONSILLECTOMY      UPPER GASTROINTESTINAL ENDOSCOPY         Current Medications:     Current Facility-Administered Medications:     sodium chloride flush 0.9 % injection 10 mL, 10 mL, Intravenous, 2 times per day, Nkechi Olvera MD    sodium chloride flush 0.9 % injection 10 mL, 10 mL, Intravenous, PRN, Nkechi Olvera MD    ceFAZolin (ANCEF) 2 g in dextrose 5 % 50 mL IVPB, 2 g, Intravenous, On Call to OR, Nkechi Olvera MD    0.9 % sodium chloride infusion, , Intravenous, Continuous, Nkechi Olvera MD    Allergies:  Tramadol    Social History     Social History    Marital status:      Spouse name: N/A    Number of children: N/A    Years of education: N/A

## 2018-10-29 NOTE — PROGRESS NOTES
CVICU Admission Note    Name: Mariam Tolliver  MRN: [id number]    CC: Postoperative Critical Care Management     Indication for Surgery/Procedure: left carotid artery stenosis     Important/Relevant PMH/PSH: CVA with residual right sided weakness, aphasia, DMII, GERD, HTN, HLD    Procedure/Surgeries: 10/29/2018 Left CEA     Physical Exam:    BP (!) 102/58   Pulse 101   Temp 97.6 °F (36.4 °C) (Temporal)   Resp 18   Ht 5' 7\" (1.702 m)   Wt 204 lb (92.5 kg)   SpO2 94%   BMI 31.95 kg/m²     General Appearance: arrived to ICU in stable condition; slightly hypotensive starting phenylephrine    Eyes: PERRL  Pulmonary: CTA bilaterally. No wheezes, no accessory muscle use noted   Cardiovascular: RRR, no heaves or thrills palpated   Telemetry: SR  Abdomen: Soft, nontender, nondistended   Extremities: Palpable pulses all extremities, No edema   Neurologic/Psych: moves all extremities; right side slightly weaker (consistent with previous history), tongue midline   Skin: Warm and dry. Incision: Left neck incision well approximated; no hematoma       Assessment/Plan: Day of Surgery     1.  Left carotid artery stenosis S/p Left CEA   -Hypotension- SBP 80s on arrival to ICU; starting phenylephrine for target maps>65  -IVF  -Ongoing neurovascular/incision checks  -npo  -bedrest  -pain control     Electronically signed by BENITO Harvey - CNP on 10/29/2018 at 3:33 PM

## 2018-10-30 ENCOUNTER — TELEPHONE (OUTPATIENT)
Dept: FAMILY MEDICINE CLINIC | Age: 63
End: 2018-10-30

## 2018-10-30 VITALS
DIASTOLIC BLOOD PRESSURE: 59 MMHG | RESPIRATION RATE: 19 BRPM | TEMPERATURE: 97.2 F | HEIGHT: 67 IN | SYSTOLIC BLOOD PRESSURE: 101 MMHG | OXYGEN SATURATION: 92 % | WEIGHT: 204 LBS | HEART RATE: 87 BPM | BODY MASS INDEX: 32.02 KG/M2

## 2018-10-30 LAB
ANION GAP SERPL CALCULATED.3IONS-SCNC: 11 MMOL/L (ref 7–16)
BASOPHILS ABSOLUTE: 0.01 E9/L (ref 0–0.2)
BASOPHILS RELATIVE PERCENT: 0.1 % (ref 0–2)
BUN BLDV-MCNC: 14 MG/DL (ref 8–23)
CALCIUM SERPL-MCNC: 7.9 MG/DL (ref 8.6–10.2)
CHLORIDE BLD-SCNC: 107 MMOL/L (ref 98–107)
CO2: 19 MMOL/L (ref 22–29)
CREAT SERPL-MCNC: 0.5 MG/DL (ref 0.5–1)
EOSINOPHILS ABSOLUTE: 0 E9/L (ref 0.05–0.5)
EOSINOPHILS RELATIVE PERCENT: 0 % (ref 0–6)
GFR AFRICAN AMERICAN: >60
GFR NON-AFRICAN AMERICAN: >60 ML/MIN/1.73
GLUCOSE BLD-MCNC: 242 MG/DL (ref 74–109)
HCT VFR BLD CALC: 32.7 % (ref 34–48)
HEMOGLOBIN: 11 G/DL (ref 11.5–15.5)
IMMATURE GRANULOCYTES #: 0.02 E9/L
IMMATURE GRANULOCYTES %: 0.3 % (ref 0–5)
LYMPHOCYTES ABSOLUTE: 1.46 E9/L (ref 1.5–4)
LYMPHOCYTES RELATIVE PERCENT: 21.9 % (ref 20–42)
MCH RBC QN AUTO: 32.6 PG (ref 26–35)
MCHC RBC AUTO-ENTMCNC: 33.6 % (ref 32–34.5)
MCV RBC AUTO: 97 FL (ref 80–99.9)
METER GLUCOSE: 202 MG/DL (ref 70–110)
METER GLUCOSE: 233 MG/DL (ref 70–110)
METER GLUCOSE: 245 MG/DL (ref 70–110)
METER GLUCOSE: 312 MG/DL (ref 70–110)
MONOCYTES ABSOLUTE: 0.5 E9/L (ref 0.1–0.95)
MONOCYTES RELATIVE PERCENT: 7.5 % (ref 2–12)
NEUTROPHILS ABSOLUTE: 4.69 E9/L (ref 1.8–7.3)
NEUTROPHILS RELATIVE PERCENT: 70.2 % (ref 43–80)
PDW BLD-RTO: 14.4 FL (ref 11.5–15)
PLATELET # BLD: 128 E9/L (ref 130–450)
PMV BLD AUTO: 11.3 FL (ref 7–12)
POTASSIUM SERPL-SCNC: 4.1 MMOL/L (ref 3.5–5)
RBC # BLD: 3.37 E12/L (ref 3.5–5.5)
SODIUM BLD-SCNC: 137 MMOL/L (ref 132–146)
WBC # BLD: 6.7 E9/L (ref 4.5–11.5)

## 2018-10-30 PROCEDURE — 85025 COMPLETE CBC W/AUTO DIFF WBC: CPT

## 2018-10-30 PROCEDURE — 36592 COLLECT BLOOD FROM PICC: CPT

## 2018-10-30 PROCEDURE — 2580000003 HC RX 258: Performed by: STUDENT IN AN ORGANIZED HEALTH CARE EDUCATION/TRAINING PROGRAM

## 2018-10-30 PROCEDURE — 99024 POSTOP FOLLOW-UP VISIT: CPT | Performed by: SURGERY

## 2018-10-30 PROCEDURE — 36415 COLL VENOUS BLD VENIPUNCTURE: CPT

## 2018-10-30 PROCEDURE — 82962 GLUCOSE BLOOD TEST: CPT

## 2018-10-30 PROCEDURE — 80048 BASIC METABOLIC PNL TOTAL CA: CPT

## 2018-10-30 PROCEDURE — 6360000002 HC RX W HCPCS: Performed by: NURSE PRACTITIONER

## 2018-10-30 PROCEDURE — 6370000000 HC RX 637 (ALT 250 FOR IP): Performed by: STUDENT IN AN ORGANIZED HEALTH CARE EDUCATION/TRAINING PROGRAM

## 2018-10-30 PROCEDURE — 6370000000 HC RX 637 (ALT 250 FOR IP): Performed by: NURSE PRACTITIONER

## 2018-10-30 RX ORDER — CLOPIDOGREL BISULFATE 75 MG/1
75 TABLET ORAL DAILY
Status: DISCONTINUED | OUTPATIENT
Start: 2018-10-30 | End: 2018-10-30 | Stop reason: HOSPADM

## 2018-10-30 RX ORDER — OXYCODONE HYDROCHLORIDE AND ACETAMINOPHEN 5; 325 MG/1; MG/1
1 TABLET ORAL EVERY 6 HOURS PRN
Qty: 28 TABLET | Refills: 0 | Status: SHIPPED | OUTPATIENT
Start: 2018-10-30 | End: 2018-11-06

## 2018-10-30 RX ORDER — OXYCODONE HYDROCHLORIDE AND ACETAMINOPHEN 5; 325 MG/1; MG/1
1 TABLET ORAL EVERY 4 HOURS PRN
Qty: 20 TABLET | Refills: 0 | Status: SHIPPED | OUTPATIENT
Start: 2018-10-30 | End: 2018-10-30

## 2018-10-30 RX ADMIN — CLOPIDOGREL BISULFATE 75 MG: 75 TABLET ORAL at 09:06

## 2018-10-30 RX ADMIN — CEFAZOLIN SODIUM 1 G: 1 SOLUTION INTRAVENOUS at 04:15

## 2018-10-30 RX ADMIN — INSULIN LISPRO 4 UNITS: 100 INJECTION, SOLUTION INTRAVENOUS; SUBCUTANEOUS at 05:35

## 2018-10-30 RX ADMIN — Medication 10 ML: at 09:06

## 2018-10-30 RX ADMIN — INSULIN LISPRO 4 UNITS: 100 INJECTION, SOLUTION INTRAVENOUS; SUBCUTANEOUS at 08:17

## 2018-10-30 RX ADMIN — INSULIN LISPRO 4 UNITS: 100 INJECTION, SOLUTION INTRAVENOUS; SUBCUTANEOUS at 02:58

## 2018-10-30 RX ADMIN — ASPIRIN 325 MG: 325 TABLET, COATED ORAL at 09:06

## 2018-10-30 ASSESSMENT — PAIN SCALES - GENERAL
PAINLEVEL_OUTOF10: 0
PAINLEVEL_OUTOF10: 0

## 2018-10-30 NOTE — PROGRESS NOTES
Vascular Surgery Progress Note    Pt is being seen in f/u today regarding left carotid endarterectomy    Subjective:  Lily Jaimes is a 58 y.o. female status post left carotid endarterectomy. She denies any chest pain shortness of breath or discomfort. She denies any new right-sided left-sided weakness numbness or vision changes. She denies any current speech changes. Current Medications:    phenylephrine (SUSANNA-SYNEPHRINE) 50mg/250mL infusion Stopped (10/30/18 0300)    dextrose        sodium chloride flush, acetaminophen, oxyCODONE-acetaminophen **OR** oxyCODONE-acetaminophen, ondansetron, glucose, dextrose, glucagon (rDNA), dextrose    clopidogrel  75 mg Oral Daily    aspirin  325 mg Oral Daily    sodium chloride flush  10 mL Intravenous 2 times per day    insulin lispro  0-12 Units Subcutaneous Q4H        PHYSICAL EXAM:    /66   Pulse 79   Temp 97.2 °F (36.2 °C) (Temporal)   Resp 20   Ht 5' 7\" (1.702 m)   Wt 204 lb (92.5 kg)   SpO2 92%   BMI 31.95 kg/m²     Intake/Output Summary (Last 24 hours) at 10/30/18 0828  Last data filed at 10/30/18 0745   Gross per 24 hour   Intake             2780 ml   Output             1045 ml   Net             1735 ml          General: Alert and oriented answers questions appropriately no acute distress   Skin: Incision is clean dry and intact. There is no evidence of hematoma. No ecchymosis  HEENT: Normocephalic atraumatic trachea is midline. Refer to skin examination for incision site  CVS: Currently Regular rate and rhythm  Resp: No Labored breathing   Extremities: Moving upper and lower extremities symmetrically.   Neuro: No gross cranial nerve deficits other than some residual right-sided weakness    LABS:    Lab Results   Component Value Date    WBC 6.7 10/30/2018    HGB 11.0 (L) 10/30/2018    HCT 32.7 (L) 10/30/2018     (L) 10/30/2018    PROTIME 13.9 (H) 10/01/2018    INR 1.2 10/01/2018    APTT 33.7 10/01/2018    K 4.1 10/30/2018    BUN 14 10/30/2018 CREATININE 0.5 10/30/2018       RADIOLOGY:  No orders to display       ASSESSMENT/PLAN:   · Status post left carotid endarterectomy for critical left carotid artery disease. At this point she's doing well we'll control her blood sugar she's been a need to follow-up with her family doctor with regards to her blood sugars status/diabetes. She will remain on risk reduction therapy she can be restarted on all of her home medications including her antiplatelet. She will then follow up with us in 2 weeks for wound check with follow-up ultrasound examination in one month.         Electronically signed by Erik Plaza MD on 10/30/2018 at 8:28 AM

## 2018-10-30 NOTE — TELEPHONE ENCOUNTER
[facility] Trans[facility] Initial Follow Up Call    Outreach made within 2 business days of discharge: Yes    Patient: Maxime Jerry Patient : 1955   MRN: [id number]  Reason for Admission: Type 2 Diabetes    Discharge Date: 10/30/18       Spoke with: Patients omkar Lugo    Discharge department/facility: [facility]    TCM Interactive Patient Contact:  Was patient able to fill all prescriptions: Yes  Was patient instructed to bring all medications to the follow-up visit: Yes  Is patient taking all medications as directed in the discharge summary?  Yes  Does patient understand their discharge instructions: Yes  Does patient have questions or concerns that need addressed prior to 7-14 day follow up office visit: no    Scheduled appointment with PCP within 7-14 days    Follow Up  Future Appointments  Date Time Provider Erick Klein   2018 3:00 PM [facility]   2018 4:15 PM Rodrigo Dickson MD [facility]   2018 11:30 AM Lenny Johnson MD [facility]MED [facility]   2018 11:00 AM Tom Ying MD [facility] Neuro [facility]   2019 4:00 PM SEB US RM 1 SEBZ US SEB Radiolog   2019 2:40 PM Feroz Hdz MD [facility] ENDO [facility]       Destinee Cantu

## 2018-10-30 NOTE — CARE COORDINATION
SOCIAL WORK AND DISCHARGE PLANNING: Pt chose [facility]. Referral made. List offered, pt declined. Discharge home today.  Nadia Briceno 10/30/2018

## 2018-10-30 NOTE — PROGRESS NOTES
Patient up to commode. Voided clear yellow urine. Ambulated in minor ~200 ft. Tolerated well. Returned to chair. Call light within reach.

## 2018-10-31 DIAGNOSIS — J31.0 CHRONIC RHINITIS: ICD-10-CM

## 2018-10-31 RX ORDER — MONTELUKAST SODIUM 10 MG/1
TABLET ORAL
Qty: 90 TABLET | Refills: 2 | Status: SHIPPED | OUTPATIENT
Start: 2018-10-31

## 2018-10-31 RX ORDER — AMITRIPTYLINE HYDROCHLORIDE 25 MG/1
25 TABLET, FILM COATED ORAL NIGHTLY PRN
Qty: 90 TABLET | Refills: 2 | Status: SHIPPED | OUTPATIENT
Start: 2018-10-31 | End: 2019-07-06 | Stop reason: SDUPTHER

## 2018-11-06 ENCOUNTER — TELEPHONE (OUTPATIENT)
Dept: ENDOCRINOLOGY | Age: 63
End: 2018-11-06

## 2018-11-06 RX ORDER — VENLAFAXINE HYDROCHLORIDE 37.5 MG/1
CAPSULE, EXTENDED RELEASE ORAL
Qty: 90 CAPSULE | Refills: 2 | Status: SHIPPED | OUTPATIENT
Start: 2018-11-06

## 2018-11-07 ENCOUNTER — HOSPITAL ENCOUNTER (EMERGENCY)
Age: 63
Discharge: HOME | End: 2018-11-07
Attending: EMERGENCY MEDICINE
Payer: COMMERCIAL

## 2018-11-07 ENCOUNTER — APPOINTMENT (OUTPATIENT)
Dept: GENERAL RADIOLOGY | Age: 63
End: 2018-11-07
Payer: COMMERCIAL

## 2018-11-07 VITALS
BODY MASS INDEX: 32.02 KG/M2 | TEMPERATURE: 98.4 F | HEIGHT: 67 IN | DIASTOLIC BLOOD PRESSURE: 89 MMHG | HEART RATE: 98 BPM | OXYGEN SATURATION: 99 % | RESPIRATION RATE: 16 BRPM | SYSTOLIC BLOOD PRESSURE: 169 MMHG | WEIGHT: 204 LBS

## 2018-11-07 DIAGNOSIS — N39.0 URINARY TRACT INFECTION WITHOUT HEMATURIA, SITE UNSPECIFIED: Primary | ICD-10-CM

## 2018-11-07 LAB
ALBUMIN SERPL-MCNC: 2.9 G/DL (ref 3.5–5.2)
ALP BLD-CCNC: 189 U/L (ref 35–104)
ALT SERPL-CCNC: 31 U/L (ref 0–32)
ANION GAP SERPL CALCULATED.3IONS-SCNC: 11 MMOL/L (ref 7–16)
APTT: 34.6 SEC (ref 24.5–35.1)
AST SERPL-CCNC: 46 U/L (ref 0–31)
BACTERIA: ABNORMAL /HPF
BASOPHILS ABSOLUTE: 0.04 E9/L (ref 0–0.2)
BASOPHILS RELATIVE PERCENT: 0.7 % (ref 0–2)
BILIRUB SERPL-MCNC: 0.8 MG/DL (ref 0–1.2)
BILIRUBIN URINE: NEGATIVE
BLOOD, URINE: ABNORMAL
BUN BLDV-MCNC: 12 MG/DL (ref 8–23)
CALCIUM SERPL-MCNC: 9 MG/DL (ref 8.6–10.2)
CHLORIDE BLD-SCNC: 102 MMOL/L (ref 98–107)
CLARITY: CLEAR
CO2: 23 MMOL/L (ref 22–29)
COLOR: YELLOW
CREAT SERPL-MCNC: 1 MG/DL (ref 0.5–1)
EKG ATRIAL RATE: 96 BPM
EKG P AXIS: 62 DEGREES
EKG P-R INTERVAL: 144 MS
EKG Q-T INTERVAL: 378 MS
EKG QRS DURATION: 92 MS
EKG QTC CALCULATION (BAZETT): 477 MS
EKG R AXIS: 16 DEGREES
EKG T AXIS: 64 DEGREES
EKG VENTRICULAR RATE: 96 BPM
EOSINOPHILS ABSOLUTE: 0.14 E9/L (ref 0.05–0.5)
EOSINOPHILS RELATIVE PERCENT: 2.4 % (ref 0–6)
GFR AFRICAN AMERICAN: >60
GFR NON-AFRICAN AMERICAN: 56 ML/MIN/1.73
GLUCOSE BLD-MCNC: 280 MG/DL (ref 74–99)
GLUCOSE URINE: >=1000 MG/DL
HCT VFR BLD CALC: 36.9 % (ref 34–48)
HEMOGLOBIN: 12.2 G/DL (ref 11.5–15.5)
IMMATURE GRANULOCYTES #: 0.04 E9/L
IMMATURE GRANULOCYTES %: 0.7 % (ref 0–5)
INR BLD: 1.2
KETONES, URINE: NEGATIVE MG/DL
LACTIC ACID, SEPSIS: 3.1 MMOL/L (ref 0.5–1.9)
LEUKOCYTE ESTERASE, URINE: NEGATIVE
LYMPHOCYTES ABSOLUTE: 1.83 E9/L (ref 1.5–4)
LYMPHOCYTES RELATIVE PERCENT: 31 % (ref 20–42)
MCH RBC QN AUTO: 32.6 PG (ref 26–35)
MCHC RBC AUTO-ENTMCNC: 33.1 % (ref 32–34.5)
MCV RBC AUTO: 98.7 FL (ref 80–99.9)
MONOCYTES ABSOLUTE: 0.62 E9/L (ref 0.1–0.95)
MONOCYTES RELATIVE PERCENT: 10.5 % (ref 2–12)
NEUTROPHILS ABSOLUTE: 3.23 E9/L (ref 1.8–7.3)
NEUTROPHILS RELATIVE PERCENT: 54.7 % (ref 43–80)
NITRITE, URINE: POSITIVE
PDW BLD-RTO: 14.2 FL (ref 11.5–15)
PH UA: 6 (ref 5–9)
PLATELET # BLD: 165 E9/L (ref 130–450)
PMV BLD AUTO: 10.7 FL (ref 7–12)
POTASSIUM REFLEX MAGNESIUM: 4.3 MMOL/L (ref 3.5–5)
PROTEIN UA: NEGATIVE MG/DL
PROTHROMBIN TIME: 14.1 SEC (ref 9.3–12.4)
RBC # BLD: 3.74 E12/L (ref 3.5–5.5)
RBC UA: ABNORMAL /HPF (ref 0–2)
SODIUM BLD-SCNC: 136 MMOL/L (ref 132–146)
SPECIFIC GRAVITY UA: 1.02 (ref 1–1.03)
TOTAL PROTEIN: 7.5 G/DL (ref 6.4–8.3)
UROBILINOGEN, URINE: 2 E.U./DL
WBC # BLD: 5.9 E9/L (ref 4.5–11.5)
WBC UA: ABNORMAL /HPF (ref 0–5)

## 2018-11-07 PROCEDURE — 81001 URINALYSIS AUTO W/SCOPE: CPT

## 2018-11-07 PROCEDURE — 85730 THROMBOPLASTIN TIME PARTIAL: CPT

## 2018-11-07 PROCEDURE — 99284 EMERGENCY DEPT VISIT MOD MDM: CPT

## 2018-11-07 PROCEDURE — 6370000000 HC RX 637 (ALT 250 FOR IP): Performed by: EMERGENCY MEDICINE

## 2018-11-07 PROCEDURE — 36415 COLL VENOUS BLD VENIPUNCTURE: CPT

## 2018-11-07 PROCEDURE — 83605 ASSAY OF LACTIC ACID: CPT

## 2018-11-07 PROCEDURE — 85025 COMPLETE CBC W/AUTO DIFF WBC: CPT

## 2018-11-07 PROCEDURE — 87088 URINE BACTERIA CULTURE: CPT

## 2018-11-07 PROCEDURE — 85610 PROTHROMBIN TIME: CPT

## 2018-11-07 PROCEDURE — 87077 CULTURE AEROBIC IDENTIFY: CPT

## 2018-11-07 PROCEDURE — 87040 BLOOD CULTURE FOR BACTERIA: CPT

## 2018-11-07 PROCEDURE — 80053 COMPREHEN METABOLIC PANEL: CPT

## 2018-11-07 PROCEDURE — 87186 SC STD MICRODIL/AGAR DIL: CPT

## 2018-11-07 RX ORDER — CEPHALEXIN 500 MG/1
500 CAPSULE ORAL ONCE
Status: COMPLETED | OUTPATIENT
Start: 2018-11-07 | End: 2018-11-07

## 2018-11-07 RX ORDER — CEPHALEXIN 500 MG/1
500 CAPSULE ORAL 3 TIMES DAILY
Qty: 21 CAPSULE | Refills: 0 | Status: SHIPPED | OUTPATIENT
Start: 2018-11-07 | End: 2018-11-14

## 2018-11-07 RX ADMIN — CEPHALEXIN 500 MG: 500 CAPSULE ORAL at 21:52

## 2018-11-08 ENCOUNTER — OFFICE VISIT (OUTPATIENT)
Dept: FAMILY MEDICINE CLINIC | Age: 63
End: 2018-11-08
Payer: COMMERCIAL

## 2018-11-08 ENCOUNTER — HOSPITAL ENCOUNTER (OUTPATIENT)
Age: 63
Discharge: HOME | End: 2018-11-08
Payer: COMMERCIAL

## 2018-11-08 VITALS
HEART RATE: 106 BPM | BODY MASS INDEX: 32.65 KG/M2 | SYSTOLIC BLOOD PRESSURE: 100 MMHG | OXYGEN SATURATION: 97 % | WEIGHT: 208 LBS | TEMPERATURE: 98.2 F | RESPIRATION RATE: 16 BRPM | DIASTOLIC BLOOD PRESSURE: 55 MMHG | HEIGHT: 67 IN

## 2018-11-08 DIAGNOSIS — N30.00 ACUTE CYSTITIS WITHOUT HEMATURIA: ICD-10-CM

## 2018-11-08 DIAGNOSIS — Z09 HOSPITAL DISCHARGE FOLLOW-UP: Primary | ICD-10-CM

## 2018-11-08 DIAGNOSIS — K74.60 CIRRHOSIS OF LIVER WITH ASCITES, UNSPECIFIED HEPATIC CIRRHOSIS TYPE (HCC): Chronic | ICD-10-CM

## 2018-11-08 DIAGNOSIS — Z79.4 TYPE 2 DIABETES MELLITUS WITH DIABETIC POLYNEUROPATHY, WITH LONG-TERM CURRENT USE OF INSULIN (HCC): Chronic | ICD-10-CM

## 2018-11-08 DIAGNOSIS — I63.422 CEREBROVASCULAR ACCIDENT (CVA) DUE TO EMBOLISM OF LEFT ANTERIOR CEREBRAL ARTERY (HCC): ICD-10-CM

## 2018-11-08 DIAGNOSIS — R18.8 CIRRHOSIS OF LIVER WITH ASCITES, UNSPECIFIED HEPATIC CIRRHOSIS TYPE (HCC): Chronic | ICD-10-CM

## 2018-11-08 DIAGNOSIS — E11.42 TYPE 2 DIABETES MELLITUS WITH DIABETIC POLYNEUROPATHY, WITH LONG-TERM CURRENT USE OF INSULIN (HCC): Chronic | ICD-10-CM

## 2018-11-08 DIAGNOSIS — Z98.890 S/P CAROTID ENDARTERECTOMY: ICD-10-CM

## 2018-11-08 LAB
ALBUMIN SERPL-MCNC: 3 G/DL (ref 3.5–5.2)
ALP BLD-CCNC: 173 U/L (ref 35–104)
ALT SERPL-CCNC: 28 U/L (ref 0–32)
AST SERPL-CCNC: 37 U/L (ref 0–31)
BILIRUB SERPL-MCNC: 1 MG/DL (ref 0–1.2)
BILIRUBIN DIRECT: 0.4 MG/DL (ref 0–0.3)
BILIRUBIN, INDIRECT: 0.6 MG/DL (ref 0–1)
TOTAL PROTEIN: 7.4 G/DL (ref 6.4–8.3)

## 2018-11-08 PROCEDURE — 83883 ASSAY NEPHELOMETRY NOT SPEC: CPT

## 2018-11-08 PROCEDURE — 82172 ASSAY OF APOLIPOPROTEIN: CPT

## 2018-11-08 PROCEDURE — 80076 HEPATIC FUNCTION PANEL: CPT

## 2018-11-08 PROCEDURE — 82247 BILIRUBIN TOTAL: CPT

## 2018-11-08 PROCEDURE — 36415 COLL VENOUS BLD VENIPUNCTURE: CPT

## 2018-11-08 PROCEDURE — 84460 ALANINE AMINO (ALT) (SGPT): CPT

## 2018-11-08 PROCEDURE — 82977 ASSAY OF GGT: CPT

## 2018-11-08 PROCEDURE — 83010 ASSAY OF HAPTOGLOBIN QUANT: CPT

## 2018-11-08 PROCEDURE — 99495 TRANSJ CARE MGMT MOD F2F 14D: CPT | Performed by: FAMILY MEDICINE

## 2018-11-08 PROCEDURE — 1111F DSCHRG MED/CURRENT MED MERGE: CPT | Performed by: FAMILY MEDICINE

## 2018-11-08 PROCEDURE — 86706 HEP B SURFACE ANTIBODY: CPT

## 2018-11-08 PROCEDURE — 87340 HEPATITIS B SURFACE AG IA: CPT

## 2018-11-08 RX ORDER — LISINOPRIL 5 MG/1
TABLET ORAL
Qty: 30 TABLET | Refills: 2 | Status: SHIPPED | OUTPATIENT
Start: 2018-11-08 | End: 2019-10-18

## 2018-11-08 NOTE — PROGRESS NOTES
Negative for food allergies. Neurological: Negative for dizziness, light-headedness and headaches. Hematological: Does not bruise/bleed easily. Psychiatric/Behavioral: Negative for behavioral problems and sleep disturbance. Vitals:    11/08/18 1441   BP: (!) 100/55   Site: Left Upper Arm   Pulse: 106   Resp: 16   Temp: 98.2 °F (36.8 °C)   TempSrc: Tympanic   SpO2: 97%   Weight: 208 lb (94.3 kg)   Height: 5' 7\" (1.702 m)     Body mass index is 32.58 kg/m². Wt Readings from Last 3 Encounters:   11/08/18 208 lb (94.3 kg)   11/07/18 204 lb (92.5 kg)   10/29/18 204 lb (92.5 kg)     BP Readings from Last 3 Encounters:   11/08/18 (!) 100/55   11/07/18 (!) 169/89   10/30/18 (!) 101/59       Physical Exam   Constitutional: She is oriented to person, place, and time. She appears well-developed and well-nourished. HENT:   Head: Normocephalic and atraumatic. Right Ear: External ear normal.   Left Ear: External ear normal.   Nose: Nose normal.   Mouth/Throat: Oropharynx is clear and moist.   Eyes: Pupils are equal, round, and reactive to light. Conjunctivae and EOM are normal.   Neck: Normal range of motion. Neck supple. No thyromegaly present. Cardiovascular: Normal rate, regular rhythm and normal heart sounds. Exam reveals no gallop and no friction rub. No murmur heard. Pulmonary/Chest: Effort normal and breath sounds normal. She has no wheezes. Abdominal: Soft. She exhibits no mass. There is tenderness (Right upper quadrant). There is no rebound and no guarding. Musculoskeletal: Normal range of motion. She exhibits no edema or tenderness. Lymphadenopathy:     She has no cervical adenopathy. Neurological: She is alert and oriented to person, place, and time. She has normal reflexes. Skin: Skin is warm and dry. No rash noted. Left neck incision is clean dry and intact   Psychiatric: She has a normal mood and affect.  Her behavior is normal.   Nursing note and vitals

## 2018-11-08 NOTE — ED PROVIDER NOTES
aPTT 34.6 24.5 - 35.1 sec   Protime-INR   Result Value Ref Range    Protime 14.1 (H) 9.3 - 12.4 sec    INR 1.2    Microscopic Urinalysis   Result Value Ref Range    WBC, UA 10-20 (A) 0 - 5 /HPF    RBC, UA NONE 0 - 2 /HPF    Bacteria, UA MANY (A) /HPF   EKG 12 lead   Result Value Ref Range    Ventricular Rate 96 BPM    Atrial Rate 96 BPM    P-R Interval 144 ms    QRS Duration 92 ms    Q-T Interval 378 ms    QTc Calculation (Bazett) 477 ms    P Axis 62 degrees    R Axis 16 degrees    T Axis 64 degrees       RADIOLOGY:  Interpreted by Radiologist.  XR CHEST 1 VW    (Results Pending)       ------------------------- NURSING NOTES AND VITALS REVIEWED ---------------------------   The nursing notes within the ED encounter and vital signs as below have been reviewed. /82   Pulse 101   Temp 98.2 °F (36.8 °C) (Oral)   Resp 18   Ht 5' 7\" (1.702 m)   Wt 204 lb (92.5 kg)   SpO2 94%   BMI 31.95 kg/m²   Oxygen Saturation Interpretation: Normal      ---------------------------------------------------PHYSICAL EXAM--------------------------------------      Constitutional/General: Alert and oriented x3, well appearing, non toxic in NAD  Head: NC/AT  Eyes: PERRL, EOMI  Mouth: Oropharynx clear, handling secretions, no trismus  Neck: Supple, full ROM, no meningeal signs  Pulmonary: Lungs clear to auscultation bilaterally, no wheezes, rales, or rhonchi. Not in respiratory distress  Cardiovascular:  Regular rate and rhythm, no murmurs, gallops, or rubs. 2+ distal pulses  Abdomen: Soft, non tender, non distended,   Extremities: Moves all extremities x 4.  Warm and well perfused  Skin: warm and dry without rash  Neurologic: GCS 15,  Psych: Normal Affect      ------------------------------ ED COURSE/MEDICAL DECISION MAKING----------------------  Medications   cephALEXin (KEFLEX) capsule 500 mg (not administered)         Medical Decision Making:    Urine shows 10-20 white cells with positive nitrite therefore probable cause for her but patient is currently afebrile and feels fine therefore to be discharged with antibiotics for UTI    Counseling: The emergency provider has spoken with the patient and discussed todays results, in addition to providing specific details for the plan of care and counseling regarding the diagnosis and prognosis. Questions are answered at this time and they are agreeable with the plan.      --------------------------------- IMPRESSION AND DISPOSITION ---------------------------------    IMPRESSION  1.  Urinary tract infection without hematuria, site unspecified        DISPOSITION  Disposition: Discharge to home  Patient condition is stable                  Tristen Timmons MD  11/07/18 7436

## 2018-11-09 LAB
HBV SURFACE AB TITR SER: NORMAL {TITER}
HEPATITIS B SURFACE ANTIGEN INTERPRETATION: NORMAL
ORGANISM: ABNORMAL
URINE CULTURE, ROUTINE: ABNORMAL
URINE CULTURE, ROUTINE: ABNORMAL

## 2018-11-09 RX ORDER — SULFAMETHOXAZOLE AND TRIMETHOPRIM 800; 160 MG/1; MG/1
1 TABLET ORAL 2 TIMES DAILY
Qty: 6 TABLET | Refills: 0 | Status: SHIPPED | OUTPATIENT
Start: 2018-11-09 | End: 2018-11-12

## 2018-11-09 ASSESSMENT — ENCOUNTER SYMPTOMS
BACK PAIN: 1
SHORTNESS OF BREATH: 0
SORE THROAT: 0
SINUS PRESSURE: 0
ABDOMINAL PAIN: 1
EYE PAIN: 0
DIARRHEA: 0
CONSTIPATION: 0
COUGH: 0

## 2018-11-12 ENCOUNTER — OFFICE VISIT (OUTPATIENT)
Dept: PAIN MANAGEMENT | Age: 63
End: 2018-11-12
Payer: COMMERCIAL

## 2018-11-12 VITALS
WEIGHT: 208 LBS | HEIGHT: 67 IN | SYSTOLIC BLOOD PRESSURE: 132 MMHG | RESPIRATION RATE: 18 BRPM | OXYGEN SATURATION: 93 % | DIASTOLIC BLOOD PRESSURE: 68 MMHG | BODY MASS INDEX: 32.65 KG/M2 | HEART RATE: 101 BPM

## 2018-11-12 DIAGNOSIS — M16.0 PRIMARY OSTEOARTHRITIS OF BOTH HIPS: ICD-10-CM

## 2018-11-12 DIAGNOSIS — M19.012 PRIMARY OSTEOARTHRITIS OF BOTH SHOULDERS: ICD-10-CM

## 2018-11-12 DIAGNOSIS — M47.816 LUMBAR SPONDYLOSIS: ICD-10-CM

## 2018-11-12 DIAGNOSIS — M47.812 SPONDYLOSIS OF CERVICAL REGION WITHOUT MYELOPATHY OR RADICULOPATHY: Primary | ICD-10-CM

## 2018-11-12 DIAGNOSIS — M47.816 LUMBAR FACET ARTHROPATHY: ICD-10-CM

## 2018-11-12 DIAGNOSIS — M54.16 LUMBAR RADICULOPATHY: ICD-10-CM

## 2018-11-12 DIAGNOSIS — M19.011 PRIMARY OSTEOARTHRITIS OF BOTH SHOULDERS: ICD-10-CM

## 2018-11-12 DIAGNOSIS — G89.4 CHRONIC PAIN SYNDROME: ICD-10-CM

## 2018-11-12 DIAGNOSIS — M47.812 CERVICAL FACET JOINT SYNDROME: ICD-10-CM

## 2018-11-12 LAB — BLOOD CULTURE, ROUTINE: NORMAL

## 2018-11-12 PROCEDURE — G8598 ASA/ANTIPLAT THER USED: HCPCS | Performed by: PAIN MEDICINE

## 2018-11-12 PROCEDURE — 99213 OFFICE O/P EST LOW 20 MIN: CPT | Performed by: PAIN MEDICINE

## 2018-11-12 PROCEDURE — G8482 FLU IMMUNIZE ORDER/ADMIN: HCPCS | Performed by: PAIN MEDICINE

## 2018-11-12 PROCEDURE — G8427 DOCREV CUR MEDS BY ELIG CLIN: HCPCS | Performed by: PAIN MEDICINE

## 2018-11-12 PROCEDURE — G8417 CALC BMI ABV UP PARAM F/U: HCPCS | Performed by: PAIN MEDICINE

## 2018-11-12 PROCEDURE — 1111F DSCHRG MED/CURRENT MED MERGE: CPT | Performed by: PAIN MEDICINE

## 2018-11-12 PROCEDURE — 3017F COLORECTAL CA SCREEN DOC REV: CPT | Performed by: PAIN MEDICINE

## 2018-11-12 PROCEDURE — 1036F TOBACCO NON-USER: CPT | Performed by: PAIN MEDICINE

## 2018-11-12 RX ORDER — METFORMIN HYDROCHLORIDE 500 MG/1
TABLET, EXTENDED RELEASE ORAL
Qty: 30 TABLET | Refills: 3 | Status: SHIPPED | OUTPATIENT
Start: 2018-11-12 | End: 2019-02-19 | Stop reason: SDUPTHER

## 2018-11-12 NOTE — PROGRESS NOTES
Artis Asif presents to the [address] on 11/12/2018. Quentin Avendano is complaining of pain in . The pain is persistent in she laiding down and standing up she has pain . The pain is described as stabbing and burning. Pain is rated today at a 8 on the VAS scale. She took her last dose of oxycodone/acetaminophen (Percocet, Tylox)  on last pm.      She has been on anticoagulation medications to include ASA and plavix anaged by chance .       /68   Pulse 101   Resp 18   Ht 5' 7\" (1.702 m)   Wt 208 lb (94.3 kg)   SpO2 93%   BMI 32.58 kg/m²

## 2018-11-12 NOTE — PROGRESS NOTES
[facility], [address] [place] [address] Jovanni  [phone]    Follow up Note      Gio Ellis     Date of Visit:  11/12/2018    CC:  Patient presents for follow up   Chief Complaint   Patient presents with    Pain     lower back up her shoulders        HPI:  Recent surgery Left CEA  Pain is unchanged. Change in quality of symptoms:no. Medication side effects:not applicable . Recent diagnostic testing:bilateral hip xray. Recent interventional procedures:none. .    Alleviating factors include: ice. Aggravating factors include:  walking, standing, bending, lying down.      She has been on anticoagulation medications to include ASA and has not been on herbal supplements. She is diabetic.     Imaging:   Bilateral hip Xray 09/2018  Moderate bilateral osteoarthritis of the hips. Lumbar spine Xray 10/2018  Degenerative disc space narrowing and associated vertebral   hyperostotic flaring between L3 and S1, which could be definitively   characterized with MRI if there are no contraindications and further   imaging is clinically warranted.       Atherosclerotic arterial calcification and colonic fecal content   suggesting constipation are noted. Cervical spine Xray 10/2018  Cervical vertebrae show loss of disc spacing at the C4-5 level, and   mild disc space narrowing at the C5-7 levels. There is barely   perceptible anterolisthesis of C4 on C5, but facets and vertebrae   remain in relative axial alignment throughout. There is no evidence of   acute fracture, and no evidence of paraspinous the soft tissue   swelling.       There is spinous soft tissues show atherosclerotic carotid   calcification bilaterally. Pulmonary apical region show no acute   pathology.      Previous treatments: medications. .         Potential Aberrant Drug-Related Behavior:  No    Urine Drug Screening:   First office visit urine screen showed no narcotics    OAR[facility] report:  10/2018 consistent  11/2018 showed scripts for Percocet and Ultram, consistent with her Left CEA  Past Medical History:   Diagnosis Date    Cerebral artery occlusion with cerebral infarction ([facility] 09/2018    Diabetes mellitus ([facility]     GERD (gastroesophageal reflux disease)     Hyperlipidemia     Hypertension     Liver cirrhosis (HCC)     Polyp of esophagus     PONV (postoperative nausea and vomiting)     Prolonged emergence from general anesthesia     Thyroid disease     no meds     Past Surgical History:   Procedure Laterality Date    CHOLECYSTECTOMY      COLONOSCOPY      HYSTERECTOMY  2003    NE Evelyne Cassis INCIS Left 10/29/2018    LEFT CAROTID ENDARTERECTOMY performed by Kylah Stone MD at [address]       Prior to Admission medications    Medication Sig Start Date End Date Taking?  Authorizing Provider   metFORMIN (GLUCOPHAGE-XR) 500 MG extended release tablet TAKE 1 TABLET BY MOUTH EVERY DAY 11/12/18  Yes Feroz Kiser MD   sulfamethoxazole-trimethoprim (BACTRIM DS) 800-160 MG per tablet Take 1 tablet by mouth 2 times daily for 3 days 11/9/18 11/12/18 Yes Concepcion Lorenzana DO   lisinopril (PRINIVIL;ZESTRIL) 5 MG tablet Take 5 mg daily 11/8/18  Yes Concepcion Lorenzana DO   venlafaxine (EFFEXOR XR) 37.5 MG extended release capsule TAKE ONE CAPSULE BY MOUTH EVERY DAY 11/6/18  Yes Concepcion Lorenzana DO   insulin aspart (NOVOLOG FLEXPEN) 100 UNIT/ML injection pen Use 18 before breakfast,16 before lunch and 20 units before dinner 11/2/18  Yes Annie Betancourt DO   montelukast (SINGULAIR) 10 MG tablet TAKE 1 TABLET BY MOUTH EVERY DAY AT NIGHT 10/31/18  Yes Annie Betancourt DO   amitriptyline (ELAVIL) 25 MG tablet Take 1 tablet by mouth nightly as needed for Sleep 10/31/18  Yes Annie Betancourt DO   pantoprazole (PROTONIX) 40 MG tablet Take 40 mg by mouth daily Take morning of surgery with a sip of water   Yes Historical Provider, MD

## 2018-11-13 ENCOUNTER — TELEPHONE (OUTPATIENT)
Dept: FAMILY MEDICINE CLINIC | Age: 63
End: 2018-11-13

## 2018-11-15 LAB
Lab: NORMAL
REPORT: NORMAL
THIS TEST SENT TO: NORMAL

## 2018-11-20 ENCOUNTER — TELEPHONE (OUTPATIENT)
Dept: ENDOCRINOLOGY | Age: 63
End: 2018-11-20

## 2018-11-27 DIAGNOSIS — E05.90 HYPERTHYROIDISM: Primary | ICD-10-CM

## 2018-11-27 RX ORDER — METHIMAZOLE 5 MG/1
TABLET ORAL
Qty: 36 TABLET | Refills: 4 | OUTPATIENT
Start: 2018-11-27

## 2018-11-27 NOTE — TELEPHONE ENCOUNTER
Endo staff,  Please confirm that p currently not taking Methimazole.  We stopped it on 10/18/2018     I want her to do thyroid hormones level in few days

## 2018-11-28 ENCOUNTER — OFFICE VISIT (OUTPATIENT)
Dept: VASCULAR SURGERY | Age: 63
End: 2018-11-28

## 2018-11-28 ENCOUNTER — TELEPHONE (OUTPATIENT)
Dept: ENDOCRINOLOGY | Age: 63
End: 2018-11-28

## 2018-11-28 ENCOUNTER — HOSPITAL ENCOUNTER (OUTPATIENT)
Age: 63
Discharge: HOME | End: 2018-11-28
Payer: COMMERCIAL

## 2018-11-28 VITALS — SYSTOLIC BLOOD PRESSURE: 122 MMHG | DIASTOLIC BLOOD PRESSURE: 80 MMHG | HEART RATE: 76 BPM

## 2018-11-28 DIAGNOSIS — E03.9 HYPOTHYROIDISM, UNSPECIFIED TYPE: Primary | ICD-10-CM

## 2018-11-28 DIAGNOSIS — I65.22 LEFT CAROTID STENOSIS: ICD-10-CM

## 2018-11-28 DIAGNOSIS — E05.90 HYPERTHYROIDISM: ICD-10-CM

## 2018-11-28 DIAGNOSIS — I63.422 CEREBROVASCULAR ACCIDENT (CVA) DUE TO EMBOLISM OF LEFT ANTERIOR CEREBRAL ARTERY (HCC): ICD-10-CM

## 2018-11-28 LAB
T4 FREE: 1.01 NG/DL (ref 0.93–1.7)
TSH SERPL DL<=0.05 MIU/L-ACNC: 4.73 UIU/ML (ref 0.27–4.2)

## 2018-11-28 PROCEDURE — 84443 ASSAY THYROID STIM HORMONE: CPT

## 2018-11-28 PROCEDURE — 36415 COLL VENOUS BLD VENIPUNCTURE: CPT

## 2018-11-28 PROCEDURE — 84439 ASSAY OF FREE THYROXINE: CPT

## 2018-11-28 PROCEDURE — 99024 POSTOP FOLLOW-UP VISIT: CPT | Performed by: SURGERY

## 2018-12-02 LAB
AVERAGE GLUCOSE: NORMAL
HBA1C MFR BLD: 8.5 %

## 2018-12-03 ENCOUNTER — HOSPITAL ENCOUNTER (OUTPATIENT)
Dept: CARDIOLOGY | Age: 63
Discharge: HOME | End: 2018-12-03
Payer: COMMERCIAL

## 2018-12-03 ENCOUNTER — TELEPHONE (OUTPATIENT)
Dept: FAMILY MEDICINE CLINIC | Age: 63
End: 2018-12-03

## 2018-12-03 DIAGNOSIS — I63.422 CEREBROVASCULAR ACCIDENT (CVA) DUE TO EMBOLISM OF LEFT ANTERIOR CEREBRAL ARTERY (HCC): ICD-10-CM

## 2018-12-03 DIAGNOSIS — I65.22 LEFT CAROTID STENOSIS: ICD-10-CM

## 2018-12-03 PROCEDURE — 93880 EXTRACRANIAL BILAT STUDY: CPT

## 2018-12-04 RX ORDER — FUROSEMIDE 20 MG/1
10 TABLET ORAL DAILY
Qty: 30 TABLET | Refills: 3 | Status: SHIPPED | OUTPATIENT
Start: 2018-12-04 | End: 2019-02-18 | Stop reason: SDUPTHER

## 2018-12-04 RX ORDER — POTASSIUM CHLORIDE 750 MG/1
10 TABLET, EXTENDED RELEASE ORAL DAILY
Qty: 30 TABLET | Refills: 1 | Status: SHIPPED | OUTPATIENT
Start: 2018-12-04 | End: 2019-01-22 | Stop reason: SDUPTHER

## 2018-12-06 ENCOUNTER — TELEPHONE (OUTPATIENT)
Dept: VASCULAR SURGERY | Age: 63
End: 2018-12-06

## 2018-12-07 ENCOUNTER — TELEPHONE (OUTPATIENT)
Dept: ENDOCRINOLOGY | Age: 63
End: 2018-12-07

## 2018-12-07 NOTE — TELEPHONE ENCOUNTER
BS still high, I don;t think she saw diabetes educator recently.  I want her to see our diabetes educators for more teaching on diabetic diet    Also, I want her to change insulin regimen as below  · Increase Toujeo from 75 to 84 HS   · Increase humalog before meals from 25/25/30 to 30/30/35  · Continue same sliding scale

## 2018-12-10 ENCOUNTER — TELEPHONE (OUTPATIENT)
Dept: VASCULAR SURGERY | Age: 63
End: 2018-12-10

## 2018-12-11 ENCOUNTER — OFFICE VISIT (OUTPATIENT)
Dept: PAIN MANAGEMENT | Age: 63
End: 2018-12-11
Payer: COMMERCIAL

## 2018-12-11 ENCOUNTER — HOSPITAL ENCOUNTER (OUTPATIENT)
Dept: DIABETES SERVICES | Age: 63
Setting detail: THERAPIES SERIES
Discharge: HOME | End: 2018-12-11
Payer: COMMERCIAL

## 2018-12-11 VITALS
SYSTOLIC BLOOD PRESSURE: 126 MMHG | RESPIRATION RATE: 18 BRPM | HEART RATE: 106 BPM | DIASTOLIC BLOOD PRESSURE: 68 MMHG | TEMPERATURE: 97.9 F

## 2018-12-11 DIAGNOSIS — M19.011 PRIMARY OSTEOARTHRITIS OF BOTH SHOULDERS: ICD-10-CM

## 2018-12-11 DIAGNOSIS — G89.4 CHRONIC PAIN SYNDROME: ICD-10-CM

## 2018-12-11 DIAGNOSIS — M19.012 PRIMARY OSTEOARTHRITIS OF BOTH SHOULDERS: ICD-10-CM

## 2018-12-11 DIAGNOSIS — M47.816 LUMBAR SPONDYLOSIS: ICD-10-CM

## 2018-12-11 DIAGNOSIS — M47.816 LUMBAR FACET ARTHROPATHY: ICD-10-CM

## 2018-12-11 DIAGNOSIS — M16.0 PRIMARY OSTEOARTHRITIS OF BOTH HIPS: ICD-10-CM

## 2018-12-11 DIAGNOSIS — M47.812 CERVICAL FACET JOINT SYNDROME: ICD-10-CM

## 2018-12-11 DIAGNOSIS — M47.812 SPONDYLOSIS OF CERVICAL REGION WITHOUT MYELOPATHY OR RADICULOPATHY: ICD-10-CM

## 2018-12-11 DIAGNOSIS — M54.16 LUMBAR RADICULOPATHY: Primary | ICD-10-CM

## 2018-12-11 PROCEDURE — G8482 FLU IMMUNIZE ORDER/ADMIN: HCPCS | Performed by: PAIN MEDICINE

## 2018-12-11 PROCEDURE — G8598 ASA/ANTIPLAT THER USED: HCPCS | Performed by: PAIN MEDICINE

## 2018-12-11 PROCEDURE — 3017F COLORECTAL CA SCREEN DOC REV: CPT | Performed by: PAIN MEDICINE

## 2018-12-11 PROCEDURE — 1036F TOBACCO NON-USER: CPT | Performed by: PAIN MEDICINE

## 2018-12-11 PROCEDURE — 99214 OFFICE O/P EST MOD 30 MIN: CPT | Performed by: PAIN MEDICINE

## 2018-12-11 PROCEDURE — G0109 DIAB MANAGE TRN IND/GROUP: HCPCS | Performed by: DIETITIAN, REGISTERED

## 2018-12-11 PROCEDURE — G8427 DOCREV CUR MEDS BY ELIG CLIN: HCPCS | Performed by: PAIN MEDICINE

## 2018-12-11 PROCEDURE — G8417 CALC BMI ABV UP PARAM F/U: HCPCS | Performed by: PAIN MEDICINE

## 2018-12-11 RX ORDER — OXYCODONE HYDROCHLORIDE AND ACETAMINOPHEN 5; 325 MG/1; MG/1
0.5 TABLET ORAL DAILY PRN
Qty: 15 TABLET | Refills: 0 | Status: SHIPPED | OUTPATIENT
Start: 2018-12-11 | End: 2019-01-10

## 2018-12-11 ASSESSMENT — PATIENT HEALTH QUESTIONNAIRE - PHQ9: SUM OF ALL RESPONSES TO PHQ QUESTIONS 1-9: 16

## 2018-12-11 NOTE — PROGRESS NOTES
Trent Baeza presents to the [facility] on 12/11/2018. Royer Garden is complaining of pain in the lower back and into left hip . The pain is constant. The pain is described as aching and weak. Pain is rated today at a 6 on the VAS scale. She is not on opioids at this time. Medication Contracts: Existing medication contract.     She has been on anticoagulation medications to include ASA and Plavix and is managed by Hermelindo Paulino MD        /68   Pulse 106   Temp 97.9 °F (36.6 °C)   Resp 18

## 2018-12-12 ENCOUNTER — TELEPHONE (OUTPATIENT)
Dept: VASCULAR SURGERY | Age: 63
End: 2018-12-12

## 2018-12-12 ENCOUNTER — HOSPITAL ENCOUNTER (OUTPATIENT)
Dept: DIABETES SERVICES | Age: 63
Setting detail: THERAPIES SERIES
Discharge: HOME | End: 2018-12-12
Payer: COMMERCIAL

## 2018-12-12 PROCEDURE — 97804 MEDICAL NUTRITION GROUP: CPT

## 2018-12-12 PROCEDURE — G0109 DIAB MANAGE TRN IND/GROUP: HCPCS

## 2018-12-12 RX ORDER — CLOPIDOGREL BISULFATE 75 MG/1
75 TABLET ORAL DAILY
Qty: 30 TABLET | Refills: 3 | Status: SHIPPED | OUTPATIENT
Start: 2018-12-12 | End: 2019-04-03 | Stop reason: ALTCHOICE

## 2018-12-13 ENCOUNTER — HOSPITAL ENCOUNTER (OUTPATIENT)
Dept: DIABETES SERVICES | Age: 63
Setting detail: THERAPIES SERIES
Discharge: HOME | End: 2018-12-13
Payer: COMMERCIAL

## 2018-12-13 PROCEDURE — 97804 MEDICAL NUTRITION GROUP: CPT

## 2018-12-13 NOTE — PROGRESS NOTES
Diabetes Self-Management Education Record    Participant Name: Wendie Bartlett  Referring Provider: Jimenez Spearite,   Assessment/Evaluation Ratings:  1=Needs Instruction   4=Demonstrates Understanding/Competency  2=Needs Review   NC=Not Covered    3=Comprehends Key Points  N/A=Not Applicable  Topics/Learning Objectives Pre-session Assess Date:   Instr. Date Reinforce Date Post- session Eval Comments   Diabetes disease process & Treatment process: Define diabetes & pre-diabetes; Identify own type of diabetes; role of the pancreas; signs/symptoms; diagnostic criteria; prevention & treatment options; contributing factors. Incorporating nutritional management into lifestyle: Describe effect of type, amount & timing of food on blood glucose; Describe basic meal planning techniques & current nutrition guidelines;Correctly read food labels & demonstrate CHO counting & portion control with personalized meal plan. Identify dining out strategies, & dietary sick day guidelines. Incorporating physical activity into lifestyle:   Verbalize effect of exercise on blood glucose levels; benefits of regular exercise; safety considerations; contraindications; maintenance of activity. Using medications safely:  Identify effects of diabetes medicines on blood glucose levels; List diabetes medication taken, action & side effects; appropriate injection sites; proper storage; supplies needed; proper technique; safe needle disposal guidelines. Monitoring blood glucose, interpreting and using results:  Identify recommended & personal blood glucose targets; importance of testing; testing supplies; HgbA1C target levels; Factors affecting blood glucose; Importance of logging blood glucose levels for pattern recognition; ketone testing; safe lancet disposal.        Prevention, detection & treatment of acute complications:  Identify symptoms of hyper & hypoglycemia, and prevention & treatment strategies.  Describe sick

## 2018-12-13 NOTE — PROGRESS NOTES
Individual MNT         Gestational MNT         Shared Med Appt         Yearly Follow-up        Meter Instrx        Insulin Instrx      []Pen  []Vial & Syringe      Additional Comments:  12/11/18  Cris Oro attended session one with her son. Quiet but attentive to class materials.  [address]    DSMS Support Plan:  Follow-up plan/Date: 4/2019    Contact Post Class Regarding:   Fasting Blood Sugar   HgbA1C   Weight   Hypertension/Follow-up with Physician   Self-Foot Exam Frequency   Monitoring Frequency   Exercise Routine   Goal Attainment  Post Education Referrals:      []WIC   []PAP   []Wound Care   []Social Service   [] [facility] Specialist   [][facility]   []Sleep Lab   []Other

## 2018-12-13 NOTE — LETTER
[address] [facility] DIABETIC ED  [address] [place] [id number]  Dept: [phone]  Dept Fax: [phone]: [phone] Marshall Gisselle, Wilmar Barreto,    12/13/2018      Agus Huggins  [phone]      Dear Shelia Levin records show that you are due or overdue for a colon cancer screening. Colorectal cancer is the second leading cause of cancer-related death in the [facility]. The good news is that this disease can be prevented. Colon Cancer screening can detect precancerous polyps that can be removed with easy procedures. The [place] Preventative Services Task Force tells us that appropriately scheduled colon cancer screening reduces death from colorectal cancer, and strongly suggests screening for men and women ages 48 and older. I recommend that you elect one of the following two approved options:         1) A test that finds polyps and cancer - colonoscopy       2) A test that primarily finds cancer - yearly stool testing, with a colonoscopy for                    positive tests showing presence of blood in the bowels (FOBT or FIT)    The colonoscopy is preferable as it is able to identify and treat both pre-cancerous polyps and early forms of colon cancer. Please call our office so we can assist you in scheduling your colonoscopy. We look forward to hearing from you. If you already have had this done, we praise your attention to your health. Please call our office so we can update your records. Please make sure to let us know who performed your test and where it was done. We also welcome you to reach out to us online using [employer]. Ask us how!     Sincerely,    Laurel Mcmahon, DO    www.cdc.gov

## 2018-12-18 ENCOUNTER — TELEPHONE (OUTPATIENT)
Dept: ENDOCRINOLOGY | Age: 63
End: 2018-12-18

## 2018-12-21 ENCOUNTER — OFFICE VISIT (OUTPATIENT)
Dept: NEUROLOGY | Age: 63
End: 2018-12-21
Payer: COMMERCIAL

## 2018-12-21 VITALS
WEIGHT: 207 LBS | SYSTOLIC BLOOD PRESSURE: 100 MMHG | HEART RATE: 100 BPM | DIASTOLIC BLOOD PRESSURE: 60 MMHG | BODY MASS INDEX: 32.49 KG/M2 | HEIGHT: 67 IN | RESPIRATION RATE: 12 BRPM | OXYGEN SATURATION: 94 %

## 2018-12-21 DIAGNOSIS — G45.9 TIA (TRANSIENT ISCHEMIC ATTACK): Primary | ICD-10-CM

## 2018-12-21 DIAGNOSIS — I65.22 LEFT CAROTID ARTERY STENOSIS: ICD-10-CM

## 2018-12-21 DIAGNOSIS — I65.23 ATHEROSCLEROSIS OF BOTH CAROTID ARTERIES: ICD-10-CM

## 2018-12-21 PROCEDURE — G8482 FLU IMMUNIZE ORDER/ADMIN: HCPCS | Performed by: PSYCHIATRY & NEUROLOGY

## 2018-12-21 PROCEDURE — G8598 ASA/ANTIPLAT THER USED: HCPCS | Performed by: PSYCHIATRY & NEUROLOGY

## 2018-12-21 PROCEDURE — G8427 DOCREV CUR MEDS BY ELIG CLIN: HCPCS | Performed by: PSYCHIATRY & NEUROLOGY

## 2018-12-21 PROCEDURE — 99214 OFFICE O/P EST MOD 30 MIN: CPT | Performed by: PSYCHIATRY & NEUROLOGY

## 2018-12-21 PROCEDURE — 1036F TOBACCO NON-USER: CPT | Performed by: PSYCHIATRY & NEUROLOGY

## 2018-12-21 PROCEDURE — G8417 CALC BMI ABV UP PARAM F/U: HCPCS | Performed by: PSYCHIATRY & NEUROLOGY

## 2018-12-21 PROCEDURE — 3017F COLORECTAL CA SCREEN DOC REV: CPT | Performed by: PSYCHIATRY & NEUROLOGY

## 2018-12-21 RX ORDER — LACTULOSE 20 G/30ML
SOLUTION ORAL
Status: ON HOLD | COMMUNITY
End: 2019-01-14 | Stop reason: HOSPADM

## 2018-12-21 ASSESSMENT — ENCOUNTER SYMPTOMS
RESPIRATORY NEGATIVE: 1
GASTROINTESTINAL NEGATIVE: 1
BACK PAIN: 1
EYES NEGATIVE: 1

## 2018-12-26 RX ORDER — ATORVASTATIN CALCIUM 40 MG/1
40 TABLET, FILM COATED ORAL NIGHTLY
Qty: 30 TABLET | Refills: 3 | Status: ON HOLD
Start: 2018-12-26 | End: 2020-04-17 | Stop reason: HOSPADM

## 2019-01-08 ENCOUNTER — TELEPHONE (OUTPATIENT)
Dept: ENDOCRINOLOGY | Age: 64
End: 2019-01-08

## 2019-01-11 ENCOUNTER — HOSPITAL ENCOUNTER (INPATIENT)
Age: 64
LOS: 3 days | Discharge: HOME | DRG: 392 | End: 2019-01-14
Attending: EMERGENCY MEDICINE | Admitting: INTERNAL MEDICINE
Payer: COMMERCIAL

## 2019-01-11 ENCOUNTER — APPOINTMENT (OUTPATIENT)
Dept: CT IMAGING | Age: 64
DRG: 392 | End: 2019-01-11
Payer: COMMERCIAL

## 2019-01-11 DIAGNOSIS — R79.89 ELEVATED LACTIC ACID LEVEL: ICD-10-CM

## 2019-01-11 DIAGNOSIS — K57.32 DIVERTICULITIS OF COLON: Primary | ICD-10-CM

## 2019-01-11 PROBLEM — Z86.73 HISTORY OF COMPLETED STROKE: Chronic | Status: ACTIVE | Noted: 2019-01-11

## 2019-01-11 PROBLEM — K57.92 ACUTE DIVERTICULITIS: Status: ACTIVE | Noted: 2019-01-11

## 2019-01-11 LAB
ALBUMIN SERPL-MCNC: 3.4 G/DL (ref 3.5–5.2)
ALP BLD-CCNC: 132 U/L (ref 35–104)
ALT SERPL-CCNC: 26 U/L (ref 0–32)
ANION GAP SERPL CALCULATED.3IONS-SCNC: 13 MMOL/L (ref 7–16)
APTT: 32.9 SEC (ref 24.5–35.1)
AST SERPL-CCNC: 64 U/L (ref 0–31)
BACTERIA: ABNORMAL /HPF
BASOPHILS ABSOLUTE: 0.08 E9/L (ref 0–0.2)
BASOPHILS RELATIVE PERCENT: 1 % (ref 0–2)
BILIRUB SERPL-MCNC: 1.5 MG/DL (ref 0–1.2)
BILIRUBIN URINE: NEGATIVE
BLOOD, URINE: ABNORMAL
BUN BLDV-MCNC: 8 MG/DL (ref 8–23)
CALCIUM SERPL-MCNC: 8.9 MG/DL (ref 8.6–10.2)
CHLORIDE BLD-SCNC: 100 MMOL/L (ref 98–107)
CLARITY: CLEAR
CO2: 18 MMOL/L (ref 22–29)
COLOR: YELLOW
CREAT SERPL-MCNC: 0.5 MG/DL (ref 0.5–1)
EOSINOPHILS ABSOLUTE: 0.05 E9/L (ref 0.05–0.5)
EOSINOPHILS RELATIVE PERCENT: 0.6 % (ref 0–6)
EPITHELIAL CELLS, UA: ABNORMAL /HPF
GFR AFRICAN AMERICAN: >60
GFR NON-AFRICAN AMERICAN: >60 ML/MIN/1.73
GLUCOSE BLD-MCNC: 265 MG/DL (ref 74–99)
GLUCOSE URINE: >=1000 MG/DL
HCT VFR BLD CALC: 41.7 % (ref 34–48)
HEMOGLOBIN: 13.8 G/DL (ref 11.5–15.5)
IMMATURE GRANULOCYTES #: 0.03 E9/L
IMMATURE GRANULOCYTES %: 0.4 % (ref 0–5)
INR BLD: 1.4
KETONES, URINE: NEGATIVE MG/DL
LACTIC ACID: 2.8 MMOL/L (ref 0.5–2.2)
LACTIC ACID: 5.7 MMOL/L (ref 0.5–2.2)
LEUKOCYTE ESTERASE, URINE: NEGATIVE
LIPASE: 40 U/L (ref 13–60)
LYMPHOCYTES ABSOLUTE: 2.06 E9/L (ref 1.5–4)
LYMPHOCYTES RELATIVE PERCENT: 24.9 % (ref 20–42)
MCH RBC QN AUTO: 31.4 PG (ref 26–35)
MCHC RBC AUTO-ENTMCNC: 33.1 % (ref 32–34.5)
MCV RBC AUTO: 94.8 FL (ref 80–99.9)
METER GLUCOSE: 158 MG/DL (ref 74–99)
MONOCYTES ABSOLUTE: 0.51 E9/L (ref 0.1–0.95)
MONOCYTES RELATIVE PERCENT: 6.2 % (ref 2–12)
MUCUS: PRESENT
NEUTROPHILS ABSOLUTE: 5.53 E9/L (ref 1.8–7.3)
NEUTROPHILS RELATIVE PERCENT: 66.9 % (ref 43–80)
NITRITE, URINE: NEGATIVE
PDW BLD-RTO: 13.9 FL (ref 11.5–15)
PH UA: 6 (ref 5–9)
PLATELET # BLD: 203 E9/L (ref 130–450)
PMV BLD AUTO: 10.5 FL (ref 7–12)
POTASSIUM REFLEX MAGNESIUM: 4.9 MMOL/L (ref 3.5–5)
PROTEIN UA: NEGATIVE MG/DL
PROTHROMBIN TIME: 15.4 SEC (ref 9.3–12.4)
RBC # BLD: 4.4 E12/L (ref 3.5–5.5)
RBC UA: ABNORMAL /HPF (ref 0–2)
RENAL EPITHELIAL, UA: ABNORMAL /HPF
SODIUM BLD-SCNC: 131 MMOL/L (ref 132–146)
SPECIFIC GRAVITY UA: >=1.03 (ref 1–1.03)
TOTAL PROTEIN: 8.4 G/DL (ref 6.4–8.3)
UROBILINOGEN, URINE: 1 E.U./DL
WBC # BLD: 8.3 E9/L (ref 4.5–11.5)
WBC UA: ABNORMAL /HPF (ref 0–5)

## 2019-01-11 PROCEDURE — 6360000004 HC RX CONTRAST MEDICATION: Performed by: RADIOLOGY

## 2019-01-11 PROCEDURE — 6370000000 HC RX 637 (ALT 250 FOR IP): Performed by: INTERNAL MEDICINE

## 2019-01-11 PROCEDURE — 85025 COMPLETE CBC W/AUTO DIFF WBC: CPT

## 2019-01-11 PROCEDURE — 85730 THROMBOPLASTIN TIME PARTIAL: CPT

## 2019-01-11 PROCEDURE — 87040 BLOOD CULTURE FOR BACTERIA: CPT

## 2019-01-11 PROCEDURE — 83605 ASSAY OF LACTIC ACID: CPT

## 2019-01-11 PROCEDURE — C9113 INJ PANTOPRAZOLE SODIUM, VIA: HCPCS | Performed by: EMERGENCY MEDICINE

## 2019-01-11 PROCEDURE — 2580000003 HC RX 258: Performed by: EMERGENCY MEDICINE

## 2019-01-11 PROCEDURE — 2060000000 HC ICU INTERMEDIATE R&B

## 2019-01-11 PROCEDURE — 96375 TX/PRO/DX INJ NEW DRUG ADDON: CPT

## 2019-01-11 PROCEDURE — 74177 CT ABD & PELVIS W/CONTRAST: CPT

## 2019-01-11 PROCEDURE — 85610 PROTHROMBIN TIME: CPT

## 2019-01-11 PROCEDURE — 2580000003 HC RX 258: Performed by: RADIOLOGY

## 2019-01-11 PROCEDURE — 96374 THER/PROPH/DIAG INJ IV PUSH: CPT

## 2019-01-11 PROCEDURE — 81001 URINALYSIS AUTO W/SCOPE: CPT

## 2019-01-11 PROCEDURE — 6360000002 HC RX W HCPCS: Performed by: EMERGENCY MEDICINE

## 2019-01-11 PROCEDURE — 2580000003 HC RX 258: Performed by: INTERNAL MEDICINE

## 2019-01-11 PROCEDURE — 96361 HYDRATE IV INFUSION ADD-ON: CPT

## 2019-01-11 PROCEDURE — 80053 COMPREHEN METABOLIC PANEL: CPT

## 2019-01-11 PROCEDURE — 83690 ASSAY OF LIPASE: CPT

## 2019-01-11 PROCEDURE — 36415 COLL VENOUS BLD VENIPUNCTURE: CPT

## 2019-01-11 PROCEDURE — 99285 EMERGENCY DEPT VISIT HI MDM: CPT

## 2019-01-11 PROCEDURE — 82962 GLUCOSE BLOOD TEST: CPT

## 2019-01-11 PROCEDURE — 2500000003 HC RX 250 WO HCPCS: Performed by: INTERNAL MEDICINE

## 2019-01-11 RX ORDER — CIPROFLOXACIN 2 MG/ML
400 INJECTION, SOLUTION INTRAVENOUS EVERY 12 HOURS
Status: DISCONTINUED | OUTPATIENT
Start: 2019-01-11 | End: 2019-01-13

## 2019-01-11 RX ORDER — PANTOPRAZOLE SODIUM 40 MG/10ML
40 INJECTION, POWDER, LYOPHILIZED, FOR SOLUTION INTRAVENOUS DAILY
Status: DISCONTINUED | OUTPATIENT
Start: 2019-01-12 | End: 2019-01-13

## 2019-01-11 RX ORDER — ACETAMINOPHEN 325 MG/1
650 TABLET ORAL EVERY 4 HOURS PRN
Status: DISCONTINUED | OUTPATIENT
Start: 2019-01-11 | End: 2019-01-14 | Stop reason: HOSPADM

## 2019-01-11 RX ORDER — FENTANYL CITRATE 50 UG/ML
80 INJECTION, SOLUTION INTRAMUSCULAR; INTRAVENOUS ONCE
Status: COMPLETED | OUTPATIENT
Start: 2019-01-11 | End: 2019-01-11

## 2019-01-11 RX ORDER — LACTULOSE 10 G/15ML
30 SOLUTION ORAL DAILY
Status: DISCONTINUED | OUTPATIENT
Start: 2019-01-12 | End: 2019-01-13

## 2019-01-11 RX ORDER — VENLAFAXINE HYDROCHLORIDE 37.5 MG/1
37.5 CAPSULE, EXTENDED RELEASE ORAL DAILY
Status: DISCONTINUED | OUTPATIENT
Start: 2019-01-12 | End: 2019-01-14 | Stop reason: HOSPADM

## 2019-01-11 RX ORDER — PANTOPRAZOLE SODIUM 40 MG/10ML
80 INJECTION, POWDER, LYOPHILIZED, FOR SOLUTION INTRAVENOUS ONCE
Status: COMPLETED | OUTPATIENT
Start: 2019-01-11 | End: 2019-01-11

## 2019-01-11 RX ORDER — SODIUM CHLORIDE 0.9 % (FLUSH) 0.9 %
10 SYRINGE (ML) INJECTION EVERY 12 HOURS SCHEDULED
Status: DISCONTINUED | OUTPATIENT
Start: 2019-01-11 | End: 2019-01-14 | Stop reason: HOSPADM

## 2019-01-11 RX ORDER — SODIUM CHLORIDE 0.9 % (FLUSH) 0.9 %
10 SYRINGE (ML) INJECTION PRN
Status: DISCONTINUED | OUTPATIENT
Start: 2019-01-11 | End: 2019-01-11

## 2019-01-11 RX ORDER — INSULIN GLARGINE 100 [IU]/ML
30 INJECTION, SOLUTION SUBCUTANEOUS NIGHTLY
Status: DISCONTINUED | OUTPATIENT
Start: 2019-01-11 | End: 2019-01-14 | Stop reason: HOSPADM

## 2019-01-11 RX ORDER — DEXTROSE MONOHYDRATE 50 MG/ML
100 INJECTION, SOLUTION INTRAVENOUS PRN
Status: DISCONTINUED | OUTPATIENT
Start: 2019-01-11 | End: 2019-01-14 | Stop reason: HOSPADM

## 2019-01-11 RX ORDER — LISINOPRIL 5 MG/1
5 TABLET ORAL DAILY
Status: DISCONTINUED | OUTPATIENT
Start: 2019-01-12 | End: 2019-01-14 | Stop reason: HOSPADM

## 2019-01-11 RX ORDER — ONDANSETRON 2 MG/ML
4 INJECTION INTRAMUSCULAR; INTRAVENOUS EVERY 6 HOURS PRN
Status: DISCONTINUED | OUTPATIENT
Start: 2019-01-11 | End: 2019-01-14 | Stop reason: HOSPADM

## 2019-01-11 RX ORDER — SODIUM CHLORIDE 9 MG/ML
INJECTION, SOLUTION INTRAVENOUS CONTINUOUS
Status: DISCONTINUED | OUTPATIENT
Start: 2019-01-11 | End: 2019-01-14

## 2019-01-11 RX ORDER — ASPIRIN 81 MG/1
81 TABLET ORAL DAILY
Status: DISCONTINUED | OUTPATIENT
Start: 2019-01-12 | End: 2019-01-14 | Stop reason: HOSPADM

## 2019-01-11 RX ORDER — 0.9 % SODIUM CHLORIDE 0.9 %
1000 INTRAVENOUS SOLUTION INTRAVENOUS ONCE
Status: COMPLETED | OUTPATIENT
Start: 2019-01-11 | End: 2019-01-11

## 2019-01-11 RX ORDER — NICOTINE POLACRILEX 4 MG
15 LOZENGE BUCCAL PRN
Status: DISCONTINUED | OUTPATIENT
Start: 2019-01-11 | End: 2019-01-14 | Stop reason: HOSPADM

## 2019-01-11 RX ORDER — CLOPIDOGREL BISULFATE 75 MG/1
75 TABLET ORAL DAILY
Status: DISCONTINUED | OUTPATIENT
Start: 2019-01-12 | End: 2019-01-14 | Stop reason: HOSPADM

## 2019-01-11 RX ORDER — ATORVASTATIN CALCIUM 40 MG/1
40 TABLET, FILM COATED ORAL NIGHTLY
Status: DISCONTINUED | OUTPATIENT
Start: 2019-01-11 | End: 2019-01-14 | Stop reason: HOSPADM

## 2019-01-11 RX ORDER — DEXTROSE MONOHYDRATE 25 G/50ML
12.5 INJECTION, SOLUTION INTRAVENOUS PRN
Status: DISCONTINUED | OUTPATIENT
Start: 2019-01-11 | End: 2019-01-14 | Stop reason: HOSPADM

## 2019-01-11 RX ORDER — SODIUM CHLORIDE 0.9 % (FLUSH) 0.9 %
10 SYRINGE (ML) INJECTION PRN
Status: DISCONTINUED | OUTPATIENT
Start: 2019-01-11 | End: 2019-01-14 | Stop reason: HOSPADM

## 2019-01-11 RX ORDER — HYDROCODONE BITARTRATE AND ACETAMINOPHEN 5; 325 MG/1; MG/1
1 TABLET ORAL EVERY 4 HOURS PRN
Status: DISCONTINUED | OUTPATIENT
Start: 2019-01-11 | End: 2019-01-14 | Stop reason: HOSPADM

## 2019-01-11 RX ORDER — ONDANSETRON 2 MG/ML
4 INJECTION INTRAMUSCULAR; INTRAVENOUS ONCE
Status: COMPLETED | OUTPATIENT
Start: 2019-01-11 | End: 2019-01-11

## 2019-01-11 RX ORDER — MONTELUKAST SODIUM 10 MG/1
10 TABLET ORAL NIGHTLY
Status: DISCONTINUED | OUTPATIENT
Start: 2019-01-11 | End: 2019-01-14 | Stop reason: HOSPADM

## 2019-01-11 RX ORDER — MORPHINE SULFATE 2 MG/ML
2 INJECTION, SOLUTION INTRAMUSCULAR; INTRAVENOUS EVERY 4 HOURS PRN
Status: DISCONTINUED | OUTPATIENT
Start: 2019-01-11 | End: 2019-01-14 | Stop reason: HOSPADM

## 2019-01-11 RX ADMIN — SODIUM CHLORIDE 1000 ML: 9 INJECTION, SOLUTION INTRAVENOUS at 19:10

## 2019-01-11 RX ADMIN — PANTOPRAZOLE SODIUM 80 MG: 40 INJECTION, POWDER, FOR SOLUTION INTRAVENOUS at 19:13

## 2019-01-11 RX ADMIN — INSULIN GLARGINE 30 UNITS: 100 INJECTION, SOLUTION SUBCUTANEOUS at 23:39

## 2019-01-11 RX ADMIN — MONTELUKAST SODIUM 10 MG: 10 TABLET, FILM COATED ORAL at 23:34

## 2019-01-11 RX ADMIN — Medication 10 ML: at 20:50

## 2019-01-11 RX ADMIN — Medication 10 ML: at 23:34

## 2019-01-11 RX ADMIN — ONDANSETRON 4 MG: 2 INJECTION INTRAMUSCULAR; INTRAVENOUS at 19:11

## 2019-01-11 RX ADMIN — Medication 10 ML: at 20:03

## 2019-01-11 RX ADMIN — SODIUM CHLORIDE: 9 INJECTION, SOLUTION INTRAVENOUS at 23:35

## 2019-01-11 RX ADMIN — IOPAMIDOL 110 ML: 755 INJECTION, SOLUTION INTRAVENOUS at 20:03

## 2019-01-11 RX ADMIN — ATORVASTATIN CALCIUM 40 MG: 40 TABLET, FILM COATED ORAL at 23:34

## 2019-01-11 RX ADMIN — METRONIDAZOLE 500 MG: 500 INJECTION, SOLUTION INTRAVENOUS at 23:34

## 2019-01-11 RX ADMIN — PIPERACILLIN SODIUM AND TAZOBACTAM SODIUM 4.5 G: 4; .5 INJECTION, POWDER, LYOPHILIZED, FOR SOLUTION INTRAVENOUS at 20:50

## 2019-01-11 RX ADMIN — SODIUM CHLORIDE 1000 ML: 9 INJECTION, SOLUTION INTRAVENOUS at 20:50

## 2019-01-11 RX ADMIN — FENTANYL CITRATE 80 MCG: 50 INJECTION, SOLUTION INTRAMUSCULAR; INTRAVENOUS at 19:12

## 2019-01-11 RX ADMIN — INSULIN LISPRO 1 UNITS: 100 INJECTION, SOLUTION INTRAVENOUS; SUBCUTANEOUS at 23:38

## 2019-01-11 ASSESSMENT — ENCOUNTER SYMPTOMS
BACK PAIN: 0
COUGH: 0
SINUS PRESSURE: 0
WHEEZING: 0
ABDOMINAL DISTENTION: 0
DIARRHEA: 0
EYE PAIN: 0
VOMITING: 1
EYE REDNESS: 0
SORE THROAT: 0
SHORTNESS OF BREATH: 0
EYE DISCHARGE: 0
ABDOMINAL PAIN: 1
NAUSEA: 1
ANAL BLEEDING: 1

## 2019-01-11 ASSESSMENT — PAIN DESCRIPTION - ORIENTATION: ORIENTATION: LEFT;UPPER;LOWER

## 2019-01-11 ASSESSMENT — PAIN DESCRIPTION - ONSET: ONSET: PROGRESSIVE

## 2019-01-11 ASSESSMENT — PAIN SCALES - GENERAL
PAINLEVEL_OUTOF10: 9
PAINLEVEL_OUTOF10: 4
PAINLEVEL_OUTOF10: 9
PAINLEVEL_OUTOF10: 4
PAINLEVEL_OUTOF10: 5

## 2019-01-11 ASSESSMENT — PAIN DESCRIPTION - PROGRESSION: CLINICAL_PROGRESSION: NOT CHANGED

## 2019-01-11 ASSESSMENT — PAIN DESCRIPTION - LOCATION
LOCATION: ABDOMEN

## 2019-01-11 ASSESSMENT — PAIN - FUNCTIONAL ASSESSMENT: PAIN_FUNCTIONAL_ASSESSMENT: 0-10

## 2019-01-11 ASSESSMENT — PAIN DESCRIPTION - FREQUENCY: FREQUENCY: INTERMITTENT

## 2019-01-11 ASSESSMENT — PAIN DESCRIPTION - PAIN TYPE
TYPE: ACUTE PAIN
TYPE: ACUTE PAIN

## 2019-01-11 ASSESSMENT — PAIN DESCRIPTION - DESCRIPTORS: DESCRIPTORS: ACHING;SHARP

## 2019-01-12 LAB
ANION GAP SERPL CALCULATED.3IONS-SCNC: 9 MMOL/L (ref 7–16)
BASOPHILS ABSOLUTE: 0.07 E9/L (ref 0–0.2)
BASOPHILS RELATIVE PERCENT: 0.9 % (ref 0–2)
BUN BLDV-MCNC: 6 MG/DL (ref 8–23)
CALCIUM SERPL-MCNC: 8.5 MG/DL (ref 8.6–10.2)
CHLORIDE BLD-SCNC: 105 MMOL/L (ref 98–107)
CO2: 22 MMOL/L (ref 22–29)
CREAT SERPL-MCNC: 0.6 MG/DL (ref 0.5–1)
EOSINOPHILS ABSOLUTE: 0.09 E9/L (ref 0.05–0.5)
EOSINOPHILS RELATIVE PERCENT: 1.2 % (ref 0–6)
GFR AFRICAN AMERICAN: >60
GFR NON-AFRICAN AMERICAN: >60 ML/MIN/1.73
GLUCOSE BLD-MCNC: 163 MG/DL (ref 74–99)
HCT VFR BLD CALC: 38 % (ref 34–48)
HEMOGLOBIN: 12.5 G/DL (ref 11.5–15.5)
IMMATURE GRANULOCYTES #: 0.03 E9/L
IMMATURE GRANULOCYTES %: 0.4 % (ref 0–5)
LACTIC ACID: 3.3 MMOL/L (ref 0.5–2.2)
LACTIC ACID: 3.6 MMOL/L (ref 0.5–2.2)
LYMPHOCYTES ABSOLUTE: 2.05 E9/L (ref 1.5–4)
LYMPHOCYTES RELATIVE PERCENT: 27.8 % (ref 20–42)
MCH RBC QN AUTO: 31.3 PG (ref 26–35)
MCHC RBC AUTO-ENTMCNC: 32.9 % (ref 32–34.5)
MCV RBC AUTO: 95.2 FL (ref 80–99.9)
METER GLUCOSE: 124 MG/DL (ref 74–99)
METER GLUCOSE: 140 MG/DL (ref 74–99)
METER GLUCOSE: 194 MG/DL (ref 74–99)
METER GLUCOSE: 246 MG/DL (ref 74–99)
MONOCYTES ABSOLUTE: 0.58 E9/L (ref 0.1–0.95)
MONOCYTES RELATIVE PERCENT: 7.9 % (ref 2–12)
NEUTROPHILS ABSOLUTE: 4.55 E9/L (ref 1.8–7.3)
NEUTROPHILS RELATIVE PERCENT: 61.8 % (ref 43–80)
PDW BLD-RTO: 14.1 FL (ref 11.5–15)
PLATELET # BLD: 178 E9/L (ref 130–450)
PMV BLD AUTO: 10.7 FL (ref 7–12)
POTASSIUM SERPL-SCNC: 4 MMOL/L (ref 3.5–5)
RBC # BLD: 3.99 E12/L (ref 3.5–5.5)
SODIUM BLD-SCNC: 136 MMOL/L (ref 132–146)
WBC # BLD: 7.4 E9/L (ref 4.5–11.5)

## 2019-01-12 PROCEDURE — C9113 INJ PANTOPRAZOLE SODIUM, VIA: HCPCS | Performed by: INTERNAL MEDICINE

## 2019-01-12 PROCEDURE — 83605 ASSAY OF LACTIC ACID: CPT

## 2019-01-12 PROCEDURE — 2500000003 HC RX 250 WO HCPCS: Performed by: INTERNAL MEDICINE

## 2019-01-12 PROCEDURE — 6360000002 HC RX W HCPCS: Performed by: INTERNAL MEDICINE

## 2019-01-12 PROCEDURE — 6370000000 HC RX 637 (ALT 250 FOR IP): Performed by: INTERNAL MEDICINE

## 2019-01-12 PROCEDURE — 80048 BASIC METABOLIC PNL TOTAL CA: CPT

## 2019-01-12 PROCEDURE — 82962 GLUCOSE BLOOD TEST: CPT

## 2019-01-12 PROCEDURE — 36415 COLL VENOUS BLD VENIPUNCTURE: CPT

## 2019-01-12 PROCEDURE — 1200000000 HC SEMI PRIVATE

## 2019-01-12 PROCEDURE — 2580000003 HC RX 258: Performed by: INTERNAL MEDICINE

## 2019-01-12 PROCEDURE — 85025 COMPLETE CBC W/AUTO DIFF WBC: CPT

## 2019-01-12 RX ADMIN — METRONIDAZOLE 500 MG: 500 INJECTION, SOLUTION INTRAVENOUS at 06:58

## 2019-01-12 RX ADMIN — MICONAZOLE NITRATE: 20 POWDER TOPICAL at 22:13

## 2019-01-12 RX ADMIN — INSULIN LISPRO 2 UNITS: 100 INJECTION, SOLUTION INTRAVENOUS; SUBCUTANEOUS at 06:56

## 2019-01-12 RX ADMIN — ENOXAPARIN SODIUM 40 MG: 40 INJECTION SUBCUTANEOUS at 08:44

## 2019-01-12 RX ADMIN — ATORVASTATIN CALCIUM 40 MG: 40 TABLET, FILM COATED ORAL at 22:12

## 2019-01-12 RX ADMIN — LACTULOSE 30 G: 20 SOLUTION ORAL at 08:43

## 2019-01-12 RX ADMIN — ASPIRIN 81 MG: 81 TABLET, COATED ORAL at 08:43

## 2019-01-12 RX ADMIN — SODIUM CHLORIDE: 9 INJECTION, SOLUTION INTRAVENOUS at 11:17

## 2019-01-12 RX ADMIN — CIPROFLOXACIN 400 MG: 2 INJECTION, SOLUTION INTRAVENOUS at 11:13

## 2019-01-12 RX ADMIN — MONTELUKAST SODIUM 10 MG: 10 TABLET, FILM COATED ORAL at 22:12

## 2019-01-12 RX ADMIN — VENLAFAXINE HYDROCHLORIDE 37.5 MG: 37.5 CAPSULE, EXTENDED RELEASE ORAL at 08:44

## 2019-01-12 RX ADMIN — PANTOPRAZOLE SODIUM 40 MG: 40 INJECTION, POWDER, FOR SOLUTION INTRAVENOUS at 08:44

## 2019-01-12 RX ADMIN — MICONAZOLE NITRATE: 20 POWDER TOPICAL at 11:23

## 2019-01-12 RX ADMIN — CLOPIDOGREL BISULFATE 75 MG: 75 TABLET ORAL at 08:44

## 2019-01-12 RX ADMIN — Medication 10 ML: at 08:44

## 2019-01-12 RX ADMIN — INSULIN GLARGINE 30 UNITS: 100 INJECTION, SOLUTION SUBCUTANEOUS at 22:15

## 2019-01-12 RX ADMIN — CIPROFLOXACIN 400 MG: 2 INJECTION, SOLUTION INTRAVENOUS at 00:35

## 2019-01-12 RX ADMIN — INSULIN LISPRO 4 UNITS: 100 INJECTION, SOLUTION INTRAVENOUS; SUBCUTANEOUS at 11:24

## 2019-01-12 RX ADMIN — METRONIDAZOLE 500 MG: 500 INJECTION, SOLUTION INTRAVENOUS at 23:43

## 2019-01-12 RX ADMIN — METRONIDAZOLE 500 MG: 500 INJECTION, SOLUTION INTRAVENOUS at 14:48

## 2019-01-12 RX ADMIN — CIPROFLOXACIN 400 MG: 2 INJECTION, SOLUTION INTRAVENOUS at 22:28

## 2019-01-12 ASSESSMENT — PAIN DESCRIPTION - FREQUENCY: FREQUENCY: INTERMITTENT

## 2019-01-12 ASSESSMENT — PAIN SCALES - GENERAL
PAINLEVEL_OUTOF10: 0
PAINLEVEL_OUTOF10: 0
PAINLEVEL_OUTOF10: 2
PAINLEVEL_OUTOF10: 0

## 2019-01-12 ASSESSMENT — PAIN DESCRIPTION - ORIENTATION: ORIENTATION: RIGHT;LEFT;UPPER;LOWER

## 2019-01-12 ASSESSMENT — PAIN DESCRIPTION - DESCRIPTORS: DESCRIPTORS: CRAMPING

## 2019-01-12 ASSESSMENT — PAIN DESCRIPTION - LOCATION: LOCATION: ABDOMEN

## 2019-01-12 ASSESSMENT — PAIN DESCRIPTION - PAIN TYPE: TYPE: ACUTE PAIN

## 2019-01-13 LAB
ANION GAP SERPL CALCULATED.3IONS-SCNC: 7 MMOL/L (ref 7–16)
BUN BLDV-MCNC: 5 MG/DL (ref 8–23)
CALCIUM SERPL-MCNC: 7.9 MG/DL (ref 8.6–10.2)
CHLORIDE BLD-SCNC: 108 MMOL/L (ref 98–107)
CO2: 22 MMOL/L (ref 22–29)
CREAT SERPL-MCNC: 0.6 MG/DL (ref 0.5–1)
GFR AFRICAN AMERICAN: >60
GFR NON-AFRICAN AMERICAN: >60 ML/MIN/1.73
GLUCOSE BLD-MCNC: 156 MG/DL (ref 74–99)
HCT VFR BLD CALC: 32.5 % (ref 34–48)
HEMOGLOBIN: 10.9 G/DL (ref 11.5–15.5)
LACTIC ACID: 1.6 MMOL/L (ref 0.5–2.2)
MAGNESIUM: 1.7 MG/DL (ref 1.6–2.6)
MCH RBC QN AUTO: 32.1 PG (ref 26–35)
MCHC RBC AUTO-ENTMCNC: 33.5 % (ref 32–34.5)
MCV RBC AUTO: 95.6 FL (ref 80–99.9)
METER GLUCOSE: 118 MG/DL (ref 74–99)
METER GLUCOSE: 228 MG/DL (ref 74–99)
METER GLUCOSE: 231 MG/DL (ref 74–99)
METER GLUCOSE: 249 MG/DL (ref 74–99)
PDW BLD-RTO: 14.3 FL (ref 11.5–15)
PHOSPHORUS: 2.7 MG/DL (ref 2.5–4.5)
PLATELET # BLD: 132 E9/L (ref 130–450)
PMV BLD AUTO: 10.9 FL (ref 7–12)
POTASSIUM SERPL-SCNC: 3.5 MMOL/L (ref 3.5–5)
RBC # BLD: 3.4 E12/L (ref 3.5–5.5)
SODIUM BLD-SCNC: 137 MMOL/L (ref 132–146)
WBC # BLD: 4.4 E9/L (ref 4.5–11.5)

## 2019-01-13 PROCEDURE — 84100 ASSAY OF PHOSPHORUS: CPT

## 2019-01-13 PROCEDURE — 6360000002 HC RX W HCPCS: Performed by: INTERNAL MEDICINE

## 2019-01-13 PROCEDURE — 6370000000 HC RX 637 (ALT 250 FOR IP): Performed by: SURGERY

## 2019-01-13 PROCEDURE — 1200000000 HC SEMI PRIVATE

## 2019-01-13 PROCEDURE — 83605 ASSAY OF LACTIC ACID: CPT

## 2019-01-13 PROCEDURE — 6370000000 HC RX 637 (ALT 250 FOR IP): Performed by: INTERNAL MEDICINE

## 2019-01-13 PROCEDURE — 36415 COLL VENOUS BLD VENIPUNCTURE: CPT

## 2019-01-13 PROCEDURE — C9113 INJ PANTOPRAZOLE SODIUM, VIA: HCPCS | Performed by: INTERNAL MEDICINE

## 2019-01-13 PROCEDURE — 2580000003 HC RX 258: Performed by: INTERNAL MEDICINE

## 2019-01-13 PROCEDURE — 83735 ASSAY OF MAGNESIUM: CPT

## 2019-01-13 PROCEDURE — 82962 GLUCOSE BLOOD TEST: CPT

## 2019-01-13 PROCEDURE — 80048 BASIC METABOLIC PNL TOTAL CA: CPT

## 2019-01-13 PROCEDURE — 2500000003 HC RX 250 WO HCPCS: Performed by: INTERNAL MEDICINE

## 2019-01-13 PROCEDURE — 85027 COMPLETE CBC AUTOMATED: CPT

## 2019-01-13 RX ORDER — PANTOPRAZOLE SODIUM 40 MG/1
40 TABLET, DELAYED RELEASE ORAL
Status: DISCONTINUED | OUTPATIENT
Start: 2019-01-14 | End: 2019-01-14 | Stop reason: HOSPADM

## 2019-01-13 RX ORDER — CIPROFLOXACIN 500 MG/1
500 TABLET, FILM COATED ORAL EVERY 12 HOURS SCHEDULED
Status: DISCONTINUED | OUTPATIENT
Start: 2019-01-13 | End: 2019-01-14 | Stop reason: HOSPADM

## 2019-01-13 RX ORDER — METRONIDAZOLE 500 MG/1
500 TABLET ORAL EVERY 8 HOURS SCHEDULED
Status: DISCONTINUED | OUTPATIENT
Start: 2019-01-13 | End: 2019-01-14 | Stop reason: HOSPADM

## 2019-01-13 RX ADMIN — INSULIN LISPRO 2 UNITS: 100 INJECTION, SOLUTION INTRAVENOUS; SUBCUTANEOUS at 21:31

## 2019-01-13 RX ADMIN — INSULIN LISPRO 4 UNITS: 100 INJECTION, SOLUTION INTRAVENOUS; SUBCUTANEOUS at 11:14

## 2019-01-13 RX ADMIN — INSULIN GLARGINE 30 UNITS: 100 INJECTION, SOLUTION SUBCUTANEOUS at 21:30

## 2019-01-13 RX ADMIN — LACTULOSE 30 G: 20 SOLUTION ORAL at 08:22

## 2019-01-13 RX ADMIN — ENOXAPARIN SODIUM 40 MG: 40 INJECTION SUBCUTANEOUS at 08:22

## 2019-01-13 RX ADMIN — VENLAFAXINE HYDROCHLORIDE 37.5 MG: 37.5 CAPSULE, EXTENDED RELEASE ORAL at 08:23

## 2019-01-13 RX ADMIN — INSULIN LISPRO 4 UNITS: 100 INJECTION, SOLUTION INTRAVENOUS; SUBCUTANEOUS at 16:26

## 2019-01-13 RX ADMIN — METRONIDAZOLE 500 MG: 500 TABLET ORAL at 22:15

## 2019-01-13 RX ADMIN — CLOPIDOGREL BISULFATE 75 MG: 75 TABLET ORAL at 08:23

## 2019-01-13 RX ADMIN — Medication 10 ML: at 08:22

## 2019-01-13 RX ADMIN — LISINOPRIL 5 MG: 5 TABLET ORAL at 08:23

## 2019-01-13 RX ADMIN — CIPROFLOXACIN HYDROCHLORIDE 500 MG: 500 TABLET, FILM COATED ORAL at 21:29

## 2019-01-13 RX ADMIN — ATORVASTATIN CALCIUM 40 MG: 40 TABLET, FILM COATED ORAL at 21:30

## 2019-01-13 RX ADMIN — ASPIRIN 81 MG: 81 TABLET, COATED ORAL at 08:23

## 2019-01-13 RX ADMIN — METRONIDAZOLE 500 MG: 500 INJECTION, SOLUTION INTRAVENOUS at 08:22

## 2019-01-13 RX ADMIN — SODIUM CHLORIDE: 9 INJECTION, SOLUTION INTRAVENOUS at 08:23

## 2019-01-13 RX ADMIN — PANTOPRAZOLE SODIUM 40 MG: 40 INJECTION, POWDER, FOR SOLUTION INTRAVENOUS at 08:22

## 2019-01-13 RX ADMIN — MONTELUKAST SODIUM 10 MG: 10 TABLET, FILM COATED ORAL at 21:29

## 2019-01-13 RX ADMIN — METRONIDAZOLE 500 MG: 500 TABLET ORAL at 14:31

## 2019-01-13 ASSESSMENT — PAIN SCALES - GENERAL
PAINLEVEL_OUTOF10: 0
PAINLEVEL_OUTOF10: 0

## 2019-01-14 VITALS
RESPIRATION RATE: 14 BRPM | OXYGEN SATURATION: 95 % | WEIGHT: 206.7 LBS | DIASTOLIC BLOOD PRESSURE: 87 MMHG | BODY MASS INDEX: 32.44 KG/M2 | TEMPERATURE: 98.5 F | HEART RATE: 93 BPM | HEIGHT: 67 IN | SYSTOLIC BLOOD PRESSURE: 161 MMHG

## 2019-01-14 LAB
ANION GAP SERPL CALCULATED.3IONS-SCNC: 6 MMOL/L (ref 7–16)
BUN BLDV-MCNC: 4 MG/DL (ref 8–23)
CALCIUM SERPL-MCNC: 7.7 MG/DL (ref 8.6–10.2)
CHLORIDE BLD-SCNC: 108 MMOL/L (ref 98–107)
CO2: 23 MMOL/L (ref 22–29)
CREAT SERPL-MCNC: 0.5 MG/DL (ref 0.5–1)
GFR AFRICAN AMERICAN: >60
GFR NON-AFRICAN AMERICAN: >60 ML/MIN/1.73
GLUCOSE BLD-MCNC: 166 MG/DL (ref 74–99)
HCT VFR BLD CALC: 31.9 % (ref 34–48)
HEMOGLOBIN: 10.8 G/DL (ref 11.5–15.5)
MCH RBC QN AUTO: 32 PG (ref 26–35)
MCHC RBC AUTO-ENTMCNC: 33.9 % (ref 32–34.5)
MCV RBC AUTO: 94.7 FL (ref 80–99.9)
METER GLUCOSE: 148 MG/DL (ref 74–99)
METER GLUCOSE: 286 MG/DL (ref 74–99)
PDW BLD-RTO: 13.9 FL (ref 11.5–15)
PLATELET # BLD: 109 E9/L (ref 130–450)
PMV BLD AUTO: 10.3 FL (ref 7–12)
POTASSIUM SERPL-SCNC: 3.5 MMOL/L (ref 3.5–5)
RBC # BLD: 3.37 E12/L (ref 3.5–5.5)
SODIUM BLD-SCNC: 137 MMOL/L (ref 132–146)
WBC # BLD: 3.9 E9/L (ref 4.5–11.5)

## 2019-01-14 PROCEDURE — 82962 GLUCOSE BLOOD TEST: CPT

## 2019-01-14 PROCEDURE — 6370000000 HC RX 637 (ALT 250 FOR IP): Performed by: INTERNAL MEDICINE

## 2019-01-14 PROCEDURE — 6370000000 HC RX 637 (ALT 250 FOR IP): Performed by: SURGERY

## 2019-01-14 PROCEDURE — 36415 COLL VENOUS BLD VENIPUNCTURE: CPT

## 2019-01-14 PROCEDURE — 80048 BASIC METABOLIC PNL TOTAL CA: CPT

## 2019-01-14 PROCEDURE — 2580000003 HC RX 258: Performed by: INTERNAL MEDICINE

## 2019-01-14 PROCEDURE — 85027 COMPLETE CBC AUTOMATED: CPT

## 2019-01-14 RX ORDER — METRONIDAZOLE 500 MG/1
500 TABLET ORAL EVERY 8 HOURS SCHEDULED
Qty: 30 TABLET | Refills: 0 | Status: SHIPPED | OUTPATIENT
Start: 2019-01-14 | End: 2019-01-24

## 2019-01-14 RX ORDER — CIPROFLOXACIN 500 MG/1
500 TABLET, FILM COATED ORAL EVERY 12 HOURS SCHEDULED
Qty: 20 TABLET | Refills: 0 | Status: SHIPPED | OUTPATIENT
Start: 2019-01-14 | End: 2019-01-24

## 2019-01-14 RX ADMIN — Medication 10 ML: at 09:48

## 2019-01-14 RX ADMIN — CLOPIDOGREL BISULFATE 75 MG: 75 TABLET ORAL at 09:48

## 2019-01-14 RX ADMIN — INSULIN LISPRO 2 UNITS: 100 INJECTION, SOLUTION INTRAVENOUS; SUBCUTANEOUS at 06:53

## 2019-01-14 RX ADMIN — METRONIDAZOLE 500 MG: 500 TABLET ORAL at 06:09

## 2019-01-14 RX ADMIN — PANTOPRAZOLE SODIUM 40 MG: 40 TABLET, DELAYED RELEASE ORAL at 06:09

## 2019-01-14 RX ADMIN — MICONAZOLE NITRATE: 20 POWDER TOPICAL at 09:50

## 2019-01-14 RX ADMIN — CIPROFLOXACIN HYDROCHLORIDE 500 MG: 500 TABLET, FILM COATED ORAL at 09:48

## 2019-01-14 RX ADMIN — ASPIRIN 81 MG: 81 TABLET, COATED ORAL at 09:48

## 2019-01-14 RX ADMIN — LISINOPRIL 5 MG: 5 TABLET ORAL at 09:48

## 2019-01-14 RX ADMIN — METRONIDAZOLE 500 MG: 500 TABLET ORAL at 14:33

## 2019-01-14 RX ADMIN — INSULIN LISPRO 6 UNITS: 100 INJECTION, SOLUTION INTRAVENOUS; SUBCUTANEOUS at 11:43

## 2019-01-14 RX ADMIN — VENLAFAXINE HYDROCHLORIDE 37.5 MG: 37.5 CAPSULE, EXTENDED RELEASE ORAL at 09:48

## 2019-01-14 ASSESSMENT — PAIN SCALES - GENERAL: PAINLEVEL_OUTOF10: 0

## 2019-01-16 LAB
BLOOD CULTURE, ROUTINE: NORMAL
CULTURE, BLOOD 2: NORMAL

## 2019-01-22 RX ORDER — POTASSIUM CHLORIDE 750 MG/1
10 TABLET, EXTENDED RELEASE ORAL DAILY
Qty: 30 TABLET | Refills: 1 | Status: SHIPPED | OUTPATIENT
Start: 2019-01-22 | End: 2019-10-27

## 2019-02-01 ENCOUNTER — HOSPITAL ENCOUNTER (OUTPATIENT)
Age: 64
Discharge: HOME | End: 2019-02-01
Payer: COMMERCIAL

## 2019-02-01 ENCOUNTER — HOSPITAL ENCOUNTER (OUTPATIENT)
Dept: ULTRASOUND IMAGING | Age: 64
Discharge: HOME | End: 2019-02-03
Payer: COMMERCIAL

## 2019-02-01 ENCOUNTER — HOSPITAL ENCOUNTER (OUTPATIENT)
Dept: CT IMAGING | Age: 64
Discharge: HOME | End: 2019-02-03
Payer: COMMERCIAL

## 2019-02-01 DIAGNOSIS — R91.1 LUNG NODULE: ICD-10-CM

## 2019-02-01 DIAGNOSIS — E05.90 HYPERTHYROIDISM: ICD-10-CM

## 2019-02-01 LAB
ALBUMIN SERPL-MCNC: 3 G/DL (ref 3.5–5.2)
ALP BLD-CCNC: 157 U/L (ref 35–104)
ALT SERPL-CCNC: 24 U/L (ref 0–32)
AST SERPL-CCNC: 60 U/L (ref 0–31)
BILIRUB SERPL-MCNC: 0.9 MG/DL (ref 0–1.2)
BILIRUBIN DIRECT: 0.3 MG/DL (ref 0–0.3)
BILIRUBIN, INDIRECT: 0.6 MG/DL (ref 0–1)
TOTAL PROTEIN: 7.8 G/DL (ref 6.4–8.3)

## 2019-02-01 PROCEDURE — 80076 HEPATIC FUNCTION PANEL: CPT

## 2019-02-01 PROCEDURE — 87340 HEPATITIS B SURFACE AG IA: CPT

## 2019-02-01 PROCEDURE — 81596 NFCT DS CHRNC HCV 6 ASSAYS: CPT

## 2019-02-01 PROCEDURE — 86706 HEP B SURFACE ANTIBODY: CPT

## 2019-02-01 PROCEDURE — 71250 CT THORAX DX C-: CPT

## 2019-02-01 PROCEDURE — 76536 US EXAM OF HEAD AND NECK: CPT

## 2019-02-01 PROCEDURE — 36415 COLL VENOUS BLD VENIPUNCTURE: CPT

## 2019-02-04 LAB
HBV SURFACE AB TITR SER: NORMAL {TITER}
HEPATITIS B SURFACE ANTIGEN INTERPRETATION: NORMAL

## 2019-02-05 LAB
Lab: NORMAL
REPORT: NORMAL
THIS TEST SENT TO: NORMAL

## 2019-02-06 ENCOUNTER — TELEPHONE (OUTPATIENT)
Dept: ENDOCRINOLOGY | Age: 64
End: 2019-02-06

## 2019-02-18 ENCOUNTER — APPOINTMENT (OUTPATIENT)
Dept: GENERAL RADIOLOGY | Age: 64
End: 2019-02-18
Payer: COMMERCIAL

## 2019-02-18 ENCOUNTER — OFFICE VISIT (OUTPATIENT)
Dept: ENDOCRINOLOGY | Age: 64
End: 2019-02-18
Payer: COMMERCIAL

## 2019-02-18 ENCOUNTER — APPOINTMENT (OUTPATIENT)
Dept: CT IMAGING | Age: 64
End: 2019-02-18
Payer: COMMERCIAL

## 2019-02-18 ENCOUNTER — HOSPITAL ENCOUNTER (EMERGENCY)
Age: 64
Discharge: HOME | End: 2019-02-18
Attending: EMERGENCY MEDICINE
Payer: COMMERCIAL

## 2019-02-18 ENCOUNTER — TELEPHONE (OUTPATIENT)
Dept: ENDOCRINOLOGY | Age: 64
End: 2019-02-18

## 2019-02-18 VITALS
HEART RATE: 100 BPM | SYSTOLIC BLOOD PRESSURE: 106 MMHG | WEIGHT: 203 LBS | TEMPERATURE: 98.3 F | BODY MASS INDEX: 31.86 KG/M2 | RESPIRATION RATE: 18 BRPM | DIASTOLIC BLOOD PRESSURE: 64 MMHG | OXYGEN SATURATION: 95 % | HEIGHT: 67 IN

## 2019-02-18 VITALS
WEIGHT: 203 LBS | SYSTOLIC BLOOD PRESSURE: 90 MMHG | HEART RATE: 103 BPM | BODY MASS INDEX: 31.86 KG/M2 | RESPIRATION RATE: 18 BRPM | OXYGEN SATURATION: 97 % | DIASTOLIC BLOOD PRESSURE: 60 MMHG | HEIGHT: 67 IN

## 2019-02-18 DIAGNOSIS — R79.89 LOW SERUM CORTISOL LEVEL: Primary | ICD-10-CM

## 2019-02-18 DIAGNOSIS — R42 DIZZINESS: Primary | ICD-10-CM

## 2019-02-18 DIAGNOSIS — E86.0 DEHYDRATION: ICD-10-CM

## 2019-02-18 DIAGNOSIS — E05.90 HYPERTHYROIDISM: ICD-10-CM

## 2019-02-18 LAB
ALBUMIN SERPL-MCNC: 2.4 G/DL (ref 3.5–5.2)
ALP BLD-CCNC: 114 U/L (ref 35–104)
ALT SERPL-CCNC: 15 U/L (ref 0–32)
ANION GAP SERPL CALCULATED.3IONS-SCNC: 9 MMOL/L (ref 7–16)
AST SERPL-CCNC: 46 U/L (ref 0–31)
BACTERIA: ABNORMAL /HPF
BASOPHILS ABSOLUTE: 0.05 E9/L (ref 0–0.2)
BASOPHILS RELATIVE PERCENT: 0.7 % (ref 0–2)
BILIRUB SERPL-MCNC: 0.6 MG/DL (ref 0–1.2)
BILIRUBIN URINE: NEGATIVE
BLOOD, URINE: NEGATIVE
BUN BLDV-MCNC: 9 MG/DL (ref 8–23)
CALCIUM SERPL-MCNC: 7 MG/DL (ref 8.6–10.2)
CHLORIDE BLD-SCNC: 109 MMOL/L (ref 98–107)
CLARITY: CLEAR
CO2: 16 MMOL/L (ref 22–29)
COLOR: YELLOW
CREAT SERPL-MCNC: 0.4 MG/DL (ref 0.5–1)
EOSINOPHILS ABSOLUTE: 0.14 E9/L (ref 0.05–0.5)
EOSINOPHILS RELATIVE PERCENT: 1.9 % (ref 0–6)
EPITHELIAL CELLS, UA: ABNORMAL /HPF
GFR AFRICAN AMERICAN: >60
GFR NON-AFRICAN AMERICAN: >60 ML/MIN/1.73
GLUCOSE BLD-MCNC: 109 MG/DL (ref 74–99)
GLUCOSE URINE: 250 MG/DL
HCT VFR BLD CALC: 39.8 % (ref 34–48)
HEMOGLOBIN: 13 G/DL (ref 11.5–15.5)
IMMATURE GRANULOCYTES #: 0.03 E9/L
IMMATURE GRANULOCYTES %: 0.4 % (ref 0–5)
KETONES, URINE: NEGATIVE MG/DL
LACTIC ACID: 2.8 MMOL/L (ref 0.5–2.2)
LEUKOCYTE ESTERASE, URINE: ABNORMAL
LYMPHOCYTES ABSOLUTE: 2.08 E9/L (ref 1.5–4)
LYMPHOCYTES RELATIVE PERCENT: 28.5 % (ref 20–42)
MCH RBC QN AUTO: 30.4 PG (ref 26–35)
MCHC RBC AUTO-ENTMCNC: 32.7 % (ref 32–34.5)
MCV RBC AUTO: 93.2 FL (ref 80–99.9)
MONOCYTES ABSOLUTE: 0.62 E9/L (ref 0.1–0.95)
MONOCYTES RELATIVE PERCENT: 8.5 % (ref 2–12)
MUCUS: PRESENT
NEUTROPHILS ABSOLUTE: 4.38 E9/L (ref 1.8–7.3)
NEUTROPHILS RELATIVE PERCENT: 60 % (ref 43–80)
NITRITE, URINE: NEGATIVE
PDW BLD-RTO: 14.6 FL (ref 11.5–15)
PH UA: 6 (ref 5–9)
PLATELET # BLD: 164 E9/L (ref 130–450)
PMV BLD AUTO: 12.4 FL (ref 7–12)
POTASSIUM SERPL-SCNC: 3.7 MMOL/L (ref 3.5–5)
PROTEIN UA: NEGATIVE MG/DL
RBC # BLD: 4.27 E12/L (ref 3.5–5.5)
RBC UA: ABNORMAL /HPF (ref 0–2)
SODIUM BLD-SCNC: 134 MMOL/L (ref 132–146)
SPECIFIC GRAVITY UA: 1.02 (ref 1–1.03)
TOTAL PROTEIN: 6.3 G/DL (ref 6.4–8.3)
TROPONIN: <0.01 NG/ML (ref 0–0.03)
UROBILINOGEN, URINE: 0.2 E.U./DL
WBC # BLD: 7.3 E9/L (ref 4.5–11.5)
WBC UA: ABNORMAL /HPF (ref 0–5)

## 2019-02-18 PROCEDURE — 81001 URINALYSIS AUTO W/SCOPE: CPT

## 2019-02-18 PROCEDURE — 99214 OFFICE O/P EST MOD 30 MIN: CPT | Performed by: INTERNAL MEDICINE

## 2019-02-18 PROCEDURE — 84484 ASSAY OF TROPONIN QUANT: CPT

## 2019-02-18 PROCEDURE — 96361 HYDRATE IV INFUSION ADD-ON: CPT

## 2019-02-18 PROCEDURE — 93005 ELECTROCARDIOGRAM TRACING: CPT | Performed by: EMERGENCY MEDICINE

## 2019-02-18 PROCEDURE — 70450 CT HEAD/BRAIN W/O DYE: CPT

## 2019-02-18 PROCEDURE — 71045 X-RAY EXAM CHEST 1 VIEW: CPT

## 2019-02-18 PROCEDURE — 36415 COLL VENOUS BLD VENIPUNCTURE: CPT

## 2019-02-18 PROCEDURE — 96360 HYDRATION IV INFUSION INIT: CPT

## 2019-02-18 PROCEDURE — 85025 COMPLETE CBC W/AUTO DIFF WBC: CPT

## 2019-02-18 PROCEDURE — 2580000003 HC RX 258: Performed by: EMERGENCY MEDICINE

## 2019-02-18 PROCEDURE — 83605 ASSAY OF LACTIC ACID: CPT

## 2019-02-18 PROCEDURE — 80053 COMPREHEN METABOLIC PANEL: CPT

## 2019-02-18 PROCEDURE — 99284 EMERGENCY DEPT VISIT MOD MDM: CPT

## 2019-02-18 RX ORDER — SODIUM CHLORIDE 0.9 % (FLUSH) 0.9 %
10 SYRINGE (ML) INJECTION PRN
Status: DISCONTINUED | OUTPATIENT
Start: 2019-02-18 | End: 2019-02-18 | Stop reason: HOSPADM

## 2019-02-18 RX ORDER — FUROSEMIDE 20 MG/1
10 TABLET ORAL DAILY
Qty: 15 TABLET | Refills: 0 | Status: ON HOLD | OUTPATIENT
Start: 2019-02-18 | End: 2019-10-29 | Stop reason: SDUPTHER

## 2019-02-18 RX ORDER — 0.9 % SODIUM CHLORIDE 0.9 %
1000 INTRAVENOUS SOLUTION INTRAVENOUS ONCE
Status: COMPLETED | OUTPATIENT
Start: 2019-02-18 | End: 2019-02-18

## 2019-02-18 RX ORDER — FUROSEMIDE 20 MG/1
10 TABLET ORAL DAILY
Qty: 30 TABLET | Refills: 3 | Status: SHIPPED | OUTPATIENT
Start: 2019-02-18 | End: 2019-02-18

## 2019-02-18 RX ADMIN — SODIUM CHLORIDE 1000 ML: 9 INJECTION, SOLUTION INTRAVENOUS at 17:24

## 2019-02-18 ASSESSMENT — ENCOUNTER SYMPTOMS
VOMITING: 0
COUGH: 1
NAUSEA: 0
ABDOMINAL PAIN: 0
BACK PAIN: 0
SHORTNESS OF BREATH: 0
BLOOD IN STOOL: 0

## 2019-02-19 LAB
EKG ATRIAL RATE: 94 BPM
EKG P AXIS: 46 DEGREES
EKG P-R INTERVAL: 150 MS
EKG Q-T INTERVAL: 392 MS
EKG QRS DURATION: 90 MS
EKG QTC CALCULATION (BAZETT): 490 MS
EKG R AXIS: 12 DEGREES
EKG T AXIS: 71 DEGREES
EKG VENTRICULAR RATE: 94 BPM

## 2019-02-19 RX ORDER — METFORMIN HYDROCHLORIDE 500 MG/1
TABLET, EXTENDED RELEASE ORAL
Qty: 30 TABLET | Refills: 3 | Status: SHIPPED | OUTPATIENT
Start: 2019-02-19 | End: 2019-06-12 | Stop reason: SDUPTHER

## 2019-02-20 ENCOUNTER — PREP FOR PROCEDURE (OUTPATIENT)
Dept: SURGERY | Age: 64
End: 2019-02-20

## 2019-02-20 RX ORDER — SODIUM CHLORIDE 9 MG/ML
INJECTION, SOLUTION INTRAVENOUS CONTINUOUS
Status: CANCELLED | OUTPATIENT
Start: 2019-02-20

## 2019-03-13 ENCOUNTER — TELEPHONE (OUTPATIENT)
Dept: ENDOCRINOLOGY | Age: 64
End: 2019-03-13

## 2019-03-20 ENCOUNTER — TELEPHONE (OUTPATIENT)
Dept: ENDOCRINOLOGY | Age: 64
End: 2019-03-20

## 2019-03-20 ENCOUNTER — HOSPITAL ENCOUNTER (OUTPATIENT)
Age: 64
Discharge: HOME | End: 2019-03-20
Payer: COMMERCIAL

## 2019-03-20 DIAGNOSIS — R79.89 LOW SERUM CORTISOL LEVEL: ICD-10-CM

## 2019-03-20 DIAGNOSIS — E27.40 ADRENAL INSUFFICIENCY (HCC): Primary | ICD-10-CM

## 2019-03-20 DIAGNOSIS — E05.90 HYPERTHYROIDISM: ICD-10-CM

## 2019-03-20 LAB
CORTISOL TOTAL: 8.8 MCG/DL (ref 2.68–18.4)
T4 FREE: 1.08 NG/DL (ref 0.93–1.7)
TSH SERPL DL<=0.05 MIU/L-ACNC: 3.68 UIU/ML (ref 0.27–4.2)

## 2019-03-20 PROCEDURE — 84443 ASSAY THYROID STIM HORMONE: CPT

## 2019-03-20 PROCEDURE — 36415 COLL VENOUS BLD VENIPUNCTURE: CPT

## 2019-03-20 PROCEDURE — 82533 TOTAL CORTISOL: CPT

## 2019-03-20 PROCEDURE — 84439 ASSAY OF FREE THYROXINE: CPT

## 2019-03-20 RX ORDER — COSYNTROPIN 0.25 MG/ML
0.25 INJECTION, POWDER, FOR SOLUTION INTRAMUSCULAR; INTRAVENOUS ONCE
Qty: 1 EACH | Refills: 0 | Status: SHIPPED | OUTPATIENT
Start: 2019-03-20 | End: 2019-03-20

## 2019-03-21 ENCOUNTER — TELEPHONE (OUTPATIENT)
Dept: VASCULAR SURGERY | Age: 64
End: 2019-03-21

## 2019-03-26 DIAGNOSIS — E27.40 ISOLATED CORTICOTROPIN DEFICIENCY (HCC): ICD-10-CM

## 2019-03-26 RX ORDER — COSYNTROPIN 0.25 MG/ML
0.25 INJECTION, POWDER, FOR SOLUTION INTRAMUSCULAR; INTRAVENOUS ONCE
Status: CANCELLED
Start: 2019-04-03 | End: 2019-04-03

## 2019-04-03 ENCOUNTER — HOSPITAL ENCOUNTER (OUTPATIENT)
Dept: INFUSION THERAPY | Age: 64
Setting detail: INFUSION SERIES
Discharge: HOME | End: 2019-04-03
Payer: COMMERCIAL

## 2019-04-03 ENCOUNTER — HOSPITAL ENCOUNTER (OUTPATIENT)
Age: 64
Discharge: HOME | End: 2019-04-03
Payer: COMMERCIAL

## 2019-04-03 VITALS
RESPIRATION RATE: 16 BRPM | TEMPERATURE: 97.8 F | HEIGHT: 67 IN | HEART RATE: 98 BPM | BODY MASS INDEX: 32.02 KG/M2 | SYSTOLIC BLOOD PRESSURE: 149 MMHG | WEIGHT: 204 LBS | OXYGEN SATURATION: 96 % | DIASTOLIC BLOOD PRESSURE: 69 MMHG

## 2019-04-03 DIAGNOSIS — E27.40 ISOLATED CORTICOTROPIN DEFICIENCY (HCC): Primary | ICD-10-CM

## 2019-04-03 LAB
ALBUMIN SERPL-MCNC: 3.1 G/DL (ref 3.5–5.2)
ALP BLD-CCNC: 147 U/L (ref 35–104)
ALT SERPL-CCNC: 27 U/L (ref 0–32)
AMMONIA: 47.6 UMOL/L (ref 11–51)
ANION GAP SERPL CALCULATED.3IONS-SCNC: 10 MMOL/L (ref 7–16)
AST SERPL-CCNC: 60 U/L (ref 0–31)
BASOPHILS ABSOLUTE: 0.07 E9/L (ref 0–0.2)
BASOPHILS RELATIVE PERCENT: 0.9 % (ref 0–2)
BILIRUB SERPL-MCNC: 1 MG/DL (ref 0–1.2)
BUN BLDV-MCNC: 7 MG/DL (ref 8–23)
CALCIUM SERPL-MCNC: 8.8 MG/DL (ref 8.6–10.2)
CHLORIDE BLD-SCNC: 106 MMOL/L (ref 98–107)
CO2: 22 MMOL/L (ref 22–29)
CORTISOL TOTAL: 15.34 MCG/DL (ref 2.68–18.4)
CORTISOL TOTAL: 19.94 MCG/DL (ref 2.68–18.4)
CORTISOL TOTAL: 3.81 MCG/DL (ref 2.68–18.4)
CREAT SERPL-MCNC: 0.5 MG/DL (ref 0.5–1)
EOSINOPHILS ABSOLUTE: 0.19 E9/L (ref 0.05–0.5)
EOSINOPHILS RELATIVE PERCENT: 2.4 % (ref 0–6)
GFR AFRICAN AMERICAN: >60
GFR NON-AFRICAN AMERICAN: >60 ML/MIN/1.73
GLUCOSE BLD-MCNC: 182 MG/DL (ref 74–99)
HCT VFR BLD CALC: 39.8 % (ref 34–48)
HEMOGLOBIN: 13 G/DL (ref 11.5–15.5)
IMMATURE GRANULOCYTES #: 0.04 E9/L
IMMATURE GRANULOCYTES %: 0.5 % (ref 0–5)
LYMPHOCYTES ABSOLUTE: 2.13 E9/L (ref 1.5–4)
LYMPHOCYTES RELATIVE PERCENT: 27 % (ref 20–42)
MCH RBC QN AUTO: 31 PG (ref 26–35)
MCHC RBC AUTO-ENTMCNC: 32.7 % (ref 32–34.5)
MCV RBC AUTO: 95 FL (ref 80–99.9)
MONOCYTES ABSOLUTE: 0.56 E9/L (ref 0.1–0.95)
MONOCYTES RELATIVE PERCENT: 7.1 % (ref 2–12)
NEUTROPHILS ABSOLUTE: 4.89 E9/L (ref 1.8–7.3)
NEUTROPHILS RELATIVE PERCENT: 62.1 % (ref 43–80)
PDW BLD-RTO: 16 FL (ref 11.5–15)
PLATELET # BLD: 135 E9/L (ref 130–450)
PMV BLD AUTO: 11.3 FL (ref 7–12)
POTASSIUM SERPL-SCNC: 4.2 MMOL/L (ref 3.5–5)
RBC # BLD: 4.19 E12/L (ref 3.5–5.5)
SODIUM BLD-SCNC: 138 MMOL/L (ref 132–146)
TOTAL PROTEIN: 7.6 G/DL (ref 6.4–8.3)
WBC # BLD: 7.9 E9/L (ref 4.5–11.5)

## 2019-04-03 PROCEDURE — 85025 COMPLETE CBC W/AUTO DIFF WBC: CPT

## 2019-04-03 PROCEDURE — 96375 TX/PRO/DX INJ NEW DRUG ADDON: CPT

## 2019-04-03 PROCEDURE — 80053 COMPREHEN METABOLIC PANEL: CPT

## 2019-04-03 PROCEDURE — 6360000002 HC RX W HCPCS: Performed by: INTERNAL MEDICINE

## 2019-04-03 PROCEDURE — 36415 COLL VENOUS BLD VENIPUNCTURE: CPT

## 2019-04-03 PROCEDURE — 2580000003 HC RX 258: Performed by: INTERNAL MEDICINE

## 2019-04-03 PROCEDURE — 96374 THER/PROPH/DIAG INJ IV PUSH: CPT

## 2019-04-03 PROCEDURE — 82140 ASSAY OF AMMONIA: CPT

## 2019-04-03 PROCEDURE — 82533 TOTAL CORTISOL: CPT

## 2019-04-03 RX ORDER — COSYNTROPIN 0.25 MG/ML
0.25 INJECTION, POWDER, FOR SOLUTION INTRAMUSCULAR; INTRAVENOUS ONCE
Status: CANCELLED
Start: 2019-04-03

## 2019-04-03 RX ORDER — SODIUM CHLORIDE 0.9 % (FLUSH) 0.9 %
10 SYRINGE (ML) INJECTION PRN
Status: CANCELLED
Start: 2019-04-03

## 2019-04-03 RX ORDER — SODIUM CHLORIDE 0.9 % (FLUSH) 0.9 %
10 SYRINGE (ML) INJECTION PRN
Status: DISCONTINUED | OUTPATIENT
Start: 2019-04-03 | End: 2019-04-04 | Stop reason: HOSPADM

## 2019-04-03 RX ORDER — COSYNTROPIN 0.25 MG/ML
0.25 INJECTION, POWDER, FOR SOLUTION INTRAMUSCULAR; INTRAVENOUS ONCE
Status: COMPLETED | OUTPATIENT
Start: 2019-04-03 | End: 2019-04-03

## 2019-04-03 RX ORDER — SODIUM CHLORIDE 0.9 % (FLUSH) 0.9 %
SYRINGE (ML) INJECTION
Status: DISCONTINUED
Start: 2019-04-03 | End: 2019-04-03 | Stop reason: HOSPADM

## 2019-04-03 RX ADMIN — COSYNTROPIN 0.25 MG: 0.25 INJECTION, POWDER, LYOPHILIZED, FOR SOLUTION INTRAMUSCULAR; INTRAVENOUS at 10:45

## 2019-04-03 RX ADMIN — Medication 10 ML: at 10:47

## 2019-04-03 NOTE — PROGRESS NOTES
Pt came in for her cortisol stim test. Medication given and labs drawn per test protocol. Pt tolerated procedure well.

## 2019-04-05 ENCOUNTER — CLINICAL DOCUMENTATION (OUTPATIENT)
Dept: NEUROLOGY | Age: 64
End: 2019-04-05

## 2019-04-05 NOTE — PROGRESS NOTES
Patient: Ez Arredondo  : 53  Date: 19  Re: letter requested for Neurology clearance; Gastroenterology group, Cleophas Phalen., Brynn Leblanc, Sekou Oseguera Consuella Sine    To Whom It May Concern: The above-named patient was seen once in the Neurology clinic on 18, a patient of Dr. Hieu Pruitt, referred for TIA. She was taking low-dose aspirin 81 mg prior to admission and Plavix 75 mg daily was added. She was noted to have a history of poorly-controlled type II diabetes mellitus, and essential hypertension. Medical management of her significant chronic medical conditions and vascular risk factors were otherwise recommended for secondary stroke risk reduction and she was advised to continue her medical follow-up through her family [phone] office. From a neurologic standpoint, she would be given written Neurology clearance as requested.     Sincerely,    Sixto Garcia M.D.

## 2019-04-08 ENCOUNTER — HOSPITAL ENCOUNTER (OUTPATIENT)
Age: 64
Discharge: HOME | End: 2019-04-08
Payer: COMMERCIAL

## 2019-04-08 LAB
ALBUMIN SERPL-MCNC: 3 G/DL (ref 3.5–5.2)
ALP BLD-CCNC: 143 U/L (ref 35–104)
ALT SERPL-CCNC: 24 U/L (ref 0–32)
AMMONIA: 68.9 UMOL/L (ref 11–51)
ANION GAP SERPL CALCULATED.3IONS-SCNC: 9 MMOL/L (ref 7–16)
AST SERPL-CCNC: 51 U/L (ref 0–31)
BASOPHILS ABSOLUTE: 0.07 E9/L (ref 0–0.2)
BASOPHILS RELATIVE PERCENT: 1.1 % (ref 0–2)
BILIRUB SERPL-MCNC: 1.1 MG/DL (ref 0–1.2)
BUN BLDV-MCNC: 9 MG/DL (ref 8–23)
CALCIUM SERPL-MCNC: 8.6 MG/DL (ref 8.6–10.2)
CHLORIDE BLD-SCNC: 104 MMOL/L (ref 98–107)
CO2: 23 MMOL/L (ref 22–29)
CREAT SERPL-MCNC: 0.5 MG/DL (ref 0.5–1)
EOSINOPHILS ABSOLUTE: 0.19 E9/L (ref 0.05–0.5)
EOSINOPHILS RELATIVE PERCENT: 3 % (ref 0–6)
GFR AFRICAN AMERICAN: >60
GFR NON-AFRICAN AMERICAN: >60 ML/MIN/1.73
GLUCOSE BLD-MCNC: 187 MG/DL (ref 74–99)
HCT VFR BLD CALC: 39.4 % (ref 34–48)
HEMOGLOBIN: 12.8 G/DL (ref 11.5–15.5)
IMMATURE GRANULOCYTES #: 0.03 E9/L
IMMATURE GRANULOCYTES %: 0.5 % (ref 0–5)
LYMPHOCYTES ABSOLUTE: 1.74 E9/L (ref 1.5–4)
LYMPHOCYTES RELATIVE PERCENT: 27.1 % (ref 20–42)
MCH RBC QN AUTO: 30.6 PG (ref 26–35)
MCHC RBC AUTO-ENTMCNC: 32.5 % (ref 32–34.5)
MCV RBC AUTO: 94.3 FL (ref 80–99.9)
MONOCYTES ABSOLUTE: 0.41 E9/L (ref 0.1–0.95)
MONOCYTES RELATIVE PERCENT: 6.4 % (ref 2–12)
NEUTROPHILS ABSOLUTE: 3.99 E9/L (ref 1.8–7.3)
NEUTROPHILS RELATIVE PERCENT: 61.9 % (ref 43–80)
PDW BLD-RTO: 15.5 FL (ref 11.5–15)
PLATELET # BLD: 168 E9/L (ref 130–450)
PMV BLD AUTO: 10.9 FL (ref 7–12)
POTASSIUM SERPL-SCNC: 4.1 MMOL/L (ref 3.5–5)
RBC # BLD: 4.18 E12/L (ref 3.5–5.5)
SODIUM BLD-SCNC: 136 MMOL/L (ref 132–146)
TOTAL PROTEIN: 7.4 G/DL (ref 6.4–8.3)
WBC # BLD: 6.4 E9/L (ref 4.5–11.5)

## 2019-04-08 PROCEDURE — 87340 HEPATITIS B SURFACE AG IA: CPT

## 2019-04-08 PROCEDURE — 36415 COLL VENOUS BLD VENIPUNCTURE: CPT

## 2019-04-08 PROCEDURE — 82140 ASSAY OF AMMONIA: CPT

## 2019-04-08 PROCEDURE — 86376 MICROSOMAL ANTIBODY EACH: CPT

## 2019-04-08 PROCEDURE — 85025 COMPLETE CBC W/AUTO DIFF WBC: CPT

## 2019-04-08 PROCEDURE — 81596 NFCT DS CHRNC HCV 6 ASSAYS: CPT

## 2019-04-08 PROCEDURE — 86255 FLUORESCENT ANTIBODY SCREEN: CPT

## 2019-04-08 PROCEDURE — 82105 ALPHA-FETOPROTEIN SERUM: CPT

## 2019-04-08 PROCEDURE — 86704 HEP B CORE ANTIBODY TOTAL: CPT

## 2019-04-08 PROCEDURE — 82103 ALPHA-1-ANTITRYPSIN TOTAL: CPT

## 2019-04-08 PROCEDURE — 86317 IMMUNOASSAY INFECTIOUS AGENT: CPT

## 2019-04-08 PROCEDURE — 80053 COMPREHEN METABOLIC PANEL: CPT

## 2019-04-08 PROCEDURE — 86038 ANTINUCLEAR ANTIBODIES: CPT

## 2019-04-08 PROCEDURE — 86803 HEPATITIS C AB TEST: CPT

## 2019-04-09 LAB
ANTI-NUCLEAR ANTIBODY (ANA): NEGATIVE
HEPATITIS B SURFACE ANTIGEN INTERPRETATION: NORMAL
HEPATITIS C ANTIBODY INTERPRETATION: REACTIVE

## 2019-04-10 LAB
ANTI-MITOCHONDRIAL AB, IFA: NEGATIVE
SMOOTH MUSCLE ANTIBODY: NEGATIVE

## 2019-04-11 LAB
AFP-TUMOR MARKER: 8 NG/ML (ref 0–9)
ALPHA-1 ANTITRYPSIN: 104 MG/DL (ref 90–200)
HEPATITIS B CORE TOTAL ANTIBODY: NONREACTIVE
LIVER-KIDNEY MICROSOME-1 AB IGG: 1.4 U (ref 0–24.9)
MISCELLANEOUS LAB TEST RESULT: NORMAL

## 2019-04-12 LAB
Lab: NORMAL
REPORT: NORMAL
THIS TEST SENT TO: NORMAL

## 2019-04-13 ENCOUNTER — HOSPITAL ENCOUNTER (OUTPATIENT)
Dept: MRI IMAGING | Age: 64
Discharge: HOME | End: 2019-04-15
Payer: COMMERCIAL

## 2019-04-13 DIAGNOSIS — R93.89 ABNORMAL CT SCAN: ICD-10-CM

## 2019-04-13 DIAGNOSIS — K86.2 PANCREATIC CYST: ICD-10-CM

## 2019-04-13 DIAGNOSIS — K74.60 HEPATIC CIRRHOSIS, UNSPECIFIED HEPATIC CIRRHOSIS TYPE, UNSPECIFIED WHETHER ASCITES PRESENT (HCC): ICD-10-CM

## 2019-04-13 PROCEDURE — A9579 GAD-BASE MR CONTRAST NOS,1ML: HCPCS | Performed by: RADIOLOGY

## 2019-04-13 PROCEDURE — 74183 MRI ABD W/O CNTR FLWD CNTR: CPT

## 2019-04-13 PROCEDURE — 6360000004 HC RX CONTRAST MEDICATION: Performed by: RADIOLOGY

## 2019-04-13 RX ADMIN — GADOTERIDOL 19 ML: 279.3 INJECTION, SOLUTION INTRAVENOUS at 12:40

## 2019-04-19 ENCOUNTER — HOSPITAL ENCOUNTER (OUTPATIENT)
Age: 64
Discharge: HOME | End: 2019-04-19
Payer: COMMERCIAL

## 2019-04-19 PROCEDURE — 87522 HEPATITIS C REVRS TRNSCRPJ: CPT

## 2019-04-29 LAB
Lab: NORMAL
REPORT: NORMAL
THIS TEST SENT TO: NORMAL

## 2019-05-06 ENCOUNTER — TELEPHONE (OUTPATIENT)
Dept: ENDOCRINOLOGY | Age: 64
End: 2019-05-06

## 2019-05-06 NOTE — TELEPHONE ENCOUNTER
· Confirm she is currently Toujeo 95 units daily at bedtime, Humalog 30/30/35 units before meals +  sliding scale    If so,  · Change Toujeo to 55 units BID starting from tonight   · Increase Humalog before meals to 35/35/40 units   · Continue same sliding scale     Send sugar log in 3 weeks     Also, please remind her to rawls her insulin regimen on sugar log all the time

## 2019-06-07 RX ORDER — ALBUTEROL SULFATE 90 UG/1
AEROSOL, METERED RESPIRATORY (INHALATION)
Refills: 0 | COMMUNITY
Start: 2019-05-07

## 2019-06-07 RX ORDER — RIFAXIMIN 550 MG/1
TABLET ORAL
Refills: 11 | Status: ON HOLD | COMMUNITY
Start: 2019-05-19 | End: 2020-01-14 | Stop reason: HOSPADM

## 2019-06-07 NOTE — PROGRESS NOTES
[facility] PRE-ADMISSION TESTING INSTRUCTIONS    The Preadmission Testing patient is instructed accordingly using the following criteria (check applicable):    ARRIVAL INSTRUCTIONS:  [x] Parking the day of Surgery is located in the Main Entrance lot. Upon entering the door, make an immediate right to the surgery reception desk    [] [id number] is available Monday through Friday 6 am to 6 pm    [x] Bring photo ID and insurance card    [] Bring in a copy of Living will or Durable Power of  papers. [x] Please be sure to arrange for responsible adult to provide transportation to and from the hospital    [x] Please arrange for responsible adult to be with you for the 24 hour period post procedure due to having anesthesia      GENERAL INSTRUCTIONS:    [x] Nothing by mouth after midnight, including gum, candy, mints or water    [x] You may brush your teeth, but do not swallow any water    [x] Take medications as instructed with 1-2 oz of water    [x] Stop herbal supplements and vitamins 5 days prior to procedure    [x] Follow preop dosing of blood thinners per physician instructions    [x] Take 1/2 dose of evening insulin, but no insulin after midnight    [x] No oral diabetic medications after midnight    [x] If diabetic and have low blood sugar or feel symptomatic, take 1-2oz apple juice only    [] Bring inhalers day of surgery    [] Bring C-PAP/ Bi-Pap day of surgery    [] Bring urine specimen day of surgery    [] Shower or bath with soap, lather and rinse well, AM of Surgery, no lotion, powders or creams to surgical site    [x] Follow bowel prep as instructed per surgeon    [] No tobacco products within 24 hours of surgery     [] No alcohol or illegal drug use within 24 hours of surgery.     [x] Jewelry, body piercing's, eyeglasses, contact lenses and dentures are not permitted into surgery (bring cases)      [x] Please do not wear any nail polish, make up or hair products on the day of surgery    [x] If not already done, you can expect a call from registration    [x] You can expect a call the business day prior to procedure to notify you if your arrival time changes    [x] If you receive a survey after surgery we would greatly appreciate your comments    [] Parent/guardian of a minor must accompany their child and remain on the premises  the entire time they are under our care     [] Pediatric patients may bring favorite toy, blanket or comfort item with them    [x] A caregiver or family member must remain with the patient during their stay if they are mentally handicapped, have dementia, disoriented or unable to use a call light or would be a safety concern if left unattended    [x] Please notify surgeon if you develop any illness between now and time of surgery (cold, cough, sore throat, fever, nausea, vomiting) or any signs of infections  including skin, wounds, and dental.    []  The Outpatient Pharmacy is available to fill your prescription here on your day of surgery, ask your preop nurse for details    [] Other instructions  EDUCATIONAL MATERIALS PROVIDED:    [] PAT Preoperative Education Packet/Booklet     [] Medication List    [] Fluoroscopy Information Pamphlet    [] Transfusion bracelet applied with instructions    [] Joint replacement video reviewed    [] Shower with soap, lather and rinse well, and use CHG wipes provided the evening before surgery as instructed

## 2019-06-11 ENCOUNTER — HOSPITAL ENCOUNTER (OUTPATIENT)
Age: 64
Setting detail: OUTPATIENT SURGERY
Discharge: HOME | End: 2019-06-11
Attending: INTERNAL MEDICINE | Admitting: INTERNAL MEDICINE
Payer: MEDICARE

## 2019-06-11 ENCOUNTER — ANESTHESIA (OUTPATIENT)
Dept: ENDOSCOPY | Age: 64
End: 2019-06-11
Payer: MEDICARE

## 2019-06-11 ENCOUNTER — ANESTHESIA EVENT (OUTPATIENT)
Dept: ENDOSCOPY | Age: 64
End: 2019-06-11
Payer: MEDICARE

## 2019-06-11 VITALS
DIASTOLIC BLOOD PRESSURE: 72 MMHG | HEIGHT: 67 IN | HEART RATE: 87 BPM | WEIGHT: 203 LBS | TEMPERATURE: 97.6 F | SYSTOLIC BLOOD PRESSURE: 134 MMHG | BODY MASS INDEX: 31.86 KG/M2 | OXYGEN SATURATION: 92 % | RESPIRATION RATE: 17 BRPM

## 2019-06-11 VITALS — OXYGEN SATURATION: 93 % | SYSTOLIC BLOOD PRESSURE: 172 MMHG | DIASTOLIC BLOOD PRESSURE: 70 MMHG

## 2019-06-11 LAB — METER GLUCOSE: 130 MG/DL (ref 74–99)

## 2019-06-11 PROCEDURE — 2720000010 HC SURG SUPPLY STERILE: Performed by: INTERNAL MEDICINE

## 2019-06-11 PROCEDURE — 7100000011 HC PHASE II RECOVERY - ADDTL 15 MIN: Performed by: INTERNAL MEDICINE

## 2019-06-11 PROCEDURE — 3700000000 HC ANESTHESIA ATTENDED CARE: Performed by: INTERNAL MEDICINE

## 2019-06-11 PROCEDURE — 7100000010 HC PHASE II RECOVERY - FIRST 15 MIN: Performed by: INTERNAL MEDICINE

## 2019-06-11 PROCEDURE — 6360000002 HC RX W HCPCS: Performed by: NURSE ANESTHETIST, CERTIFIED REGISTERED

## 2019-06-11 PROCEDURE — 88305 TISSUE EXAM BY PATHOLOGIST: CPT

## 2019-06-11 PROCEDURE — 2580000003 HC RX 258: Performed by: NURSE ANESTHETIST, CERTIFIED REGISTERED

## 2019-06-11 PROCEDURE — 88341 IMHCHEM/IMCYTCHM EA ADD ANTB: CPT

## 2019-06-11 PROCEDURE — 3609012400 HC EGD TRANSORAL BIOPSY SINGLE/MULTIPLE: Performed by: INTERNAL MEDICINE

## 2019-06-11 PROCEDURE — 82962 GLUCOSE BLOOD TEST: CPT

## 2019-06-11 PROCEDURE — 3609010600 HC COLONOSCOPY POLYPECTOMY SNARE/COLD BIOPSY: Performed by: INTERNAL MEDICINE

## 2019-06-11 PROCEDURE — 3700000001 HC ADD 15 MINUTES (ANESTHESIA): Performed by: INTERNAL MEDICINE

## 2019-06-11 PROCEDURE — 2709999900 HC NON-CHARGEABLE SUPPLY: Performed by: INTERNAL MEDICINE

## 2019-06-11 PROCEDURE — 88342 IMHCHEM/IMCYTCHM 1ST ANTB: CPT

## 2019-06-11 RX ORDER — FENTANYL CITRATE 50 UG/ML
INJECTION, SOLUTION INTRAMUSCULAR; INTRAVENOUS PRN
Status: DISCONTINUED | OUTPATIENT
Start: 2019-06-11 | End: 2019-06-11 | Stop reason: SDUPTHER

## 2019-06-11 RX ORDER — SODIUM CHLORIDE 9 MG/ML
INJECTION, SOLUTION INTRAVENOUS CONTINUOUS PRN
Status: DISCONTINUED | OUTPATIENT
Start: 2019-06-11 | End: 2019-06-11 | Stop reason: SDUPTHER

## 2019-06-11 RX ORDER — IBUPROFEN 800 MG/1
800 TABLET ORAL DAILY
Status: ON HOLD | COMMUNITY
End: 2019-08-09 | Stop reason: HOSPADM

## 2019-06-11 RX ORDER — CIPROFLOXACIN 2 MG/ML
400 INJECTION, SOLUTION INTRAVENOUS ONCE
Status: DISCONTINUED | OUTPATIENT
Start: 2019-06-11 | End: 2019-06-11 | Stop reason: HOSPADM

## 2019-06-11 RX ORDER — LACTULOSE 10 G/15ML
30 SOLUTION ORAL DAILY
Status: ON HOLD | COMMUNITY
End: 2020-01-14 | Stop reason: HOSPADM

## 2019-06-11 RX ORDER — CIPROFLOXACIN 2 MG/ML
400 INJECTION, SOLUTION INTRAVENOUS ONCE
Status: DISCONTINUED | OUTPATIENT
Start: 2019-06-11 | End: 2019-06-11

## 2019-06-11 RX ORDER — CIPROFLOXACIN 2 MG/ML
INJECTION, SOLUTION INTRAVENOUS PRN
Status: DISCONTINUED | OUTPATIENT
Start: 2019-06-11 | End: 2019-06-11 | Stop reason: SDUPTHER

## 2019-06-11 RX ORDER — PROPOFOL 10 MG/ML
INJECTION, EMULSION INTRAVENOUS PRN
Status: DISCONTINUED | OUTPATIENT
Start: 2019-06-11 | End: 2019-06-11 | Stop reason: SDUPTHER

## 2019-06-11 RX ORDER — PENICILLIN V POTASSIUM 500 MG/1
500 TABLET ORAL EVERY 6 HOURS
COMMUNITY
End: 2019-06-19

## 2019-06-11 RX ADMIN — PROPOFOL 25 MG: 10 INJECTION, EMULSION INTRAVENOUS at 14:55

## 2019-06-11 RX ADMIN — SODIUM CHLORIDE: 9 INJECTION, SOLUTION INTRAVENOUS at 13:44

## 2019-06-11 RX ADMIN — PROPOFOL 25 MG: 10 INJECTION, EMULSION INTRAVENOUS at 15:07

## 2019-06-11 RX ADMIN — PROPOFOL 25 MG: 10 INJECTION, EMULSION INTRAVENOUS at 14:39

## 2019-06-11 RX ADMIN — PROPOFOL 25 MG: 10 INJECTION, EMULSION INTRAVENOUS at 14:57

## 2019-06-11 RX ADMIN — PROPOFOL 25 MG: 10 INJECTION, EMULSION INTRAVENOUS at 15:30

## 2019-06-11 RX ADMIN — PROPOFOL 25 MG: 10 INJECTION, EMULSION INTRAVENOUS at 15:24

## 2019-06-11 RX ADMIN — PROPOFOL 25 MG: 10 INJECTION, EMULSION INTRAVENOUS at 15:14

## 2019-06-11 RX ADMIN — PROPOFOL 25 MG: 10 INJECTION, EMULSION INTRAVENOUS at 15:34

## 2019-06-11 RX ADMIN — PROPOFOL 25 MG: 10 INJECTION, EMULSION INTRAVENOUS at 15:09

## 2019-06-11 RX ADMIN — FENTANYL CITRATE 25 MCG: 50 INJECTION, SOLUTION INTRAMUSCULAR; INTRAVENOUS at 14:52

## 2019-06-11 RX ADMIN — PROPOFOL 25 MG: 10 INJECTION, EMULSION INTRAVENOUS at 14:36

## 2019-06-11 RX ADMIN — PROPOFOL 25 MG: 10 INJECTION, EMULSION INTRAVENOUS at 15:12

## 2019-06-11 RX ADMIN — FENTANYL CITRATE 25 MCG: 50 INJECTION, SOLUTION INTRAMUSCULAR; INTRAVENOUS at 14:47

## 2019-06-11 RX ADMIN — PROPOFOL 25 MG: 10 INJECTION, EMULSION INTRAVENOUS at 15:05

## 2019-06-11 RX ADMIN — PROPOFOL 25 MG: 10 INJECTION, EMULSION INTRAVENOUS at 15:38

## 2019-06-11 RX ADMIN — PROPOFOL 25 MG: 10 INJECTION, EMULSION INTRAVENOUS at 14:43

## 2019-06-11 RX ADMIN — PROPOFOL 25 MG: 10 INJECTION, EMULSION INTRAVENOUS at 15:44

## 2019-06-11 RX ADMIN — PROPOFOL 25 MG: 10 INJECTION, EMULSION INTRAVENOUS at 15:00

## 2019-06-11 RX ADMIN — PROPOFOL 50 MG: 10 INJECTION, EMULSION INTRAVENOUS at 14:33

## 2019-06-11 RX ADMIN — PROPOFOL 25 MG: 10 INJECTION, EMULSION INTRAVENOUS at 15:18

## 2019-06-11 RX ADMIN — CIPROFLOXACIN 400 MG: 2 INJECTION, SOLUTION INTRAVENOUS at 15:24

## 2019-06-11 RX ADMIN — PROPOFOL 25 MG: 10 INJECTION, EMULSION INTRAVENOUS at 15:16

## 2019-06-11 RX ADMIN — PROPOFOL 25 MG: 10 INJECTION, EMULSION INTRAVENOUS at 14:46

## 2019-06-11 RX ADMIN — FENTANYL CITRATE 50 MCG: 50 INJECTION, SOLUTION INTRAMUSCULAR; INTRAVENOUS at 14:32

## 2019-06-11 RX ADMIN — PROPOFOL 25 MG: 10 INJECTION, EMULSION INTRAVENOUS at 14:52

## 2019-06-11 ASSESSMENT — PAIN SCALES - GENERAL: PAINLEVEL_OUTOF10: 0

## 2019-06-11 ASSESSMENT — ENCOUNTER SYMPTOMS: SHORTNESS OF BREATH: 0

## 2019-06-11 ASSESSMENT — PAIN - FUNCTIONAL ASSESSMENT: PAIN_FUNCTIONAL_ASSESSMENT: 0-10

## 2019-06-11 ASSESSMENT — LIFESTYLE VARIABLES: SMOKING_STATUS: 0

## 2019-06-11 NOTE — PROCEDURES
Procedure:  Esophagogastroduodenoscopy with Biopsy, Cold snare polypectomy, banding of esophageal varices    Indication:  Cirrhosis, h/o esophageal varices    Sedation  MAC  Ciprofloxacin 400 mg IV    Endoscope was advanced easily through mouth to second portion of duodenum      Oropharynx views are limited but grossly normal.    Esophagus:     GEJ at 40 cm. Grade 4 single varix; 4 bands placed with collapse of varix    Stomach:   Antrum is normal    Gastric body 6 mm sessile polyp removed with cold snare. Active oozing treated with tip of snare with complete cessation of bleeding. Retroflexed views show 1.5 cm GE JXN soft mass pulsating. Did not appear to be gastric varix. Difficult location of polyp. NBMI used, photo taken. Base of lesion biopsied only as was not entirely convinced gastric varix although mucosa appeared polypoid. Duodenum: Bulb is normal.    Second portion of duodenum is normal.    IMPRESSION AND PLAN:     1.  Grade 4 esophageal varix banded with complete collapse of varix  2. Pulsating GE JXN 1.5 cm lesion biopsied only  3. Gastric body polyp    Recommend hold ASA. OV 1 week. Repeat EGD 2-3 weeks with possible repeat banding and removal of GE JXN lesion. Hold Corgard for now. Follow up as outpatient in office, call [phone] to schedule for appointment.       Rosibel Byrne MD  6/11/2019  3:51 PM

## 2019-06-11 NOTE — ANESTHESIA PRE PROCEDURE
1 tablet by mouth nightly 12/26/18  Yes Sascha Leung, DO   lisinopril (PRINIVIL;ZESTRIL) 5 MG tablet Take 5 mg daily 11/8/18  Yes Dayana Betancourt DO   venlafaxine (EFFEXOR XR) 37.5 MG extended release capsule TAKE ONE CAPSULE BY MOUTH EVERY DAY  Patient taking differently: Take 75 mg by mouth daily  11/6/18  Yes Dayana Betancourt DO   montelukast (SINGULAIR) 10 MG tablet TAKE 1 TABLET BY MOUTH EVERY DAY AT NIGHT 10/31/18  Yes Susana Betancourt DO   amitriptyline (ELAVIL) 25 MG tablet Take 1 tablet by mouth nightly as needed for Sleep 10/31/18  Yes Beatrice Oliveira, DO   pantoprazole (PROTONIX) 40 MG tablet Take 40 mg by mouth daily Take morning of surgery with a sip of water   Yes Historical Provider, MD   aspirin 81 MG EC tablet Take 1 tablet by mouth daily 9/13/18  Yes Taina Hess MD   Milk Thistle 1000 MG CAPS Take by mouth daily Ld 10-24-18   Yes Historical Provider, MD   Insulin Pen Needle (BD PEN NEEDLE BASIM U/F) 32G X 4 MM MISC 1 each by Does not apply route daily With insulin 4/10/19   Joie Joyner MD   Insulin Syringe-Needle U-100 ([employer] INS SYR .3CC/29G) 29G X 1/2\" 0.3 ML MISC Four times a day with insulin 3/14/19   Joie Joyner MD   Misc. Devices (ADJUST BATH/SHOWER SEAT) MISC Use daily 10/11/18   Susana Betancourt DO   Misc. Devices (COMMODE BEDSIDE) MISC Use daily 10/5/18   Susana Betancourt, DO   FREESTYLE LANCETS MISC 1 each by Does not apply route daily 8/30/18   Susnaa Betancourt, DO   blood glucose test strips (ASCENSIA AUTODISC VI;ONE TOUCH ULTRA TEST VI) strip 1 each by In Vitro route daily As needed. 8/30/18   Beatrice Oliveira, DO   glucose monitoring kit (FREESTYLE) monitoring kit 1 kit by Does not apply route daily as needed (glucose) 8/28/18   Susana Betancourt DO       Current medications:    No current facility-administered medications for this encounter. Allergies:     Allergies   Allergen Reactions    Tramadol Other (See Comments) Disoriented, Dizziness  Disoriented, Dizziness       Problem List:    Patient Active Problem List   Diagnosis Code    Diabetes mellitus, type 2 ([facility] [address] E11.9    HTN (hypertension), benign I10    Mixed hyperlipidemia E78.2    History of hyperthyroidism Z86.39    Cirrhosis of liver with ascites ([facility] [address] K74.60, R18.8    Spondylosis of cervical region without myelopathy or radiculopathy M47.812    Cervical facet joint syndrome M47.812    Lumbar facet arthropathy M47.816    Primary osteoarthritis of both hips M16.0    Carotid stenosis I65.29    Left carotid stenosis I65.22    Acute diverticulitis K57.92    History of completed stroke Z86.73    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  Cough     post anesthesia    Diabetes mellitus ([facility] [address]     Diverticulitis     GERD (gastroesophageal reflux disease)     Hyperlipidemia     Hypertension     Liver cirrhosis (HCC)     Polyp of esophagus     PONV (postoperative nausea and vomiting)     Prolonged emergence from general anesthesia     Thyroid disease     no meds       Past Surgical History:        Procedure Laterality Date    CAROTID ENDARTERECTOMY      october 2018    CHOLECYSTECTOMY      COLONOSCOPY      HYSTERECTOMY  2003    FL Ricky Cortez INCIS Left 10/29/2018    LEFT CAROTID ENDARTERECTOMY performed by Gildardo Dueñas MD at [address] [place] [address] ENDOSCOPY         Social History:    Social History     Tobacco Use    Smoking status: Never Smoker    Smokeless tobacco: Never Used   Substance Use Topics    Alcohol use:  No                                Counseling given: Not Answered      Vital Signs (Current):   Vitals:    06/07/19 1602 06/11/19 1325 06/11/19 1350   BP:  (!) 85/51 (!) 95/52   Resp:  16    Temp:  97.6 °F (36.4 °C)    TempSrc:  Temporal    SpO2:  98%    Weight: 203 lb (92.1 kg)     Height: 5' 7\" (1.702 m)                                                BP Readings from Last 3 Encounters:   06/11/19 (!) 95/52   04/03/19 (!) 149/69   02/18/19 106/64       NPO Status: Time of last liquid consumption: 2330                  Time of last solid consumption: 0800                        Date of last liquid consumption: 06/10/19                        Date of last solid food consumption: 06/10/19    BMI:   Wt Readings from Last 3 Encounters:   06/07/19 203 lb (92.1 kg)   04/03/19 204 lb (92.5 kg)   02/18/19 203 lb (92.1 kg)     Body mass index is 31.79 kg/m².     CBC:   Lab Results   Component Value Date    WBC 6.4 04/08/2019    RBC 4.18 04/08/2019    HGB 12.8 04/08/2019    HCT 39.4 04/08/2019    MCV 94.3 04/08/2019    RDW 15.5 04/08/2019     04/08/2019       CMP:   Lab Results   Component Value Date     04/08/2019    K 4.1 04/08/2019    K 4.9 01/11/2019     04/08/2019    CO2 23 04/08/2019    BUN 9 04/08/2019    CREATININE 0.5 04/08/2019    GFRAA >60 04/08/2019    LABGLOM >60 04/08/2019    GLUCOSE 187 04/08/2019    PROT 7.4 04/08/2019    CALCIUM 8.6 04/08/2019    BILITOT 1.1 04/08/2019    ALKPHOS 143 04/08/2019    AST 51 04/08/2019    ALT 24 04/08/2019       POC Tests: No results for input(s): POCGLU, POCNA, POCK, POCCL, POCBUN, POCHEMO, POCHCT in the last 72 hours.  /  Coags: /  Lab Results   Component Value Date    PROTIME 15.4 01/11/2019    INR 1.4 01/11/2019    APTT 32.9 01/11/2019       HCG (If Applicable): No results found for: PREGTESTUR, PREGSERUM, HCG, HCGQUANT     ABGs: No results found for: PHART, PO2ART, IWL7BNO, JSM9CNW, BEART, Q3IIZYHK     Type & Screen (If Applicable):  No results found for: LABABO, LABRH     EKG 10/12/2018  Sinus  Tachycardia   OTHERWISE NORMAL     ECHO 9/12/2018   Summary   Left ventricular size is grossly normal with LVH.   Normal systolic function with LVEF estimated at

## 2019-06-11 NOTE — PROGRESS NOTES
Glucose 130 . Patient  called c/o lightheadedness . bp 85.51 and  Dr Perla Vieyra called and IV started IV fluids . Patient states feeling better.

## 2019-06-12 ENCOUNTER — OFFICE VISIT (OUTPATIENT)
Dept: VASCULAR SURGERY | Age: 64
End: 2019-06-12
Payer: MEDICARE

## 2019-06-12 ENCOUNTER — HOSPITAL ENCOUNTER (OUTPATIENT)
Dept: CARDIOLOGY | Age: 64
Discharge: HOME | End: 2019-06-12
Payer: MEDICARE

## 2019-06-12 VITALS — SYSTOLIC BLOOD PRESSURE: 132 MMHG | DIASTOLIC BLOOD PRESSURE: 70 MMHG | HEART RATE: 84 BPM

## 2019-06-12 DIAGNOSIS — I65.22 LEFT CAROTID STENOSIS: Primary | ICD-10-CM

## 2019-06-12 PROCEDURE — 3017F COLORECTAL CA SCREEN DOC REV: CPT | Performed by: SURGERY

## 2019-06-12 PROCEDURE — 93880 EXTRACRANIAL BILAT STUDY: CPT

## 2019-06-12 PROCEDURE — G8417 CALC BMI ABV UP PARAM F/U: HCPCS | Performed by: SURGERY

## 2019-06-12 PROCEDURE — 99213 OFFICE O/P EST LOW 20 MIN: CPT | Performed by: SURGERY

## 2019-06-12 PROCEDURE — G8598 ASA/ANTIPLAT THER USED: HCPCS | Performed by: SURGERY

## 2019-06-12 PROCEDURE — 1036F TOBACCO NON-USER: CPT | Performed by: SURGERY

## 2019-06-12 PROCEDURE — G8427 DOCREV CUR MEDS BY ELIG CLIN: HCPCS | Performed by: SURGERY

## 2019-06-12 RX ORDER — METFORMIN HYDROCHLORIDE 500 MG/1
TABLET, EXTENDED RELEASE ORAL
Qty: 30 TABLET | Refills: 3 | Status: SHIPPED | OUTPATIENT
Start: 2019-06-12 | End: 2019-06-19

## 2019-06-12 NOTE — PROGRESS NOTES
[facility] - [facility]    This is a pre read worksheet - prior to official physician interpretation    Groverdemar Rodriguezmoy  1955  Date of study: 6/12/19    Indication for study:  Carotid artery stenosis  Study : Bilateral Carotid Artery Duplex Examination    Duplex examination of the RIGHT carotid artery system identifies atherosclerotic plaque. The peak systolic velocity in internal carotid artery was 132 centimeters / second. The maximum end diastolic velocity was 30 centimeters / second. The ICA/CCA ratio is 1.8. The right vertebral artery has antegrade flow. Duplex examination of the LEFT carotid artery system identifies atherosclerotic plaque. The peak systolic velocity in internal carotid artery was 89 centimeters / second. The maximum end diastolic velocity was 25 centimeters / second. The ICA/CCA ratio is 1.0. The left vertebral artery has antegrade flow.         LAST STUDY  12/3/2018  Rt <50  Lt < 50

## 2019-06-12 NOTE — PROGRESS NOTES
30 mLs by mouth daily   Yes Historical Provider, MD   penicillin v potassium (VEETID) 500 MG tablet Take 500 mg by mouth every 6 hours   Yes Historical Provider, MD   ibuprofen (ADVIL;MOTRIN) 800 MG tablet Take 800 mg by mouth daily   Yes Historical Provider, MD   albuterol sulfate  (90 Base) MCG/ACT inhaler INHALE 2 PUFFS THREE TIMES A DAY AS NEEDED FOR WHEEZING 5/7/19  Yes Historical Provider, MD   XIFAXAN 550 MG tablet TAKE 1 TABLET BY MOUTH TWICE A DAY 5/19/19  Yes Historical Provider, MD   insulin aspart (NOVOLOG FLEXPEN) 100 UNIT/ML injection pen 35 units before breakfast and lunch and 40 units before supper plus a sliding scale. A max of 150 units/day 6/3/19  Yes Rickey Kirk MD   Insulin Pen Needle (BD PEN NEEDLE BASIM U/F) 32G X 4 MM MISC 1 each by Does not apply route daily With insulin 4/10/19  Yes Rickey Kirk MD   insulin glargine (TOUJEO SOLOSTAR) 300 UNIT/ML injection pen Inject 94 Units into the skin nightly  Patient taking differently: Inject 55 Units into the skin 2 times daily  4/4/19  Yes Rickey Kirk MD   Insulin Syringe-Needle U-100 ([employer] INS SYR .3CC/29G) 29G X 1/2\" 0.3 ML MISC Four times a day with insulin 3/14/19  Yes Rickey Kirk MD   furosemide (LASIX) 20 MG tablet Take 0.5 tablets by mouth daily 2/18/19  Yes Shamika Brown, DO   potassium chloride (KLOR-CON M) 10 MEQ extended release tablet Take 1 tablet by mouth daily 1/22/19  Yes Chandan Betancourt, DO   ciclopirox (LOPROX) 0.77 % cream Apply topically 2 times daily Apply topically 2 times daily.    Yes Historical Provider, MD   atorvastatin (LIPITOR) 40 MG tablet Take 1 tablet by mouth nightly 12/26/18  Yes Tarik Kumari, DO   lisinopril (PRINIVIL;ZESTRIL) 5 MG tablet Take 5 mg daily 11/8/18  Yes Jaylene Snow, DO   venlafaxine (EFFEXOR XR) 37.5 MG extended release capsule TAKE ONE CAPSULE BY MOUTH EVERY DAY  Patient taking differently: Take 75 mg by mouth daily  11/6/18  Yes Chandan Cordero Serafin, DO   montelukast (SINGULAIR) 10 MG tablet TAKE 1 TABLET BY MOUTH EVERY DAY AT NIGHT 10/31/18  Yes Jenny Betancourt, DO   amitriptyline (ELAVIL) 25 MG tablet Take 1 tablet by mouth nightly as needed for Sleep 10/31/18  Yes [address], DO   pantoprazole (PROTONIX) 40 MG tablet Take 40 mg by mouth daily Take morning of surgery with a sip of water   Yes Historical Provider, MD   Misc. Devices (ADJUST BATH/SHOWER SEAT) MISC Use daily 10/11/18  Yes [address], DO   Misc. Devices (COMMODE BEDSIDE) MISC Use daily 10/5/18  Yes Jenny Betancourt, DO   aspirin 81 MG EC tablet Take 1 tablet by mouth daily 9/13/18  Yes Claudell Leatherwood, MD   Milk Thistle 1000 MG CAPS Take by mouth daily Ld 10-24-18   Yes Historical Provider, MD   FREESTYLE LANCETS MISC 1 each by Does not apply route daily 8/30/18  Yes [address], DO   blood glucose test strips (ASCENSIA AUTODISC VI;ONE TOUCH ULTRA TEST VI) strip 1 each by In Vitro route daily As needed.  8/30/18  Yes [address], DO   glucose monitoring kit (FREESTYLE) monitoring kit 1 kit by Does not apply route daily as needed (glucose) 8/28/18  Yes Jenny Betancourt, DO     Allergies:  Tramadol    Social History     Socioeconomic History    Marital status:      Spouse name: Not on file    Number of children: Not on file    Years of education: Not on file    Highest education level: Not on file   Occupational History    Not on file   Social Needs    Financial resource strain: Not on file    Food insecurity:     Worry: Not on file     Inability: Not on file    Transportation needs:     Medical: Not on file     Non-medical: Not on file   Tobacco Use    Smoking status: Never Smoker    Smokeless tobacco: Never Used   Substance and Sexual Activity    Alcohol use: No    Drug use: No    Sexual activity: Not on file   Lifestyle    Physical activity:     Days per week: Not on file     Minutes per session: Not on file    Stress: Not on file Relationships    Social connections:     Talks on phone: Not on file     Gets together: Not on file     Attends Zoroastrian service: Not on file     Active member of club or organization: Not on file     Attends meetings of clubs or organizations: Not on file     Relationship status: Not on file    Intimate partner violence:     Fear of current or ex partner: Not on file     Emotionally abused: Not on file     Physically abused: Not on file     Forced sexual activity: Not on file   Other Topics Concern    Not on file   Social History Narrative    Not on file        Family History   Problem Relation Age of Onset    Heart Disease Mother 28    Diabetes Mother     Heart Disease Sister     Diabetes Sister        REVIEW OF SYSTEMS:    Constitutional: Negative for activity change, appetite change, chills, diaphoresis, fatigue, fever and unexpected weight change. HEENT: Negative for congestion, ear discharge, ear pain, hearing loss, nosebleeds, rhinorrhea, sinus pain, sore throat, tinnitus, trouble swallowing and voice change. Eyes: Negative for photophobia, pain, discharge, redness, itching and visual disturbance. Respiratory: Negative for apnea, cough, chest tightness, shortness of breath and wheezing. Cardiovascular: Negative for chest pain, palpitations and leg swelling. Gastrointestinal: Negative for abdominal distention, abdominal pain, blood in stool, constipation, diarrhea, nausea and vomiting. Endocrine: Negative for cold intolerance, heat intolerance, polydipsia, polyphagia and polyuria. Genitourinary: Negative for decreased urine volume, difficulty urinating, dysuria, frequency, hematuria and urgency. Musculoskeletal: Negative for arthralgias, back pain, gait problem, joint swelling and neck pain. Skin: Negative for color change, pallor, rash and wound. Allergic/Immunologic: Negative for environmental allergies, food allergies and immunocompromised state.    Neurological: Negative for dizziness, syncope, facial asymmetry, speech difficulty, weakness, light-headedness, numbness and headaches. Hematological: Negative for adenopathy. Does not bruise/bleed easily. Psychiatric/Behavioral: Negative for agitation, confusion, sleep disturbance and suicidal ideas. The patient is not nervous/anxious. PHYSICAL EXAM:  Vitals:    06/12/19 1340   BP: 132/70   Pulse: 84     General Appearance: alert and oriented to person, place and time, well developed and well- nourished, in no acute distress  Skin: warm and dry, no rash or erythema  Head: normocephalic and atraumatic  Eyes: extraocular eye movements intact, conjunctivae normal  ENT: external ear and ear canal normal bilaterally, nose without deformity  Pulmonary/Chest: clear to auscultation bilaterally- no wheezes, rales or rhonchi, normal air movement, no respiratory distress  Cardiovascular: normal rate, regular rhythm, normal S1 and S2, no murmurs, no carotid bruits  Abdomen: soft, non-tender, non-distended, normal bowel sounds, no masses or organomegaly  Musculoskeletal: normal range of motion, no joint swelling, deformity or tenderness  Neurologic: no cranial nerve deficit, gait, coordination and speech normal  Extremities: Mild bilateral trace edema and 1+. Bilateral palpable brachial radial pulses 2-3+. Bilateral palpable 1+ DP and PTs. Motor and sensation are intact. Problem List Items Addressed This Visit     Left carotid stenosis - Primary      Defined Dio Ramsey  1955  Date of study: 6/12/19     Indication for study:  Carotid artery stenosis  Study : Bilateral Carotid Artery Duplex Examination     Duplex examination of the RIGHT carotid artery system identifies atherosclerotic plaque. The peak systolic velocity in internal carotid artery was 132 centimeters / second. The maximum end diastolic velocity was 30 centimeters / second. The ICA/CCA ratio is 1.8.   The right vertebral artery has antegrade flow.     Duplex examination of the LEFT carotid artery system identifies atherosclerotic plaque. The peak systolic velocity in internal carotid artery was 89 centimeters / second. The maximum end diastolic velocity was 25 centimeters / second. The ICA/CCA ratio is 1.0. The left vertebral artery has antegrade flow.              #1 status post left carotid endarterectomy overall she is doing well her left carotid endarterectomy site is widely patent. She will continue with her current therapy and she will follow-up in 6 months with a repeat carotid duplex exam      No follow-ups on file.

## 2019-06-19 ENCOUNTER — OFFICE VISIT (OUTPATIENT)
Dept: ENDOCRINOLOGY | Age: 64
End: 2019-06-19
Payer: MEDICARE

## 2019-06-19 VITALS
DIASTOLIC BLOOD PRESSURE: 82 MMHG | OXYGEN SATURATION: 93 % | WEIGHT: 218.8 LBS | BODY MASS INDEX: 34.34 KG/M2 | HEART RATE: 88 BPM | HEIGHT: 67 IN | RESPIRATION RATE: 16 BRPM | SYSTOLIC BLOOD PRESSURE: 132 MMHG

## 2019-06-19 LAB — HBA1C MFR BLD: 9.1 %

## 2019-06-19 PROCEDURE — 2022F DILAT RTA XM EVC RTNOPTHY: CPT | Performed by: INTERNAL MEDICINE

## 2019-06-19 PROCEDURE — G8427 DOCREV CUR MEDS BY ELIG CLIN: HCPCS | Performed by: INTERNAL MEDICINE

## 2019-06-19 PROCEDURE — 95250 CONT GLUC MNTR PHYS/QHP EQP: CPT | Performed by: INTERNAL MEDICINE

## 2019-06-19 PROCEDURE — 83036 HEMOGLOBIN GLYCOSYLATED A1C: CPT | Performed by: INTERNAL MEDICINE

## 2019-06-19 PROCEDURE — 99214 OFFICE O/P EST MOD 30 MIN: CPT | Performed by: INTERNAL MEDICINE

## 2019-06-19 PROCEDURE — 3017F COLORECTAL CA SCREEN DOC REV: CPT | Performed by: INTERNAL MEDICINE

## 2019-06-19 PROCEDURE — 3046F HEMOGLOBIN A1C LEVEL >9.0%: CPT | Performed by: INTERNAL MEDICINE

## 2019-06-19 PROCEDURE — G8598 ASA/ANTIPLAT THER USED: HCPCS | Performed by: INTERNAL MEDICINE

## 2019-06-19 PROCEDURE — G8417 CALC BMI ABV UP PARAM F/U: HCPCS | Performed by: INTERNAL MEDICINE

## 2019-06-19 PROCEDURE — 1036F TOBACCO NON-USER: CPT | Performed by: INTERNAL MEDICINE

## 2019-06-19 RX ORDER — FLASH GLUCOSE SENSOR
KIT MISCELLANEOUS
Qty: 2 EACH | Refills: 5 | Status: SHIPPED | OUTPATIENT
Start: 2019-06-19 | End: 2019-09-18

## 2019-06-19 RX ORDER — FLASH GLUCOSE SCANNING READER
EACH MISCELLANEOUS
Qty: 1 DEVICE | Refills: 0 | Status: SHIPPED | OUTPATIENT
Start: 2019-06-19 | End: 2019-09-18

## 2019-06-19 RX ORDER — ACETAMINOPHEN AND CODEINE PHOSPHATE 300; 30 MG/1; MG/1
TABLET ORAL PRN
COMMUNITY
Start: 2019-06-18 | End: 2019-10-18

## 2019-06-19 NOTE — PROGRESS NOTES
ENDOCRINOLOGY CLINIC NOTE    Date of Service: 6/19/2019    Medical Records Reviewed:   Inpatient records, outpatient records, outside records     Care Team:  Primary Care Physician: King Josefa DO. Provider: Spenser Seaman MD  Other provider(s):            Reason for the visit:  Type 2 DM, h/o hyperthyroidism     History of Present Illness: The history is provided by the patient. No  was used. Accuracy of the patient data is excellent. Angelita Wilburn is a very pleasant 58 y.o. female seen in Endocrine clinic today for diabetes management. Angelita Wilburn was diagnosed with diabetes at age 51 . She is currently on Toujeo to 55 units BID, Humalog before meals to 35/35/40 units, Sliding scale 3:50>150, Metformin 500mg once daily    Wasn't able to tolerate higher dose of Metformin. Also with h/o liver disease will keep on very small dose of Metformin    The patient has been checking blood sugar 3day. Not improved compared with the last visit and readings are improving. Average -280. Lab Results   Component Value Date    LABA1C 9.1 06/19/2019    LABA1C 8.5 12/02/2018    LABA1C 10.0 10/17/2018    LABA1C 10.4 09/09/2018     She has had a lot of stress with medical conditions recently which have affected her ability to have BS control. Overall there has been no major improvement in her BS. Medical changes include having a polyp removed from stomach. She has been suffering URI and sore throat. Recently also had variceal banding secondary to cirrhosis. Patient has had no hypoglycemic episodes   The patient has been mindful of what has been eating and following diabetic diet as encouraged. She will sneak snacks at times, per son, however. I reviewed current medications and the patient has no issues with diabetes medications  The patient is up to date with the and now does have diabetic retinopathy - per eye exam last week.  She will f/u with optho in July for further Laterality Date    CAROTID ENDARTERECTOMY      october 2018    CHOLECYSTECTOMY      COLONOSCOPY      COLONOSCOPY N/A 6/11/2019    COLONOSCOPY POLYPECTOMY SNARE/COLD BIOPSY performed by Antelmo Ramirez MD at [address] [place] [address]  2003    NM THROMBOENDARTECTMY Harmeet Bird INCIS Left 10/29/2018    LEFT CAROTID ENDARTERECTOMY performed by Fausto Holter, MD at [address]  [facility] ENDOSCOPY      UPPER GASTROINTESTINAL ENDOSCOPY N/A 6/11/2019    EGD BIOPSY performed by Antelmo Ramriez MD at [facility] ENDOSCOPY     SOCIAL HISTORY   Social History     Socioeconomic History    Marital status:      Spouse name: Not on file    Number of children: Not on file    Years of education: Not on file    Highest education level: Not on file   Occupational History    Not on file   Social Needs    Financial resource strain: Not on file    Food insecurity:     Worry: Not on file     Inability: Not on file    Transportation needs:     Medical: Not on file     Non-medical: Not on file   Tobacco Use    Smoking status: Never Smoker    Smokeless tobacco: Never Used   Substance and Sexual Activity    Alcohol use: No    Drug use: No    Sexual activity: Not on file   Lifestyle    Physical activity:     Days per week: Not on file     Minutes per session: Not on file    Stress: Not on file   Relationships    Social connections:     Talks on phone: Not on file     Gets together: Not on file     Attends Mosque service: Not on file     Active member of club or organization: Not on file     Attends meetings of clubs or organizations: Not on file     Relationship status: Not on file    Intimate partner violence:     Fear of current or ex partner: Not on file     Emotionally abused: Not on file     Physically abused: Not on file     Forced sexual activity: Not on file   Other Topics Concern    Not on file   Social History Narrative    Not on file     FAMILY HISTORY   Family History   Problem Relation Age of Onset    Heart Disease Mother 28    Diabetes Mother     Heart Disease Sister     Diabetes Sister      ALLERGIES AND DRUG REACTIONS   Allergies   Allergen Reactions    Tramadol Other (See Comments)     Disoriented, Dizziness  Disoriented, Dizziness       CURRENT MEDICATIONS     Current Outpatient Medications   Medication Sig Dispense Refill    acetaminophen-codeine (TYLENOL #3) 300-30 MG per tablet as needed.  metFORMIN (GLUCOPHAGE-XR) 500 MG extended release tablet TAKE 1 TABLET BY MOUTH EVERY DAY 30 tablet 3    lactulose (CHRONULAC) 10 GM/15ML solution Take 30 mLs by mouth daily      ibuprofen (ADVIL;MOTRIN) 800 MG tablet Take 800 mg by mouth daily      albuterol sulfate  (90 Base) MCG/ACT inhaler INHALE 2 PUFFS THREE TIMES A DAY AS NEEDED FOR WHEEZING  0    XIFAXAN 550 MG tablet TAKE 1 TABLET BY MOUTH TWICE A DAY  11    insulin aspart (NOVOLOG FLEXPEN) 100 UNIT/ML injection pen 35 units before breakfast and lunch and 40 units before supper plus a sliding scale. A max of 150 units/day 15 pen 5    Insulin Pen Needle (BD PEN NEEDLE BASIM U/F) 32G X 4 MM MISC 1 each by Does not apply route daily With insulin 100 each 11    insulin glargine (TOUJEO SOLOSTAR) 300 UNIT/ML injection pen Inject 94 Units into the skin nightly (Patient taking differently: Inject 55 Units into the skin 2 times daily ) 4 pen 5    Insulin Syringe-Needle U-100 ([employer] INS SYR .3CC/29G) 29G X 1/2\" 0.3 ML MISC Four times a day with insulin 250 each 3    furosemide (LASIX) 20 MG tablet Take 0.5 tablets by mouth daily 15 tablet 0    potassium chloride (KLOR-CON M) 10 MEQ extended release tablet Take 1 tablet by mouth daily 30 tablet 1    ciclopirox (LOPROX) 0.77 % cream Apply topically 2 times daily Apply topically 2 times daily.       atorvastatin (LIPITOR) 40 MG tablet Take 1 tablet by mouth nightly 30 tablet 3    lisinopril (PRINIVIL;ZESTRIL) 5 MG tablet Take 5 mg daily 30 tablet 2  venlafaxine (EFFEXOR XR) 37.5 MG extended release capsule TAKE ONE CAPSULE BY MOUTH EVERY DAY (Patient taking differently: Take 75 mg by mouth daily ) 90 capsule 2    montelukast (SINGULAIR) 10 MG tablet TAKE 1 TABLET BY MOUTH EVERY DAY AT NIGHT 90 tablet 2    amitriptyline (ELAVIL) 25 MG tablet Take 1 tablet by mouth nightly as needed for Sleep 90 tablet 2    pantoprazole (PROTONIX) 40 MG tablet Take 40 mg by mouth daily Take morning of surgery with a sip of water      Misc. Devices (ADJUST BATH/SHOWER SEAT) MISC Use daily 1 each 0    Misc. Devices (COMMODE BEDSIDE) MISC Use daily 1 each 0    aspirin 81 MG EC tablet Take 1 tablet by mouth daily 90 tablet 1    Milk Thistle 1000 MG CAPS Take by mouth daily Ld 10-24-18      FREESTYLE LANCETS MISC 1 each by Does not apply route daily 100 each 3    blood glucose test strips (ASCENSIA AUTODISC VI;ONE TOUCH ULTRA TEST VI) strip 1 each by In Vitro route daily As needed. 100 each 3    glucose monitoring kit (FREESTYLE) monitoring kit 1 kit by Does not apply route daily as needed (glucose) 1 kit 0    penicillin v potassium (VEETID) 500 MG tablet Take 500 mg by mouth every 6 hours       No current facility-administered medications for this visit. Review of Systems  Constitutional: tired   HEENT: No blurred vision, No sore throat, no ear pain, no hair loss  Neck: denied any neck swelling, difficulty swallowing,   Cardio-pulmonary: No CP, SOB or palpitation, No orthopnea or PND. No cough or wheezing. GI: No N/V/D, no constipation, No current abdominal pain, no melena or hematochezia   : Denied any dysuria, hematuria, flank pain, discharge, or incontinence. Skin: denied any rash, ulcer, Hirsute, or hyperpigmentation. MSK: denied any joint deformity, joint pain/swelling, muscle pain, or back pain.   Neuro: no numbness, no tingling, no weakness, _  OBJECTIVE    /82   Pulse 88   Resp 16   Ht 5' 7\" (1.702 m)   Wt 218 lb 12.8 oz (99.2 kg)   SpO2 CHOL 169 12/18/2014    TRIG 136 09/09/2018    TRIG 168 04/13/2018    TRIG 148 12/18/2014    HDL 31 09/09/2018    HDL 39 04/13/2018    HDL 42 12/18/2014     Lab Results   Component Value Date    VITD25 33 04/13/2018     Lab Results   Component Value Date/Time    TSH 3.680 03/20/2019 08:35 AM    T4FREE 1.08 03/20/2019 08:35 AM    TSI 85 10/17/2018 11:02 AM    TPOABS 464.9 (H) 10/17/2018 11:02 AM    THGAB 1.0 10/17/2018 11:02 AM       Medical Records/Labs/Images review:   I personally reviewed and summarized previous records   All labs were reviewed independently     [address] Yahaira Coyle Mark Anthony Santana, a 58 y.o.-old female seen in for the following issues     Diabetes Mellitus Type 2    · Improving control but still above goal. A1c now 9.1% up from  8.5 %. · stop Metformin (h/o liver disease)   · Take Toujeo 55 units BID, Humalog 35/35/40 units before meals + current sliding scale  · Advised to check blood sugars 4 times a day before meals and at bedtime and fax the results to our office in a week. · Discussed with patient A1c and blood sugar goals   · Optimal blood sugars: 100-140 pre-prandial, < 180 peak post-prandial  · The patient counseled about the complications of uncontrolled diabetes   · Patient was counselled about the importance of self-blood glucose monitoring and eating consistent carb diet to avoid blood sugar fluctuations   · Patient up to date with the routine diabetes maintenance and prevention  · Discussed lifestyle changes including diet and exercise with patient; recommended 150 minutes of moderate intensity exercise per week. · Diabetes labs before next visit     Hyperthyroidism   · Has been on Methimazole for almost 8 years. I stopped Methimaozole in 9/2018   · Clinically and biochemically euthyroid     Multinodular goiter   · Prevalence of thyroid nodule on thyroid ultrasound is 50% and 95 % of these nodules are benign.   · Given nodule size and lack of ultrasound suspicious features which making the nodule less likely to be malignant, I will continue following with periodic US      Return in about 4 months (around 10/19/2019) for DM type 2 on insulin . The above issues were reviewed with the patient who understood and agreed with the plan. 30 minutes were spent today in management of this patient. More than 50% of time spent on counseling of patient on above diagnosis. Thank you for allowing us to participate in the care of this patient. Please do not hesitate to contact us with any additional questions. Diagnosis Orders   1.  IDDM (insulin dependent diabetes mellitus) ([facility]  POCT glycosylated hemoglobin (Hb A1C)      Nando Ashley MD  Endocrinologist, [facility])   [address] [place], [place] [address]   Phone: [phone]  Fax: [phone]  --------------------------------------------  Electronically signed

## 2019-06-24 ENCOUNTER — NURSE ONLY (OUTPATIENT)
Dept: ENDOCRINOLOGY | Age: 64
End: 2019-06-24
Payer: MEDICARE

## 2019-06-24 PROCEDURE — 95251 CONT GLUC MNTR ANALYSIS I&R: CPT | Performed by: INTERNAL MEDICINE

## 2019-06-25 ENCOUNTER — TELEPHONE (OUTPATIENT)
Dept: ENDOCRINOLOGY | Age: 64
End: 2019-06-25

## 2019-06-25 NOTE — PROGRESS NOTES
Patient had an iPro2 removed from the right side of the pt's abdomen yesterday with no issue. It was downloaded along with her blood sugars and placed on the doctor's desk.

## 2019-06-25 NOTE — TELEPHONE ENCOUNTER
Blood glucose log reviewed with son, Ailyn Klein. Discussed changes re humalog (see scanned in report). Med list updated    Son requesting this to updated in [facility]. Son would like refill on Toujeo. Please sign off.

## 2019-07-15 ENCOUNTER — TELEPHONE (OUTPATIENT)
Dept: ENDOCRINOLOGY | Age: 64
End: 2019-07-15

## 2019-07-15 NOTE — TELEPHONE ENCOUNTER
Patents son Phoenix called in stating they are having a hard time trying to find a insulin where the copay is under $100. They tried basaglar the copay was $289, Levemir is $134 and now Toujeo is $100. They can't afford any of that. They are asking what insulin can they get that would be cheaper. He said they can try Vials if those would be cheaper or the Relion brand from  [facility]. He is currently giving her his old prescription of levemir. It is not  but will run out soon. Please advise Thank you.

## 2019-07-16 NOTE — TELEPHONE ENCOUNTER
Can you please give her information on our insulin assistant program and see if is she will be eligible for it.  Meanwhile, please give her a vial or two of Tresiba (long acting) and vial or two Humalog until wee see if she will be eligible for the program.     If she can't get to the program, then will try Relion insulin

## 2019-08-06 ENCOUNTER — HOSPITAL ENCOUNTER (OUTPATIENT)
Age: 64
Discharge: HOME | End: 2019-08-08
Payer: MEDICARE

## 2019-08-06 ENCOUNTER — HOSPITAL ENCOUNTER (OUTPATIENT)
Dept: GENERAL RADIOLOGY | Age: 64
Discharge: HOME | End: 2019-08-08
Payer: MEDICARE

## 2019-08-06 DIAGNOSIS — R07.89 OTHER CHEST PAIN: ICD-10-CM

## 2019-08-06 PROCEDURE — 71101 X-RAY EXAM UNILAT RIBS/CHEST: CPT

## 2019-08-08 ENCOUNTER — APPOINTMENT (OUTPATIENT)
Dept: CT IMAGING | Age: 64
End: 2019-08-08
Payer: MEDICARE

## 2019-08-08 ENCOUNTER — APPOINTMENT (OUTPATIENT)
Dept: GENERAL RADIOLOGY | Age: 64
End: 2019-08-08
Payer: MEDICARE

## 2019-08-08 ENCOUNTER — HOSPITAL ENCOUNTER (OUTPATIENT)
Age: 64
Setting detail: OBSERVATION
Discharge: HOME | End: 2019-08-09
Attending: EMERGENCY MEDICINE | Admitting: INTERNAL MEDICINE
Payer: MEDICARE

## 2019-08-08 DIAGNOSIS — R09.02 HYPOXIA: ICD-10-CM

## 2019-08-08 DIAGNOSIS — J06.9 ACUTE UPPER RESPIRATORY INFECTION: Primary | ICD-10-CM

## 2019-08-08 LAB
ALBUMIN SERPL-MCNC: 2.7 G/DL (ref 3.5–5.2)
ALP BLD-CCNC: 164 U/L (ref 35–104)
ALT SERPL-CCNC: 18 U/L (ref 0–32)
ANION GAP SERPL CALCULATED.3IONS-SCNC: 9 MMOL/L (ref 7–16)
APTT: 34.6 SEC (ref 24.5–35.1)
AST SERPL-CCNC: 47 U/L (ref 0–31)
BASOPHILS ABSOLUTE: 0.06 E9/L (ref 0–0.2)
BASOPHILS RELATIVE PERCENT: 0.7 % (ref 0–2)
BILIRUB SERPL-MCNC: 1.3 MG/DL (ref 0–1.2)
BUN BLDV-MCNC: 12 MG/DL (ref 8–23)
CALCIUM SERPL-MCNC: 8.7 MG/DL (ref 8.6–10.2)
CHLORIDE BLD-SCNC: 103 MMOL/L (ref 98–107)
CO2: 24 MMOL/L (ref 22–29)
CREAT SERPL-MCNC: 0.8 MG/DL (ref 0.5–1)
EOSINOPHILS ABSOLUTE: 0.21 E9/L (ref 0.05–0.5)
EOSINOPHILS RELATIVE PERCENT: 2.5 % (ref 0–6)
GFR AFRICAN AMERICAN: >60
GFR NON-AFRICAN AMERICAN: >60 ML/MIN/1.73
GLUCOSE BLD-MCNC: 92 MG/DL (ref 74–99)
HCT VFR BLD CALC: 38.6 % (ref 34–48)
HEMOGLOBIN: 12.4 G/DL (ref 11.5–15.5)
IMMATURE GRANULOCYTES #: 0.06 E9/L
IMMATURE GRANULOCYTES %: 0.7 % (ref 0–5)
INR BLD: 1.4
LYMPHOCYTES ABSOLUTE: 2.34 E9/L (ref 1.5–4)
LYMPHOCYTES RELATIVE PERCENT: 28 % (ref 20–42)
MCH RBC QN AUTO: 29.9 PG (ref 26–35)
MCHC RBC AUTO-ENTMCNC: 32.1 % (ref 32–34.5)
MCV RBC AUTO: 93 FL (ref 80–99.9)
MONOCYTES ABSOLUTE: 0.84 E9/L (ref 0.1–0.95)
MONOCYTES RELATIVE PERCENT: 10.1 % (ref 2–12)
NEUTROPHILS ABSOLUTE: 4.84 E9/L (ref 1.8–7.3)
NEUTROPHILS RELATIVE PERCENT: 58 % (ref 43–80)
PDW BLD-RTO: 15.6 FL (ref 11.5–15)
PLATELET # BLD: 179 E9/L (ref 130–450)
PMV BLD AUTO: 11 FL (ref 7–12)
POTASSIUM REFLEX MAGNESIUM: 3.7 MMOL/L (ref 3.5–5)
PROTHROMBIN TIME: 15.6 SEC (ref 9.3–12.4)
RBC # BLD: 4.15 E12/L (ref 3.5–5.5)
SODIUM BLD-SCNC: 136 MMOL/L (ref 132–146)
TOTAL PROTEIN: 7.3 G/DL (ref 6.4–8.3)
TROPONIN: <0.01 NG/ML (ref 0–0.03)
WBC # BLD: 8.4 E9/L (ref 4.5–11.5)

## 2019-08-08 PROCEDURE — 85025 COMPLETE CBC W/AUTO DIFF WBC: CPT

## 2019-08-08 PROCEDURE — 99285 EMERGENCY DEPT VISIT HI MDM: CPT

## 2019-08-08 PROCEDURE — 2580000003 HC RX 258: Performed by: EMERGENCY MEDICINE

## 2019-08-08 PROCEDURE — 80053 COMPREHEN METABOLIC PANEL: CPT

## 2019-08-08 PROCEDURE — 84484 ASSAY OF TROPONIN QUANT: CPT

## 2019-08-08 PROCEDURE — 71101 X-RAY EXAM UNILAT RIBS/CHEST: CPT

## 2019-08-08 PROCEDURE — 70450 CT HEAD/BRAIN W/O DYE: CPT

## 2019-08-08 PROCEDURE — 85730 THROMBOPLASTIN TIME PARTIAL: CPT

## 2019-08-08 PROCEDURE — 85610 PROTHROMBIN TIME: CPT

## 2019-08-08 PROCEDURE — 83036 HEMOGLOBIN GLYCOSYLATED A1C: CPT

## 2019-08-08 PROCEDURE — 73630 X-RAY EXAM OF FOOT: CPT

## 2019-08-08 PROCEDURE — 93005 ELECTROCARDIOGRAM TRACING: CPT | Performed by: EMERGENCY MEDICINE

## 2019-08-08 RX ORDER — SODIUM CHLORIDE 9 MG/ML
INJECTION, SOLUTION INTRAVENOUS ONCE
Status: COMPLETED | OUTPATIENT
Start: 2019-08-08 | End: 2019-08-08

## 2019-08-08 RX ADMIN — SODIUM CHLORIDE: 9 INJECTION, SOLUTION INTRAVENOUS at 19:52

## 2019-08-08 ASSESSMENT — PAIN DESCRIPTION - LOCATION: LOCATION: FOOT

## 2019-08-08 ASSESSMENT — PAIN DESCRIPTION - ORIENTATION: ORIENTATION: RIGHT

## 2019-08-08 ASSESSMENT — PAIN DESCRIPTION - PAIN TYPE: TYPE: ACUTE PAIN

## 2019-08-09 VITALS
HEIGHT: 67 IN | DIASTOLIC BLOOD PRESSURE: 85 MMHG | WEIGHT: 227 LBS | HEART RATE: 97 BPM | BODY MASS INDEX: 35.63 KG/M2 | RESPIRATION RATE: 18 BRPM | SYSTOLIC BLOOD PRESSURE: 174 MMHG | TEMPERATURE: 98.4 F | OXYGEN SATURATION: 93 %

## 2019-08-09 PROBLEM — J06.9 URI, ACUTE: Status: ACTIVE | Noted: 2019-08-09

## 2019-08-09 LAB
ANION GAP SERPL CALCULATED.3IONS-SCNC: 10 MMOL/L (ref 7–16)
BUN BLDV-MCNC: 10 MG/DL (ref 8–23)
CALCIUM SERPL-MCNC: 8 MG/DL (ref 8.6–10.2)
CHLORIDE BLD-SCNC: 108 MMOL/L (ref 98–107)
CO2: 22 MMOL/L (ref 22–29)
CREAT SERPL-MCNC: 0.7 MG/DL (ref 0.5–1)
EKG ATRIAL RATE: 89 BPM
EKG P AXIS: -3 DEGREES
EKG P-R INTERVAL: 152 MS
EKG Q-T INTERVAL: 406 MS
EKG QRS DURATION: 86 MS
EKG QTC CALCULATION (BAZETT): 493 MS
EKG R AXIS: -14 DEGREES
EKG T AXIS: 43 DEGREES
EKG VENTRICULAR RATE: 89 BPM
GFR AFRICAN AMERICAN: >60
GFR NON-AFRICAN AMERICAN: >60 ML/MIN/1.73
GLUCOSE BLD-MCNC: 339 MG/DL (ref 74–99)
HBA1C MFR BLD: 9.3 % (ref 4–5.6)
METER GLUCOSE: 325 MG/DL (ref 74–99)
POTASSIUM SERPL-SCNC: 4.5 MMOL/L (ref 3.5–5)
SODIUM BLD-SCNC: 140 MMOL/L (ref 132–146)

## 2019-08-09 PROCEDURE — 6370000000 HC RX 637 (ALT 250 FOR IP): Performed by: INTERNAL MEDICINE

## 2019-08-09 PROCEDURE — 36415 COLL VENOUS BLD VENIPUNCTURE: CPT

## 2019-08-09 PROCEDURE — 6360000002 HC RX W HCPCS: Performed by: INTERNAL MEDICINE

## 2019-08-09 PROCEDURE — 2580000003 HC RX 258

## 2019-08-09 PROCEDURE — 96365 THER/PROPH/DIAG IV INF INIT: CPT

## 2019-08-09 PROCEDURE — 6360000002 HC RX W HCPCS: Performed by: EMERGENCY MEDICINE

## 2019-08-09 PROCEDURE — 82962 GLUCOSE BLOOD TEST: CPT

## 2019-08-09 PROCEDURE — 2500000003 HC RX 250 WO HCPCS: Performed by: INTERNAL MEDICINE

## 2019-08-09 PROCEDURE — G0378 HOSPITAL OBSERVATION PER HR: HCPCS

## 2019-08-09 PROCEDURE — 94664 DEMO&/EVAL PT USE INHALER: CPT

## 2019-08-09 PROCEDURE — 96375 TX/PRO/DX INJ NEW DRUG ADDON: CPT

## 2019-08-09 PROCEDURE — 80048 BASIC METABOLIC PNL TOTAL CA: CPT

## 2019-08-09 RX ORDER — LACTULOSE 10 G/15ML
30 SOLUTION ORAL DAILY
Status: DISCONTINUED | OUTPATIENT
Start: 2019-08-09 | End: 2019-08-09 | Stop reason: HOSPADM

## 2019-08-09 RX ORDER — DOXYCYCLINE HYCLATE 100 MG
100 TABLET ORAL 2 TIMES DAILY
Qty: 14 TABLET | Refills: 0 | Status: SHIPPED | OUTPATIENT
Start: 2019-08-09 | End: 2019-08-16

## 2019-08-09 RX ORDER — LISINOPRIL 5 MG/1
5 TABLET ORAL DAILY
Status: DISCONTINUED | OUTPATIENT
Start: 2019-08-09 | End: 2019-08-09 | Stop reason: HOSPADM

## 2019-08-09 RX ORDER — LABETALOL HYDROCHLORIDE 5 MG/ML
10 INJECTION, SOLUTION INTRAVENOUS EVERY 4 HOURS PRN
Status: DISCONTINUED | OUTPATIENT
Start: 2019-08-09 | End: 2019-08-09 | Stop reason: HOSPADM

## 2019-08-09 RX ORDER — INSULIN GLARGINE 100 [IU]/ML
55 INJECTION, SOLUTION SUBCUTANEOUS NIGHTLY
Status: DISCONTINUED | OUTPATIENT
Start: 2019-08-09 | End: 2019-08-09 | Stop reason: HOSPADM

## 2019-08-09 RX ORDER — MONTELUKAST SODIUM 10 MG/1
10 TABLET ORAL NIGHTLY
Status: DISCONTINUED | OUTPATIENT
Start: 2019-08-09 | End: 2019-08-09 | Stop reason: HOSPADM

## 2019-08-09 RX ORDER — FUROSEMIDE 20 MG/1
10 TABLET ORAL DAILY
Status: DISCONTINUED | OUTPATIENT
Start: 2019-08-09 | End: 2019-08-09 | Stop reason: HOSPADM

## 2019-08-09 RX ORDER — ALBUTEROL SULFATE 90 UG/1
2 AEROSOL, METERED RESPIRATORY (INHALATION) EVERY 4 HOURS PRN
Status: DISCONTINUED | OUTPATIENT
Start: 2019-08-09 | End: 2019-08-09 | Stop reason: HOSPADM

## 2019-08-09 RX ORDER — ATORVASTATIN CALCIUM 40 MG/1
40 TABLET, FILM COATED ORAL NIGHTLY
Status: DISCONTINUED | OUTPATIENT
Start: 2019-08-09 | End: 2019-08-09 | Stop reason: HOSPADM

## 2019-08-09 RX ORDER — LEVOFLOXACIN 5 MG/ML
500 INJECTION, SOLUTION INTRAVENOUS EVERY 24 HOURS
Status: DISCONTINUED | OUTPATIENT
Start: 2019-08-09 | End: 2019-08-09

## 2019-08-09 RX ORDER — AMITRIPTYLINE HYDROCHLORIDE 25 MG/1
25 TABLET, FILM COATED ORAL NIGHTLY PRN
Status: DISCONTINUED | OUTPATIENT
Start: 2019-08-09 | End: 2019-08-09 | Stop reason: HOSPADM

## 2019-08-09 RX ORDER — LEVOFLOXACIN 500 MG/1
500 TABLET, FILM COATED ORAL DAILY
Status: DISCONTINUED | OUTPATIENT
Start: 2019-08-10 | End: 2019-08-09 | Stop reason: HOSPADM

## 2019-08-09 RX ORDER — HYDROCODONE BITARTRATE AND ACETAMINOPHEN 5; 325 MG/1; MG/1
1 TABLET ORAL EVERY 4 HOURS PRN
Status: DISCONTINUED | OUTPATIENT
Start: 2019-08-09 | End: 2019-08-09 | Stop reason: HOSPADM

## 2019-08-09 RX ORDER — SODIUM CHLORIDE 0.9 % (FLUSH) 0.9 %
SYRINGE (ML) INJECTION
Status: COMPLETED
Start: 2019-08-09 | End: 2019-08-09

## 2019-08-09 RX ORDER — PANTOPRAZOLE SODIUM 40 MG/1
40 TABLET, DELAYED RELEASE ORAL DAILY
Status: DISCONTINUED | OUTPATIENT
Start: 2019-08-09 | End: 2019-08-09 | Stop reason: HOSPADM

## 2019-08-09 RX ORDER — DEXTROSE MONOHYDRATE 50 MG/ML
100 INJECTION, SOLUTION INTRAVENOUS PRN
Status: DISCONTINUED | OUTPATIENT
Start: 2019-08-09 | End: 2019-08-09 | Stop reason: HOSPADM

## 2019-08-09 RX ORDER — POTASSIUM CHLORIDE 750 MG/1
10 TABLET, EXTENDED RELEASE ORAL DAILY
Status: DISCONTINUED | OUTPATIENT
Start: 2019-08-09 | End: 2019-08-09 | Stop reason: HOSPADM

## 2019-08-09 RX ORDER — DEXTROSE MONOHYDRATE 25 G/50ML
12.5 INJECTION, SOLUTION INTRAVENOUS PRN
Status: DISCONTINUED | OUTPATIENT
Start: 2019-08-09 | End: 2019-08-09 | Stop reason: HOSPADM

## 2019-08-09 RX ORDER — LEVOFLOXACIN 500 MG/1
500 TABLET, FILM COATED ORAL DAILY
Qty: 5 TABLET | Refills: 0 | Status: SHIPPED | OUTPATIENT
Start: 2019-08-10 | End: 2019-08-09 | Stop reason: HOSPADM

## 2019-08-09 RX ORDER — LEVOFLOXACIN 500 MG/1
500 TABLET, FILM COATED ORAL DAILY
Qty: 10 TABLET | Refills: 0 | Status: SHIPPED | OUTPATIENT
Start: 2019-08-10 | End: 2019-08-09

## 2019-08-09 RX ORDER — ASPIRIN 81 MG/1
81 TABLET ORAL DAILY
Status: DISCONTINUED | OUTPATIENT
Start: 2019-08-09 | End: 2019-08-09 | Stop reason: HOSPADM

## 2019-08-09 RX ORDER — ALBUTEROL SULFATE 2.5 MG/3ML
2.5 SOLUTION RESPIRATORY (INHALATION) ONCE
Status: COMPLETED | OUTPATIENT
Start: 2019-08-09 | End: 2019-08-09

## 2019-08-09 RX ORDER — VENLAFAXINE HYDROCHLORIDE 75 MG/1
75 CAPSULE, EXTENDED RELEASE ORAL DAILY
Status: DISCONTINUED | OUTPATIENT
Start: 2019-08-09 | End: 2019-08-09 | Stop reason: HOSPADM

## 2019-08-09 RX ORDER — NICOTINE POLACRILEX 4 MG
15 LOZENGE BUCCAL PRN
Status: DISCONTINUED | OUTPATIENT
Start: 2019-08-09 | End: 2019-08-09 | Stop reason: HOSPADM

## 2019-08-09 RX ADMIN — RIFAXIMIN 550 MG: 550 TABLET ORAL at 08:57

## 2019-08-09 RX ADMIN — LISINOPRIL 5 MG: 5 TABLET ORAL at 08:57

## 2019-08-09 RX ADMIN — POTASSIUM CHLORIDE 10 MEQ: 10 TABLET, EXTENDED RELEASE ORAL at 08:57

## 2019-08-09 RX ADMIN — LABETALOL HYDROCHLORIDE 10 MG: 5 INJECTION INTRAVENOUS at 03:50

## 2019-08-09 RX ADMIN — INSULIN LISPRO 8 UNITS: 100 INJECTION, SOLUTION INTRAVENOUS; SUBCUTANEOUS at 11:57

## 2019-08-09 RX ADMIN — VENLAFAXINE HYDROCHLORIDE 75 MG: 75 CAPSULE, EXTENDED RELEASE ORAL at 08:57

## 2019-08-09 RX ADMIN — ASPIRIN 81 MG: 81 TABLET, COATED ORAL at 08:56

## 2019-08-09 RX ADMIN — MICONAZOLE NITRATE: 20 CREAM TOPICAL at 11:15

## 2019-08-09 RX ADMIN — MOMETASONE FUROATE AND FORMOTEROL FUMARATE DIHYDRATE 2 PUFF: 200; 5 AEROSOL RESPIRATORY (INHALATION) at 12:26

## 2019-08-09 RX ADMIN — LEVOFLOXACIN 500 MG: 5 INJECTION, SOLUTION INTRAVENOUS at 00:58

## 2019-08-09 RX ADMIN — ALBUTEROL SULFATE 2.5 MG: 2.5 SOLUTION RESPIRATORY (INHALATION) at 12:26

## 2019-08-09 RX ADMIN — Medication 10 ML: at 03:50

## 2019-08-09 RX ADMIN — FUROSEMIDE 10 MG: 20 TABLET ORAL at 08:57

## 2019-08-09 RX ADMIN — LACTULOSE 20 G: 20 SOLUTION ORAL at 08:57

## 2019-08-09 ASSESSMENT — PAIN SCALES - GENERAL
PAINLEVEL_OUTOF10: 0
PAINLEVEL_OUTOF10: 0
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
## 2019-08-09 NOTE — DISCHARGE INSTR - COC
Active Problems:  Patient Active Problem List   Diagnosis Code    Diabetes mellitus, type 2 ([facility] E11.9    HTN (hypertension), benign I10    Mixed hyperlipidemia E78.2    History of hyperthyroidism Z86.39    Cirrhosis of liver with ascites ([facility] K74.60, R18.8    Spondylosis of cervical region without myelopathy or radiculopathy M47.812    Cervical facet joint syndrome M47.812    Lumbar facet arthropathy M47.816    Primary osteoarthritis of both hips M16.0    Carotid stenosis I65.29    Left carotid stenosis I65.22    Acute diverticulitis K57.92    History of completed stroke Z86.73    Isolated corticotropin deficiency (HCC) E27.40    URI, acute J06.9       Isolation/Infection:   Isolation          No Isolation            Nurse Assessment:  Last Vital Signs: BP (!) 174/85   Pulse 97   Temp 98.4 °F (36.9 °C) (Oral)   Resp 18   Ht 5' 7\" (1.702 m)   Wt 227 lb (103 kg)   SpO2 93%   BMI 35.55 kg/m²     Last documented pain score (0-10 scale): Pain Level: 0  Last Weight:   Wt Readings from Last 1 Encounters:   08/09/19 227 lb (103 kg)     Mental Status:  {IP PT MENTAL STATUS:60038}    IV Access:  { TIEN IV ACCESS:740804739}    Nursing Mobility/ADLs:  Walking   {P DME WJGD:468876528}  Transfer  {P DME BGRS:603063313}  Bathing  {P DME ZVOA:786399154}  Dressing  {P DME WDWI:425612366}  Toileting  {P DME ZDAA:535998983}  Feeding  {P DME JVDA:145077753}  Med Admin  {P DME RUCI:281954155}  Med Delivery   { TIEN MED Delivery:930281763}    Wound Care Documentation and Therapy:  Wound 09/08/18 Toe (Comment  which one) Left (Active)   Number of days: 335       Incision 10/29/18 Neck Left (Active)   Number of days: 283        Elimination:  Continence:   · Bowel: {YES / MN:04562}  · Bladder: {YES / EM:89233}  Urinary Catheter: {Urinary Catheter:641924534}   Colostomy/Ileostomy/Ileal Conduit: {YES / WP:61855}       Date of Last BM: ***    Intake/Output Summary (Last 24 hours) at 8/9/2019

## 2019-08-09 NOTE — ED PROVIDER NOTES
Interpretation: Normal      Assessment and Plan: Imaging negative, labs negative, pt desats to the 80s with walking on RA, has had a cough, will cover with abx and admit, spoke with Dr. Jasvir Bergeron covering for Dr. Mary Wilson and will admit to his service      Impression: URI, hypoxida      I have reviewed the patient's medications, vital signs, and diagnostic studies available to me in the medical record at the time of the patient encounter. 8/8/19, 8:34 PM.    This note is prepared by Yeni Young, acting as Scribe for Mally Nicole MD.    Mally Nicole MD:  The scribe's documentation has been prepared under my direction and personally reviewed by me in its entirety. I confirm that the note above accurately reflects all work, treatment, procedures, and medical decision making performed by me.         Mally Nicole MD  08/09/19 [id number]

## 2019-08-09 NOTE — CARE COORDINATION
8/9/2019  Social Work Discharge Planning:  SW made a tentative referral to Rayna with [facility] for possible IV ATB needs at discharge. Pt prefers them-has had them before. Electronically signed by CHELITA Jaimes on 8/9/2019 at 9:55 AM   8/9/2019  Social Work Discharge Planning:  SW was informed by Clemente Abbott with [facility] that after reviewing cost of possible IV ATB with Pt, Pt and mother said they are unable to afford the copay.  Electronically signed by CHELITA Jaimes on 8/9/2019 at 12:02 PM

## 2019-08-09 NOTE — PROGRESS NOTES
[facility] Quality Flow/Interdisciplinary Rounds Progress Note        Quality Flow Rounds held on August 9, 2019    Disciplines Attending:  Bedside Nurse, ,  and Nursing Unit [address] was admitted on 8/8/2019  6:40 PM    Anticipated Discharge Date:  Expected Discharge Date: 08/09/19    Disposition:    Tarik Score:  Tarik Scale Score: 20    Readmission Risk              Risk of Unplanned Readmission:        12           Discussed patient goal for the day, patient clinical progression, and barriers to discharge. The following Goal(s) of the Day/Commitment(s) have been identified:  IV ATB, check ambulation on RALashaun Paul  August 9, 2019

## 2019-08-09 NOTE — HOME CARE
Referral received for possible IV ATB infusion. Spoke with patient's son, demographics verified. IV coverage verified for Levaquin 500mg daily . Patient's copay is $68.95 per day, explained IV benefit. Patient is not agreeable to home care. She is on a fixed income and would not be able to afford this. CHELITA Blood. Will follow for dishcarge plans. Mathew Camarillo LPN [facility].

## 2019-08-19 RX ORDER — BLOOD-GLUCOSE METER
EACH MISCELLANEOUS
Qty: 1 KIT | Refills: 0 | Status: SHIPPED | OUTPATIENT
Start: 2019-08-19

## 2019-09-18 ENCOUNTER — TELEPHONE (OUTPATIENT)
Dept: ENDOCRINOLOGY | Age: 64
End: 2019-09-18

## 2019-09-18 ENCOUNTER — OFFICE VISIT (OUTPATIENT)
Dept: ENDOCRINOLOGY | Age: 64
End: 2019-09-18
Payer: MEDICARE

## 2019-09-18 VITALS
BODY MASS INDEX: 36.35 KG/M2 | DIASTOLIC BLOOD PRESSURE: 82 MMHG | RESPIRATION RATE: 16 BRPM | SYSTOLIC BLOOD PRESSURE: 128 MMHG | OXYGEN SATURATION: 94 % | HEIGHT: 67 IN | HEART RATE: 100 BPM | WEIGHT: 231.6 LBS

## 2019-09-18 DIAGNOSIS — E55.9 VITAMIN D DEFICIENCY: ICD-10-CM

## 2019-09-18 DIAGNOSIS — E07.9 THYROID DISEASE: Primary | ICD-10-CM

## 2019-09-18 PROCEDURE — 1090F PRES/ABSN URINE INCON ASSESS: CPT | Performed by: INTERNAL MEDICINE

## 2019-09-18 PROCEDURE — G8598 ASA/ANTIPLAT THER USED: HCPCS | Performed by: INTERNAL MEDICINE

## 2019-09-18 PROCEDURE — G8400 PT W/DXA NO RESULTS DOC: HCPCS | Performed by: INTERNAL MEDICINE

## 2019-09-18 PROCEDURE — 3017F COLORECTAL CA SCREEN DOC REV: CPT | Performed by: INTERNAL MEDICINE

## 2019-09-18 PROCEDURE — 1036F TOBACCO NON-USER: CPT | Performed by: INTERNAL MEDICINE

## 2019-09-18 PROCEDURE — G8428 CUR MEDS NOT DOCUMENT: HCPCS | Performed by: INTERNAL MEDICINE

## 2019-09-18 PROCEDURE — 3046F HEMOGLOBIN A1C LEVEL >9.0%: CPT | Performed by: INTERNAL MEDICINE

## 2019-09-18 PROCEDURE — 99214 OFFICE O/P EST MOD 30 MIN: CPT | Performed by: INTERNAL MEDICINE

## 2019-09-18 PROCEDURE — 1123F ACP DISCUSS/DSCN MKR DOCD: CPT | Performed by: INTERNAL MEDICINE

## 2019-09-18 PROCEDURE — G8417 CALC BMI ABV UP PARAM F/U: HCPCS | Performed by: INTERNAL MEDICINE

## 2019-09-18 PROCEDURE — 2022F DILAT RTA XM EVC RTNOPTHY: CPT | Performed by: INTERNAL MEDICINE

## 2019-09-18 PROCEDURE — 4040F PNEUMOC VAC/ADMIN/RCVD: CPT | Performed by: INTERNAL MEDICINE

## 2019-09-18 RX ORDER — GLUCOSAMINE HCL/CHONDROITIN SU 500-400 MG
CAPSULE ORAL
Qty: 250 STRIP | Refills: 5 | Status: SHIPPED | OUTPATIENT
Start: 2019-09-18

## 2019-09-18 NOTE — PROGRESS NOTES
ENDOCRINOLOGY CLINIC NOTE    Date of Service: 9/18/2019    Primary Care Physician: Mariana Rosario DO. Provider: Jose Martin Aly MD    Reason for the visit:  Type 2 DM, h/o hyperthyroidism     History of Present Illness: The history is provided by the patient. No  was used. Accuracy of the patient data is excellent. Roney Urrutia is a very pleasant 71 y.o. female seen in Endocrine clinic today for diabetes management. Roney Urrutia was diagnosed with diabetes at age 51 . She is currently on Toujeo to 55 units BID, Humalog before meals to 45/40/45 units, Sliding scale 3:50>150, Metformin 500mg once daily    Wasn't able to tolerate higher dose of Metformin. Also with h/o liver disease will keep on very small dose of Metformin    The patient has been checking blood sugar 3day. Not improved compared with the last visit and readings are improving. Average -280. Lab Results   Component Value Date    LABA1C 9.3 08/08/2019    LABA1C 9.1 06/19/2019    LABA1C 8.5 12/02/2018    LABA1C 10.0 10/17/2018     She has had a lot of stress with medical conditions recently which have affected her ability to have BS control. Overall there has been no major improvement in her BS. Medical changes include having a polyp removed from stomach. She has been suffering URI and sore throat. Recently also had variceal banding secondary to cirrhosis. Patient has had no hypoglycemic episodes   The patient has been mindful of what has been eating and following diabetic diet as encouraged. She will sneak snacks at times, per son, however. I reviewed current medications and the patient has no issues with diabetes medications  The patient is up to date with the and now does have diabetic retinopathy - per eye exam last week. She will f/u with optho in July for further treatment.   Seeing podiatrist every 6 months and also performs her own foot care  Microvascular complications:  + Retinopathy, + Nephropathy + POLYPECTOMY SNARE/COLD BIOPSY performed by Jeet Mcmahon MD at [address] [place] [address]    MS THROMBOENDARTECTMY [facility] [place] INCIS Left 10/29/2018    LEFT CAROTID ENDARTERECTOMY performed by Pee Boyer MD at [address] [place] [address] ENDOSCOPY      UPPER GASTROINTESTINAL ENDOSCOPY N/A 6/11/2019    EGD BIOPSY performed by Jeet Mcmahon MD at [facility] History     Socioeconomic History    Marital status:      Spouse name: Not on file    Number of children: Not on file    Years of education: Not on file    Highest education level: Not on file   Occupational History    Not on file   Social Needs    Financial resource strain: Not on file    Food insecurity:     Worry: Not on file     Inability: Not on file    Transportation needs:     Medical: Not on file     Non-medical: Not on file   Tobacco Use    Smoking status: Never Smoker    Smokeless tobacco: Never Used   Substance and Sexual Activity    Alcohol use: No    Drug use: No    Sexual activity: Not on file   Lifestyle    Physical activity:     Days per week: Not on file     Minutes per session: Not on file    Stress: Not on file   Relationships    Social connections:     Talks on phone: Not on file     Gets together: Not on file     Attends Holiness service: Not on file     Active member of club or organization: Not on file     Attends meetings of clubs or organizations: Not on file     Relationship status: Not on file    Intimate partner violence:     Fear of current or ex partner: Not on file     Emotionally abused: Not on file     Physically abused: Not on file     Forced sexual activity: Not on file   Other Topics Concern    Not on file   Social History Narrative    Not on file     FAMILY HISTORY   Family History   Problem Relation Age of Onset    Heart Disease Mother 28    Diabetes Mother     Heart Disease Sister     Diabetes Sister      ALLERGIES cream Apply topically 2 times daily Apply topically 2 times daily.  atorvastatin (LIPITOR) 40 MG tablet Take 1 tablet by mouth nightly 30 tablet 3    lisinopril (PRINIVIL;ZESTRIL) 5 MG tablet Take 5 mg daily (Patient taking differently: Take 7.5 mg by mouth daily Take 5 mg daily) 30 tablet 2    venlafaxine (EFFEXOR XR) 37.5 MG extended release capsule TAKE ONE CAPSULE BY MOUTH EVERY DAY (Patient taking differently: Take 75 mg by mouth daily ) 90 capsule 2    montelukast (SINGULAIR) 10 MG tablet TAKE 1 TABLET BY MOUTH EVERY DAY AT NIGHT 90 tablet 2    pantoprazole (PROTONIX) 40 MG tablet Take 40 mg by mouth daily Take morning of surgery with a sip of water      Misc. Devices (ADJUST BATH/SHOWER SEAT) MISC Use daily 1 each 0    Misc. Devices (COMMODE BEDSIDE) MISC Use daily 1 each 0    aspirin 81 MG EC tablet Take 1 tablet by mouth daily 90 tablet 1    Milk Thistle 1000 MG CAPS Take by mouth daily Ld 10-24-18      FREESTYLE LANCETS MISC 1 each by Does not apply route daily 100 each 3    blood glucose test strips (ASCENSIA AUTODISC VI;ONE TOUCH ULTRA TEST VI) strip 1 each by In Vitro route daily As needed. 100 each 3    glucose monitoring kit (FREESTYLE) monitoring kit 1 kit by Does not apply route daily as needed (glucose) 1 kit 0    Continuous Blood Gluc Sensor (FREESTYLE BORIS 14 DAY SENSOR) MISC Change sensor every 14 days (Patient not taking: Reported on 9/18/2019) 2 each 5    Continuous Blood Gluc  (FREESTYLE BORIS 14 DAY READER) SUDHAKAR Freestyle Boris 14 days reader device (Patient not taking: Reported on 9/18/2019) 1 Device 0     No current facility-administered medications for this visit. Review of Systems  Constitutional: tired   HEENT: No blurred vision, No sore throat, no ear pain, no hair loss  Neck: denied any neck swelling, difficulty swallowing,   Cardio-pulmonary: No CP, SOB or palpitation, No orthopnea or PND. No cough or wheezing.   GI: No N/V/D, no constipation, No

## 2019-09-26 ENCOUNTER — TELEPHONE (OUTPATIENT)
Dept: ENDOCRINOLOGY | Age: 64
End: 2019-09-26

## 2019-09-30 ENCOUNTER — TELEPHONE (OUTPATIENT)
Dept: ENDOCRINOLOGY | Age: 64
End: 2019-09-30

## 2019-09-30 RX ORDER — IBUPROFEN 200 MG
1 TABLET ORAL 4 TIMES DAILY
Qty: 200 EACH | Refills: 5 | Status: SHIPPED | OUTPATIENT
Start: 2019-09-30

## 2019-10-03 ENCOUNTER — HOSPITAL ENCOUNTER (OUTPATIENT)
Dept: MAMMOGRAPHY | Age: 64
Discharge: HOME | End: 2019-10-05
Payer: MEDICARE

## 2019-10-03 ENCOUNTER — HOSPITAL ENCOUNTER (OUTPATIENT)
Age: 64
Discharge: HOME | End: 2019-10-05
Payer: MEDICARE

## 2019-10-03 DIAGNOSIS — Z12.31 BREAST CANCER SCREENING BY MAMMOGRAM: ICD-10-CM

## 2019-10-03 PROCEDURE — 77063 BREAST TOMOSYNTHESIS BI: CPT

## 2019-10-17 ENCOUNTER — TELEPHONE (OUTPATIENT)
Dept: ENDOCRINOLOGY | Age: 64
End: 2019-10-17

## 2019-10-18 ENCOUNTER — HOSPITAL ENCOUNTER (INPATIENT)
Age: 64
LOS: 3 days | Discharge: HOME | DRG: 433 | End: 2019-10-21
Attending: EMERGENCY MEDICINE | Admitting: INTERNAL MEDICINE
Payer: MEDICARE

## 2019-10-18 ENCOUNTER — APPOINTMENT (OUTPATIENT)
Dept: GENERAL RADIOLOGY | Age: 64
DRG: 433 | End: 2019-10-18
Payer: MEDICARE

## 2019-10-18 ENCOUNTER — APPOINTMENT (OUTPATIENT)
Dept: CT IMAGING | Age: 64
DRG: 433 | End: 2019-10-18
Payer: MEDICARE

## 2019-10-18 DIAGNOSIS — R10.9 ABDOMINAL PAIN, UNSPECIFIED ABDOMINAL LOCATION: ICD-10-CM

## 2019-10-18 DIAGNOSIS — N30.00 ACUTE CYSTITIS WITHOUT HEMATURIA: ICD-10-CM

## 2019-10-18 DIAGNOSIS — K57.32 DIVERTICULITIS OF COLON: ICD-10-CM

## 2019-10-18 DIAGNOSIS — R18.8 OTHER ASCITES: Primary | ICD-10-CM

## 2019-10-18 PROBLEM — M47.812 CERVICAL FACET JOINT SYNDROME: Status: RESOLVED | Noted: 2018-08-14 | Resolved: 2019-10-18

## 2019-10-18 PROBLEM — K57.92 ACUTE DIVERTICULITIS: Status: RESOLVED | Noted: 2019-01-11 | Resolved: 2019-10-18

## 2019-10-18 PROBLEM — I10 ESSENTIAL HYPERTENSION: Chronic | Status: ACTIVE | Noted: 2019-10-18

## 2019-10-18 PROBLEM — E27.40 ISOLATED CORTICOTROPIN DEFICIENCY (HCC): Status: RESOLVED | Noted: 2019-03-26 | Resolved: 2019-10-18

## 2019-10-18 PROBLEM — M47.816 LUMBAR FACET ARTHROPATHY: Chronic | Status: RESOLVED | Noted: 2018-08-14 | Resolved: 2019-10-18

## 2019-10-18 PROBLEM — I65.22 LEFT CAROTID STENOSIS: Status: RESOLVED | Noted: 2018-10-29 | Resolved: 2019-10-18

## 2019-10-18 PROBLEM — M16.0 PRIMARY OSTEOARTHRITIS OF BOTH HIPS: Chronic | Status: RESOLVED | Noted: 2018-08-14 | Resolved: 2019-10-18

## 2019-10-18 PROBLEM — J06.9 URI, ACUTE: Status: RESOLVED | Noted: 2019-08-09 | Resolved: 2019-10-18

## 2019-10-18 PROBLEM — I65.29 CAROTID STENOSIS: Status: RESOLVED | Noted: 2018-09-13 | Resolved: 2019-10-18

## 2019-10-18 PROBLEM — E78.5 HYPERLIPIDEMIA LDL GOAL <100: Chronic | Status: ACTIVE | Noted: 2019-10-18

## 2019-10-18 PROBLEM — Z86.73 HISTORY OF CVA (CEREBROVASCULAR ACCIDENT): Chronic | Status: ACTIVE | Noted: 2019-10-18

## 2019-10-18 PROBLEM — M47.812 SPONDYLOSIS OF CERVICAL REGION WITHOUT MYELOPATHY OR RADICULOPATHY: Status: RESOLVED | Noted: 2018-08-14 | Resolved: 2019-10-18

## 2019-10-18 PROBLEM — Z86.73 HISTORY OF COMPLETED STROKE: Chronic | Status: RESOLVED | Noted: 2019-01-11 | Resolved: 2019-10-18

## 2019-10-18 LAB
ALBUMIN SERPL-MCNC: 2.5 G/DL (ref 3.5–5.2)
ALP BLD-CCNC: 166 U/L (ref 35–104)
ALT SERPL-CCNC: 22 U/L (ref 0–32)
ANION GAP SERPL CALCULATED.3IONS-SCNC: 10 MMOL/L (ref 7–16)
APTT: 38.3 SEC (ref 24.5–35.1)
AST SERPL-CCNC: 43 U/L (ref 0–31)
BACTERIA: ABNORMAL /HPF
BASOPHILS ABSOLUTE: 0.07 E9/L (ref 0–0.2)
BASOPHILS RELATIVE PERCENT: 1.1 % (ref 0–2)
BILIRUB SERPL-MCNC: 1.1 MG/DL (ref 0–1.2)
BILIRUBIN DIRECT: 0.5 MG/DL (ref 0–0.3)
BILIRUBIN URINE: NEGATIVE
BILIRUBIN, INDIRECT: 0.6 MG/DL (ref 0–1)
BLOOD, URINE: NEGATIVE
BUN BLDV-MCNC: 12 MG/DL (ref 8–23)
CALCIUM SERPL-MCNC: 8.5 MG/DL (ref 8.6–10.2)
CHLORIDE BLD-SCNC: 105 MMOL/L (ref 98–107)
CLARITY: ABNORMAL
CO2: 25 MMOL/L (ref 22–29)
COLOR: YELLOW
CREAT SERPL-MCNC: 0.8 MG/DL (ref 0.5–1)
EKG ATRIAL RATE: 89 BPM
EKG P-R INTERVAL: 144 MS
EKG Q-T INTERVAL: 416 MS
EKG QRS DURATION: 92 MS
EKG QTC CALCULATION (BAZETT): 506 MS
EKG R AXIS: -11 DEGREES
EKG T AXIS: 62 DEGREES
EKG VENTRICULAR RATE: 89 BPM
EOSINOPHILS ABSOLUTE: 0.27 E9/L (ref 0.05–0.5)
EOSINOPHILS RELATIVE PERCENT: 4.2 % (ref 0–6)
EPITHELIAL CELLS, UA: ABNORMAL /HPF
GFR AFRICAN AMERICAN: >60
GFR NON-AFRICAN AMERICAN: >60 ML/MIN/1.73
GLUCOSE BLD-MCNC: 123 MG/DL (ref 74–99)
GLUCOSE URINE: NEGATIVE MG/DL
HBA1C MFR BLD: 8.1 % (ref 4–5.6)
HCT VFR BLD CALC: 38.5 % (ref 34–48)
HEMOGLOBIN: 12.3 G/DL (ref 11.5–15.5)
IMMATURE GRANULOCYTES #: 0.03 E9/L
IMMATURE GRANULOCYTES %: 0.5 % (ref 0–5)
INR BLD: 1.3
KETONES, URINE: NEGATIVE MG/DL
LACTIC ACID, SEPSIS: 1.9 MMOL/L (ref 0.5–1.9)
LEUKOCYTE ESTERASE, URINE: ABNORMAL
LIPASE: 54 U/L (ref 13–60)
LYMPHOCYTES ABSOLUTE: 2.12 E9/L (ref 1.5–4)
LYMPHOCYTES RELATIVE PERCENT: 32.9 % (ref 20–42)
MCH RBC QN AUTO: 30.4 PG (ref 26–35)
MCHC RBC AUTO-ENTMCNC: 31.9 % (ref 32–34.5)
MCV RBC AUTO: 95.1 FL (ref 80–99.9)
METER GLUCOSE: 111 MG/DL (ref 74–99)
METER GLUCOSE: 288 MG/DL (ref 74–99)
MONOCYTES ABSOLUTE: 0.62 E9/L (ref 0.1–0.95)
MONOCYTES RELATIVE PERCENT: 9.6 % (ref 2–12)
MUCUS: PRESENT
NEUTROPHILS ABSOLUTE: 3.34 E9/L (ref 1.8–7.3)
NEUTROPHILS RELATIVE PERCENT: 51.7 % (ref 43–80)
NITRITE, URINE: NEGATIVE
PDW BLD-RTO: 16.4 FL (ref 11.5–15)
PH UA: 5.5 (ref 5–9)
PLATELET # BLD: 231 E9/L (ref 130–450)
PMV BLD AUTO: 11.1 FL (ref 7–12)
POTASSIUM REFLEX MAGNESIUM: 3.7 MMOL/L (ref 3.5–5)
PRO-BNP: 30 PG/ML (ref 0–125)
PROTEIN UA: NEGATIVE MG/DL
PROTHROMBIN TIME: 15.6 SEC (ref 9.3–12.4)
RBC # BLD: 4.05 E12/L (ref 3.5–5.5)
RBC UA: ABNORMAL /HPF (ref 0–2)
SODIUM BLD-SCNC: 140 MMOL/L (ref 132–146)
SPECIFIC GRAVITY UA: >=1.03 (ref 1–1.03)
TOTAL PROTEIN: 7.7 G/DL (ref 6.4–8.3)
TROPONIN: 0.01 NG/ML (ref 0–0.03)
UROBILINOGEN, URINE: 1 E.U./DL
WBC # BLD: 6.5 E9/L (ref 4.5–11.5)
WBC UA: >20 /HPF (ref 0–5)

## 2019-10-18 PROCEDURE — 2580000003 HC RX 258: Performed by: INTERNAL MEDICINE

## 2019-10-18 PROCEDURE — 83036 HEMOGLOBIN GLYCOSYLATED A1C: CPT

## 2019-10-18 PROCEDURE — 80048 BASIC METABOLIC PNL TOTAL CA: CPT

## 2019-10-18 PROCEDURE — 2500000003 HC RX 250 WO HCPCS: Performed by: EMERGENCY MEDICINE

## 2019-10-18 PROCEDURE — 85025 COMPLETE CBC W/AUTO DIFF WBC: CPT

## 2019-10-18 PROCEDURE — 1200000000 HC SEMI PRIVATE

## 2019-10-18 PROCEDURE — 93005 ELECTROCARDIOGRAM TRACING: CPT | Performed by: EMERGENCY MEDICINE

## 2019-10-18 PROCEDURE — 6370000000 HC RX 637 (ALT 250 FOR IP): Performed by: INTERNAL MEDICINE

## 2019-10-18 PROCEDURE — 87088 URINE BACTERIA CULTURE: CPT

## 2019-10-18 PROCEDURE — 6360000002 HC RX W HCPCS: Performed by: EMERGENCY MEDICINE

## 2019-10-18 PROCEDURE — 82962 GLUCOSE BLOOD TEST: CPT

## 2019-10-18 PROCEDURE — 81001 URINALYSIS AUTO W/SCOPE: CPT

## 2019-10-18 PROCEDURE — 74177 CT ABD & PELVIS W/CONTRAST: CPT

## 2019-10-18 PROCEDURE — 94640 AIRWAY INHALATION TREATMENT: CPT

## 2019-10-18 PROCEDURE — 93010 ELECTROCARDIOGRAM REPORT: CPT | Performed by: INTERNAL MEDICINE

## 2019-10-18 PROCEDURE — 94760 N-INVAS EAR/PLS OXIMETRY 1: CPT

## 2019-10-18 PROCEDURE — 99285 EMERGENCY DEPT VISIT HI MDM: CPT

## 2019-10-18 PROCEDURE — 96375 TX/PRO/DX INJ NEW DRUG ADDON: CPT

## 2019-10-18 PROCEDURE — 2580000003 HC RX 258: Performed by: EMERGENCY MEDICINE

## 2019-10-18 PROCEDURE — 87040 BLOOD CULTURE FOR BACTERIA: CPT

## 2019-10-18 PROCEDURE — 89051 BODY FLUID CELL COUNT: CPT

## 2019-10-18 PROCEDURE — 83880 ASSAY OF NATRIURETIC PEPTIDE: CPT

## 2019-10-18 PROCEDURE — 85730 THROMBOPLASTIN TIME PARTIAL: CPT

## 2019-10-18 PROCEDURE — 83605 ASSAY OF LACTIC ACID: CPT

## 2019-10-18 PROCEDURE — 71045 X-RAY EXAM CHEST 1 VIEW: CPT

## 2019-10-18 PROCEDURE — 84484 ASSAY OF TROPONIN QUANT: CPT

## 2019-10-18 PROCEDURE — 2500000003 HC RX 250 WO HCPCS: Performed by: INTERNAL MEDICINE

## 2019-10-18 PROCEDURE — 36415 COLL VENOUS BLD VENIPUNCTURE: CPT

## 2019-10-18 PROCEDURE — 6360000004 HC RX CONTRAST MEDICATION: Performed by: RADIOLOGY

## 2019-10-18 PROCEDURE — 80076 HEPATIC FUNCTION PANEL: CPT

## 2019-10-18 PROCEDURE — 83690 ASSAY OF LIPASE: CPT

## 2019-10-18 PROCEDURE — 85610 PROTHROMBIN TIME: CPT

## 2019-10-18 PROCEDURE — 96365 THER/PROPH/DIAG IV INF INIT: CPT

## 2019-10-18 RX ORDER — VENLAFAXINE HYDROCHLORIDE 75 MG/1
75 CAPSULE, EXTENDED RELEASE ORAL DAILY
Status: DISCONTINUED | OUTPATIENT
Start: 2019-10-18 | End: 2019-10-21 | Stop reason: HOSPADM

## 2019-10-18 RX ORDER — ACETAMINOPHEN 325 MG/1
650 TABLET ORAL EVERY 4 HOURS PRN
Status: DISCONTINUED | OUTPATIENT
Start: 2019-10-18 | End: 2019-10-21 | Stop reason: HOSPADM

## 2019-10-18 RX ORDER — ONDANSETRON 2 MG/ML
4 INJECTION INTRAMUSCULAR; INTRAVENOUS EVERY 6 HOURS PRN
Status: DISCONTINUED | OUTPATIENT
Start: 2019-10-18 | End: 2019-10-21 | Stop reason: HOSPADM

## 2019-10-18 RX ORDER — ASPIRIN 81 MG/1
81 TABLET ORAL DAILY
Status: DISCONTINUED | OUTPATIENT
Start: 2019-10-18 | End: 2019-10-21 | Stop reason: HOSPADM

## 2019-10-18 RX ORDER — NICOTINE POLACRILEX 4 MG
15 LOZENGE BUCCAL PRN
Status: DISCONTINUED | OUTPATIENT
Start: 2019-10-18 | End: 2019-10-21 | Stop reason: HOSPADM

## 2019-10-18 RX ORDER — SODIUM CHLORIDE 0.9 % (FLUSH) 0.9 %
10 SYRINGE (ML) INJECTION PRN
Status: DISCONTINUED | OUTPATIENT
Start: 2019-10-18 | End: 2019-10-21 | Stop reason: HOSPADM

## 2019-10-18 RX ORDER — MORPHINE SULFATE 4 MG/ML
4 INJECTION, SOLUTION INTRAMUSCULAR; INTRAVENOUS ONCE
Status: COMPLETED | OUTPATIENT
Start: 2019-10-18 | End: 2019-10-18

## 2019-10-18 RX ORDER — INSULIN GLARGINE 100 [IU]/ML
65 INJECTION, SOLUTION SUBCUTANEOUS 2 TIMES DAILY
Status: DISCONTINUED | OUTPATIENT
Start: 2019-10-18 | End: 2019-10-21 | Stop reason: HOSPADM

## 2019-10-18 RX ORDER — ONDANSETRON 2 MG/ML
4 INJECTION INTRAMUSCULAR; INTRAVENOUS
Status: COMPLETED | OUTPATIENT
Start: 2019-10-18 | End: 2019-10-18

## 2019-10-18 RX ORDER — SODIUM CHLORIDE 0.9 % (FLUSH) 0.9 %
10 SYRINGE (ML) INJECTION EVERY 12 HOURS SCHEDULED
Status: DISCONTINUED | OUTPATIENT
Start: 2019-10-18 | End: 2019-10-21 | Stop reason: HOSPADM

## 2019-10-18 RX ORDER — ALBUTEROL SULFATE 90 UG/1
2 AEROSOL, METERED RESPIRATORY (INHALATION) EVERY 4 HOURS PRN
Status: DISCONTINUED | OUTPATIENT
Start: 2019-10-18 | End: 2019-10-21 | Stop reason: HOSPADM

## 2019-10-18 RX ORDER — DEXTROSE MONOHYDRATE 25 G/50ML
12.5 INJECTION, SOLUTION INTRAVENOUS PRN
Status: DISCONTINUED | OUTPATIENT
Start: 2019-10-18 | End: 2019-10-21 | Stop reason: HOSPADM

## 2019-10-18 RX ORDER — POTASSIUM CHLORIDE 750 MG/1
10 TABLET, EXTENDED RELEASE ORAL DAILY
Status: DISCONTINUED | OUTPATIENT
Start: 2019-10-18 | End: 2019-10-21 | Stop reason: HOSPADM

## 2019-10-18 RX ORDER — AMLODIPINE BESYLATE 5 MG/1
2.5 TABLET ORAL DAILY
Status: ON HOLD | COMMUNITY
End: 2020-03-17 | Stop reason: HOSPADM

## 2019-10-18 RX ORDER — PHENOL 1.4 %
1200 AEROSOL, SPRAY (ML) MUCOUS MEMBRANE DAILY
COMMUNITY
End: 2019-10-27

## 2019-10-18 RX ORDER — PANTOPRAZOLE SODIUM 40 MG/1
40 TABLET, DELAYED RELEASE ORAL DAILY
Status: DISCONTINUED | OUTPATIENT
Start: 2019-10-18 | End: 2019-10-21 | Stop reason: HOSPADM

## 2019-10-18 RX ORDER — LACTULOSE 10 G/15ML
30 SOLUTION ORAL DAILY
Status: DISCONTINUED | OUTPATIENT
Start: 2019-10-18 | End: 2019-10-20

## 2019-10-18 RX ORDER — DEXTROSE MONOHYDRATE 50 MG/ML
100 INJECTION, SOLUTION INTRAVENOUS PRN
Status: DISCONTINUED | OUTPATIENT
Start: 2019-10-18 | End: 2019-10-21 | Stop reason: HOSPADM

## 2019-10-18 RX ORDER — FUROSEMIDE 20 MG/1
10 TABLET ORAL DAILY
Status: DISCONTINUED | OUTPATIENT
Start: 2019-10-18 | End: 2019-10-21 | Stop reason: HOSPADM

## 2019-10-18 RX ORDER — MONTELUKAST SODIUM 10 MG/1
10 TABLET ORAL NIGHTLY
Status: DISCONTINUED | OUTPATIENT
Start: 2019-10-18 | End: 2019-10-21 | Stop reason: HOSPADM

## 2019-10-18 RX ORDER — AMITRIPTYLINE HYDROCHLORIDE 25 MG/1
25 TABLET, FILM COATED ORAL NIGHTLY PRN
Status: DISCONTINUED | OUTPATIENT
Start: 2019-10-18 | End: 2019-10-21 | Stop reason: HOSPADM

## 2019-10-18 RX ORDER — ALBUMIN (HUMAN) 12.5 G/50ML
50 SOLUTION INTRAVENOUS SEE ADMIN INSTRUCTIONS
Status: DISCONTINUED | OUTPATIENT
Start: 2019-10-18 | End: 2019-10-21 | Stop reason: HOSPADM

## 2019-10-18 RX ORDER — CEPHALEXIN 500 MG/1
500 CAPSULE ORAL 3 TIMES DAILY
Status: ON HOLD | COMMUNITY
Start: 2019-10-16 | End: 2019-10-21 | Stop reason: HOSPADM

## 2019-10-18 RX ORDER — LISINOPRIL 5 MG/1
7.5 TABLET ORAL DAILY
Status: DISCONTINUED | OUTPATIENT
Start: 2019-10-18 | End: 2019-10-19

## 2019-10-18 RX ADMIN — CEFTRIAXONE 2 G: 2 INJECTION, POWDER, FOR SOLUTION INTRAMUSCULAR; INTRAVENOUS at 12:58

## 2019-10-18 RX ADMIN — METRONIDAZOLE 500 MG: 500 INJECTION, SOLUTION INTRAVENOUS at 13:36

## 2019-10-18 RX ADMIN — INSULIN LISPRO 3 UNITS: 100 INJECTION, SOLUTION INTRAVENOUS; SUBCUTANEOUS at 22:00

## 2019-10-18 RX ADMIN — MORPHINE SULFATE 4 MG: 4 INJECTION, SOLUTION INTRAMUSCULAR; INTRAVENOUS at 11:58

## 2019-10-18 RX ADMIN — IOPAMIDOL 110 ML: 755 INJECTION, SOLUTION INTRAVENOUS at 12:38

## 2019-10-18 RX ADMIN — METRONIDAZOLE 500 MG: 500 INJECTION, SOLUTION INTRAVENOUS at 21:14

## 2019-10-18 RX ADMIN — VENLAFAXINE HYDROCHLORIDE 75 MG: 75 CAPSULE, EXTENDED RELEASE ORAL at 18:02

## 2019-10-18 RX ADMIN — FUROSEMIDE 10 MG: 20 TABLET ORAL at 17:52

## 2019-10-18 RX ADMIN — POTASSIUM CHLORIDE 10 MEQ: 10 TABLET, EXTENDED RELEASE ORAL at 17:51

## 2019-10-18 RX ADMIN — LISINOPRIL 7.5 MG: 5 TABLET ORAL at 17:52

## 2019-10-18 RX ADMIN — MONTELUKAST SODIUM 10 MG: 10 TABLET, FILM COATED ORAL at 21:14

## 2019-10-18 RX ADMIN — RIFAXIMIN 550 MG: 550 TABLET ORAL at 21:14

## 2019-10-18 RX ADMIN — LACTULOSE 20 G: 20 SOLUTION ORAL at 17:51

## 2019-10-18 RX ADMIN — ACETAMINOPHEN 650 MG: 325 TABLET ORAL at 22:12

## 2019-10-18 RX ADMIN — PANTOPRAZOLE SODIUM 40 MG: 40 TABLET, DELAYED RELEASE ORAL at 17:52

## 2019-10-18 RX ADMIN — Medication 10 ML: at 21:14

## 2019-10-18 RX ADMIN — Medication 10 ML: at 17:51

## 2019-10-18 RX ADMIN — ONDANSETRON 4 MG: 2 INJECTION INTRAMUSCULAR; INTRAVENOUS at 11:51

## 2019-10-18 RX ADMIN — MOMETASONE FUROATE AND FORMOTEROL FUMARATE DIHYDRATE 2 PUFF: 200; 5 AEROSOL RESPIRATORY (INHALATION) at 19:19

## 2019-10-18 ASSESSMENT — ENCOUNTER SYMPTOMS
ABDOMINAL PAIN: 1
BLOOD IN STOOL: 0
COLOR CHANGE: 0
NAUSEA: 0
SHORTNESS OF BREATH: 1
DIARRHEA: 0
BACK PAIN: 0
ABDOMINAL DISTENTION: 1
VOMITING: 0
COUGH: 0
CHEST TIGHTNESS: 0

## 2019-10-18 ASSESSMENT — PAIN DESCRIPTION - PROGRESSION: CLINICAL_PROGRESSION: NOT CHANGED

## 2019-10-18 ASSESSMENT — PAIN DESCRIPTION - LOCATION
LOCATION: ABDOMEN
LOCATION: ABDOMEN

## 2019-10-18 ASSESSMENT — PAIN SCALES - GENERAL
PAINLEVEL_OUTOF10: 6
PAINLEVEL_OUTOF10: 0
PAINLEVEL_OUTOF10: 0
PAINLEVEL_OUTOF10: 6
PAINLEVEL_OUTOF10: 0
PAINLEVEL_OUTOF10: 3
PAINLEVEL_OUTOF10: 0

## 2019-10-18 ASSESSMENT — PAIN - FUNCTIONAL ASSESSMENT: PAIN_FUNCTIONAL_ASSESSMENT: ACTIVITIES ARE NOT PREVENTED

## 2019-10-18 ASSESSMENT — PAIN DESCRIPTION - ONSET: ONSET: ON-GOING

## 2019-10-18 ASSESSMENT — PAIN DESCRIPTION - FREQUENCY
FREQUENCY: CONTINUOUS
FREQUENCY: INTERMITTENT

## 2019-10-18 ASSESSMENT — PAIN DESCRIPTION - DESCRIPTORS
DESCRIPTORS: CRAMPING
DESCRIPTORS: ACHING

## 2019-10-18 ASSESSMENT — PAIN DESCRIPTION - ORIENTATION
ORIENTATION: RIGHT
ORIENTATION: RIGHT

## 2019-10-18 ASSESSMENT — PAIN DESCRIPTION - PAIN TYPE
TYPE: ACUTE PAIN
TYPE: ACUTE PAIN

## 2019-10-19 ENCOUNTER — APPOINTMENT (OUTPATIENT)
Dept: ULTRASOUND IMAGING | Age: 64
DRG: 433 | End: 2019-10-19
Payer: MEDICARE

## 2019-10-19 LAB
ALBUMIN FLUID: 0.4 G/DL
ALBUMIN SERPL-MCNC: 2.2 G/DL (ref 3.5–5.2)
ALP BLD-CCNC: 145 U/L (ref 35–104)
ALT SERPL-CCNC: 15 U/L (ref 0–32)
AMYLASE FLUID: 27 U/L
ANION GAP SERPL CALCULATED.3IONS-SCNC: 10 MMOL/L (ref 7–16)
APPEARANCE FLUID: NORMAL
AST SERPL-CCNC: 41 U/L (ref 0–31)
BASOPHILS ABSOLUTE: 0.08 E9/L (ref 0–0.2)
BASOPHILS RELATIVE PERCENT: 1.3 % (ref 0–2)
BILIRUB SERPL-MCNC: 0.9 MG/DL (ref 0–1.2)
BUN BLDV-MCNC: 13 MG/DL (ref 8–23)
CALCIUM SERPL-MCNC: 8.2 MG/DL (ref 8.6–10.2)
CELL COUNT FLUID TYPE: NORMAL
CHLORIDE BLD-SCNC: 100 MMOL/L (ref 98–107)
CO2: 25 MMOL/L (ref 22–29)
COLOR FLUID: YELLOW
CREAT SERPL-MCNC: 0.7 MG/DL (ref 0.5–1)
EOSINOPHILS ABSOLUTE: 0.18 E9/L (ref 0.05–0.5)
EOSINOPHILS RELATIVE PERCENT: 2.9 % (ref 0–6)
FLUID TYPE: NORMAL
GFR AFRICAN AMERICAN: >60
GFR NON-AFRICAN AMERICAN: >60 ML/MIN/1.73
GLUCOSE BLD-MCNC: 216 MG/DL (ref 74–99)
GLUCOSE, FLUID: 208 MG/DL
HCT VFR BLD CALC: 34.7 % (ref 34–48)
HEMOGLOBIN: 10.9 G/DL (ref 11.5–15.5)
IMMATURE GRANULOCYTES #: 0.04 E9/L
IMMATURE GRANULOCYTES %: 0.6 % (ref 0–5)
LD, FLUID: 45 U/L
LYMPHOCYTES ABSOLUTE: 1.86 E9/L (ref 1.5–4)
LYMPHOCYTES RELATIVE PERCENT: 29.6 % (ref 20–42)
MAGNESIUM: 1.9 MG/DL (ref 1.6–2.6)
MCH RBC QN AUTO: 29.8 PG (ref 26–35)
MCHC RBC AUTO-ENTMCNC: 31.4 % (ref 32–34.5)
MCV RBC AUTO: 94.8 FL (ref 80–99.9)
METER GLUCOSE: 162 MG/DL (ref 74–99)
METER GLUCOSE: 179 MG/DL (ref 74–99)
METER GLUCOSE: 190 MG/DL (ref 74–99)
METER GLUCOSE: 304 MG/DL (ref 74–99)
MONOCYTE, FLUID: 88 %
MONOCYTES ABSOLUTE: 0.64 E9/L (ref 0.1–0.95)
MONOCYTES RELATIVE PERCENT: 10.2 % (ref 2–12)
NEUTROPHIL, FLUID: 12 %
NEUTROPHILS ABSOLUTE: 3.48 E9/L (ref 1.8–7.3)
NEUTROPHILS RELATIVE PERCENT: 55.4 % (ref 43–80)
NUCLEATED CELLS FLUID: 230 /UL
PDW BLD-RTO: 16.5 FL (ref 11.5–15)
PLATELET # BLD: 195 E9/L (ref 130–450)
PMV BLD AUTO: 10.6 FL (ref 7–12)
POTASSIUM SERPL-SCNC: 4.2 MMOL/L (ref 3.5–5)
PROTEIN FLUID: 0.9 G/DL
RBC # BLD: 3.66 E12/L (ref 3.5–5.5)
RBC FLUID: <2000 /UL
SODIUM BLD-SCNC: 135 MMOL/L (ref 132–146)
TOTAL PROTEIN: 6.5 G/DL (ref 6.4–8.3)
URINE CULTURE, ROUTINE: NORMAL
WBC # BLD: 6.3 E9/L (ref 4.5–11.5)

## 2019-10-19 PROCEDURE — 80053 COMPREHEN METABOLIC PANEL: CPT

## 2019-10-19 PROCEDURE — 36415 COLL VENOUS BLD VENIPUNCTURE: CPT

## 2019-10-19 PROCEDURE — 2580000003 HC RX 258: Performed by: HOSPITALIST

## 2019-10-19 PROCEDURE — 83615 LACTATE (LD) (LDH) ENZYME: CPT

## 2019-10-19 PROCEDURE — 85025 COMPLETE CBC W/AUTO DIFF WBC: CPT

## 2019-10-19 PROCEDURE — 82962 GLUCOSE BLOOD TEST: CPT

## 2019-10-19 PROCEDURE — 87205 SMEAR GRAM STAIN: CPT

## 2019-10-19 PROCEDURE — 94640 AIRWAY INHALATION TREATMENT: CPT

## 2019-10-19 PROCEDURE — 93975 VASCULAR STUDY: CPT

## 2019-10-19 PROCEDURE — 6370000000 HC RX 637 (ALT 250 FOR IP): Performed by: INTERNAL MEDICINE

## 2019-10-19 PROCEDURE — 88112 CYTOPATH CELL ENHANCE TECH: CPT

## 2019-10-19 PROCEDURE — 76705 ECHO EXAM OF ABDOMEN: CPT

## 2019-10-19 PROCEDURE — 2500000003 HC RX 250 WO HCPCS: Performed by: INTERNAL MEDICINE

## 2019-10-19 PROCEDURE — 82947 ASSAY GLUCOSE BLOOD QUANT: CPT

## 2019-10-19 PROCEDURE — 82272 OCCULT BLD FECES 1-3 TESTS: CPT

## 2019-10-19 PROCEDURE — 6360000002 HC RX W HCPCS: Performed by: HOSPITALIST

## 2019-10-19 PROCEDURE — 0W9G3ZZ DRAINAGE OF PERITONEAL CAVITY, PERCUTANEOUS APPROACH: ICD-10-PCS | Performed by: RADIOLOGY

## 2019-10-19 PROCEDURE — 83735 ASSAY OF MAGNESIUM: CPT

## 2019-10-19 PROCEDURE — 2580000003 HC RX 258: Performed by: INTERNAL MEDICINE

## 2019-10-19 PROCEDURE — 88305 TISSUE EXAM BY PATHOLOGIST: CPT

## 2019-10-19 PROCEDURE — 82150 ASSAY OF AMYLASE: CPT

## 2019-10-19 PROCEDURE — 82042 OTHER SOURCE ALBUMIN QUAN EA: CPT

## 2019-10-19 PROCEDURE — 87070 CULTURE OTHR SPECIMN AEROBIC: CPT

## 2019-10-19 PROCEDURE — 1200000000 HC SEMI PRIVATE

## 2019-10-19 PROCEDURE — 6360000002 HC RX W HCPCS: Performed by: INTERNAL MEDICINE

## 2019-10-19 PROCEDURE — 84157 ASSAY OF PROTEIN OTHER: CPT

## 2019-10-19 PROCEDURE — C1729 CATH, DRAINAGE: HCPCS

## 2019-10-19 RX ORDER — LIDOCAINE HYDROCHLORIDE 10 MG/ML
10 INJECTION, SOLUTION EPIDURAL; INFILTRATION; INTRACAUDAL; PERINEURAL ONCE
Status: DISCONTINUED | OUTPATIENT
Start: 2019-10-19 | End: 2019-10-19 | Stop reason: CLARIF

## 2019-10-19 RX ORDER — AMLODIPINE BESYLATE 5 MG/1
5 TABLET ORAL DAILY
Status: DISCONTINUED | OUTPATIENT
Start: 2019-10-19 | End: 2019-10-21 | Stop reason: HOSPADM

## 2019-10-19 RX ORDER — LIDOCAINE HYDROCHLORIDE 10 MG/ML
10 INJECTION, SOLUTION INFILTRATION; PERINEURAL ONCE
Status: DISCONTINUED | OUTPATIENT
Start: 2019-10-19 | End: 2019-10-19

## 2019-10-19 RX ADMIN — INSULIN GLARGINE 65 UNITS: 100 INJECTION, SOLUTION SUBCUTANEOUS at 20:54

## 2019-10-19 RX ADMIN — VENLAFAXINE HYDROCHLORIDE 75 MG: 75 CAPSULE, EXTENDED RELEASE ORAL at 13:20

## 2019-10-19 RX ADMIN — INSULIN LISPRO 1 UNITS: 100 INJECTION, SOLUTION INTRAVENOUS; SUBCUTANEOUS at 20:53

## 2019-10-19 RX ADMIN — Medication 10 ML: at 20:55

## 2019-10-19 RX ADMIN — ENOXAPARIN SODIUM 40 MG: 40 INJECTION SUBCUTANEOUS at 13:22

## 2019-10-19 RX ADMIN — INSULIN LISPRO 8 UNITS: 100 INJECTION, SOLUTION INTRAVENOUS; SUBCUTANEOUS at 18:15

## 2019-10-19 RX ADMIN — MOMETASONE FUROATE AND FORMOTEROL FUMARATE DIHYDRATE 2 PUFF: 200; 5 AEROSOL RESPIRATORY (INHALATION) at 19:43

## 2019-10-19 RX ADMIN — Medication 10 ML: at 13:05

## 2019-10-19 RX ADMIN — ASPIRIN 81 MG: 81 TABLET, COATED ORAL at 13:21

## 2019-10-19 RX ADMIN — FUROSEMIDE 10 MG: 20 TABLET ORAL at 13:20

## 2019-10-19 RX ADMIN — MONTELUKAST SODIUM 10 MG: 10 TABLET, FILM COATED ORAL at 20:55

## 2019-10-19 RX ADMIN — POTASSIUM CHLORIDE 10 MEQ: 10 TABLET, EXTENDED RELEASE ORAL at 13:21

## 2019-10-19 RX ADMIN — METRONIDAZOLE 500 MG: 500 INJECTION, SOLUTION INTRAVENOUS at 13:36

## 2019-10-19 RX ADMIN — RIFAXIMIN 550 MG: 550 TABLET ORAL at 20:55

## 2019-10-19 RX ADMIN — AMLODIPINE BESYLATE 5 MG: 5 TABLET ORAL at 14:32

## 2019-10-19 RX ADMIN — RIFAXIMIN 550 MG: 550 TABLET ORAL at 13:19

## 2019-10-19 RX ADMIN — CEFTRIAXONE 2 G: 2 INJECTION, POWDER, FOR SOLUTION INTRAMUSCULAR; INTRAVENOUS at 13:05

## 2019-10-19 RX ADMIN — MOMETASONE FUROATE AND FORMOTEROL FUMARATE DIHYDRATE 2 PUFF: 200; 5 AEROSOL RESPIRATORY (INHALATION) at 07:59

## 2019-10-19 RX ADMIN — METRONIDAZOLE 500 MG: 500 INJECTION, SOLUTION INTRAVENOUS at 20:55

## 2019-10-19 RX ADMIN — INSULIN GLARGINE 65 UNITS: 100 INJECTION, SOLUTION SUBCUTANEOUS at 13:14

## 2019-10-19 RX ADMIN — METRONIDAZOLE 500 MG: 500 INJECTION, SOLUTION INTRAVENOUS at 05:12

## 2019-10-19 RX ADMIN — LACTULOSE 20 G: 20 SOLUTION ORAL at 13:19

## 2019-10-19 RX ADMIN — PANTOPRAZOLE SODIUM 40 MG: 40 TABLET, DELAYED RELEASE ORAL at 13:19

## 2019-10-19 RX ADMIN — INSULIN LISPRO 2 UNITS: 100 INJECTION, SOLUTION INTRAVENOUS; SUBCUTANEOUS at 13:13

## 2019-10-19 ASSESSMENT — PAIN SCALES - GENERAL: PAINLEVEL_OUTOF10: 0

## 2019-10-20 LAB
ALBUMIN SERPL-MCNC: 2.2 G/DL (ref 3.5–5.2)
ALP BLD-CCNC: 139 U/L (ref 35–104)
ALT SERPL-CCNC: 17 U/L (ref 0–32)
AMMONIA: 31.9 UMOL/L (ref 11–51)
ANION GAP SERPL CALCULATED.3IONS-SCNC: 6 MMOL/L (ref 7–16)
AST SERPL-CCNC: 37 U/L (ref 0–31)
BASOPHILS ABSOLUTE: 0.05 E9/L (ref 0–0.2)
BASOPHILS RELATIVE PERCENT: 0.7 % (ref 0–2)
BILIRUB SERPL-MCNC: 0.8 MG/DL (ref 0–1.2)
BUN BLDV-MCNC: 10 MG/DL (ref 8–23)
CALCIUM SERPL-MCNC: 8 MG/DL (ref 8.6–10.2)
CHLORIDE BLD-SCNC: 102 MMOL/L (ref 98–107)
CO2: 26 MMOL/L (ref 22–29)
CREAT SERPL-MCNC: 0.6 MG/DL (ref 0.5–1)
EOSINOPHILS ABSOLUTE: 0.14 E9/L (ref 0.05–0.5)
EOSINOPHILS RELATIVE PERCENT: 2 % (ref 0–6)
FERRITIN: 29 NG/ML
GFR AFRICAN AMERICAN: >60
GFR NON-AFRICAN AMERICAN: >60 ML/MIN/1.73
GLUCOSE BLD-MCNC: 77 MG/DL (ref 74–99)
GRAM STAIN ORDERABLE: NORMAL
HCT VFR BLD CALC: 34.6 % (ref 34–48)
HEMOGLOBIN: 11.2 G/DL (ref 11.5–15.5)
IMMATURE GRANULOCYTES #: 0.05 E9/L
IMMATURE GRANULOCYTES %: 0.7 % (ref 0–5)
INR BLD: 1.6
IRON SATURATION: 21 % (ref 15–50)
IRON: 50 MCG/DL (ref 37–145)
LYMPHOCYTES ABSOLUTE: 2.35 E9/L (ref 1.5–4)
LYMPHOCYTES RELATIVE PERCENT: 33.5 % (ref 20–42)
MCH RBC QN AUTO: 30 PG (ref 26–35)
MCHC RBC AUTO-ENTMCNC: 32.4 % (ref 32–34.5)
MCV RBC AUTO: 92.8 FL (ref 80–99.9)
METER GLUCOSE: 130 MG/DL (ref 74–99)
METER GLUCOSE: 184 MG/DL (ref 74–99)
METER GLUCOSE: 206 MG/DL (ref 74–99)
METER GLUCOSE: 66 MG/DL (ref 74–99)
METER GLUCOSE: 82 MG/DL (ref 74–99)
MONOCYTES ABSOLUTE: 0.79 E9/L (ref 0.1–0.95)
MONOCYTES RELATIVE PERCENT: 11.3 % (ref 2–12)
NEUTROPHILS ABSOLUTE: 3.64 E9/L (ref 1.8–7.3)
NEUTROPHILS RELATIVE PERCENT: 51.8 % (ref 43–80)
OCCULT BLOOD DIAGNOSTIC: NORMAL
PDW BLD-RTO: 16.2 FL (ref 11.5–15)
PLATELET # BLD: 197 E9/L (ref 130–450)
PMV BLD AUTO: 10.2 FL (ref 7–12)
POTASSIUM SERPL-SCNC: 3.5 MMOL/L (ref 3.5–5)
PROTHROMBIN TIME: 18.6 SEC (ref 9.3–12.4)
RBC # BLD: 3.73 E12/L (ref 3.5–5.5)
SODIUM BLD-SCNC: 134 MMOL/L (ref 132–146)
TOTAL IRON BINDING CAPACITY: 236 MCG/DL (ref 250–450)
TOTAL PROTEIN: 6.4 G/DL (ref 6.4–8.3)
WBC # BLD: 7 E9/L (ref 4.5–11.5)

## 2019-10-20 PROCEDURE — 85610 PROTHROMBIN TIME: CPT

## 2019-10-20 PROCEDURE — 6370000000 HC RX 637 (ALT 250 FOR IP): Performed by: INTERNAL MEDICINE

## 2019-10-20 PROCEDURE — 2580000003 HC RX 258: Performed by: HOSPITALIST

## 2019-10-20 PROCEDURE — 82728 ASSAY OF FERRITIN: CPT

## 2019-10-20 PROCEDURE — 1200000000 HC SEMI PRIVATE

## 2019-10-20 PROCEDURE — 2500000003 HC RX 250 WO HCPCS: Performed by: INTERNAL MEDICINE

## 2019-10-20 PROCEDURE — 83540 ASSAY OF IRON: CPT

## 2019-10-20 PROCEDURE — 36415 COLL VENOUS BLD VENIPUNCTURE: CPT

## 2019-10-20 PROCEDURE — 85025 COMPLETE CBC W/AUTO DIFF WBC: CPT

## 2019-10-20 PROCEDURE — 82962 GLUCOSE BLOOD TEST: CPT

## 2019-10-20 PROCEDURE — 83550 IRON BINDING TEST: CPT

## 2019-10-20 PROCEDURE — 2580000003 HC RX 258: Performed by: INTERNAL MEDICINE

## 2019-10-20 PROCEDURE — 80053 COMPREHEN METABOLIC PANEL: CPT

## 2019-10-20 PROCEDURE — 82140 ASSAY OF AMMONIA: CPT

## 2019-10-20 PROCEDURE — 6360000002 HC RX W HCPCS: Performed by: INTERNAL MEDICINE

## 2019-10-20 PROCEDURE — 6360000002 HC RX W HCPCS: Performed by: HOSPITALIST

## 2019-10-20 PROCEDURE — 94640 AIRWAY INHALATION TREATMENT: CPT

## 2019-10-20 RX ORDER — LACTULOSE 10 G/15ML
30 SOLUTION ORAL DAILY PRN
Status: DISCONTINUED | OUTPATIENT
Start: 2019-10-20 | End: 2019-10-21 | Stop reason: HOSPADM

## 2019-10-20 RX ADMIN — MOMETASONE FUROATE AND FORMOTEROL FUMARATE DIHYDRATE 2 PUFF: 200; 5 AEROSOL RESPIRATORY (INHALATION) at 19:35

## 2019-10-20 RX ADMIN — INSULIN GLARGINE 65 UNITS: 100 INJECTION, SOLUTION SUBCUTANEOUS at 20:40

## 2019-10-20 RX ADMIN — ASPIRIN 81 MG: 81 TABLET, COATED ORAL at 09:16

## 2019-10-20 RX ADMIN — POTASSIUM CHLORIDE 10 MEQ: 10 TABLET, EXTENDED RELEASE ORAL at 09:16

## 2019-10-20 RX ADMIN — INSULIN GLARGINE 65 UNITS: 100 INJECTION, SOLUTION SUBCUTANEOUS at 09:17

## 2019-10-20 RX ADMIN — VENLAFAXINE HYDROCHLORIDE 75 MG: 75 CAPSULE, EXTENDED RELEASE ORAL at 09:16

## 2019-10-20 RX ADMIN — RIFAXIMIN 550 MG: 550 TABLET ORAL at 09:17

## 2019-10-20 RX ADMIN — MONTELUKAST SODIUM 10 MG: 10 TABLET, FILM COATED ORAL at 20:40

## 2019-10-20 RX ADMIN — Medication 10 ML: at 20:40

## 2019-10-20 RX ADMIN — Medication 10 ML: at 04:36

## 2019-10-20 RX ADMIN — Medication 10 ML: at 09:18

## 2019-10-20 RX ADMIN — CEFTRIAXONE 2 G: 2 INJECTION, POWDER, FOR SOLUTION INTRAMUSCULAR; INTRAVENOUS at 12:14

## 2019-10-20 RX ADMIN — RIFAXIMIN 550 MG: 550 TABLET ORAL at 20:40

## 2019-10-20 RX ADMIN — METRONIDAZOLE 500 MG: 500 INJECTION, SOLUTION INTRAVENOUS at 12:57

## 2019-10-20 RX ADMIN — METRONIDAZOLE 500 MG: 500 INJECTION, SOLUTION INTRAVENOUS at 04:35

## 2019-10-20 RX ADMIN — INSULIN LISPRO 4 UNITS: 100 INJECTION, SOLUTION INTRAVENOUS; SUBCUTANEOUS at 16:40

## 2019-10-20 RX ADMIN — AMLODIPINE BESYLATE 5 MG: 5 TABLET ORAL at 09:16

## 2019-10-20 RX ADMIN — ENOXAPARIN SODIUM 40 MG: 40 INJECTION SUBCUTANEOUS at 09:17

## 2019-10-20 RX ADMIN — INSULIN LISPRO 1 UNITS: 100 INJECTION, SOLUTION INTRAVENOUS; SUBCUTANEOUS at 20:41

## 2019-10-20 RX ADMIN — FUROSEMIDE 10 MG: 20 TABLET ORAL at 09:16

## 2019-10-20 RX ADMIN — LACTULOSE 20 G: 20 SOLUTION ORAL at 09:16

## 2019-10-20 RX ADMIN — PANTOPRAZOLE SODIUM 40 MG: 40 TABLET, DELAYED RELEASE ORAL at 09:17

## 2019-10-20 RX ADMIN — MOMETASONE FUROATE AND FORMOTEROL FUMARATE DIHYDRATE 2 PUFF: 200; 5 AEROSOL RESPIRATORY (INHALATION) at 07:11

## 2019-10-20 RX ADMIN — METRONIDAZOLE 500 MG: 500 INJECTION, SOLUTION INTRAVENOUS at 20:40

## 2019-10-20 ASSESSMENT — PAIN SCALES - GENERAL
PAINLEVEL_OUTOF10: 0
PAINLEVEL_OUTOF10: 0

## 2019-10-21 VITALS
HEIGHT: 67 IN | TEMPERATURE: 98.3 F | RESPIRATION RATE: 18 BRPM | HEART RATE: 97 BPM | SYSTOLIC BLOOD PRESSURE: 177 MMHG | BODY MASS INDEX: 39.24 KG/M2 | WEIGHT: 250 LBS | DIASTOLIC BLOOD PRESSURE: 85 MMHG | OXYGEN SATURATION: 95 %

## 2019-10-21 LAB
METER GLUCOSE: 116 MG/DL (ref 74–99)
METER GLUCOSE: 126 MG/DL (ref 74–99)
METER GLUCOSE: 63 MG/DL (ref 74–99)
METER GLUCOSE: 81 MG/DL (ref 74–99)

## 2019-10-21 PROCEDURE — 2580000003 HC RX 258: Performed by: INTERNAL MEDICINE

## 2019-10-21 PROCEDURE — 6360000002 HC RX W HCPCS: Performed by: INTERNAL MEDICINE

## 2019-10-21 PROCEDURE — 6360000002 HC RX W HCPCS: Performed by: HOSPITALIST

## 2019-10-21 PROCEDURE — 94640 AIRWAY INHALATION TREATMENT: CPT

## 2019-10-21 PROCEDURE — 82962 GLUCOSE BLOOD TEST: CPT

## 2019-10-21 PROCEDURE — 97161 PT EVAL LOW COMPLEX 20 MIN: CPT

## 2019-10-21 PROCEDURE — 2500000003 HC RX 250 WO HCPCS: Performed by: INTERNAL MEDICINE

## 2019-10-21 PROCEDURE — 6370000000 HC RX 637 (ALT 250 FOR IP): Performed by: INTERNAL MEDICINE

## 2019-10-21 PROCEDURE — 2580000003 HC RX 258: Performed by: HOSPITALIST

## 2019-10-21 RX ORDER — METRONIDAZOLE 500 MG/1
500 TABLET ORAL 3 TIMES DAILY
Qty: 15 TABLET | Refills: 0 | Status: SHIPPED | OUTPATIENT
Start: 2019-10-21 | End: 2019-10-26

## 2019-10-21 RX ORDER — CEFDINIR 300 MG/1
300 CAPSULE ORAL 2 TIMES DAILY
Qty: 10 CAPSULE | Refills: 0 | Status: SHIPPED | OUTPATIENT
Start: 2019-10-21 | End: 2019-10-26

## 2019-10-21 RX ADMIN — MOMETASONE FUROATE AND FORMOTEROL FUMARATE DIHYDRATE 2 PUFF: 200; 5 AEROSOL RESPIRATORY (INHALATION) at 07:16

## 2019-10-21 RX ADMIN — POTASSIUM CHLORIDE 10 MEQ: 10 TABLET, EXTENDED RELEASE ORAL at 07:53

## 2019-10-21 RX ADMIN — METRONIDAZOLE 500 MG: 500 INJECTION, SOLUTION INTRAVENOUS at 05:22

## 2019-10-21 RX ADMIN — ASPIRIN 81 MG: 81 TABLET, COATED ORAL at 07:53

## 2019-10-21 RX ADMIN — Medication 10 ML: at 07:51

## 2019-10-21 RX ADMIN — AMLODIPINE BESYLATE 5 MG: 5 TABLET ORAL at 07:53

## 2019-10-21 RX ADMIN — RIFAXIMIN 550 MG: 550 TABLET ORAL at 07:53

## 2019-10-21 RX ADMIN — INSULIN GLARGINE 65 UNITS: 100 INJECTION, SOLUTION SUBCUTANEOUS at 07:50

## 2019-10-21 RX ADMIN — Medication 10 ML: at 05:22

## 2019-10-21 RX ADMIN — ENOXAPARIN SODIUM 40 MG: 40 INJECTION SUBCUTANEOUS at 07:53

## 2019-10-21 RX ADMIN — FUROSEMIDE 10 MG: 20 TABLET ORAL at 07:52

## 2019-10-21 RX ADMIN — PANTOPRAZOLE SODIUM 40 MG: 40 TABLET, DELAYED RELEASE ORAL at 07:52

## 2019-10-21 RX ADMIN — VENLAFAXINE HYDROCHLORIDE 75 MG: 75 CAPSULE, EXTENDED RELEASE ORAL at 07:53

## 2019-10-21 RX ADMIN — CEFTRIAXONE 2 G: 2 INJECTION, POWDER, FOR SOLUTION INTRAMUSCULAR; INTRAVENOUS at 11:30

## 2019-10-21 ASSESSMENT — PAIN SCALES - GENERAL: PAINLEVEL_OUTOF10: 0

## 2019-10-22 LAB
BODY FLUID CULTURE, STERILE: NORMAL
GRAM STAIN RESULT: NORMAL

## 2019-10-23 LAB
BLOOD CULTURE, ROUTINE: NORMAL
CULTURE, BLOOD 2: NORMAL

## 2019-10-27 ENCOUNTER — HOSPITAL ENCOUNTER (INPATIENT)
Age: 64
LOS: 2 days | Discharge: HOME HEALTH | DRG: 433 | End: 2019-10-29
Attending: EMERGENCY MEDICINE | Admitting: INTERNAL MEDICINE
Payer: MEDICARE

## 2019-10-27 ENCOUNTER — APPOINTMENT (OUTPATIENT)
Dept: CT IMAGING | Age: 64
DRG: 433 | End: 2019-10-27
Payer: MEDICARE

## 2019-10-27 DIAGNOSIS — R33.9 URINARY RETENTION: ICD-10-CM

## 2019-10-27 DIAGNOSIS — R18.8 CIRRHOSIS OF LIVER WITH ASCITES, UNSPECIFIED HEPATIC CIRRHOSIS TYPE (HCC): Primary | ICD-10-CM

## 2019-10-27 DIAGNOSIS — K74.60 CIRRHOSIS OF LIVER WITH ASCITES, UNSPECIFIED HEPATIC CIRRHOSIS TYPE (HCC): Primary | ICD-10-CM

## 2019-10-27 DIAGNOSIS — E11.65 UNCONTROLLED TYPE 2 DIABETES MELLITUS WITH HYPERGLYCEMIA (HCC): Chronic | ICD-10-CM

## 2019-10-27 LAB
ALBUMIN SERPL-MCNC: 2.5 G/DL (ref 3.5–5.2)
ALP BLD-CCNC: 134 U/L (ref 35–104)
ALT SERPL-CCNC: 20 U/L (ref 0–32)
ANION GAP SERPL CALCULATED.3IONS-SCNC: 11 MMOL/L (ref 7–16)
APTT: 44.3 SEC (ref 24.5–35.1)
AST SERPL-CCNC: 50 U/L (ref 0–31)
BACTERIA: ABNORMAL /HPF
BASOPHILS ABSOLUTE: 0.04 E9/L (ref 0–0.2)
BASOPHILS RELATIVE PERCENT: 0.6 % (ref 0–2)
BILIRUB SERPL-MCNC: 1 MG/DL (ref 0–1.2)
BILIRUBIN URINE: NEGATIVE
BLOOD, URINE: NEGATIVE
BUN BLDV-MCNC: 9 MG/DL (ref 8–23)
CALCIUM SERPL-MCNC: 8.2 MG/DL (ref 8.6–10.2)
CHLORIDE BLD-SCNC: 99 MMOL/L (ref 98–107)
CLARITY: CLEAR
CO2: 23 MMOL/L (ref 22–29)
COLOR: ABNORMAL
CREAT SERPL-MCNC: 0.6 MG/DL (ref 0.5–1)
CRYSTALS, UA: ABNORMAL
EOSINOPHILS ABSOLUTE: 0.14 E9/L (ref 0.05–0.5)
EOSINOPHILS RELATIVE PERCENT: 2.1 % (ref 0–6)
GFR AFRICAN AMERICAN: >60
GFR NON-AFRICAN AMERICAN: >60 ML/MIN/1.73
GLUCOSE BLD-MCNC: 226 MG/DL (ref 74–99)
GLUCOSE URINE: NEGATIVE MG/DL
HCT VFR BLD CALC: 40.8 % (ref 34–48)
HEMOGLOBIN: 13 G/DL (ref 11.5–15.5)
IMMATURE GRANULOCYTES #: 0.02 E9/L
IMMATURE GRANULOCYTES %: 0.3 % (ref 0–5)
INR BLD: 1.6
KETONES, URINE: NEGATIVE MG/DL
LACTIC ACID: 2.3 MMOL/L (ref 0.5–2.2)
LEUKOCYTE ESTERASE, URINE: ABNORMAL
LIPASE: 47 U/L (ref 13–60)
LYMPHOCYTES ABSOLUTE: 1.56 E9/L (ref 1.5–4)
LYMPHOCYTES RELATIVE PERCENT: 23.7 % (ref 20–42)
MCH RBC QN AUTO: 29.3 PG (ref 26–35)
MCHC RBC AUTO-ENTMCNC: 31.9 % (ref 32–34.5)
MCV RBC AUTO: 92.1 FL (ref 80–99.9)
MONOCYTES ABSOLUTE: 0.59 E9/L (ref 0.1–0.95)
MONOCYTES RELATIVE PERCENT: 9 % (ref 2–12)
NEUTROPHILS ABSOLUTE: 4.22 E9/L (ref 1.8–7.3)
NEUTROPHILS RELATIVE PERCENT: 64.3 % (ref 43–80)
NITRITE, URINE: NEGATIVE
PDW BLD-RTO: 16.3 FL (ref 11.5–15)
PH UA: 5.5 (ref 5–9)
PLATELET # BLD: 229 E9/L (ref 130–450)
PMV BLD AUTO: 10.6 FL (ref 7–12)
POTASSIUM REFLEX MAGNESIUM: 4 MMOL/L (ref 3.5–5)
PRO-BNP: 25 PG/ML (ref 0–125)
PROTEIN UA: NEGATIVE MG/DL
PROTHROMBIN TIME: 18.7 SEC (ref 9.3–12.4)
RBC # BLD: 4.43 E12/L (ref 3.5–5.5)
RBC UA: ABNORMAL /HPF (ref 0–2)
SODIUM BLD-SCNC: 133 MMOL/L (ref 132–146)
SPECIFIC GRAVITY UA: 1.02 (ref 1–1.03)
TOTAL PROTEIN: 7.5 G/DL (ref 6.4–8.3)
TROPONIN: 0.01 NG/ML (ref 0–0.03)
UROBILINOGEN, URINE: 0.2 E.U./DL
WBC # BLD: 6.6 E9/L (ref 4.5–11.5)
WBC UA: ABNORMAL /HPF (ref 0–5)

## 2019-10-27 PROCEDURE — 85730 THROMBOPLASTIN TIME PARTIAL: CPT

## 2019-10-27 PROCEDURE — 83880 ASSAY OF NATRIURETIC PEPTIDE: CPT

## 2019-10-27 PROCEDURE — 85610 PROTHROMBIN TIME: CPT

## 2019-10-27 PROCEDURE — 84484 ASSAY OF TROPONIN QUANT: CPT

## 2019-10-27 PROCEDURE — 83605 ASSAY OF LACTIC ACID: CPT

## 2019-10-27 PROCEDURE — 80053 COMPREHEN METABOLIC PANEL: CPT

## 2019-10-27 PROCEDURE — 2060000000 HC ICU INTERMEDIATE R&B

## 2019-10-27 PROCEDURE — 85025 COMPLETE CBC W/AUTO DIFF WBC: CPT

## 2019-10-27 PROCEDURE — 93005 ELECTROCARDIOGRAM TRACING: CPT | Performed by: EMERGENCY MEDICINE

## 2019-10-27 PROCEDURE — 71250 CT THORAX DX C-: CPT

## 2019-10-27 PROCEDURE — 74176 CT ABD & PELVIS W/O CONTRAST: CPT

## 2019-10-27 PROCEDURE — 99285 EMERGENCY DEPT VISIT HI MDM: CPT

## 2019-10-27 PROCEDURE — 96374 THER/PROPH/DIAG INJ IV PUSH: CPT

## 2019-10-27 PROCEDURE — 51702 INSERT TEMP BLADDER CATH: CPT

## 2019-10-27 PROCEDURE — 83690 ASSAY OF LIPASE: CPT

## 2019-10-27 PROCEDURE — 81001 URINALYSIS AUTO W/SCOPE: CPT

## 2019-10-27 PROCEDURE — 6360000002 HC RX W HCPCS: Performed by: EMERGENCY MEDICINE

## 2019-10-27 RX ORDER — ACETAMINOPHEN 325 MG/1
650 TABLET ORAL EVERY 4 HOURS PRN
Status: DISCONTINUED | OUTPATIENT
Start: 2019-10-27 | End: 2019-10-29 | Stop reason: HOSPADM

## 2019-10-27 RX ORDER — SPIRONOLACTONE 50 MG/1
50 TABLET, FILM COATED ORAL DAILY
Status: ON HOLD | COMMUNITY
End: 2020-01-14 | Stop reason: HOSPADM

## 2019-10-27 RX ORDER — SODIUM CHLORIDE 0.9 % (FLUSH) 0.9 %
10 SYRINGE (ML) INJECTION PRN
Status: DISCONTINUED | OUTPATIENT
Start: 2019-10-27 | End: 2019-10-29 | Stop reason: HOSPADM

## 2019-10-27 RX ORDER — CALCIUM CARBONATE 500(1250)
1000 TABLET ORAL DAILY
COMMUNITY

## 2019-10-27 RX ORDER — FUROSEMIDE 10 MG/ML
40 INJECTION INTRAMUSCULAR; INTRAVENOUS ONCE
Status: COMPLETED | OUTPATIENT
Start: 2019-10-27 | End: 2019-10-27

## 2019-10-27 RX ORDER — SODIUM CHLORIDE 0.9 % (FLUSH) 0.9 %
10 SYRINGE (ML) INJECTION EVERY 12 HOURS SCHEDULED
Status: DISCONTINUED | OUTPATIENT
Start: 2019-10-27 | End: 2019-10-29 | Stop reason: HOSPADM

## 2019-10-27 RX ADMIN — FUROSEMIDE 40 MG: 10 INJECTION, SOLUTION INTRAMUSCULAR; INTRAVENOUS at 20:51

## 2019-10-27 ASSESSMENT — PAIN DESCRIPTION - ORIENTATION: ORIENTATION: RIGHT

## 2019-10-27 ASSESSMENT — PAIN DESCRIPTION - PAIN TYPE: TYPE: ACUTE PAIN

## 2019-10-27 ASSESSMENT — PAIN SCALES - GENERAL: PAINLEVEL_OUTOF10: 7

## 2019-10-27 ASSESSMENT — PAIN DESCRIPTION - LOCATION: LOCATION: BACK

## 2019-10-28 ENCOUNTER — APPOINTMENT (OUTPATIENT)
Dept: ULTRASOUND IMAGING | Age: 64
DRG: 433 | End: 2019-10-28
Payer: MEDICARE

## 2019-10-28 LAB
ANION GAP SERPL CALCULATED.3IONS-SCNC: 9 MMOL/L (ref 7–16)
BUN BLDV-MCNC: 8 MG/DL (ref 8–23)
CALCIUM SERPL-MCNC: 8.1 MG/DL (ref 8.6–10.2)
CHLORIDE BLD-SCNC: 105 MMOL/L (ref 98–107)
CO2: 26 MMOL/L (ref 22–29)
CREAT SERPL-MCNC: 0.6 MG/DL (ref 0.5–1)
EKG ATRIAL RATE: 98 BPM
EKG P AXIS: -13 DEGREES
EKG P-R INTERVAL: 154 MS
EKG Q-T INTERVAL: 376 MS
EKG QRS DURATION: 88 MS
EKG QTC CALCULATION (BAZETT): 480 MS
EKG R AXIS: -9 DEGREES
EKG T AXIS: 51 DEGREES
EKG VENTRICULAR RATE: 98 BPM
GFR AFRICAN AMERICAN: >60
GFR NON-AFRICAN AMERICAN: >60 ML/MIN/1.73
GLUCOSE BLD-MCNC: 105 MG/DL (ref 74–99)
HBA1C MFR BLD: 8.1 % (ref 4–5.6)
METER GLUCOSE: 110 MG/DL (ref 74–99)
METER GLUCOSE: 139 MG/DL (ref 74–99)
METER GLUCOSE: 178 MG/DL (ref 74–99)
METER GLUCOSE: 226 MG/DL (ref 74–99)
METER GLUCOSE: 84 MG/DL (ref 74–99)
POTASSIUM SERPL-SCNC: 3.5 MMOL/L (ref 3.5–5)
SODIUM BLD-SCNC: 140 MMOL/L (ref 132–146)

## 2019-10-28 PROCEDURE — 93010 ELECTROCARDIOGRAM REPORT: CPT | Performed by: INTERNAL MEDICINE

## 2019-10-28 PROCEDURE — 6370000000 HC RX 637 (ALT 250 FOR IP): Performed by: INTERNAL MEDICINE

## 2019-10-28 PROCEDURE — P9047 ALBUMIN (HUMAN), 25%, 50ML: HCPCS | Performed by: INTERNAL MEDICINE

## 2019-10-28 PROCEDURE — 94664 DEMO&/EVAL PT USE INHALER: CPT

## 2019-10-28 PROCEDURE — 49083 ABD PARACENTESIS W/IMAGING: CPT

## 2019-10-28 PROCEDURE — 36415 COLL VENOUS BLD VENIPUNCTURE: CPT

## 2019-10-28 PROCEDURE — 97165 OT EVAL LOW COMPLEX 30 MIN: CPT

## 2019-10-28 PROCEDURE — 0W9G3ZZ DRAINAGE OF PERITONEAL CAVITY, PERCUTANEOUS APPROACH: ICD-10-PCS | Performed by: RADIOLOGY

## 2019-10-28 PROCEDURE — 80048 BASIC METABOLIC PNL TOTAL CA: CPT

## 2019-10-28 PROCEDURE — 6360000002 HC RX W HCPCS: Performed by: INTERNAL MEDICINE

## 2019-10-28 PROCEDURE — 2060000000 HC ICU INTERMEDIATE R&B

## 2019-10-28 PROCEDURE — 94640 AIRWAY INHALATION TREATMENT: CPT

## 2019-10-28 PROCEDURE — 82962 GLUCOSE BLOOD TEST: CPT

## 2019-10-28 PROCEDURE — 2580000003 HC RX 258: Performed by: EMERGENCY MEDICINE

## 2019-10-28 PROCEDURE — 2580000003 HC RX 258: Performed by: INTERNAL MEDICINE

## 2019-10-28 PROCEDURE — 83036 HEMOGLOBIN GLYCOSYLATED A1C: CPT

## 2019-10-28 RX ORDER — MONTELUKAST SODIUM 10 MG/1
10 TABLET ORAL NIGHTLY
Status: DISCONTINUED | OUTPATIENT
Start: 2019-10-28 | End: 2019-10-29 | Stop reason: HOSPADM

## 2019-10-28 RX ORDER — ALBUMIN (HUMAN) 12.5 G/50ML
25 SOLUTION INTRAVENOUS ONCE
Status: COMPLETED | OUTPATIENT
Start: 2019-10-28 | End: 2019-10-28

## 2019-10-28 RX ORDER — ONDANSETRON 2 MG/ML
4 INJECTION INTRAMUSCULAR; INTRAVENOUS EVERY 6 HOURS PRN
Status: DISCONTINUED | OUTPATIENT
Start: 2019-10-28 | End: 2019-10-29 | Stop reason: HOSPADM

## 2019-10-28 RX ORDER — AMITRIPTYLINE HYDROCHLORIDE 25 MG/1
25 TABLET, FILM COATED ORAL NIGHTLY PRN
Status: DISCONTINUED | OUTPATIENT
Start: 2019-10-28 | End: 2019-10-29 | Stop reason: HOSPADM

## 2019-10-28 RX ORDER — AMLODIPINE BESYLATE 5 MG/1
5 TABLET ORAL DAILY
Status: DISCONTINUED | OUTPATIENT
Start: 2019-10-28 | End: 2019-10-29 | Stop reason: HOSPADM

## 2019-10-28 RX ORDER — ATORVASTATIN CALCIUM 40 MG/1
40 TABLET, FILM COATED ORAL NIGHTLY
Status: DISCONTINUED | OUTPATIENT
Start: 2019-10-28 | End: 2019-10-29 | Stop reason: HOSPADM

## 2019-10-28 RX ORDER — LACTULOSE 10 G/15ML
30 SOLUTION ORAL DAILY
Status: DISCONTINUED | OUTPATIENT
Start: 2019-10-28 | End: 2019-10-29 | Stop reason: HOSPADM

## 2019-10-28 RX ORDER — SODIUM CHLORIDE 0.9 % (FLUSH) 0.9 %
10 SYRINGE (ML) INJECTION PRN
Status: DISCONTINUED | OUTPATIENT
Start: 2019-10-28 | End: 2019-10-29 | Stop reason: HOSPADM

## 2019-10-28 RX ORDER — DEXTROSE MONOHYDRATE 50 MG/ML
100 INJECTION, SOLUTION INTRAVENOUS PRN
Status: DISCONTINUED | OUTPATIENT
Start: 2019-10-28 | End: 2019-10-29 | Stop reason: HOSPADM

## 2019-10-28 RX ORDER — FUROSEMIDE 10 MG/ML
20 INJECTION INTRAMUSCULAR; INTRAVENOUS DAILY
Status: DISCONTINUED | OUTPATIENT
Start: 2019-10-28 | End: 2019-10-28

## 2019-10-28 RX ORDER — SPIRONOLACTONE 25 MG/1
50 TABLET ORAL DAILY
Status: DISCONTINUED | OUTPATIENT
Start: 2019-10-28 | End: 2019-10-29 | Stop reason: HOSPADM

## 2019-10-28 RX ORDER — VENLAFAXINE HYDROCHLORIDE 37.5 MG/1
37.5 CAPSULE, EXTENDED RELEASE ORAL DAILY
Status: DISCONTINUED | OUTPATIENT
Start: 2019-10-28 | End: 2019-10-29 | Stop reason: HOSPADM

## 2019-10-28 RX ORDER — NICOTINE POLACRILEX 4 MG
15 LOZENGE BUCCAL PRN
Status: DISCONTINUED | OUTPATIENT
Start: 2019-10-28 | End: 2019-10-29 | Stop reason: HOSPADM

## 2019-10-28 RX ORDER — ASPIRIN 81 MG/1
81 TABLET ORAL DAILY
Status: DISCONTINUED | OUTPATIENT
Start: 2019-10-28 | End: 2019-10-29 | Stop reason: HOSPADM

## 2019-10-28 RX ORDER — PANTOPRAZOLE SODIUM 40 MG/1
40 TABLET, DELAYED RELEASE ORAL DAILY
Status: DISCONTINUED | OUTPATIENT
Start: 2019-10-28 | End: 2019-10-29 | Stop reason: HOSPADM

## 2019-10-28 RX ORDER — FUROSEMIDE 10 MG/ML
20 INJECTION INTRAMUSCULAR; INTRAVENOUS 2 TIMES DAILY
Status: DISCONTINUED | OUTPATIENT
Start: 2019-10-29 | End: 2019-10-29 | Stop reason: HOSPADM

## 2019-10-28 RX ORDER — ALBUTEROL SULFATE 90 UG/1
2 AEROSOL, METERED RESPIRATORY (INHALATION) EVERY 6 HOURS PRN
Status: DISCONTINUED | OUTPATIENT
Start: 2019-10-28 | End: 2019-10-29 | Stop reason: HOSPADM

## 2019-10-28 RX ORDER — DEXTROSE MONOHYDRATE 25 G/50ML
12.5 INJECTION, SOLUTION INTRAVENOUS PRN
Status: DISCONTINUED | OUTPATIENT
Start: 2019-10-28 | End: 2019-10-29 | Stop reason: HOSPADM

## 2019-10-28 RX ORDER — SODIUM CHLORIDE 0.9 % (FLUSH) 0.9 %
10 SYRINGE (ML) INJECTION EVERY 12 HOURS SCHEDULED
Status: DISCONTINUED | OUTPATIENT
Start: 2019-10-28 | End: 2019-10-29 | Stop reason: HOSPADM

## 2019-10-28 RX ADMIN — PANTOPRAZOLE SODIUM 40 MG: 40 TABLET, DELAYED RELEASE ORAL at 08:33

## 2019-10-28 RX ADMIN — INSULIN LISPRO 1 UNITS: 100 INJECTION, SOLUTION INTRAVENOUS; SUBCUTANEOUS at 21:37

## 2019-10-28 RX ADMIN — MOMETASONE FUROATE AND FORMOTEROL FUMARATE DIHYDRATE 2 PUFF: 200; 5 AEROSOL RESPIRATORY (INHALATION) at 20:13

## 2019-10-28 RX ADMIN — Medication 10 ML: at 22:00

## 2019-10-28 RX ADMIN — RIFAXIMIN 550 MG: 550 TABLET ORAL at 01:27

## 2019-10-28 RX ADMIN — SPIRONOLACTONE 50 MG: 25 TABLET ORAL at 08:33

## 2019-10-28 RX ADMIN — Medication 10 ML: at 08:34

## 2019-10-28 RX ADMIN — Medication 10 ML: at 21:37

## 2019-10-28 RX ADMIN — ALBUMIN (HUMAN) 25 G: 0.25 INJECTION, SOLUTION INTRAVENOUS at 12:37

## 2019-10-28 RX ADMIN — LACTULOSE 20 G: 20 SOLUTION ORAL at 08:33

## 2019-10-28 RX ADMIN — MONTELUKAST SODIUM 10 MG: 10 TABLET, FILM COATED ORAL at 01:27

## 2019-10-28 RX ADMIN — VENLAFAXINE HYDROCHLORIDE 37.5 MG: 37.5 CAPSULE, EXTENDED RELEASE ORAL at 08:33

## 2019-10-28 RX ADMIN — MONTELUKAST SODIUM 10 MG: 10 TABLET, FILM COATED ORAL at 21:37

## 2019-10-28 RX ADMIN — Medication 10 ML: at 08:33

## 2019-10-28 RX ADMIN — RIFAXIMIN 550 MG: 550 TABLET ORAL at 08:33

## 2019-10-28 RX ADMIN — FUROSEMIDE 20 MG: 10 INJECTION, SOLUTION INTRAVENOUS at 08:33

## 2019-10-28 RX ADMIN — ATORVASTATIN CALCIUM 40 MG: 40 TABLET, FILM COATED ORAL at 01:27

## 2019-10-28 RX ADMIN — ATORVASTATIN CALCIUM 40 MG: 40 TABLET, FILM COATED ORAL at 21:36

## 2019-10-28 RX ADMIN — MOMETASONE FUROATE AND FORMOTEROL FUMARATE DIHYDRATE 2 PUFF: 200; 5 AEROSOL RESPIRATORY (INHALATION) at 09:16

## 2019-10-28 RX ADMIN — INSULIN LISPRO 1 UNITS: 100 INJECTION, SOLUTION INTRAVENOUS; SUBCUTANEOUS at 16:51

## 2019-10-28 RX ADMIN — RIFAXIMIN 550 MG: 550 TABLET ORAL at 21:36

## 2019-10-28 RX ADMIN — AMLODIPINE BESYLATE 5 MG: 5 TABLET ORAL at 08:33

## 2019-10-28 ASSESSMENT — ENCOUNTER SYMPTOMS
ABDOMINAL DISTENTION: 1
ABDOMINAL PAIN: 1
RHINORRHEA: 0
SINUS PRESSURE: 0
EYE DISCHARGE: 0
EYE PAIN: 0
NAUSEA: 0
BACK PAIN: 0
WHEEZING: 0
SHORTNESS OF BREATH: 1
PHOTOPHOBIA: 0
SORE THROAT: 0
CONSTIPATION: 0
COUGH: 0
VOMITING: 0
BLOOD IN STOOL: 0
EYE REDNESS: 0
DIARRHEA: 0

## 2019-10-28 ASSESSMENT — PAIN SCALES - GENERAL
PAINLEVEL_OUTOF10: 0
PAINLEVEL_OUTOF10: 0

## 2019-10-29 VITALS
DIASTOLIC BLOOD PRESSURE: 74 MMHG | TEMPERATURE: 99 F | HEART RATE: 96 BPM | RESPIRATION RATE: 16 BRPM | OXYGEN SATURATION: 94 % | WEIGHT: 241.5 LBS | HEIGHT: 67 IN | SYSTOLIC BLOOD PRESSURE: 135 MMHG | BODY MASS INDEX: 37.9 KG/M2

## 2019-10-29 LAB
ANION GAP SERPL CALCULATED.3IONS-SCNC: 7 MMOL/L (ref 7–16)
BASOPHILS ABSOLUTE: 0.04 E9/L (ref 0–0.2)
BASOPHILS RELATIVE PERCENT: 0.6 % (ref 0–2)
BUN BLDV-MCNC: 8 MG/DL (ref 8–23)
CALCIUM SERPL-MCNC: 8 MG/DL (ref 8.6–10.2)
CHLORIDE BLD-SCNC: 102 MMOL/L (ref 98–107)
CO2: 25 MMOL/L (ref 22–29)
CREAT SERPL-MCNC: 0.6 MG/DL (ref 0.5–1)
EOSINOPHILS ABSOLUTE: 0.13 E9/L (ref 0.05–0.5)
EOSINOPHILS RELATIVE PERCENT: 2 % (ref 0–6)
GFR AFRICAN AMERICAN: >60
GFR NON-AFRICAN AMERICAN: >60 ML/MIN/1.73
GLUCOSE BLD-MCNC: 190 MG/DL (ref 74–99)
HCT VFR BLD CALC: 35.7 % (ref 34–48)
HEMOGLOBIN: 11.5 G/DL (ref 11.5–15.5)
IMMATURE GRANULOCYTES #: 0.03 E9/L
IMMATURE GRANULOCYTES %: 0.5 % (ref 0–5)
LYMPHOCYTES ABSOLUTE: 2.21 E9/L (ref 1.5–4)
LYMPHOCYTES RELATIVE PERCENT: 33.9 % (ref 20–42)
MAGNESIUM: 1.7 MG/DL (ref 1.6–2.6)
MCH RBC QN AUTO: 29.5 PG (ref 26–35)
MCHC RBC AUTO-ENTMCNC: 32.2 % (ref 32–34.5)
MCV RBC AUTO: 91.5 FL (ref 80–99.9)
METER GLUCOSE: 175 MG/DL (ref 74–99)
METER GLUCOSE: 236 MG/DL (ref 74–99)
METER GLUCOSE: 255 MG/DL (ref 74–99)
MONOCYTES ABSOLUTE: 0.67 E9/L (ref 0.1–0.95)
MONOCYTES RELATIVE PERCENT: 10.3 % (ref 2–12)
NEUTROPHILS ABSOLUTE: 3.43 E9/L (ref 1.8–7.3)
NEUTROPHILS RELATIVE PERCENT: 52.7 % (ref 43–80)
PDW BLD-RTO: 16.5 FL (ref 11.5–15)
PLATELET # BLD: 203 E9/L (ref 130–450)
PMV BLD AUTO: 10.8 FL (ref 7–12)
POTASSIUM REFLEX MAGNESIUM: 3.5 MMOL/L (ref 3.5–5)
POTASSIUM SERPL-SCNC: 3.5 MMOL/L (ref 3.5–5)
RBC # BLD: 3.9 E12/L (ref 3.5–5.5)
SODIUM BLD-SCNC: 134 MMOL/L (ref 132–146)
WBC # BLD: 6.5 E9/L (ref 4.5–11.5)

## 2019-10-29 PROCEDURE — 83735 ASSAY OF MAGNESIUM: CPT

## 2019-10-29 PROCEDURE — 6360000002 HC RX W HCPCS: Performed by: INTERNAL MEDICINE

## 2019-10-29 PROCEDURE — 36415 COLL VENOUS BLD VENIPUNCTURE: CPT

## 2019-10-29 PROCEDURE — 2580000003 HC RX 258: Performed by: INTERNAL MEDICINE

## 2019-10-29 PROCEDURE — 82962 GLUCOSE BLOOD TEST: CPT

## 2019-10-29 PROCEDURE — 85025 COMPLETE CBC W/AUTO DIFF WBC: CPT

## 2019-10-29 PROCEDURE — 6370000000 HC RX 637 (ALT 250 FOR IP): Performed by: INTERNAL MEDICINE

## 2019-10-29 PROCEDURE — 97161 PT EVAL LOW COMPLEX 20 MIN: CPT

## 2019-10-29 PROCEDURE — 94640 AIRWAY INHALATION TREATMENT: CPT

## 2019-10-29 PROCEDURE — 80048 BASIC METABOLIC PNL TOTAL CA: CPT

## 2019-10-29 RX ORDER — FUROSEMIDE 20 MG/1
20 TABLET ORAL 2 TIMES DAILY
Qty: 60 TABLET | Refills: 2 | Status: ON HOLD | OUTPATIENT
Start: 2019-10-29 | End: 2020-02-14 | Stop reason: HOSPADM

## 2019-10-29 RX ADMIN — FUROSEMIDE 20 MG: 10 INJECTION, SOLUTION INTRAVENOUS at 08:33

## 2019-10-29 RX ADMIN — INSULIN LISPRO 3 UNITS: 100 INJECTION, SOLUTION INTRAVENOUS; SUBCUTANEOUS at 16:49

## 2019-10-29 RX ADMIN — INSULIN LISPRO 1 UNITS: 100 INJECTION, SOLUTION INTRAVENOUS; SUBCUTANEOUS at 08:36

## 2019-10-29 RX ADMIN — ENOXAPARIN SODIUM 40 MG: 40 INJECTION SUBCUTANEOUS at 08:34

## 2019-10-29 RX ADMIN — PANTOPRAZOLE SODIUM 40 MG: 40 TABLET, DELAYED RELEASE ORAL at 08:33

## 2019-10-29 RX ADMIN — Medication 10 ML: at 08:34

## 2019-10-29 RX ADMIN — LACTULOSE 20 G: 20 SOLUTION ORAL at 08:33

## 2019-10-29 RX ADMIN — FUROSEMIDE 20 MG: 10 INJECTION, SOLUTION INTRAVENOUS at 16:55

## 2019-10-29 RX ADMIN — SPIRONOLACTONE 50 MG: 25 TABLET ORAL at 08:33

## 2019-10-29 RX ADMIN — MOMETASONE FUROATE AND FORMOTEROL FUMARATE DIHYDRATE 2 PUFF: 200; 5 AEROSOL RESPIRATORY (INHALATION) at 08:20

## 2019-10-29 RX ADMIN — ASPIRIN 81 MG: 81 TABLET, COATED ORAL at 08:33

## 2019-10-29 RX ADMIN — RIFAXIMIN 550 MG: 550 TABLET ORAL at 08:34

## 2019-10-29 RX ADMIN — INSULIN LISPRO 2 UNITS: 100 INJECTION, SOLUTION INTRAVENOUS; SUBCUTANEOUS at 11:33

## 2019-10-29 RX ADMIN — AMLODIPINE BESYLATE 5 MG: 5 TABLET ORAL at 08:33

## 2019-10-29 RX ADMIN — VENLAFAXINE HYDROCHLORIDE 37.5 MG: 37.5 CAPSULE, EXTENDED RELEASE ORAL at 08:33

## 2019-10-29 ASSESSMENT — PAIN SCALES - GENERAL
PAINLEVEL_OUTOF10: 0
PAINLEVEL_OUTOF10: 0

## 2019-11-15 ENCOUNTER — TELEPHONE (OUTPATIENT)
Dept: ENDOCRINOLOGY | Age: 64
End: 2019-11-15

## 2019-12-17 DIAGNOSIS — I65.23 BILATERAL CAROTID ARTERY STENOSIS: Primary | ICD-10-CM

## 2020-01-08 ENCOUNTER — HOSPITAL ENCOUNTER (OUTPATIENT)
Dept: ULTRASOUND IMAGING | Age: 65
Discharge: HOME | DRG: 433 | End: 2020-01-10
Payer: MEDICARE

## 2020-01-08 PROCEDURE — 76705 ECHO EXAM OF ABDOMEN: CPT

## 2020-01-11 ENCOUNTER — APPOINTMENT (OUTPATIENT)
Dept: GENERAL RADIOLOGY | Age: 65
DRG: 433 | End: 2020-01-11
Payer: MEDICARE

## 2020-01-11 ENCOUNTER — HOSPITAL ENCOUNTER (INPATIENT)
Age: 65
LOS: 3 days | Discharge: HOME HEALTH | DRG: 433 | End: 2020-01-14
Attending: EMERGENCY MEDICINE | Admitting: INTERNAL MEDICINE
Payer: MEDICARE

## 2020-01-11 PROBLEM — R18.8 ASCITES OF LIVER: Status: ACTIVE | Noted: 2020-01-11

## 2020-01-11 LAB
ALBUMIN SERPL-MCNC: 2.7 G/DL (ref 3.5–5.2)
ALP BLD-CCNC: 187 U/L (ref 35–104)
ALT SERPL-CCNC: 30 U/L (ref 0–32)
AMMONIA: 76.2 UMOL/L (ref 11–51)
ANION GAP SERPL CALCULATED.3IONS-SCNC: 12 MMOL/L (ref 7–16)
APTT: 32.9 SEC (ref 24.5–35.1)
AST SERPL-CCNC: 54 U/L (ref 0–31)
BASOPHILS ABSOLUTE: 0.07 E9/L (ref 0–0.2)
BASOPHILS RELATIVE PERCENT: 0.8 % (ref 0–2)
BILIRUB SERPL-MCNC: 1.5 MG/DL (ref 0–1.2)
BILIRUBIN DIRECT: 0.5 MG/DL (ref 0–0.3)
BILIRUBIN, INDIRECT: 1 MG/DL (ref 0–1)
BUN BLDV-MCNC: 9 MG/DL (ref 8–23)
CALCIUM SERPL-MCNC: 8.7 MG/DL (ref 8.6–10.2)
CHLORIDE BLD-SCNC: 94 MMOL/L (ref 98–107)
CO2: 25 MMOL/L (ref 22–29)
CREAT SERPL-MCNC: 0.7 MG/DL (ref 0.5–1)
EKG ATRIAL RATE: 94 BPM
EKG P AXIS: -14 DEGREES
EKG P-R INTERVAL: 154 MS
EKG Q-T INTERVAL: 392 MS
EKG QRS DURATION: 80 MS
EKG QTC CALCULATION (BAZETT): 490 MS
EKG R AXIS: -17 DEGREES
EKG T AXIS: 70 DEGREES
EKG VENTRICULAR RATE: 94 BPM
EOSINOPHILS ABSOLUTE: 0.21 E9/L (ref 0.05–0.5)
EOSINOPHILS RELATIVE PERCENT: 2.3 % (ref 0–6)
GFR AFRICAN AMERICAN: >60
GFR NON-AFRICAN AMERICAN: >60 ML/MIN/1.73
GLUCOSE BLD-MCNC: 264 MG/DL (ref 74–99)
HCT VFR BLD CALC: 39.3 % (ref 34–48)
HEMOGLOBIN: 13 G/DL (ref 11.5–15.5)
IMMATURE GRANULOCYTES #: 0.03 E9/L
IMMATURE GRANULOCYTES %: 0.3 % (ref 0–5)
INR BLD: 1.4
LIPASE: 22 U/L (ref 13–60)
LYMPHOCYTES ABSOLUTE: 1.92 E9/L (ref 1.5–4)
LYMPHOCYTES RELATIVE PERCENT: 21.3 % (ref 20–42)
MCH RBC QN AUTO: 29 PG (ref 26–35)
MCHC RBC AUTO-ENTMCNC: 33.1 % (ref 32–34.5)
MCV RBC AUTO: 87.5 FL (ref 80–99.9)
METER GLUCOSE: 198 MG/DL (ref 74–99)
METER GLUCOSE: 209 MG/DL (ref 74–99)
MONOCYTES ABSOLUTE: 0.91 E9/L (ref 0.1–0.95)
MONOCYTES RELATIVE PERCENT: 10.1 % (ref 2–12)
NEUTROPHILS ABSOLUTE: 5.87 E9/L (ref 1.8–7.3)
NEUTROPHILS RELATIVE PERCENT: 65.2 % (ref 43–80)
PDW BLD-RTO: 18.1 FL (ref 11.5–15)
PLATELET # BLD: 270 E9/L (ref 130–450)
PMV BLD AUTO: 10.9 FL (ref 7–12)
POTASSIUM REFLEX MAGNESIUM: 4.3 MMOL/L (ref 3.5–5)
PROTHROMBIN TIME: 16 SEC (ref 9.3–12.4)
RBC # BLD: 4.49 E12/L (ref 3.5–5.5)
SODIUM BLD-SCNC: 131 MMOL/L (ref 132–146)
TOTAL PROTEIN: 8.4 G/DL (ref 6.4–8.3)
WBC # BLD: 9 E9/L (ref 4.5–11.5)

## 2020-01-11 PROCEDURE — 82962 GLUCOSE BLOOD TEST: CPT

## 2020-01-11 PROCEDURE — 80076 HEPATIC FUNCTION PANEL: CPT

## 2020-01-11 PROCEDURE — 99285 EMERGENCY DEPT VISIT HI MDM: CPT

## 2020-01-11 PROCEDURE — 6370000000 HC RX 637 (ALT 250 FOR IP): Performed by: PHYSICIAN ASSISTANT

## 2020-01-11 PROCEDURE — 80048 BASIC METABOLIC PNL TOTAL CA: CPT

## 2020-01-11 PROCEDURE — 85610 PROTHROMBIN TIME: CPT

## 2020-01-11 PROCEDURE — 82140 ASSAY OF AMMONIA: CPT

## 2020-01-11 PROCEDURE — 85730 THROMBOPLASTIN TIME PARTIAL: CPT

## 2020-01-11 PROCEDURE — 93005 ELECTROCARDIOGRAM TRACING: CPT | Performed by: NURSE PRACTITIONER

## 2020-01-11 PROCEDURE — 6360000002 HC RX W HCPCS: Performed by: PHYSICIAN ASSISTANT

## 2020-01-11 PROCEDURE — 83690 ASSAY OF LIPASE: CPT

## 2020-01-11 PROCEDURE — 71046 X-RAY EXAM CHEST 2 VIEWS: CPT

## 2020-01-11 PROCEDURE — 2060000000 HC ICU INTERMEDIATE R&B

## 2020-01-11 PROCEDURE — 2580000003 HC RX 258: Performed by: PHYSICIAN ASSISTANT

## 2020-01-11 PROCEDURE — 85025 COMPLETE CBC W/AUTO DIFF WBC: CPT

## 2020-01-11 PROCEDURE — 94640 AIRWAY INHALATION TREATMENT: CPT

## 2020-01-11 RX ORDER — MONTELUKAST SODIUM 10 MG/1
10 TABLET ORAL NIGHTLY
Status: DISCONTINUED | OUTPATIENT
Start: 2020-01-11 | End: 2020-01-14 | Stop reason: HOSPADM

## 2020-01-11 RX ORDER — SODIUM CHLORIDE 0.9 % (FLUSH) 0.9 %
10 SYRINGE (ML) INJECTION EVERY 12 HOURS SCHEDULED
Status: DISCONTINUED | OUTPATIENT
Start: 2020-01-11 | End: 2020-01-14 | Stop reason: HOSPADM

## 2020-01-11 RX ORDER — SPIRONOLACTONE 25 MG/1
50 TABLET ORAL DAILY
Status: DISCONTINUED | OUTPATIENT
Start: 2020-01-11 | End: 2020-01-12

## 2020-01-11 RX ORDER — AMLODIPINE BESYLATE 5 MG/1
5 TABLET ORAL DAILY
Status: DISCONTINUED | OUTPATIENT
Start: 2020-01-11 | End: 2020-01-14 | Stop reason: HOSPADM

## 2020-01-11 RX ORDER — INSULIN GLARGINE 100 [IU]/ML
55 INJECTION, SOLUTION SUBCUTANEOUS NIGHTLY
Status: DISCONTINUED | OUTPATIENT
Start: 2020-01-11 | End: 2020-01-14 | Stop reason: HOSPADM

## 2020-01-11 RX ORDER — KETOROLAC TROMETHAMINE 30 MG/ML
15 INJECTION, SOLUTION INTRAMUSCULAR; INTRAVENOUS EVERY 6 HOURS PRN
Status: DISCONTINUED | OUTPATIENT
Start: 2020-01-11 | End: 2020-01-14 | Stop reason: HOSPADM

## 2020-01-11 RX ORDER — ACETAMINOPHEN 325 MG/1
650 TABLET ORAL EVERY 6 HOURS PRN
Status: DISCONTINUED | OUTPATIENT
Start: 2020-01-11 | End: 2020-01-14 | Stop reason: HOSPADM

## 2020-01-11 RX ORDER — SODIUM CHLORIDE 0.9 % (FLUSH) 0.9 %
10 SYRINGE (ML) INJECTION PRN
Status: DISCONTINUED | OUTPATIENT
Start: 2020-01-11 | End: 2020-01-14 | Stop reason: HOSPADM

## 2020-01-11 RX ORDER — ATORVASTATIN CALCIUM 40 MG/1
40 TABLET, FILM COATED ORAL NIGHTLY
Status: DISCONTINUED | OUTPATIENT
Start: 2020-01-11 | End: 2020-01-14 | Stop reason: HOSPADM

## 2020-01-11 RX ORDER — VENLAFAXINE HYDROCHLORIDE 75 MG/1
75 CAPSULE, EXTENDED RELEASE ORAL DAILY
Status: DISCONTINUED | OUTPATIENT
Start: 2020-01-11 | End: 2020-01-14 | Stop reason: HOSPADM

## 2020-01-11 RX ORDER — DEXTROSE MONOHYDRATE 25 G/50ML
12.5 INJECTION, SOLUTION INTRAVENOUS PRN
Status: DISCONTINUED | OUTPATIENT
Start: 2020-01-11 | End: 2020-01-14 | Stop reason: HOSPADM

## 2020-01-11 RX ORDER — FUROSEMIDE 20 MG/1
20 TABLET ORAL 2 TIMES DAILY
Status: DISCONTINUED | OUTPATIENT
Start: 2020-01-11 | End: 2020-01-12

## 2020-01-11 RX ORDER — ONDANSETRON 2 MG/ML
4 INJECTION INTRAMUSCULAR; INTRAVENOUS EVERY 6 HOURS PRN
Status: DISCONTINUED | OUTPATIENT
Start: 2020-01-11 | End: 2020-01-14 | Stop reason: HOSPADM

## 2020-01-11 RX ORDER — AMITRIPTYLINE HYDROCHLORIDE 25 MG/1
25 TABLET, FILM COATED ORAL NIGHTLY PRN
Status: DISCONTINUED | OUTPATIENT
Start: 2020-01-11 | End: 2020-01-14 | Stop reason: HOSPADM

## 2020-01-11 RX ORDER — DEXTROSE MONOHYDRATE 50 MG/ML
100 INJECTION, SOLUTION INTRAVENOUS PRN
Status: DISCONTINUED | OUTPATIENT
Start: 2020-01-11 | End: 2020-01-14 | Stop reason: HOSPADM

## 2020-01-11 RX ORDER — ALBUTEROL SULFATE 90 UG/1
2 AEROSOL, METERED RESPIRATORY (INHALATION) EVERY 6 HOURS PRN
Status: DISCONTINUED | OUTPATIENT
Start: 2020-01-11 | End: 2020-01-14 | Stop reason: HOSPADM

## 2020-01-11 RX ORDER — LACTULOSE 10 G/15ML
30 SOLUTION ORAL DAILY
Status: DISCONTINUED | OUTPATIENT
Start: 2020-01-11 | End: 2020-01-12

## 2020-01-11 RX ORDER — PANTOPRAZOLE SODIUM 40 MG/1
40 TABLET, DELAYED RELEASE ORAL DAILY
Status: DISCONTINUED | OUTPATIENT
Start: 2020-01-11 | End: 2020-01-14 | Stop reason: HOSPADM

## 2020-01-11 RX ORDER — POTASSIUM CHLORIDE 7.45 MG/ML
10 INJECTION INTRAVENOUS PRN
Status: DISCONTINUED | OUTPATIENT
Start: 2020-01-11 | End: 2020-01-14 | Stop reason: HOSPADM

## 2020-01-11 RX ORDER — ASPIRIN 81 MG/1
81 TABLET ORAL DAILY
Status: DISCONTINUED | OUTPATIENT
Start: 2020-01-11 | End: 2020-01-14 | Stop reason: HOSPADM

## 2020-01-11 RX ORDER — NICOTINE POLACRILEX 4 MG
15 LOZENGE BUCCAL PRN
Status: DISCONTINUED | OUTPATIENT
Start: 2020-01-11 | End: 2020-01-14 | Stop reason: HOSPADM

## 2020-01-11 RX ORDER — POTASSIUM CHLORIDE 20 MEQ/1
40 TABLET, EXTENDED RELEASE ORAL PRN
Status: DISCONTINUED | OUTPATIENT
Start: 2020-01-11 | End: 2020-01-14 | Stop reason: HOSPADM

## 2020-01-11 RX ADMIN — Medication 10 ML: at 20:44

## 2020-01-11 RX ADMIN — INSULIN GLARGINE 55 UNITS: 100 INJECTION, SOLUTION SUBCUTANEOUS at 20:47

## 2020-01-11 RX ADMIN — MONTELUKAST SODIUM 10 MG: 10 TABLET, FILM COATED ORAL at 20:41

## 2020-01-11 RX ADMIN — LACTULOSE 20 G: 20 SOLUTION ORAL at 18:07

## 2020-01-11 RX ADMIN — KETOROLAC TROMETHAMINE 15 MG: 30 INJECTION, SOLUTION INTRAMUSCULAR at 20:42

## 2020-01-11 RX ADMIN — AMITRIPTYLINE HYDROCHLORIDE 25 MG: 25 TABLET, FILM COATED ORAL at 20:42

## 2020-01-11 RX ADMIN — FUROSEMIDE 20 MG: 20 TABLET ORAL at 20:41

## 2020-01-11 RX ADMIN — INSULIN LISPRO 1 UNITS: 100 INJECTION, SOLUTION INTRAVENOUS; SUBCUTANEOUS at 20:47

## 2020-01-11 RX ADMIN — ATORVASTATIN CALCIUM 40 MG: 40 TABLET, FILM COATED ORAL at 20:41

## 2020-01-11 RX ADMIN — MOMETASONE FUROATE AND FORMOTEROL FUMARATE DIHYDRATE 2 PUFF: 200; 5 AEROSOL RESPIRATORY (INHALATION) at 21:36

## 2020-01-11 RX ADMIN — RIFAXIMIN 550 MG: 550 TABLET ORAL at 20:41

## 2020-01-11 ASSESSMENT — PAIN DESCRIPTION - ONSET: ONSET: ON-GOING

## 2020-01-11 ASSESSMENT — PAIN DESCRIPTION - ORIENTATION: ORIENTATION: RIGHT

## 2020-01-11 ASSESSMENT — PAIN SCALES - GENERAL
PAINLEVEL_OUTOF10: 8
PAINLEVEL_OUTOF10: 6
PAINLEVEL_OUTOF10: 7
PAINLEVEL_OUTOF10: 0

## 2020-01-11 ASSESSMENT — PAIN DESCRIPTION - DESCRIPTORS
DESCRIPTORS: ACHING
DESCRIPTORS: THROBBING

## 2020-01-11 ASSESSMENT — PAIN DESCRIPTION - LOCATION
LOCATION: SHOULDER
LOCATION: ABDOMEN

## 2020-01-11 ASSESSMENT — PAIN DESCRIPTION - PROGRESSION: CLINICAL_PROGRESSION: GRADUALLY WORSENING

## 2020-01-11 ASSESSMENT — PAIN DESCRIPTION - PAIN TYPE
TYPE: ACUTE PAIN
TYPE: ACUTE PAIN

## 2020-01-11 ASSESSMENT — PAIN DESCRIPTION - FREQUENCY
FREQUENCY: CONTINUOUS
FREQUENCY: INTERMITTENT

## 2020-01-11 NOTE — ED NOTES
Report faxed to unit. [facility]  at ext [phone] confirmed receipt. Patient prepared to be transported via cart and monitor.      Chad Cotto RN  01/11/20 0269

## 2020-01-11 NOTE — PLAN OF CARE
Problem: Pain:  Goal: Pain level will decrease  Description  Pain level will decrease  Outcome: Ongoing  Goal: Control of acute pain  Description  Control of acute pain  Outcome: Ongoing  Goal: Control of chronic pain  Description  Control of chronic pain  Outcome: Ongoing     Problem: Falls - Risk of:  Goal: Will remain free from falls  Description  Will remain free from falls  Outcome: Ongoing  Goal: Absence of physical injury  Description  Absence of physical injury  Outcome: Ongoing     Problem: Activity:  Goal: Risk for activity intolerance will decrease  Description  Risk for activity intolerance will decrease  Outcome: Ongoing     Problem:  Bowel/Gastric:  Goal: Bowel function will improve  Description  Bowel function will improve  Outcome: Ongoing  Goal: Diagnostic test results will improve  Description  Diagnostic test results will improve  Outcome: Ongoing  Goal: Occurrences of nausea will decrease  Description  Occurrences of nausea will decrease  Outcome: Ongoing  Goal: Occurrences of vomiting will decrease  Description  Occurrences of vomiting will decrease  Outcome: Ongoing     Problem: Fluid Volume:  Goal: Maintenance of adequate hydration will improve  Description  Maintenance of adequate hydration will improve  Outcome: Ongoing

## 2020-01-11 NOTE — ED PROVIDER NOTES
ED Attending  CC: No       Department of Emergency Medicine   ED  Provider Note  Admit Date/RoomTime: 1/11/2020 12:03 PM  ED Room: 12/12  Chief Complaint:       Extremity Weakness (fell around 1 am.  Patient has hx of acites and is due for paracentesis on Wednesday. Legs gave out while trying to transfer to bedside commode. -hitting head, patient was taken off aspirin 81mg for procedure on Wednesday. -LOC); Fall; and Abdominal Pain (Patient states she can not wait until wednesday for procedure.  )    History of Present Illness   Source of history provided by:  patient. History/Exam Limitations: none. Kassandra Walters is a 71 y.o. old female who has a past medical history of:   Past Medical History:   Diagnosis Date    Arthritis     Cerebral artery occlusion with cerebral infarction ([facility] [address] 09/2018    some memory loss, some right side weakness.  Cough     post anesthesia    Diabetes mellitus ([facility] [address]     Diverticulitis     GERD (gastroesophageal reflux disease)     Hyperlipidemia     Hypertension     Liver cirrhosis (HCC)     Polyp of esophagus     Polyp, stomach     PONV (postoperative nausea and vomiting)     Prolonged emergence from general anesthesia     Thyroid disease     no meds    presents to the emergency department by private vehicle and accompanied by family member son, for complaints of gradual onset, still present, constant pressure, sharp pain in the entire abdomen without radiation which began 1 week(s) prior to arrival.   There has been similar episodes in the past patient has a history of centesis last one in November 2019. Patient has liver cirrhosis. .  Since onset the symptoms have been worsening. The pain is associated with nothing pertinent. The pain is aggravated by none and relieved by nothing.   There has been NO back pain, chills, cloudy urine, constipation, diarrhea, dysuria, headache, hematuria, sweating, urinary frequency, urinary incontinence, urinary urgency, % 5' 7\" (1.702 m) 235 lb (106.6 kg)      Oxygen Saturation Interpretation: Normal.    · General Appearance/Constitutional:  Alert, development consistent with age  · HEENT:  NC/NT. PERRLA. Airway patent. · Neck:  Supple. No lymphadenopathy. · Respiratory:  No retractions. Lungs Clear to auscultation and breath sounds equal.  · CV:  Regular rate and rhythm. · GI:  General Appearance: Rounded and firm. Bowel sounds: normal bowel sounds. Distension:  Severe. Tenderness: No abdominal tenderness, no rebound tenderness, no guarding. Liver: non-tender. Spleen:  non-tender. Pulsatile Mass: absent. Hernia:  no inguinal or femoral hernias noted. · Back: CVA Tenderness: No.  · Integument:  Normal turgor. Warm, dry, without visible rash, unless noted elsewhere. · Lymphatics: No edema, cap.refill <3sec. · Neurological:  Orientation age-appropriate. Motor functions intact.     Lab / Imaging Results   (All laboratory and radiology results have been personally reviewed by myself)  Labs:  Results for orders placed or performed during the hospital encounter of 01/11/20   CBC Auto Differential   Result Value Ref Range    WBC 9.0 4.5 - 11.5 E9/L    RBC 4.49 3.50 - 5.50 E12/L    Hemoglobin 13.0 11.5 - 15.5 g/dL    Hematocrit 39.3 34.0 - 48.0 %    MCV 87.5 80.0 - 99.9 fL    MCH 29.0 26.0 - 35.0 pg    MCHC 33.1 32.0 - 34.5 %    RDW 18.1 (H) 11.5 - 15.0 fL    Platelets 103 901 - 034 E9/L    MPV 10.9 7.0 - 12.0 fL    Neutrophils % 65.2 43.0 - 80.0 %    Immature Granulocytes % 0.3 0.0 - 5.0 %    Lymphocytes % 21.3 20.0 - 42.0 %    Monocytes % 10.1 2.0 - 12.0 %    Eosinophils % 2.3 0.0 - 6.0 %    Basophils % 0.8 0.0 - 2.0 %    Neutrophils Absolute 5.87 1.80 - 7.30 E9/L    Immature Granulocytes # 0.03 E9/L    Lymphocytes Absolute 1.92 1.50 - 4.00 E9/L    Monocytes Absolute 0.91 0.10 - 0.95 E9/L    Eosinophils Absolute 0.21 0.05 - 0.50 E9/L    Basophils Absolute 0.07 0.00 - 0.20 S2/K   Basic Metabolic Panel w/ Reflex to MG   Result Value Ref Range    Sodium 131 (L) 132 - 146 mmol/L    Potassium reflex Magnesium 4.3 3.5 - 5.0 mmol/L    Chloride 94 (L) 98 - 107 mmol/L    CO2 25 22 - 29 mmol/L    Anion Gap 12 7 - 16 mmol/L    Glucose 264 (H) 74 - 99 mg/dL    BUN 9 8 - 23 mg/dL    CREATININE 0.7 0.5 - 1.0 mg/dL    GFR Non-African American >60 >=60 mL/min/1.73    GFR African American >60     Calcium 8.7 8.6 - 10.2 mg/dL   Hepatic Function Panel   Result Value Ref Range    Total Protein 8.4 (H) 6.4 - 8.3 g/dL    Alb 2.7 (L) 3.5 - 5.2 g/dL    Alkaline Phosphatase 187 (H) 35 - 104 U/L    ALT 30 0 - 32 U/L    AST 54 (H) 0 - 31 U/L    Total Bilirubin 1.5 (H) 0.0 - 1.2 mg/dL    Bilirubin, Direct 0.5 (H) 0.0 - 0.3 mg/dL    Bilirubin, Indirect 1.0 0.0 - 1.0 mg/dL   Lipase   Result Value Ref Range    Lipase 22 13 - 60 U/L   Protime-INR   Result Value Ref Range    Protime 16.0 (H) 9.3 - 12.4 sec    INR 1.4    APTT   Result Value Ref Range    aPTT 32.9 24.5 - 35.1 sec   Ammonia   Result Value Ref Range    Ammonia 76.2 (H) 11.0 - 51.0 umol/L   EKG 12 Lead   Result Value Ref Range    Ventricular Rate 94 BPM    Atrial Rate 94 BPM    P-R Interval 154 ms    QRS Duration 80 ms    Q-T Interval 392 ms    QTc Calculation (Bazett) 490 ms    P Axis -14 degrees    R Axis -17 degrees    T Axis 70 degrees     Imaging: All Radiology results interpreted by Radiologist unless otherwise noted. XR CHEST STANDARD (2 VW)   Final Result   No evidence of organized pneumonia, pleural effusion, or cardiac   decompensation. EKG #1:  Interpreted by emergency department physician unless otherwise noted. Time:  1242    Rate: 94  Rhythm: Sinus. Interpretation: no acute changes. No STEMI    ED Course / Medical Decision Making   Medications - No data to display     Re-Evaluations:  1/11/20      Time:   1359-spoke with patient regarding admission. Patient accepts admission.   Patient denies any pain at this time      Consultations:               1341-woke with AMENA Khan, who is taking call for Dr. Katarina Gomez. Discussed admission. She agrees patient needs to be admitted to have paracentesis completed sooner than Wednesday. Verbal telephone orders to place patient on intermediate inpatient for for ascites of the liver. Procedures:   none    MDM:  Patient presents to the ED for extremity weakness due to increased ascites of the abdomen. Differential diagnoses included but not limited to injury from fall, ascites of the liver. Workup in the ED revealed patient had no injury related to her fall off the bedside commode. Ammonia is elevated at 76.2, liver enzymes are elevated. Patient states she does not need anything for pain while in the emergency department. Patient requires continued workup and management of their symptoms and will be admitted to the hospital for further evaluation and treatment. Dr. Kameron Interiano evaluated patient. Patient and reviewed all results. He feels patient needs to be admitted for further evaluation and to have paracentesis completed sooner. Counseling:   I have spoken with the son and patient and discussed todays results, in addition to providing specific details for the plan of care and counseling regarding the diagnosis and prognosis and are agreeable with the plan admission for further evaluation and possibly having paracentesis completed soon. Assessment      1. Ascites of liver      This patient's ED course included: a personal history and physicial examination and multiple bedside re-evaluations  This patient has remained hemodynamically stable during their ED course. Plan   Admit to telemetry. Patient condition is stable. New Medications     New Prescriptions    No medications on file     Electronically signed by BENITO Mitchell CNP   DD: 1/11/20  **This report was transcribed using voice recognition software.  Every effort was made to ensure accuracy; however, inadvertent computerized transcription errors may be present.   END OF PROVIDER NOTE     Kreg Snellen, APRN - CNP  01/11/20 [id number]

## 2020-01-11 NOTE — PLAN OF CARE
Problem: Pain:  Goal: Pain level will decrease  Description  Pain level will decrease  1/11/2020 1747 by Celeste Valle RN  Outcome: Ongoing  1/11/2020 1637 by Celeste Valle RN  Outcome: Ongoing  Goal: Control of acute pain  Description  Control of acute pain  1/11/2020 1747 by Celeste Valle RN  Outcome: Ongoing  1/11/2020 1637 by Celeste Valle RN  Outcome: Ongoing  Goal: Control of chronic pain  Description  Control of chronic pain  1/11/2020 1747 by Celeste Valle RN  Outcome: Ongoing  1/11/2020 1637 by Celeste Valle RN  Outcome: Ongoing     Problem: Falls - Risk of:  Goal: Will remain free from falls  Description  Will remain free from falls  1/11/2020 1747 by Celeste Valle RN  Outcome: Ongoing  1/11/2020 1637 by Celeste Valle RN  Outcome: Ongoing  Goal: Absence of physical injury  Description  Absence of physical injury  1/11/2020 1747 by Celeste Valle RN  Outcome: Ongoing  1/11/2020 1637 by Celeste Valle RN  Outcome: Ongoing     Problem: Activity:  Goal: Risk for activity intolerance will decrease  Description  Risk for activity intolerance will decrease  1/11/2020 1747 by Celeste Valle RN  Outcome: Ongoing  1/11/2020 1637 by Celeste Valle RN  Outcome: Ongoing     Problem:  Bowel/Gastric:  Goal: Bowel function will improve  Description  Bowel function will improve  1/11/2020 1747 by Celeste Valle RN  Outcome: Ongoing  1/11/2020 1637 by Celeste Valle RN  Outcome: Ongoing  Goal: Diagnostic test results will improve  Description  Diagnostic test results will improve  1/11/2020 1747 by Celeste Valle RN  Outcome: Ongoing  1/11/2020 1637 by Celeste Valle RN  Outcome: Ongoing  Goal: Occurrences of nausea will decrease  Description  Occurrences of nausea will decrease  1/11/2020 1747 by Celeste Valle RN  Outcome: Ongoing  1/11/2020 1637 by Celeste Valle RN  Outcome: Ongoing  Goal: Occurrences of vomiting will decrease  Description  Occurrences of vomiting will decrease  1/11/2020 1747

## 2020-01-12 LAB
ALBUMIN SERPL-MCNC: 2.3 G/DL (ref 3.5–5.2)
ALP BLD-CCNC: 162 U/L (ref 35–104)
ALT SERPL-CCNC: 25 U/L (ref 0–32)
ANION GAP SERPL CALCULATED.3IONS-SCNC: 10 MMOL/L (ref 7–16)
AST SERPL-CCNC: 36 U/L (ref 0–31)
BASOPHILS ABSOLUTE: 0.07 E9/L (ref 0–0.2)
BASOPHILS RELATIVE PERCENT: 1 % (ref 0–2)
BILIRUB SERPL-MCNC: 1.4 MG/DL (ref 0–1.2)
BUN BLDV-MCNC: 11 MG/DL (ref 8–23)
CALCIUM SERPL-MCNC: 8.6 MG/DL (ref 8.6–10.2)
CHLORIDE BLD-SCNC: 96 MMOL/L (ref 98–107)
CO2: 25 MMOL/L (ref 22–29)
CREAT SERPL-MCNC: 0.9 MG/DL (ref 0.5–1)
EOSINOPHILS ABSOLUTE: 0.21 E9/L (ref 0.05–0.5)
EOSINOPHILS RELATIVE PERCENT: 3 % (ref 0–6)
GFR AFRICAN AMERICAN: >60
GFR NON-AFRICAN AMERICAN: >60 ML/MIN/1.73
GLUCOSE BLD-MCNC: 186 MG/DL (ref 74–99)
HCT VFR BLD CALC: 36.5 % (ref 34–48)
HEMOGLOBIN: 12.1 G/DL (ref 11.5–15.5)
IMMATURE GRANULOCYTES #: 0.02 E9/L
IMMATURE GRANULOCYTES %: 0.3 % (ref 0–5)
INR BLD: 1.4
LYMPHOCYTES ABSOLUTE: 1.87 E9/L (ref 1.5–4)
LYMPHOCYTES RELATIVE PERCENT: 26.4 % (ref 20–42)
MCH RBC QN AUTO: 29.2 PG (ref 26–35)
MCHC RBC AUTO-ENTMCNC: 33.2 % (ref 32–34.5)
MCV RBC AUTO: 88 FL (ref 80–99.9)
METER GLUCOSE: 166 MG/DL (ref 74–99)
METER GLUCOSE: 178 MG/DL (ref 74–99)
METER GLUCOSE: 190 MG/DL (ref 74–99)
METER GLUCOSE: 229 MG/DL (ref 74–99)
MONOCYTES ABSOLUTE: 0.67 E9/L (ref 0.1–0.95)
MONOCYTES RELATIVE PERCENT: 9.5 % (ref 2–12)
NEUTROPHILS ABSOLUTE: 4.23 E9/L (ref 1.8–7.3)
NEUTROPHILS RELATIVE PERCENT: 59.8 % (ref 43–80)
PDW BLD-RTO: 18.3 FL (ref 11.5–15)
PLATELET # BLD: 240 E9/L (ref 130–450)
PMV BLD AUTO: 10.8 FL (ref 7–12)
POTASSIUM REFLEX MAGNESIUM: 4.2 MMOL/L (ref 3.5–5)
PROTHROMBIN TIME: 16.6 SEC (ref 9.3–12.4)
RBC # BLD: 4.15 E12/L (ref 3.5–5.5)
SODIUM BLD-SCNC: 131 MMOL/L (ref 132–146)
TOTAL PROTEIN: 7.3 G/DL (ref 6.4–8.3)
WBC # BLD: 7.1 E9/L (ref 4.5–11.5)

## 2020-01-12 PROCEDURE — 97161 PT EVAL LOW COMPLEX 20 MIN: CPT

## 2020-01-12 PROCEDURE — 2060000000 HC ICU INTERMEDIATE R&B

## 2020-01-12 PROCEDURE — 2580000003 HC RX 258: Performed by: PHYSICIAN ASSISTANT

## 2020-01-12 PROCEDURE — 85610 PROTHROMBIN TIME: CPT

## 2020-01-12 PROCEDURE — 85025 COMPLETE CBC W/AUTO DIFF WBC: CPT

## 2020-01-12 PROCEDURE — 97110 THERAPEUTIC EXERCISES: CPT

## 2020-01-12 PROCEDURE — 94640 AIRWAY INHALATION TREATMENT: CPT

## 2020-01-12 PROCEDURE — 6370000000 HC RX 637 (ALT 250 FOR IP): Performed by: NURSE PRACTITIONER

## 2020-01-12 PROCEDURE — 82962 GLUCOSE BLOOD TEST: CPT

## 2020-01-12 PROCEDURE — 80053 COMPREHEN METABOLIC PANEL: CPT

## 2020-01-12 PROCEDURE — 36415 COLL VENOUS BLD VENIPUNCTURE: CPT

## 2020-01-12 PROCEDURE — 89051 BODY FLUID CELL COUNT: CPT

## 2020-01-12 PROCEDURE — 6370000000 HC RX 637 (ALT 250 FOR IP): Performed by: PHYSICIAN ASSISTANT

## 2020-01-12 PROCEDURE — 97116 GAIT TRAINING THERAPY: CPT

## 2020-01-12 PROCEDURE — 6360000002 HC RX W HCPCS: Performed by: PHYSICIAN ASSISTANT

## 2020-01-12 RX ORDER — ALBUMIN (HUMAN) 12.5 G/50ML
25 SOLUTION INTRAVENOUS SEE ADMIN INSTRUCTIONS
Status: DISCONTINUED | OUTPATIENT
Start: 2020-01-12 | End: 2020-01-14 | Stop reason: HOSPADM

## 2020-01-12 RX ORDER — SPIRONOLACTONE 25 MG/1
100 TABLET ORAL DAILY
Status: DISCONTINUED | OUTPATIENT
Start: 2020-01-13 | End: 2020-01-14 | Stop reason: HOSPADM

## 2020-01-12 RX ORDER — LACTULOSE 10 G/15ML
30 SOLUTION ORAL 3 TIMES DAILY
Status: DISCONTINUED | OUTPATIENT
Start: 2020-01-12 | End: 2020-01-14 | Stop reason: HOSPADM

## 2020-01-12 RX ORDER — FUROSEMIDE 20 MG/1
20 TABLET ORAL NIGHTLY
Status: DISCONTINUED | OUTPATIENT
Start: 2020-01-12 | End: 2020-01-14 | Stop reason: HOSPADM

## 2020-01-12 RX ORDER — FUROSEMIDE 40 MG/1
40 TABLET ORAL DAILY
Status: DISCONTINUED | OUTPATIENT
Start: 2020-01-13 | End: 2020-01-14 | Stop reason: HOSPADM

## 2020-01-12 RX ADMIN — KETOROLAC TROMETHAMINE 15 MG: 30 INJECTION, SOLUTION INTRAMUSCULAR at 23:02

## 2020-01-12 RX ADMIN — KETOROLAC TROMETHAMINE 15 MG: 30 INJECTION, SOLUTION INTRAMUSCULAR at 16:35

## 2020-01-12 RX ADMIN — Medication 10 ML: at 20:43

## 2020-01-12 RX ADMIN — AMLODIPINE BESYLATE 5 MG: 5 TABLET ORAL at 09:04

## 2020-01-12 RX ADMIN — Medication 10 ML: at 09:06

## 2020-01-12 RX ADMIN — MONTELUKAST SODIUM 10 MG: 10 TABLET, FILM COATED ORAL at 20:37

## 2020-01-12 RX ADMIN — SPIRONOLACTONE 50 MG: 25 TABLET ORAL at 09:04

## 2020-01-12 RX ADMIN — ATORVASTATIN CALCIUM 40 MG: 40 TABLET, FILM COATED ORAL at 20:37

## 2020-01-12 RX ADMIN — RIFAXIMIN 550 MG: 550 TABLET ORAL at 09:04

## 2020-01-12 RX ADMIN — FUROSEMIDE 20 MG: 20 TABLET ORAL at 20:37

## 2020-01-12 RX ADMIN — MOMETASONE FUROATE AND FORMOTEROL FUMARATE DIHYDRATE 2 PUFF: 200; 5 AEROSOL RESPIRATORY (INHALATION) at 08:27

## 2020-01-12 RX ADMIN — PANTOPRAZOLE SODIUM 40 MG: 40 TABLET, DELAYED RELEASE ORAL at 09:04

## 2020-01-12 RX ADMIN — FUROSEMIDE 20 MG: 20 TABLET ORAL at 09:04

## 2020-01-12 RX ADMIN — RIFAXIMIN 550 MG: 550 TABLET ORAL at 20:37

## 2020-01-12 RX ADMIN — MOMETASONE FUROATE AND FORMOTEROL FUMARATE DIHYDRATE 2 PUFF: 200; 5 AEROSOL RESPIRATORY (INHALATION) at 20:51

## 2020-01-12 RX ADMIN — VENLAFAXINE HYDROCHLORIDE 75 MG: 75 CAPSULE, EXTENDED RELEASE ORAL at 09:05

## 2020-01-12 RX ADMIN — INSULIN LISPRO 2 UNITS: 100 INJECTION, SOLUTION INTRAVENOUS; SUBCUTANEOUS at 20:37

## 2020-01-12 RX ADMIN — INSULIN GLARGINE 55 UNITS: 100 INJECTION, SOLUTION SUBCUTANEOUS at 20:36

## 2020-01-12 RX ADMIN — LACTULOSE 20 G: 20 SOLUTION ORAL at 09:05

## 2020-01-12 RX ADMIN — KETOROLAC TROMETHAMINE 15 MG: 30 INJECTION, SOLUTION INTRAMUSCULAR at 08:59

## 2020-01-12 ASSESSMENT — PAIN SCALES - GENERAL
PAINLEVEL_OUTOF10: 6
PAINLEVEL_OUTOF10: 4
PAINLEVEL_OUTOF10: 0
PAINLEVEL_OUTOF10: 4

## 2020-01-12 NOTE — PROGRESS NOTES
Within Functional Limits  Pain Screening  Patient Currently in Pain: No  Vital Signs  Patient Currently in Pain: No       Orientation  Orientation  Overall Orientation Status: Within Normal Limits  Social/Functional History  Social/Functional History  Lives With: Son  Type of Home: House  Home Layout: Two level, Bed/Bath upstairs  Home Access: Stairs to enter with rails  Entrance Stairs - Number of Steps: 4  Home Equipment: Rolling walker  Receives Help From: Family  Homemaking Responsibilities: No  Ambulation Assistance: Needs assistance(Son states he walkes behind her AAT)  Transfer Assistance: Needs assistance(Son states he helps transfer on occasion and bed mobility assist)  Active : No  Occupation: Retired  Additional Comments: Son assists care at home  Cognition        Objective          AROM RLE (degrees)  RLE AROM: WNL  AROM LLE (degrees)  LLE AROM : WNL  Strength RLE  Strength RLE: WFL  Strength LLE  Strength LLE: WFL        Bed mobility  Rolling to Left: Independent  Rolling to Right: Independent  Supine to Sit: Moderate assistance  Sit to Supine: Unable to assess  Scooting: Stand by assistance;Supervision  Transfers  Sit to Stand:  Moderate Assistance  Stand to sit: Minimal Assistance  Ambulation  Ambulation?: Yes  Ambulation 1  Surface: level tile  Device: Rolling Walker  Assistance: Contact guard assistance;Stand by assistance  Quality of Gait: slow pace, fatigue, no drifting or LOB, some mild RT knee buckle mid stance  Gait Deviations: Slow Sherice;Decreased step length;Decreased step height  Distance: in room to bathroom  Stairs/Curb  Stairs?: No              Plan   Plan  Times per week: 3-5x/week  Current Treatment Recommendations: Strengthening, Transfer Training, Endurance Training, Patient/Caregiver Education & Training, Balance Training, Gait Training, Functional Mobility Training, Stair training, Safety Education & Training  Plan Comment: [facility] 15/24    G-Code       OutComes Score

## 2020-01-12 NOTE — CONSULTS
Name:  Landry Osuna  :  1955  MRN:  [id number]  Room:  0947/4476-S  DOS:  2020    [facility]  [facility]  Dr. Babatunde Rodarte M.D. Dr. Shakira Hook M.D. Dr. Naz Chanel D.O. Dr. Vincent Alvarado M.D. Dr. Jayshree Cantu D.O.     -NP Consult Note-    PCP:  Collins Goodson DO  Admitting Physician:  Sourav Calderon MD  Consultants:  GI  Chief Complaint:    Chief Complaint   Patient presents with    Extremity Weakness     fell around 1 am.  Patient has hx of acites and is due for paracentesis on Wednesday. Legs gave out while trying to transfer to bedside commode. -hitting head, patient was taken off aspirin 81mg for procedure on Wednesday. -LOC    Fall    Abdominal Pain     Patient states she can not wait until wednesday for procedure. Reason for Consult:  Cirrhosis, ascites    History of Present Illness  Landry Osuna is a 71 y.o. female on consult for cirrhosis and ascites. Patient initially presented to the hospital with complaints of increasing abdominal distension. She was scheduled for a paracentesis as outpatient this coming Wednesday, but felt she could not wait. She reports abdominal pain. Pain described as waxing and waning pain, up to 9/10, cramping pain. She reports associated nausea with vomiting x1. Emesis consisting of mucous. She denies hematemesis or emesis of coffee-ground material.  She denies fever or chills. She complains of rare, transient chest pain. She denies pain or tightness in her chest with exertion. She reports SOB, which she reports is worse with exertion. She complains of increasing weakness in her legs over the past few days. She reports that she fell once at home, but denies any injury from her fall. Her last bowel movement was reported as yesterday. Stools described as brown and hard. She denies diarrhea, melena, or hematochezia. At baseline, she has chronic constipation. She uses lactulose with good results at home. GERD (gastroesophageal reflux disease)     Hyperlipidemia     Hypertension     Liver cirrhosis (HCC)     Polyp of esophagus     Polyp, stomach     PONV (postoperative nausea and vomiting)     Prolonged emergence from general anesthesia     Thyroid disease     no meds     Past Surgical History:   Procedure Laterality Date    CAROTID ENDARTERECTOMY      october 2018    CHOLECYSTECTOMY      COLONOSCOPY      COLONOSCOPY N/A 6/11/2019    COLONOSCOPY POLYPECTOMY SNARE/COLD BIOPSY performed by Nadeen Pérez MD at [facility] Left 10/29/2018    LEFT CAROTID ENDARTERECTOMY performed by Maggie Khan MD at [address]  [facility] ENDOSCOPY      UPPER GASTROINTESTINAL ENDOSCOPY N/A 6/11/2019    EGD BIOPSY performed by Nadeen éPrez MD at [facility] ENDOSCOPY    VASCULAR SURGERY       Family History   Problem Relation Age of Onset    Heart Disease Mother 28    Diabetes Mother     Heart Disease Sister     Diabetes Sister        Home Medications  Prior to Admission medications    Medication Sig Start Date End Date Taking?  Authorizing Provider   furosemide (LASIX) 20 MG tablet Take 1 tablet by mouth 2 times daily Indications: pt takes 20mg daily 10/29/19  Yes Jose G Grullon MD   calcium carbonate (OSCAL) 500 MG TABS tablet Take 1,000 mg by mouth daily   Yes Historical Provider, MD   spironolactone (ALDACTONE) 50 MG tablet Take 50 mg by mouth daily   Yes Historical Provider, MD   insulin regular (HUMULIN R;NOVOLIN R) 100 UNIT/ML injection Inject into the skin 3 times daily (before meals) Indications: sliding scale   Yes Historical Provider, MD   amLODIPine (NORVASC) 5 MG tablet Take 5 mg by mouth daily   Yes Historical Provider, MD   mometasone-formoterol (DULERA) 200-5 MCG/ACT inhaler Inhale 2 puffs into the lungs 2 times daily  Patient taking differently: Inhale 2 puffs into the lungs 2 times daily as needed  8/9/19  Yes Kylah Roland,    insulin detemir (LEVEMIR) 100 UNIT/ML injection vial Inject 55 Units into the skin nightly  Patient taking differently: Inject 65 Units into the skin 2 times daily  7/22/19  Yes Nida Brown MD   amitriptyline (ELAVIL) 25 MG tablet TAKE 1 TABLET BY MOUTH NIGHTLY AS NEEDED FOR SLEEP 7/9/19  Yes Kraig Betancourt DO   lactulose (CHRONULAC) 10 GM/15ML solution Take 30 mLs by mouth daily   Yes Historical Provider, MD   albuterol sulfate  (90 Base) MCG/ACT inhaler INHALE 2 PUFFS THREE TIMES A DAY AS NEEDED FOR WHEEZING 5/7/19  Yes Historical Provider, MD   XIFAXAN 550 MG tablet TAKE 1 TABLET BY MOUTH TWICE A DAY 5/19/19  Yes Historical Provider, MD   atorvastatin (LIPITOR) 40 MG tablet Take 1 tablet by mouth nightly 12/26/18  Yes Kirk Bass,    venlafaxine (EFFEXOR XR) 37.5 MG extended release capsule TAKE ONE CAPSULE BY MOUTH EVERY DAY  Patient taking differently: Take 75 mg by mouth daily  11/6/18  Yes Santos Betancourt DO   montelukast (SINGULAIR) 10 MG tablet TAKE 1 TABLET BY MOUTH EVERY DAY AT NIGHT 10/31/18  Yes Santos Betancourt DO   pantoprazole (PROTONIX) 40 MG tablet Take 40 mg by mouth daily Take morning of surgery with a sip of water   Yes Historical Provider, MD   aspirin 81 MG EC tablet Take 1 tablet by mouth daily 9/13/18  Yes Olga Hopkins MD   insulin aspart (NOVOLOG FLEXPEN) 100 UNIT/ML injection pen Inject into the skin 3 times daily (before meals) 45/40/45 ac meals + s/s 3:50>150  If pt not eating, only uses sliding scale    Historical Provider, MD   Insulin Syringe-Needle U-100 ([employer] INSULIN SYR 1CC/30G) 30G X 5/16\" 1 ML MISC Use with insulin 4 times a day 10/2/19   Nida Brown MD   INSULIN SYRINGE 1CC/29G (Neymar Villegas INS SYRINGE 1CC/29G) 29G X 1/2\" 1 ML MISC 1 each by Does not apply route 4 times daily 9/30/19   Nida Brown MD   Insulin Syringe-Needle U-100 29G X 5/16\" 1 ML MISC Use with insulin 4 times a day for additional EGD  -Consider repeat EGD, inpatient versus outpatient    5. HCV  -Positive antibody  -Check viral load  -Will follow as outpatient for treatment if needed    6. Colon polyp  -Tubular adenoma  -Repeat colonoscopy 6/2024    7. GE junction polyp  -Hyperplastic polyp  -Negative for dysplasia, Schulte's, GIST, leiomyoma, and carcinoma  -Will review office chart for additional endoscopies    8. Comorbidities  -DM, HTN, HLD, history of CVA      Plan for paracentesis with fluid evaluation. Albumin ordered to be given with paracentesis. Consider repeat EGD, inpatient versus outpatient. Monitor labs. Further orders will depend on patient course and results of testing. Thank you for the opportunity to participate in the care of Ms. Mayuri Turner. John Pedroza, BENITO - CNP  5:50 PM  1/12/2020    Agree with above. Plan for paracentesis with studies. Cirrhosis stable. Pt will need close outpt F/U. See orders.     Theron Flores D.O.  1/12/20

## 2020-01-12 NOTE — PLAN OF CARE
Problem: Pain:  Goal: Pain level will decrease  Description  Pain level will decrease  1/12/2020 0704 by Nida Hodgkins, RN  Outcome: Met This Shift  1/11/2020 1747 by Emilia Wilson RN  Outcome: Ongoing  Goal: Control of acute pain  Description  Control of acute pain  1/12/2020 0704 by Nida Hodgkins, RN  Outcome: Ongoing  1/11/2020 1747 by Emilia Wilson RN  Outcome: Ongoing  Goal: Control of chronic pain  Description  Control of chronic pain  1/11/2020 1747 by Emilia Wilson RN  Outcome: Ongoing     Problem: Falls - Risk of:  Goal: Will remain free from falls  Description  Will remain free from falls  1/12/2020 0704 by Nida Hodgkins, RN  Outcome: Met This Shift  1/11/2020 1747 by Emilia Wilson RN  Outcome: Ongoing  Goal: Absence of physical injury  Description  Absence of physical injury  1/12/2020 0704 by Nida Hodgkins, RN  Outcome: Met This Shift  1/11/2020 1747 by Emilia Wilson RN  Outcome: Ongoing

## 2020-01-12 NOTE — H&P
Kami Wick M.D. History and Physical      CHIEF COMPLAINT:  abdominal pain and distension     Reason for Admission: liver cirrhsois with ascites     History Obtained From:  Pt emr     HISTORY OF PRESENT ILLNESS:      The patient is a 71 y.o. female of [facility] with significant past medical history of liver cirrhosis  who presents with complaints of weakness and abdominal distension. Pt came in for evaluation of such . she is on ultram for pain in abdomen and back - she feels this causes her to become weak and legs to give out. ` She is admitted for tap. she is scheduled for tap on Wednesday but felt she could not wait that long dt worsening discomfort. Last tap was in october 2019- ~7 l removed   /67   Pulse 90   Temp 97.8 °F (36.6 °C) (Oral)   Resp 16   Ht 5' 7\" (1.702 m)   Wt 235 lb (106.6 kg)   SpO2 92%   BMI 36.81 kg/m²       Past Medical History:        Diagnosis Date    Arthritis     Cerebral artery occlusion with cerebral infarction ([address] 09/2018    some memory loss, some right side weakness.     Cough     post anesthesia    Diabetes mellitus ([address]     Diverticulitis     GERD (gastroesophageal reflux disease)     Hyperlipidemia     Hypertension     Liver cirrhosis (HCC)     Polyp of esophagus     Polyp, stomach     PONV (postoperative nausea and vomiting)     Prolonged emergence from general anesthesia     Thyroid disease     no meds     Past Surgical History:        Procedure Laterality Date    CAROTID ENDARTERECTOMY      october 2018    CHOLECYSTECTOMY      COLONOSCOPY      COLONOSCOPY N/A 6/11/2019    COLONOSCOPY POLYPECTOMY SNARE/COLD BIOPSY performed by John Ibarra MD at [address] [facility] Left 10/29/2018    LEFT CAROTID ENDARTERECTOMY performed by Rusty Norton MD at [address] [place] [address] not eating, only uses sliding scale  Insulin Syringe-Needle U-100 ([employer] INSULIN SYR 1CC/30G) 30G X 5/16\" 1 ML MISC, Use with insulin 4 times a day  INSULIN SYRINGE 1CC/29G ([employer] INS SYRINGE 1CC/29G) 29G X 1/2\" 1 ML MISC, 1 each by Does not apply route 4 times daily  Insulin Syringe-Needle U-100 29G X 5/16\" 1 ML MISC, Use with insulin 4 times a day  blood glucose monitor strips, One-Touch Verio strips. Checks 3  times/day before meals and at bedtime and as needed for symptoms of irregular blood glucose  blood glucose test strips (ONETOUCH VERIO) strip, 1 each by In Vitro route 4 times daily As needed. Blood Glucose Monitoring Suppl (ONETOUCH VERIO) w/Device KIT, To test 4x/day  Insulin Syringe-Needle U-100 ([employer] INS SYR .3CC/29G) 29G X 1/2\" 0.3 ML MISC, Four times a day with insulin  Misc. Devices (ADJUST BATH/SHOWER SEAT) MISC, Use daily  Misc. Devices (COMMODE BEDSIDE) MISC, Use daily  Milk Thistle 1000 MG CAPS, Take by mouth daily Ld 10-24-18  blood glucose test strips (ASCENSIA AUTODISC VI;ONE TOUCH ULTRA TEST VI) strip, 1 each by In Vitro route daily As needed. Allergies:  Tramadol    Social History:   TOBACCO:   reports that she has never smoked. She has never used smokeless tobacco.  ETOH:   reports no history of alcohol use.   MARITAL STATUS:    OCCUPATION:      Family History:       Problem Relation Age of Onset    Heart Disease Mother 28    Diabetes Mother     Heart Disease Sister     Diabetes Sister        REVIEW OF SYSTEMS:    General ROS:abdomina distension and pain   Hematological and Lymphatic ROS: negative  Endocrine ROS: negative  Respiratory ROS: no cough, shortness of breath, or wheezing  Cardiovascular ROS: no chest pain or dyspnea on exertion  Gastrointestinal ROS: no abdominal pain, change in bowel habits, or black or bloody stools  Genito-Urinary ROS: no dysuria, trouble voiding, or hematuria  Neurological ROS: no TIA or stroke symptoms  negative    Vitals:  /67   Pulse 90 Temp 97.8 °F (36.6 °C) (Oral)   Resp 16   Ht 5' 7\" (1.702 m)   Wt 235 lb (106.6 kg)   SpO2 92%   BMI 36.81 kg/m²     PHYSICAL EXAM:  General:  Awake, alert, oriented X 3. Well developed, well nourished, well groomed. No apparent distress. HEENT:  Normocephalic, atraumatic. Pupils equal, round, reactive to light. No scleral icterus. No conjunctival injection. Normal lips, teeth, and gums. No nasal discharge. Neck:  Supple  Heart:  RRR, no murmurs, gallops, rubs, carotid upstroke normal, no carotid bruits  Lungs:  CTA bilaterally, bilat symmetrical expansion, no wheeze, rales, or rhonchi  Abdomen: distended abdomen   Extremities:  No clubbing, cyanosis, or edema  Skin:  Warm and dry, no open lesions or rash  Neuro:  Cranial nerves 2-12 intact, no focal deficits  Breast: deferred  Rectal: deferred  Genitalia:  deferred      DATA:     Recent Labs     01/11/20  1227 01/12/20  0715   WBC 9.0 7.1   HGB 13.0 12.1    240     Recent Labs     01/11/20  1227 01/12/20  0715   * 131*   K 4.3 4.2   BUN 9 11   CREATININE 0.7 0.9     Recent Labs     01/11/20  1227 01/12/20  0715   PROT 8.4* 7.3   INR 1.4 1.4     Recent Labs     01/11/20  1227 01/12/20  0715   AST 54* 36*   ALT 30 25   ALKPHOS 187* 162*   BILIDIR 0.5*  --    BILITOT 1.5* 1.4*     No results for input(s): BNP in the last 72 hours. No results for input(s): CKTOTAL, CKMB, CKMBINDEX, TROPONINI in the last 72 hours. ASSESSMENT:      Principal Problem:    Cirrhosis of liver with ascites (HCC)  Active Problems:    Diabetes mellitus type 2, uncontrolled ([facility]    Hyperlipidemia LDL goal <100    Essential hypertension  Resolved Problems:    * No resolved hospital problems. *        PLAN:    Admitted to [facility] for evalaution of recurrent ascites in pt with Liver cirrhosis   Await paracentesis by IR   IN1. 4 - repeat tomorrow priro to procedure   Am labs     DVTProph  PT/OT  DC plan - after tap           Nguyễn Poe MD  1/12/2020  11:15 AM

## 2020-01-13 ENCOUNTER — APPOINTMENT (OUTPATIENT)
Dept: ULTRASOUND IMAGING | Age: 65
DRG: 433 | End: 2020-01-13
Payer: MEDICARE

## 2020-01-13 LAB
ALBUMIN FLUID: 0.4 G/DL
ALBUMIN SERPL-MCNC: 2.2 G/DL (ref 3.5–5.2)
ALP BLD-CCNC: 157 U/L (ref 35–104)
ALT SERPL-CCNC: 22 U/L (ref 0–32)
AMYLASE FLUID: 20 U/L
ANION GAP SERPL CALCULATED.3IONS-SCNC: 10 MMOL/L (ref 7–16)
APPEARANCE FLUID: CLEAR
AST SERPL-CCNC: 33 U/L (ref 0–31)
BASOPHILS ABSOLUTE: 0.06 E9/L (ref 0–0.2)
BASOPHILS RELATIVE PERCENT: 0.8 % (ref 0–2)
BILIRUB SERPL-MCNC: 0.9 MG/DL (ref 0–1.2)
BUN BLDV-MCNC: 15 MG/DL (ref 8–23)
CALCIUM SERPL-MCNC: 8.5 MG/DL (ref 8.6–10.2)
CELL COUNT FLUID TYPE: NORMAL
CHLORIDE BLD-SCNC: 96 MMOL/L (ref 98–107)
CO2: 24 MMOL/L (ref 22–29)
COLOR FLUID: YELLOW
CREAT SERPL-MCNC: 1.1 MG/DL (ref 0.5–1)
EOSINOPHILS ABSOLUTE: 0.2 E9/L (ref 0.05–0.5)
EOSINOPHILS RELATIVE PERCENT: 2.5 % (ref 0–6)
FLUID TYPE: NORMAL
GFR AFRICAN AMERICAN: >60
GFR NON-AFRICAN AMERICAN: 50 ML/MIN/1.73
GLUCOSE BLD-MCNC: 156 MG/DL (ref 74–99)
GLUCOSE, FLUID: 157 MG/DL
HCT VFR BLD CALC: 34.3 % (ref 34–48)
HEMOGLOBIN: 12.5 G/DL (ref 11.5–15.5)
IMMATURE GRANULOCYTES #: 0.03 E9/L
IMMATURE GRANULOCYTES %: 0.4 % (ref 0–5)
INR BLD: 1.4
LD, FLUID: 43 U/L
LYMPHOCYTES ABSOLUTE: 1.7 E9/L (ref 1.5–4)
LYMPHOCYTES RELATIVE PERCENT: 21.3 % (ref 20–42)
MCH RBC QN AUTO: 31.3 PG (ref 26–35)
MCHC RBC AUTO-ENTMCNC: 36.4 % (ref 32–34.5)
MCV RBC AUTO: 86 FL (ref 80–99.9)
METER GLUCOSE: 127 MG/DL (ref 74–99)
METER GLUCOSE: 131 MG/DL (ref 74–99)
METER GLUCOSE: 132 MG/DL (ref 74–99)
METER GLUCOSE: 222 MG/DL (ref 74–99)
MONOCYTE, FLUID: 95 %
MONOCYTES ABSOLUTE: 0.97 E9/L (ref 0.1–0.95)
MONOCYTES RELATIVE PERCENT: 12.1 % (ref 2–12)
NEUTROPHIL, FLUID: 5 %
NEUTROPHILS ABSOLUTE: 5.04 E9/L (ref 1.8–7.3)
NEUTROPHILS RELATIVE PERCENT: 62.9 % (ref 43–80)
NUCLEATED CELLS FLUID: 160 /UL
PDW BLD-RTO: 18.1 FL (ref 11.5–15)
PLATELET # BLD: 219 E9/L (ref 130–450)
PMV BLD AUTO: 10.3 FL (ref 7–12)
POTASSIUM SERPL-SCNC: 4.3 MMOL/L (ref 3.5–5)
PROTEIN FLUID: 1 G/DL
PROTHROMBIN TIME: 16.9 SEC (ref 9.3–12.4)
RBC # BLD: 3.99 E12/L (ref 3.5–5.5)
RBC FLUID: <2000 /UL
SODIUM BLD-SCNC: 130 MMOL/L (ref 132–146)
TOTAL PROTEIN: 6.9 G/DL (ref 6.4–8.3)
WBC # BLD: 8 E9/L (ref 4.5–11.5)

## 2020-01-13 PROCEDURE — 87522 HEPATITIS C REVRS TRNSCRPJ: CPT

## 2020-01-13 PROCEDURE — 88112 CYTOPATH CELL ENHANCE TECH: CPT

## 2020-01-13 PROCEDURE — 87205 SMEAR GRAM STAIN: CPT

## 2020-01-13 PROCEDURE — 87070 CULTURE OTHR SPECIMN AEROBIC: CPT

## 2020-01-13 PROCEDURE — 0W9G3ZZ DRAINAGE OF PERITONEAL CAVITY, PERCUTANEOUS APPROACH: ICD-10-PCS | Performed by: RADIOLOGY

## 2020-01-13 PROCEDURE — 6360000002 HC RX W HCPCS: Performed by: PHYSICIAN ASSISTANT

## 2020-01-13 PROCEDURE — 85610 PROTHROMBIN TIME: CPT

## 2020-01-13 PROCEDURE — 97110 THERAPEUTIC EXERCISES: CPT

## 2020-01-13 PROCEDURE — 83615 LACTATE (LD) (LDH) ENZYME: CPT

## 2020-01-13 PROCEDURE — 85025 COMPLETE CBC W/AUTO DIFF WBC: CPT

## 2020-01-13 PROCEDURE — 88305 TISSUE EXAM BY PATHOLOGIST: CPT

## 2020-01-13 PROCEDURE — 2060000000 HC ICU INTERMEDIATE R&B

## 2020-01-13 PROCEDURE — 2709999900 US GUIDED PARACENTESIS

## 2020-01-13 PROCEDURE — 36415 COLL VENOUS BLD VENIPUNCTURE: CPT

## 2020-01-13 PROCEDURE — 82947 ASSAY GLUCOSE BLOOD QUANT: CPT

## 2020-01-13 PROCEDURE — 97165 OT EVAL LOW COMPLEX 30 MIN: CPT

## 2020-01-13 PROCEDURE — 80053 COMPREHEN METABOLIC PANEL: CPT

## 2020-01-13 PROCEDURE — 2580000003 HC RX 258: Performed by: PHYSICIAN ASSISTANT

## 2020-01-13 PROCEDURE — 6370000000 HC RX 637 (ALT 250 FOR IP): Performed by: PHYSICIAN ASSISTANT

## 2020-01-13 PROCEDURE — 94640 AIRWAY INHALATION TREATMENT: CPT

## 2020-01-13 PROCEDURE — 84157 ASSAY OF PROTEIN OTHER: CPT

## 2020-01-13 PROCEDURE — 82042 OTHER SOURCE ALBUMIN QUAN EA: CPT

## 2020-01-13 PROCEDURE — 82962 GLUCOSE BLOOD TEST: CPT

## 2020-01-13 PROCEDURE — 6370000000 HC RX 637 (ALT 250 FOR IP): Performed by: NURSE PRACTITIONER

## 2020-01-13 PROCEDURE — P9047 ALBUMIN (HUMAN), 25%, 50ML: HCPCS | Performed by: NURSE PRACTITIONER

## 2020-01-13 PROCEDURE — 97530 THERAPEUTIC ACTIVITIES: CPT

## 2020-01-13 PROCEDURE — 82150 ASSAY OF AMYLASE: CPT

## 2020-01-13 PROCEDURE — 97535 SELF CARE MNGMENT TRAINING: CPT

## 2020-01-13 PROCEDURE — 6360000002 HC RX W HCPCS: Performed by: NURSE PRACTITIONER

## 2020-01-13 RX ADMIN — LACTULOSE 20 G: 20 SOLUTION ORAL at 13:43

## 2020-01-13 RX ADMIN — VENLAFAXINE HYDROCHLORIDE 75 MG: 75 CAPSULE, EXTENDED RELEASE ORAL at 10:24

## 2020-01-13 RX ADMIN — RIFAXIMIN 550 MG: 550 TABLET ORAL at 10:25

## 2020-01-13 RX ADMIN — ALBUMIN (HUMAN) 25 G: 0.25 INJECTION, SOLUTION INTRAVENOUS at 17:20

## 2020-01-13 RX ADMIN — KETOROLAC TROMETHAMINE 15 MG: 30 INJECTION, SOLUTION INTRAMUSCULAR at 08:49

## 2020-01-13 RX ADMIN — AMLODIPINE BESYLATE 5 MG: 5 TABLET ORAL at 10:24

## 2020-01-13 RX ADMIN — FUROSEMIDE 20 MG: 20 TABLET ORAL at 20:10

## 2020-01-13 RX ADMIN — KETOROLAC TROMETHAMINE 15 MG: 30 INJECTION, SOLUTION INTRAMUSCULAR at 18:24

## 2020-01-13 RX ADMIN — MOMETASONE FUROATE AND FORMOTEROL FUMARATE DIHYDRATE 2 PUFF: 200; 5 AEROSOL RESPIRATORY (INHALATION) at 08:41

## 2020-01-13 RX ADMIN — Medication 10 ML: at 22:10

## 2020-01-13 RX ADMIN — AMITRIPTYLINE HYDROCHLORIDE 25 MG: 25 TABLET, FILM COATED ORAL at 10:24

## 2020-01-13 RX ADMIN — INSULIN GLARGINE 55 UNITS: 100 INJECTION, SOLUTION SUBCUTANEOUS at 21:50

## 2020-01-13 RX ADMIN — RIFAXIMIN 550 MG: 550 TABLET ORAL at 20:10

## 2020-01-13 RX ADMIN — PANTOPRAZOLE SODIUM 40 MG: 40 TABLET, DELAYED RELEASE ORAL at 10:23

## 2020-01-13 RX ADMIN — SPIRONOLACTONE 100 MG: 25 TABLET ORAL at 10:24

## 2020-01-13 RX ADMIN — MONTELUKAST SODIUM 10 MG: 10 TABLET, FILM COATED ORAL at 20:10

## 2020-01-13 RX ADMIN — Medication 10 ML: at 10:26

## 2020-01-13 RX ADMIN — ATORVASTATIN CALCIUM 40 MG: 40 TABLET, FILM COATED ORAL at 20:10

## 2020-01-13 RX ADMIN — MOMETASONE FUROATE AND FORMOTEROL FUMARATE DIHYDRATE 2 PUFF: 200; 5 AEROSOL RESPIRATORY (INHALATION) at 20:15

## 2020-01-13 RX ADMIN — FUROSEMIDE 40 MG: 40 TABLET ORAL at 10:24

## 2020-01-13 RX ADMIN — LACTULOSE 20 G: 20 SOLUTION ORAL at 10:23

## 2020-01-13 ASSESSMENT — PAIN DESCRIPTION - LOCATION: LOCATION: ABDOMEN;BACK

## 2020-01-13 ASSESSMENT — PAIN - FUNCTIONAL ASSESSMENT: PAIN_FUNCTIONAL_ASSESSMENT: 0-10

## 2020-01-13 ASSESSMENT — PAIN DESCRIPTION - DESCRIPTORS: DESCRIPTORS: ACHING

## 2020-01-13 ASSESSMENT — PAIN DESCRIPTION - FREQUENCY: FREQUENCY: INTERMITTENT

## 2020-01-13 ASSESSMENT — PAIN DESCRIPTION - ORIENTATION: ORIENTATION: RIGHT

## 2020-01-13 ASSESSMENT — PAIN SCALES - GENERAL
PAINLEVEL_OUTOF10: 10
PAINLEVEL_OUTOF10: 8
PAINLEVEL_OUTOF10: 8
PAINLEVEL_OUTOF10: 0

## 2020-01-13 ASSESSMENT — PAIN DESCRIPTION - PAIN TYPE: TYPE: ACUTE PAIN

## 2020-01-13 ASSESSMENT — PAIN DESCRIPTION - ONSET: ONSET: ON-GOING

## 2020-01-13 ASSESSMENT — PAIN DESCRIPTION - PROGRESSION: CLINICAL_PROGRESSION: GRADUALLY WORSENING

## 2020-01-13 NOTE — BRIEF OP NOTE
Brief Postoperative Note    Dylan Nava  YOB: 1955  [id number]    Pre-operative Diagnosis: Ascites    Post-operative Diagnosis: Same    Procedure: Paracentesis    Anesthesia: Local    Estimated Blood Loss: < 10 cc    Performed by: Lina Monroe PA-C under indirect supervision by Cameron Jiménez.  MD Kalpesh.    Complications: none    Specimen obtained: Yes, fluid, serous    Findings: fluid, drained with catheter drainage      SWETHA COYNE   1/13/2020 4:53 PM

## 2020-01-13 NOTE — PROGRESS NOTES
Occupational Therapy  OCCUPATIONAL THERAPY INITIAL EVALUATION      Date:2020  Patient Name: Ayesha Hawley  MRN: [id number]  : 1955  Room: [address] [place], [place] [address]    Evaluating OT: Sue Acuña OTR/L [id number]    AM-PAC Daily Activity Raw Score:     Recommended Adaptive Equipment: TBD    Diagnosis: ascites of liver. Pt presents to ED from home with abdominal pain and fall      Pertinent Medical History: liver cirrhosis, HTN, DM, CVA   Precautions:  falls     Home Living: Pt lives with son, who works midnights, in a 2 story home with 1st floor set up. Bathroom setup: BSC, basin for sponge bathing     Prior Level of Function: son assists with LB ADLs, son manages IADLs; completed functional mobility with rollator or \"furniture walking\". Pt reports her son is always with her during mobility for safety  Driving: No    Pain Level: no reported pain. Pt reports recently taking pain medication    Cognition: A&O: /.  Easily confused   Problem solving:  fair   Judgement/safety:  fair     Functional Assessment:   Initial Eval Status  Date: 20 Treatment session:  Short Term Goals  Treatment frequency: 2-5x/wk PRN x1-3 wks   Feeding Independent     Grooming SBA  Standing sink level for hand hygiene  Mod I   UB Dressing Min A  [facility] gown as a robe  Mod I   LB Dressing Max A  Management of B socks seated EOB  Min A    Bathing Mod A  Min A   Toileting Min A  Use of grab bar for support in transfer and to maintain balance in standing during nathaly care and brief/pants management  Mod I   Bed Mobility  Supine to sit: Min A     Functional Transfers STS: Min A  Mod I   Functional Mobility Min A with [employer]  Household distance  Occasional LOB, cues for proper technique  Mod I during ADLs   Balance Sitting: fair    Standing: fair/fair minus at [employer]     Activity Tolerance Fair minus  standing nicole x6-7 min with fair plus balance during self care tasks              Strength ROM Additional Info:    HEMALATHAE  4-/5 WFL good  and FMC/dexterity noted during ADL tasks     LUE 4-/5 WFL good  and FMC/dexterity noted during ADL tasks         Hearing: WFL   Vision: WFL   Sensation:  No c/o numbness or tingling   Tone: WFL                             Comments/Treatment: Patient educated on adapted techniques for completion of ADL, safe functional transfers and mobility. Patient required cues for follow through with proper hand/foot placement, pacing, safety, attention, sequencing and technique in bed mobility, functional transfers, functional mobility, toileting, grooming, UB dressing and LB dressing in preparation for maximum independence in all self care tasks. Eval Complexity: Low    Assessment of current deficits   Functional mobility [x]  ADLs [x] Strength [x]  Cognition []  Functional transfers  [x] IADLs [x] Safety Awareness [x]  Endurance [x]  Fine Motor Coordination [] Balance [x] Vision/perception [] Sensation []   Gross Motor Coordination [] ROM [] Delirium []                  Motor Control []    Plan of Care:   ADL retraining [x]   Equipment needs [x]   Neuromuscular re-education [x] Energy Conservation Techniques [x]  Functional Transfer training [x] Patient and/or Family Education [x]  Functional Mobility training [x]  Environmental Modifications [x]  Cognitive re-training []   Compensatory techniques for ADLs [x]  Splinting Needs []   Positioning to improve overall function [x]   Therapeutic Activity [x]  Therapeutic Exercise  [x]  Visual/Perceptual: []    Delirium prevention/treatment  []   Other:  []    Rehab Potential: Good for established goals     Patient / Family Goal: To get home. Patient and/or family were instructed on functional diagnosis, prognosis/goals and OT plan of care. Pt and son verbalized good understanding. Upon arrival, patient supine in bed. At end of session, patient seated in armchair with call light and phone within reach, all lines and tubes intact.   Pt would benefit from continued

## 2020-01-13 NOTE — PROGRESS NOTES
PROGRESS NOTE    By Al Cabrera D.O., GI Fellow    [facility] Jose Roland M.D., Dr. Ashley Perez M.D., Dr. Billi Sever., Dr. Daisy Childress M.D., Dr. Dana Sandoval  71 y.o.  female    SUBJECTIVE:    No acute events overnight. Continues to endorse RLQ and right flank pain. State he feels unsteady with any ambulation. Anticipating paracentesis and hopes it improves her symptoms. Denies any epigastric pain, nausea, emesis, hematemesis, melena, or hematochezia.      OBJECTIVE:    /78   Pulse 93   Temp 97.7 °F (36.5 °C) (Oral)   Resp 18   Ht 5' 7\" (1.702 m)   Wt 240 lb 3.2 oz (109 kg)   SpO2 92%   BMI 37.62 kg/m²   GEN: A&Ox3, friendly, NAD  HEENT:PEERL, no icterus  Heart: RRR  Lungs: CTAB  Abd.: soft, NT, ND, BS +, no G/R, no HSM  Extr.: no C/C/E, no brusing         Lab Results   Component Value Date    WBC 8.0 01/13/2020    WBC 7.1 01/12/2020    WBC 9.0 01/11/2020    HGB 12.5 01/13/2020    HGB 12.1 01/12/2020    HGB 13.0 01/11/2020    HCT 34.3 01/13/2020    MCV 86.0 01/13/2020    RDW 18.1 01/13/2020     01/13/2020     01/12/2020     01/11/2020     Lab Results   Component Value Date     01/13/2020    K 4.3 01/13/2020    K 4.2 01/12/2020    CL 96 01/13/2020    CO2 24 01/13/2020    BUN 15 01/13/2020    CREATININE 1.1 01/13/2020    CALCIUM 8.5 01/13/2020    PROT 6.9 01/13/2020    LABALBU 2.2 01/13/2020    BILITOT 0.9 01/13/2020    BILITOT 1.4 01/12/2020    BILITOT 1.5 01/11/2020    ALKPHOS 157 01/13/2020    ALKPHOS 162 01/12/2020    ALKPHOS 187 01/11/2020    AST 33 01/13/2020    AST 36 01/12/2020    AST 54 01/11/2020    ALT 22 01/13/2020    ALT 25 01/12/2020    ALT 30 01/11/2020     Lab Results   Component Value Date    LIPASE 22 01/11/2020    LIPASE 47 10/27/2019    LIPASE 54 10/18/2019     Lab Results   Component Value Date    AMYLASE 60 05/15/2017         ASSESSMENT/PLAN:  Patient is a 71 y.o. female on consult for cirrhosis and ascites.       1. Cirrhosis of the liver with ascites  -Etiology unknown, suspect WEIR vs chronic and treatment naive hepatitis C  -Awaiting Hepatitis C viral load in light of positive antibody  -MELD-NA 18  -Other viral liver serology negative  -Autoimmune serology negative  -AFP from 04-; 8  -Most recent [address] liver 10/19/2019 with no evidence of hepatic mass  -Most recent EGD in Epic 06/11/2019 with one Grade 4 varix - banded with good collapse  -Increase to Aldactone 100mg daily, Lasix 40mg QAM and Lasix 20mg QHS. Continue to monitor vitals and renal function   -Low sodium diet  -Paracentesis today with albumin and fluid analysis to follow   -Continue supportive care    2. Hepatic encephalopathy  -Grade 1-2 encephalopathy  -Ammonia 76.2 on admission   -Lactulose TID  -Xifaxan BID  -Monitor mental status     3.  Esophogeal varices  -History of esophogeal varices on EGD 6/11/2019  -Will review office chart for additional EGD  -Consider repeat EGD, inpatient versus outpatient. Will assess after paracentesis      4.  Colon polyp  -Tubular adenoma  -Repeat colonoscopy 6/2024     Other medical comorbidities:  -DM  -HTN  -HLD  -history of CVA       -Will discuss with attending    Rhiannon Yo DO  1/13/2020  10:17 AM    Pt seen and independently examined. Pertinent notes and lab work reviewed. Monitor reviewed showing sinus rhythm/sinus tachycardia  D/w Dr. Neeta Chen. Agree with physical exam and A&P. Discussed with patient/family at bedside - all questions answered -paracentesis pending. They are agreeable with the plan as delineated.       Angelita Tinoco MD  1/13/2020  4:01 PM

## 2020-01-13 NOTE — PROGRESS NOTES
Physical Therapy    Facility/Department: [facility] MED SURG  Treatment note    NAME: Nando White  : 1955  MRN: [id number]    Date of Service: 2020    Patient Diagnosis(es): The encounter diagnosis was Ascites of liver. has a past medical history of Arthritis, Cerebral artery occlusion with cerebral infarction ([facility] Cough, Diabetes mellitus ([facility] Diverticulitis, GERD (gastroesophageal reflux disease), Hyperlipidemia, Hypertension, Liver cirrhosis ([facility] Polyp of esophagus, Polyp, stomach, PONV (postoperative nausea and vomiting), Prolonged emergence from general anesthesia, and Thyroid disease. has a past surgical history that includes Cholecystectomy; Tonsillectomy; Colonoscopy; Upper gastrointestinal endoscopy; Hysterectomy (); pr thromboendartectmy neck,neck incis (Left, 10/29/2018); Carotid endarterectomy; Colonoscopy (N/A, 2019); Upper gastrointestinal endoscopy (N/A, 2019); and vascular surgery. Room #: 951  DIAGNOSIS: Ascites of the Liver  PRECAUTIONS: falls, safety  [facility] Score: 17/24    Nurse ok with rx, Pt is agreeable to treatment. Pain level is: no complaints of pain  Balance: poor dynamic using WW for support     Functional Mobility  Bed Mobility  Rolling: Rolling: Min A  Supine to sit: Min A  Sit to supine: NT  Scooting: Min A to EOB     Transfers  Sit to stand: Min A  Stand to sit: Min A  Stand pivot: NT  Instruction/prompt for UE usage to assist with transfers and to use [employer] safely backing to a seated surface     Locomotion  Ambulation: 40 feet and 15 feet x 1 rep each with Min A for balance using [employer] device. Sherice slow, consistent and laboring, Pt unsteady, required constant hands on assist for balance and safety.  Instruction given for upright posture and to stay within [employer] base of support    Pt performed therapeutic exercise of the following promoting circulation and strengthening: seated B ankle pumps AROM; B LAQ's/hamstring curls, hip ABd/ADd with minimal manual resistance x 20. Pt and Son educated on exercise benefits    Pt found in bed, remained in a bedside chair, call light in reach  Pt is making good progress towards physical therapy goals as per increased functional mobility performed and exercise participation.   Continue with physical therapy    Treatment time: 24 minutes  Time out: 0[facility] [address]

## 2020-01-14 VITALS
HEART RATE: 93 BPM | WEIGHT: 242.5 LBS | BODY MASS INDEX: 38.06 KG/M2 | OXYGEN SATURATION: 94 % | TEMPERATURE: 98 F | SYSTOLIC BLOOD PRESSURE: 138 MMHG | DIASTOLIC BLOOD PRESSURE: 74 MMHG | RESPIRATION RATE: 18 BRPM | HEIGHT: 67 IN

## 2020-01-14 LAB
ANION GAP SERPL CALCULATED.3IONS-SCNC: 13 MMOL/L (ref 7–16)
BASOPHILS ABSOLUTE: 0.05 E9/L (ref 0–0.2)
BASOPHILS RELATIVE PERCENT: 0.5 % (ref 0–2)
BUN BLDV-MCNC: 17 MG/DL (ref 8–23)
CALCIUM SERPL-MCNC: 8.7 MG/DL (ref 8.6–10.2)
CHLORIDE BLD-SCNC: 96 MMOL/L (ref 98–107)
CO2: 21 MMOL/L (ref 22–29)
CREAT SERPL-MCNC: 1.1 MG/DL (ref 0.5–1)
EOSINOPHILS ABSOLUTE: 0.13 E9/L (ref 0.05–0.5)
EOSINOPHILS RELATIVE PERCENT: 1.4 % (ref 0–6)
GFR AFRICAN AMERICAN: >60
GFR NON-AFRICAN AMERICAN: 50 ML/MIN/1.73
GLUCOSE BLD-MCNC: 145 MG/DL (ref 74–99)
GRAM STAIN ORDERABLE: NORMAL
HCT VFR BLD CALC: 37.3 % (ref 34–48)
HEMOGLOBIN: 12.4 G/DL (ref 11.5–15.5)
IMMATURE GRANULOCYTES #: 0.05 E9/L
IMMATURE GRANULOCYTES %: 0.5 % (ref 0–5)
LYMPHOCYTES ABSOLUTE: 1.73 E9/L (ref 1.5–4)
LYMPHOCYTES RELATIVE PERCENT: 18.1 % (ref 20–42)
MCH RBC QN AUTO: 28.7 PG (ref 26–35)
MCHC RBC AUTO-ENTMCNC: 33.2 % (ref 32–34.5)
MCV RBC AUTO: 86.3 FL (ref 80–99.9)
METER GLUCOSE: 144 MG/DL (ref 74–99)
METER GLUCOSE: 212 MG/DL (ref 74–99)
MONOCYTES ABSOLUTE: 0.89 E9/L (ref 0.1–0.95)
MONOCYTES RELATIVE PERCENT: 9.3 % (ref 2–12)
NEUTROPHILS ABSOLUTE: 6.71 E9/L (ref 1.8–7.3)
NEUTROPHILS RELATIVE PERCENT: 70.2 % (ref 43–80)
PDW BLD-RTO: 18.4 FL (ref 11.5–15)
PLATELET # BLD: 221 E9/L (ref 130–450)
PMV BLD AUTO: 10.5 FL (ref 7–12)
POTASSIUM SERPL-SCNC: 4 MMOL/L (ref 3.5–5)
RBC # BLD: 4.32 E12/L (ref 3.5–5.5)
SODIUM BLD-SCNC: 130 MMOL/L (ref 132–146)
WBC # BLD: 9.6 E9/L (ref 4.5–11.5)

## 2020-01-14 PROCEDURE — 6360000002 HC RX W HCPCS: Performed by: PHYSICIAN ASSISTANT

## 2020-01-14 PROCEDURE — 36415 COLL VENOUS BLD VENIPUNCTURE: CPT

## 2020-01-14 PROCEDURE — 85025 COMPLETE CBC W/AUTO DIFF WBC: CPT

## 2020-01-14 PROCEDURE — 80048 BASIC METABOLIC PNL TOTAL CA: CPT

## 2020-01-14 PROCEDURE — 2580000003 HC RX 258: Performed by: PHYSICIAN ASSISTANT

## 2020-01-14 PROCEDURE — 97530 THERAPEUTIC ACTIVITIES: CPT

## 2020-01-14 PROCEDURE — 6370000000 HC RX 637 (ALT 250 FOR IP): Performed by: NURSE PRACTITIONER

## 2020-01-14 PROCEDURE — 6370000000 HC RX 637 (ALT 250 FOR IP): Performed by: PHYSICIAN ASSISTANT

## 2020-01-14 PROCEDURE — 82962 GLUCOSE BLOOD TEST: CPT

## 2020-01-14 PROCEDURE — 94640 AIRWAY INHALATION TREATMENT: CPT

## 2020-01-14 PROCEDURE — 97535 SELF CARE MNGMENT TRAINING: CPT

## 2020-01-14 RX ORDER — LACTULOSE 10 G/15ML
30 SOLUTION ORAL 3 TIMES DAILY
Qty: 1 BOTTLE | Refills: 1 | Status: SHIPPED | OUTPATIENT
Start: 2020-01-14

## 2020-01-14 RX ORDER — SPIRONOLACTONE 100 MG/1
100 TABLET, FILM COATED ORAL DAILY
Qty: 30 TABLET | Refills: 3 | Status: ON HOLD | OUTPATIENT
Start: 2020-01-15 | End: 2020-03-17 | Stop reason: HOSPADM

## 2020-01-14 RX ORDER — FUROSEMIDE 20 MG/1
20 TABLET ORAL NIGHTLY
Qty: 60 TABLET | Refills: 3 | Status: SHIPPED | OUTPATIENT
Start: 2020-01-14 | End: 2020-01-29

## 2020-01-14 RX ADMIN — VENLAFAXINE HYDROCHLORIDE 75 MG: 75 CAPSULE, EXTENDED RELEASE ORAL at 09:44

## 2020-01-14 RX ADMIN — SPIRONOLACTONE 100 MG: 25 TABLET ORAL at 09:43

## 2020-01-14 RX ADMIN — LACTULOSE 20 G: 20 SOLUTION ORAL at 09:44

## 2020-01-14 RX ADMIN — KETOROLAC TROMETHAMINE 15 MG: 30 INJECTION, SOLUTION INTRAMUSCULAR at 09:44

## 2020-01-14 RX ADMIN — PANTOPRAZOLE SODIUM 40 MG: 40 TABLET, DELAYED RELEASE ORAL at 09:44

## 2020-01-14 RX ADMIN — ASPIRIN 81 MG: 81 TABLET, COATED ORAL at 09:43

## 2020-01-14 RX ADMIN — KETOROLAC TROMETHAMINE 15 MG: 30 INJECTION, SOLUTION INTRAMUSCULAR at 00:30

## 2020-01-14 RX ADMIN — MOMETASONE FUROATE AND FORMOTEROL FUMARATE DIHYDRATE 2 PUFF: 200; 5 AEROSOL RESPIRATORY (INHALATION) at 08:27

## 2020-01-14 RX ADMIN — INSULIN LISPRO 2 UNITS: 100 INJECTION, SOLUTION INTRAVENOUS; SUBCUTANEOUS at 12:48

## 2020-01-14 RX ADMIN — Medication 10 ML: at 09:48

## 2020-01-14 RX ADMIN — FUROSEMIDE 40 MG: 40 TABLET ORAL at 09:43

## 2020-01-14 RX ADMIN — AMITRIPTYLINE HYDROCHLORIDE 25 MG: 25 TABLET, FILM COATED ORAL at 09:44

## 2020-01-14 RX ADMIN — RIFAXIMIN 550 MG: 550 TABLET ORAL at 09:43

## 2020-01-14 RX ADMIN — LACTULOSE 20 G: 20 SOLUTION ORAL at 14:08

## 2020-01-14 RX ADMIN — AMLODIPINE BESYLATE 5 MG: 5 TABLET ORAL at 09:43

## 2020-01-14 ASSESSMENT — PAIN DESCRIPTION - DIRECTION: RADIATING_TOWARDS: UPPER ABDOMEN

## 2020-01-14 ASSESSMENT — PAIN DESCRIPTION - DESCRIPTORS: DESCRIPTORS: ACHING

## 2020-01-14 ASSESSMENT — PAIN DESCRIPTION - LOCATION: LOCATION: ABDOMEN

## 2020-01-14 ASSESSMENT — PAIN - FUNCTIONAL ASSESSMENT: PAIN_FUNCTIONAL_ASSESSMENT: PREVENTS OR INTERFERES SOME ACTIVE ACTIVITIES AND ADLS

## 2020-01-14 ASSESSMENT — PAIN DESCRIPTION - FREQUENCY: FREQUENCY: INTERMITTENT

## 2020-01-14 ASSESSMENT — PAIN DESCRIPTION - ORIENTATION: ORIENTATION: LEFT;LOWER

## 2020-01-14 ASSESSMENT — PAIN DESCRIPTION - PAIN TYPE: TYPE: ACUTE PAIN

## 2020-01-14 ASSESSMENT — PAIN SCALES - GENERAL
PAINLEVEL_OUTOF10: 8
PAINLEVEL_OUTOF10: 7

## 2020-01-14 ASSESSMENT — PAIN DESCRIPTION - PROGRESSION: CLINICAL_PROGRESSION: GRADUALLY WORSENING

## 2020-01-14 ASSESSMENT — PAIN DESCRIPTION - ONSET: ONSET: ON-GOING

## 2020-01-14 NOTE — PROGRESS NOTES
10/18/2019     Lab Results   Component Value Date    AMYLASE 60 05/15/2017         ASSESSMENT/PLAN:  Patient is a 71 y.o. female on consult for cirrhosis and ascites.       1. Cirrhosis of the liver with ascites  -Etiology unknown, suspect WEIR vs chronic and treatment naive hepatitis C  -s/p paracentesis on 01-; 7,400 cc of acitic fluid drained. SAAG 1.8 without any signs of SBP   -Patient tolerated procedure well   -Awaiting Hepatitis C viral load in light of positive antibody   -MELD-NA 18  -Other viral liver serology negative  -Autoimmune serology negative  -AFP from 04-; 8  -Most recent [address] liver 10/19/2019 with no evidence of hepatic mass  -Most recent EGD in Epic 06/11/2019 with one Grade 4 varix - banded with good collapse  -Tolerating Aldactone 100mg daily, Lasix 40mg QAM and Lasix 20mg QHS. Continue to monitor vitals and renal function   -Low sodium diet  -Okay to discharge home and to follow up with GI in outpatient setting in 1-2 weeks.      2. Hepatic encephalopathy  -Grade 1-2 encephalopathy  -Ammonia 76.2 on admission   -Lactulose TID  -Xifaxan BID  -Monitor mental status     3.  Esophogeal varices  -History of esophogeal varices on EGD 6/11/2019  -No repeat EGD required during this inpatient stay  -Will reassess need of repeat EGD in outpatient follow up.     4.  Colon polyp  -Tubular adenoma  -Repeat colonoscopy 6/2024     Other medical comorbidities:  -DM  -HTN  -HLD  -history of CVA       -Will discuss with attending    Prince Pablo DO  1/14/2020  9:53 AM

## 2020-01-14 NOTE — PROGRESS NOTES
Subjective: The patient is awake and alert. No acute events overnight. Denies chest pain, angina, ing paracentesis today     Objective:    /74   Pulse 93   Temp 98 °F (36.7 °C) (Oral)   Resp 18   Ht 5' 7\" (1.702 m)   Wt 242 lb 8 oz (110 kg)   SpO2 94%   BMI 37.98 kg/m²     No intake/output data recorded. HEENT: NCAT,  PERRLA, No JVD  Heart:  RRR, no murmurs, gallops, or rubs.   Lungs:  CTA bilaterally, no wheeze, rales or rhonchi  Abd: bowel sounds present, nontender, nondistended, no masses  Extrem:  No clubbing, cyanosis, or edema     Recent Labs     01/12/20  0715 01/13/20  0430 01/14/20  0342   WBC 7.1 8.0 9.6   HGB 12.1 12.5 12.4   HCT 36.5 34.3 37.3    219 221       Recent Labs     01/12/20  0715 01/13/20  0430 01/14/20  0342   * 130* 130*   K 4.2 4.3 4.0   CL 96* 96* 96*   CO2 25 24 21*   BUN 11 15 17   CREATININE 0.9 1.1* 1.1*   CALCIUM 8.6 8.5* 8.7       Assessment:    Patient Active Problem List   Diagnosis    History of hyperthyroidism    Cirrhosis of liver with ascites (HCC)    Diabetes mellitus type 2, uncontrolled (HCC)    Hyperlipidemia LDL goal <100    History of CVA (cerebrovascular accident)    Essential hypertension    Abdominal pain       Plan:    Admitted to [facility] for evalaution of recurrent ascites in pt with Liver cirrhosis   Await paracentesis by IR   IN1.4 today   GI consulted for recurrent ascites/livercirrhosis - known to dr. Chavez Me- appreciate input   Dc plan today with adjustments of meds post procedure if stable     DVT Prophylaxis   PT/OT  Discharge planning - today       All consultants notes reviewed    Ranjith Rudolph MD  9:34 AM  1/14/2020

## 2020-01-14 NOTE — PROGRESS NOTES
Physical Therapy  Facility/Department[facility] MED SURG  Daily Treatment Note  NAME: Herlinda Schultz  : 1955  MRN: [id number]    Date of Service: 2020      Patient Diagnosis(es): The encounter diagnosis was Ascites of liver. has a past medical history of Arthritis, Cerebral artery occlusion with cerebral infarction ([facility] [address] Cough, Diabetes mellitus ([facility] [address] Diverticulitis, GERD (gastroesophageal reflux disease), Hyperlipidemia, Hypertension, Liver cirrhosis ([facility] [address] Polyp of esophagus, Polyp, stomach, PONV (postoperative nausea and vomiting), Prolonged emergence from general anesthesia, and Thyroid disease. has a past surgical history that includes Cholecystectomy; Tonsillectomy; Colonoscopy; Upper gastrointestinal endoscopy; Hysterectomy (); pr thromboendartectmy neck,neck incis (Left, 10/29/2018); Carotid endarterectomy; Colonoscopy (N/A, 2019); Upper gastrointestinal endoscopy (N/A, 2019); and vascular surgery. Room #: 353  DIAGNOSIS: Ascites of the Liver  PRECAUTIONS: falls, safety  [facility] Score: 18/24    Patient is agreeable to treatment. Pain level is: right foot 4th and 5th digits with ambulation. Balance: standing with w/w Min A     Functional Mobility  Bed Mobility  Rolling: NT  Supine to sit: NT  Sit to supine: NT  Scooting: NT     Transfers  Sit to stand: SBA  Stand to sit: SBA  Stand pivot: SBA with w/w     Locomotion  Ambulation: 75 feet x 2 reps with w/w SBA. One LOB which pt self corrected. Pt ambulates with slow gait speed and step to pattern. Stair negotiation: ascended and descended 3 steps with 1 rail with Min A. Pt found sitting up in the chair with her son present. Pt required a seated rest break prior to performing stair negotiation due to fatigue. Cued pt to perform step to pattern with stair negotiation with 1 rail on the right. Both hands were on the railing. Cueing required for w/ wusage and safety.     At end of treatment session, pt left sitting up in the chair with her son present and with call light in reach. Pt is making good progress towards physical therapy goals. Continue with physical therapy    Time in: 1107  Time out: Zaira Benito, P.T.    License Number: [id number]

## 2020-01-14 NOTE — PROGRESS NOTES
minus at [facility]       Activity Tolerance Fair minus  fair  standing nicole x6-7 min with fair plus balance during self care tasks       Comments:  Son present in room during session. Encouraged pt to increase her participation in ADL activity. Initiated self independence with issue of reacher for LB ADL. Pt and son verbalized good understanding. Education: walker and transfer safety     Other: pt remained in chair at end of the session. · Pt has made  progress towards set goals.    · Continue with current plan of care      Total Tx Time: Thor 92 CARRI/L [id number]

## 2020-01-14 NOTE — DISCHARGE SUMMARY
Physician Discharge Summary     Patient ID:  Herlinda Schultz  [id number]  71 y.o.  1955    Admit date: 2020    Discharge date and time: 2020    Admission Diagnoses:   Patient Active Problem List   Diagnosis    History of hyperthyroidism    [facility]rhosis of liver with ascites ([facility] [address]    Diabetes mellitus type 2, uncontrolled ([facility] [address]    Hyperlipidemia LDL goal <100    History of CVA (cerebrovascular accident)    Essential hypertension    Abdominal pain       Discharge Diagnoses: ascites from liver cirrhosis     Consults:gi and IR     Procedures: paracentesis with 7300 ml removed     Hospital Course: The patient is a 71 y.o. female of Aramis Cantu DO with significant past medical history of liver cirrhosis  who presents with complaints of weakness and abdominal distension. Pt came in for evaluation of such . she is on ultram for pain in abdomen and back - she feels this causes her to become weak and legs to give out. ` She is admitted for tap. she is scheduled for tap on Wednesday but felt she could not wait that long dt worsening discomfort. Last tap was in 2019- ~7 l removed   /67   Pulse 90   Temp 97.8 °F (36.6 °C) (Oral)   Resp 16   Ht 5' 7\" (1.702 m)   Wt 235 lb (106.6 kg)   SpO2 92%   BMI 36.81 kg/m²     Pt underwent above procedure. Her meds were adjusted by GI . She feels well - received albumin post procedure. Emiliana Cleverly for home.  May need permanent drain place dif recurrent need to tap develops  Recent Labs     20  0715 20  0430 20  0342   WBC 7.1 8.0 9.6   HGB 12.1 12.5 12.4   HCT 36.5 34.3 37.3    219 221       Recent Labs     20  0715 20  0430 20  0342   * 130* 130*   K 4.2 4.3 4.0   CL 96* 96* 96*   CO2 25 24 21*   BUN 11 15 17   CREATININE 0.9 1.1* 1.1*   CALCIUM 8.6 8.5* 8.7       Xr Chest Standard (2 Vw)    Result Date: 2020  Patient MRN: [id number] : 1955 Age:  71 years Gender: Female Order Date: 2020 100 UNIT/ML injection  Commonly known as:  HUMULIN R;NOVOLIN R     * Insulin Syringe-Needle U-100 29G X 1/2\" 0.3 ML Misc  Commonly known as:  [employer] INS SYR .3CC/29G  Four times a day with insulin     * Insulin Syringe-Needle U-100 29G X 5/16\" 1 ML Misc  Use with insulin 4 times a day     * INSULIN SYRINGE 1CC/29G 29G X 1/2\" 1 ML Misc  Commonly known as:  [employer] INS SYRINGE 1CC/29G  1 each by Does not apply route 4 times daily     * Insulin Syringe-Needle U-100 30G X 5/16\" 1 ML Misc  Commonly known as:  [employer] INSULIN SYR 1CC/30G  Use with insulin 4 times a day     Milk Thistle 1000 MG Caps     montelukast 10 MG tablet  Commonly known as:  SINGULAIR  TAKE 1 TABLET BY MOUTH EVERY DAY AT NIGHT     NOVOLOG FLEXPEN 100 UNIT/ML injection pen  Generic drug:  insulin aspart     ONETOUCH VERIO w/Device Kit  To test 4x/day     pantoprazole 40 MG tablet  Commonly known as:  PROTONIX         * This list has 9 medication(s) that are the same as other medications prescribed for you. Read the directions carefully, and ask your doctor or other care provider to review them with you. Where to Get Your Medications      These medications were sent to [address] [place] [address], [address]    Phone:  [phone]   · furosemide 20 MG tablet  · lactulose 10 GM/15ML solution  · rifaximin 550 MG tablet  · spironolactone 100 MG tablet       Activity: activity as tolerated  Diet: diabetic and liver cirrhosis diet with lo sodium     Pt has been advised to: Follow-up with Pippa Massey DO in 1 week.   Follow-up with consultants as recommended by them    Note that over 30 minutes was spent in preparing discharge papers, discussing discharge with patient, medication review, etc.    Signed:  Tasha Cabrera MD  1/14/2020  12:08 PM

## 2020-01-15 LAB
HCV QNT BY NAAT IU/ML: NOT DETECTED IU/ML
HCV QNT BY NAAT LOG IU/ML: NOT DETECTED LOG IU/ML
INTERPRETATION: NOT DETECTED

## 2020-01-17 LAB
BODY FLUID CULTURE, STERILE: NORMAL
GRAM STAIN RESULT: NORMAL

## 2020-01-21 ENCOUNTER — TELEPHONE (OUTPATIENT)
Dept: ENDOCRINOLOGY | Age: 65
End: 2020-01-21

## 2020-01-29 ENCOUNTER — HOSPITAL ENCOUNTER (OUTPATIENT)
Dept: ULTRASOUND IMAGING | Age: 65
Discharge: HOME | End: 2020-01-31
Payer: MEDICARE

## 2020-01-29 VITALS
BODY MASS INDEX: 36.1 KG/M2 | HEIGHT: 67 IN | OXYGEN SATURATION: 95 % | HEART RATE: 99 BPM | WEIGHT: 230 LBS | RESPIRATION RATE: 18 BRPM | SYSTOLIC BLOOD PRESSURE: 140 MMHG | DIASTOLIC BLOOD PRESSURE: 67 MMHG

## 2020-01-29 PROCEDURE — 6360000002 HC RX W HCPCS: Performed by: INTERNAL MEDICINE

## 2020-01-29 PROCEDURE — 2500000003 HC RX 250 WO HCPCS: Performed by: NURSE PRACTITIONER

## 2020-01-29 PROCEDURE — 49083 ABD PARACENTESIS W/IMAGING: CPT

## 2020-01-29 PROCEDURE — P9047 ALBUMIN (HUMAN), 25%, 50ML: HCPCS | Performed by: INTERNAL MEDICINE

## 2020-01-29 RX ORDER — ALBUMIN (HUMAN) 12.5 G/50ML
25 SOLUTION INTRAVENOUS ONCE
Status: COMPLETED | OUTPATIENT
Start: 2020-01-29 | End: 2020-01-29

## 2020-01-29 RX ORDER — SODIUM CHLORIDE 0.9 % (FLUSH) 0.9 %
10 SYRINGE (ML) INJECTION PRN
Status: DISCONTINUED | OUTPATIENT
Start: 2020-01-29 | End: 2020-02-01 | Stop reason: HOSPADM

## 2020-01-29 RX ORDER — LIDOCAINE HYDROCHLORIDE 10 MG/ML
10 INJECTION, SOLUTION INFILTRATION; PERINEURAL ONCE
Status: COMPLETED | OUTPATIENT
Start: 2020-01-29 | End: 2020-01-29

## 2020-01-29 RX ADMIN — LIDOCAINE HYDROCHLORIDE 10 ML: 10 INJECTION, SOLUTION EPIDURAL; INFILTRATION; INTRACAUDAL; PERINEURAL at 13:45

## 2020-01-29 RX ADMIN — ALBUMIN (HUMAN) 25 G: 0.25 INJECTION, SOLUTION INTRAVENOUS at 13:41

## 2020-01-29 ASSESSMENT — PAIN SCALES - GENERAL: PAINLEVEL_OUTOF10: 0

## 2020-01-29 ASSESSMENT — PAIN - FUNCTIONAL ASSESSMENT
PAIN_FUNCTIONAL_ASSESSMENT: 0-10
PAIN_FUNCTIONAL_ASSESSMENT: 0-10

## 2020-01-29 ASSESSMENT — PAIN DESCRIPTION - DESCRIPTORS: DESCRIPTORS: BURNING;SHARP

## 2020-01-29 NOTE — H&P
symptoms of irregular blood glucose, Disp: 250 strip, Rfl: 5    blood glucose test strips (ONETOUCH VERIO) strip, 1 each by In Vitro route 4 times daily As needed. , Disp: 150 each, Rfl: 5    Blood Glucose Monitoring Suppl (ONETOUCH VERIO) w/Device KIT, To test 4x/day, Disp: 1 kit, Rfl: 0    insulin detemir (LEVEMIR) 100 UNIT/ML injection vial, Inject 55 Units into the skin nightly (Patient taking differently: Inject 65 Units into the skin 2 times daily ), Disp: 4 vial, Rfl: 5    amitriptyline (ELAVIL) 25 MG tablet, TAKE 1 TABLET BY MOUTH NIGHTLY AS NEEDED FOR SLEEP, Disp: 30 tablet, Rfl: 3    albuterol sulfate  (90 Base) MCG/ACT inhaler, INHALE 2 PUFFS THREE TIMES A DAY AS NEEDED FOR WHEEZING, Disp: , Rfl: 0    Insulin Syringe-Needle U-100 ([employer] INS SYR .3CC/29G) 29G X 1/2\" 0.3 ML MISC, Four times a day with insulin, Disp: 250 each, Rfl: 3    atorvastatin (LIPITOR) 40 MG tablet, Take 1 tablet by mouth nightly, Disp: 30 tablet, Rfl: 3    venlafaxine (EFFEXOR XR) 37.5 MG extended release capsule, TAKE ONE CAPSULE BY MOUTH EVERY DAY (Patient taking differently: Take 75 mg by mouth daily ), Disp: 90 capsule, Rfl: 2    montelukast (SINGULAIR) 10 MG tablet, TAKE 1 TABLET BY MOUTH EVERY DAY AT NIGHT, Disp: 90 tablet, Rfl: 2    pantoprazole (PROTONIX) 40 MG tablet, Take 40 mg by mouth daily Take morning of surgery with a sip of water, Disp: , Rfl:     Misc. Devices (ADJUST BATH/SHOWER SEAT) MISC, Use daily, Disp: 1 each, Rfl: 0    Misc. Devices (COMMODE BEDSIDE) MISC, Use daily, Disp: 1 each, Rfl: 0    aspirin 81 MG EC tablet, Take 1 tablet by mouth daily, Disp: 90 tablet, Rfl: 1    Milk Thistle 1000 MG CAPS, Take by mouth daily Ld 10-24-18, Disp: , Rfl:     blood glucose test strips (ASCENSIA AUTODISC VI;ONE TOUCH ULTRA TEST VI) strip, 1 each by In Vitro route daily As needed. , Disp: 100 each, Rfl: 3    Allergies   Allergen Reactions    Tramadol Other (See Comments)     Disoriented, Dizziness  Disoriented, Dizziness       Past Medical History:   Diagnosis Date    Arthritis     Cerebral artery occlusion with cerebral infarction ([facility] [address] 09/2018    some memory loss, some right side weakness.     Cough     post anesthesia    Diabetes mellitus ([facility] [address]     Diverticulitis     GERD (gastroesophageal reflux disease)     Hyperlipidemia     Hypertension     Liver cirrhosis (HCC)     Polyp of esophagus     Polyp, stomach     PONV (postoperative nausea and vomiting)     Prolonged emergence from general anesthesia     Thyroid disease     no meds       Past Surgical History:   Procedure Laterality Date    CAROTID ENDARTERECTOMY      october 2018    CHOLECYSTECTOMY      COLONOSCOPY      COLONOSCOPY N/A 6/11/2019    COLONOSCOPY POLYPECTOMY SNARE/COLD BIOPSY performed by Birdie Weller MD at [address] [place] [address] Drive  2003    [place] THROMBOENDARTECTMY [place] Left 10/29/2018    LEFT CAROTID ENDARTERECTOMY performed by Jason Bowling MD at [address] [place] [address] ENDOSCOPY      UPPER GASTROINTESTINAL ENDOSCOPY N/A 6/11/2019    EGD BIOPSY [facility] at [facility] ENDOSCOPY    VASCULAR SURGERY         Family History   Problem Relation Age of Onset    Heart Disease Mother 28    Diabetes Mother     Heart Disease Sister     Diabetes Sister        Social History     Socioeconomic History    Marital status:      Spouse name: Not on file    Number of children: Not on file    Years of education: Not on file    Highest education level: Not on file   Occupational History    Not on file   Social Needs    Financial resource strain: Not on file    Food insecurity:     Worry: Not on file     Inability: Not on file    Transportation needs:     Medical: Not on file     Non-medical: Not on file   Tobacco Use    Smoking status: Never Smoker    Smokeless tobacco: Never Used   Substance and Sexual Activity    Alcohol use: No    Drug

## 2020-01-29 NOTE — PROGRESS NOTES
IRFLOWSHEET    Date: 1/29/2020    Time: 1:19 PM     Exam: ultrasound guided paracentesis    Radiologist Performing Procedure: SUDHAKAR Castano NP    Nurse Reporting/Phone: [phone]     Nurse Receiving: Amos Speaks    Permit signed:      Yes    ID Band Checklist: Yes    Allergies: Tramadol     TIME OUT: 1343    PROCEDURE START TIME: 1344    Puncture Site: RLQ    Puncture Time: 4110    Catheters: 6 Lao    LABS:   Lab Results   Component Value Date    INR 1.4 01/13/2020    PROTIME 16.9 (H) 01/13/2020           Lab Results   Component Value Date    CREATININE 1.1 (H) 01/14/2020    BUN 17 01/14/2020          Lab Results   Component Value Date    HGB 12.4 01/14/2020    HCT 37.3 01/14/2020     01/14/2020         PROCEDURE END TIME: 1430    Total Contrast: na    Fluoroscopy Time: na    Complications:  None    Comments: Pt arrived via wheelchair to IR room for paracentesis. Pt is alert and oriented, states chronic back and shoulder  pain prior to procedure. Ultrasound images taken prior to procedure. Procedure is explained to patient, including possible risks. Pt verbalizes understanding and consent from signed. Procedure done under sterile technique and guidance of ultrasound imaging. 1% Lidocaine is used during procedure for comfort measures. A total of 7150 ml's of clear straw colored ascitic fluid drained during procedure. Catheter removed and op-site dressing applied to site with no bleeding noted. Albumin infusion given during procedure. Pt tolerated procedure well and denies any pain after. Pt given discharge instructions and pt verbalizes understanding of post procedure care/follow up. Pt transported  out of department with no difficulties in wheelchair with son.

## 2020-02-04 NOTE — TELEPHONE ENCOUNTER
1/29/20  I spoke with representative from [employer] pt assistance. Theresa Morris received a 120 day supply of Novolog in November and reached max dose. Next order for novolog can be placed on 3-4-20. New pt assistance forms need filled out. Ari needed new script sent in with dr state license number and expiration date (done - see media scan)    Theresa Morris needs to resubmit a new application for pt assistance after Feb 4, 2020  Next order for novolog can be placed on 3-4-20    Spoke with [facility] re [facility] information.

## 2020-02-05 ENCOUNTER — HOSPITAL ENCOUNTER (OUTPATIENT)
Dept: ULTRASOUND IMAGING | Age: 65
Discharge: HOME | End: 2020-02-07
Payer: MEDICARE

## 2020-02-05 ENCOUNTER — HOSPITAL ENCOUNTER (OUTPATIENT)
Age: 65
Discharge: HOME | End: 2020-02-05
Payer: MEDICARE

## 2020-02-05 VITALS
OXYGEN SATURATION: 96 % | DIASTOLIC BLOOD PRESSURE: 68 MMHG | HEART RATE: 94 BPM | SYSTOLIC BLOOD PRESSURE: 141 MMHG | RESPIRATION RATE: 18 BRPM

## 2020-02-05 LAB
ALBUMIN SERPL-MCNC: 2.9 G/DL (ref 3.5–5.2)
ALP BLD-CCNC: 175 U/L (ref 35–104)
ALT SERPL-CCNC: 26 U/L (ref 0–32)
AMMONIA: 41.8 UMOL/L (ref 11–51)
ANION GAP SERPL CALCULATED.3IONS-SCNC: 9 MMOL/L (ref 7–16)
APTT: 34.7 SEC (ref 24.5–35.1)
AST SERPL-CCNC: 38 U/L (ref 0–31)
BASOPHILS ABSOLUTE: 0.1 E9/L (ref 0–0.2)
BASOPHILS RELATIVE PERCENT: 1.2 % (ref 0–2)
BILIRUB SERPL-MCNC: 2.1 MG/DL (ref 0–1.2)
BUN BLDV-MCNC: 12 MG/DL (ref 8–23)
CALCIUM SERPL-MCNC: 9 MG/DL (ref 8.6–10.2)
CHLORIDE BLD-SCNC: 92 MMOL/L (ref 98–107)
CO2: 25 MMOL/L (ref 22–29)
CREAT SERPL-MCNC: 0.9 MG/DL (ref 0.5–1)
EOSINOPHILS ABSOLUTE: 0.13 E9/L (ref 0.05–0.5)
EOSINOPHILS RELATIVE PERCENT: 1.5 % (ref 0–6)
FERRITIN: 64 NG/ML
GFR AFRICAN AMERICAN: >60
GFR NON-AFRICAN AMERICAN: >60 ML/MIN/1.73
GLUCOSE BLD-MCNC: 211 MG/DL (ref 74–99)
HCT VFR BLD CALC: 42.6 % (ref 34–48)
HEMOGLOBIN: 14.1 G/DL (ref 11.5–15.5)
IMMATURE GRANULOCYTES #: 0.04 E9/L
IMMATURE GRANULOCYTES %: 0.5 % (ref 0–5)
INR BLD: 1.4
IRON SATURATION: 42 % (ref 15–50)
IRON: 94 MCG/DL (ref 37–145)
LYMPHOCYTES ABSOLUTE: 2.22 E9/L (ref 1.5–4)
LYMPHOCYTES RELATIVE PERCENT: 26.2 % (ref 20–42)
MCH RBC QN AUTO: 28.7 PG (ref 26–35)
MCHC RBC AUTO-ENTMCNC: 33.1 % (ref 32–34.5)
MCV RBC AUTO: 86.6 FL (ref 80–99.9)
MONOCYTES ABSOLUTE: 1 E9/L (ref 0.1–0.95)
MONOCYTES RELATIVE PERCENT: 11.8 % (ref 2–12)
NEUTROPHILS ABSOLUTE: 4.99 E9/L (ref 1.8–7.3)
NEUTROPHILS RELATIVE PERCENT: 58.8 % (ref 43–80)
PDW BLD-RTO: 20 FL (ref 11.5–15)
PLATELET # BLD: 257 E9/L (ref 130–450)
PMV BLD AUTO: 9.9 FL (ref 7–12)
POTASSIUM SERPL-SCNC: 4.3 MMOL/L (ref 3.5–5)
PROTHROMBIN TIME: 15.7 SEC (ref 9.3–12.4)
RBC # BLD: 4.92 E12/L (ref 3.5–5.5)
SODIUM BLD-SCNC: 126 MMOL/L (ref 132–146)
TOTAL IRON BINDING CAPACITY: 226 MCG/DL (ref 250–450)
TOTAL PROTEIN: 8.1 G/DL (ref 6.4–8.3)
WBC # BLD: 8.5 E9/L (ref 4.5–11.5)

## 2020-02-05 PROCEDURE — 82728 ASSAY OF FERRITIN: CPT

## 2020-02-05 PROCEDURE — 80053 COMPREHEN METABOLIC PANEL: CPT

## 2020-02-05 PROCEDURE — 85610 PROTHROMBIN TIME: CPT

## 2020-02-05 PROCEDURE — 82105 ALPHA-FETOPROTEIN SERUM: CPT

## 2020-02-05 PROCEDURE — 6360000002 HC RX W HCPCS: Performed by: INTERNAL MEDICINE

## 2020-02-05 PROCEDURE — 83540 ASSAY OF IRON: CPT

## 2020-02-05 PROCEDURE — C1729 CATH, DRAINAGE: HCPCS

## 2020-02-05 PROCEDURE — 83550 IRON BINDING TEST: CPT

## 2020-02-05 PROCEDURE — 82140 ASSAY OF AMMONIA: CPT

## 2020-02-05 PROCEDURE — P9047 ALBUMIN (HUMAN), 25%, 50ML: HCPCS | Performed by: INTERNAL MEDICINE

## 2020-02-05 PROCEDURE — 85730 THROMBOPLASTIN TIME PARTIAL: CPT

## 2020-02-05 PROCEDURE — 36415 COLL VENOUS BLD VENIPUNCTURE: CPT

## 2020-02-05 PROCEDURE — 85025 COMPLETE CBC W/AUTO DIFF WBC: CPT

## 2020-02-05 RX ORDER — INSULIN LISPRO 100 [IU]/ML
INJECTION, SOLUTION INTRAVENOUS; SUBCUTANEOUS
Qty: 6 PEN | Refills: 3 | Status: ON HOLD
Start: 2020-02-05 | End: 2020-03-10 | Stop reason: ALTCHOICE

## 2020-02-05 RX ORDER — ALBUMIN (HUMAN) 12.5 G/50ML
50 SOLUTION INTRAVENOUS ONCE
Status: COMPLETED | OUTPATIENT
Start: 2020-02-05 | End: 2020-02-05

## 2020-02-05 RX ADMIN — ALBUMIN (HUMAN) 50 G: 0.25 INJECTION, SOLUTION INTRAVENOUS at 13:07

## 2020-02-05 NOTE — PROGRESS NOTES
IRFLOWSHEET    Date: 2/5/2020    Time: 1:02 PM     Exam: Ultrasound Guided Paracentesis    Radiologist Performing Procedure: Dr Leonides Miner    Nurse Reporting/Phone: [phone]     Nurse Receiving: Margaret Gamboa    Permit signed:      Yes    ID Band Checklist: Yes    Allergies: Tramadol     TIME OUT: 1301    PROCEDURE START TIME: 8445    Puncture Site: RLQ    Puncture Time: 1172    Catheters: 6fr    LABS:   Lab Results   Component Value Date    INR 1.4 01/13/2020    PROTIME 16.9 (H) 01/13/2020           Lab Results   Component Value Date    CREATININE 1.1 (H) 01/14/2020    BUN 17 01/14/2020          Lab Results   Component Value Date    HGB 14.1 02/05/2020    HCT 42.6 02/05/2020     02/05/2020         PROCEDURE END TIME: 1330    Total Contrast: n/a    Fluoroscopy Time: n/a    Complications:  None    Comments: Patient here for ultrasound guided paracentesis. Instructions given and questions answered. Consent signed. Abdomen scanned and marked under the guidance of procedural Radiologist. Abdomen scanned, prepped and centesis catheter inserted RLQ with ultrasound guidance by Dr Leonides Miner @0324 Patient tolerated well. 6.6 Liters drained of straw colored ascites fluid. Centesis catheter removed @1330   . Puncture site cleansed and dry dressing applied. No bleeding, swelling or complications noted.

## 2020-02-06 ENCOUNTER — TELEPHONE (OUTPATIENT)
Dept: ENDOCRINOLOGY | Age: 65
End: 2020-02-06

## 2020-02-07 LAB — AFP-TUMOR MARKER: 5 NG/ML (ref 0–9)

## 2020-02-10 ENCOUNTER — HOSPITAL ENCOUNTER (INPATIENT)
Age: 65
LOS: 3 days | Discharge: ANOTHER HOSPITAL | DRG: 433 | End: 2020-02-14
Attending: EMERGENCY MEDICINE | Admitting: INTERNAL MEDICINE
Payer: MEDICARE

## 2020-02-10 ENCOUNTER — APPOINTMENT (OUTPATIENT)
Dept: GENERAL RADIOLOGY | Age: 65
DRG: 433 | End: 2020-02-10
Payer: MEDICARE

## 2020-02-10 PROBLEM — E87.1 HYPONATREMIA: Status: ACTIVE | Noted: 2020-02-10

## 2020-02-10 LAB
ALBUMIN SERPL-MCNC: 2.8 G/DL (ref 3.5–5.2)
ALP BLD-CCNC: 152 U/L (ref 35–104)
ALT SERPL-CCNC: 23 U/L (ref 0–32)
AMMONIA: 90.9 UMOL/L (ref 11–51)
ANION GAP SERPL CALCULATED.3IONS-SCNC: 11 MMOL/L (ref 7–16)
ANISOCYTOSIS: ABNORMAL
AST SERPL-CCNC: 44 U/L (ref 0–31)
BACTERIA: ABNORMAL /HPF
BASOPHILS ABSOLUTE: 0.09 E9/L (ref 0–0.2)
BASOPHILS RELATIVE PERCENT: 1.2 % (ref 0–2)
BETA-HYDROXYBUTYRATE: 0.13 MMOL/L (ref 0.02–0.27)
BILIRUB SERPL-MCNC: 1.4 MG/DL (ref 0–1.2)
BILIRUBIN DIRECT: 0.5 MG/DL (ref 0–0.3)
BILIRUBIN URINE: NEGATIVE
BILIRUBIN, INDIRECT: 0.9 MG/DL (ref 0–1)
BLOOD, URINE: NEGATIVE
BUN BLDV-MCNC: 14 MG/DL (ref 8–23)
BURR CELLS: ABNORMAL
CALCIUM SERPL-MCNC: 8.8 MG/DL (ref 8.6–10.2)
CHLORIDE BLD-SCNC: 92 MMOL/L (ref 98–107)
CLARITY: CLEAR
CO2: 22 MMOL/L (ref 22–29)
COLOR: ABNORMAL
CREAT SERPL-MCNC: 0.9 MG/DL (ref 0.5–1)
EKG ATRIAL RATE: 97 BPM
EKG P AXIS: -6 DEGREES
EKG P-R INTERVAL: 142 MS
EKG Q-T INTERVAL: 364 MS
EKG QRS DURATION: 84 MS
EKG QTC CALCULATION (BAZETT): 462 MS
EKG R AXIS: -2 DEGREES
EKG T AXIS: 58 DEGREES
EKG VENTRICULAR RATE: 97 BPM
EOSINOPHILS ABSOLUTE: 0.16 E9/L (ref 0.05–0.5)
EOSINOPHILS RELATIVE PERCENT: 2.2 % (ref 0–6)
GFR AFRICAN AMERICAN: >60
GFR NON-AFRICAN AMERICAN: >60 ML/MIN/1.73
GLUCOSE BLD-MCNC: 189 MG/DL (ref 74–99)
GLUCOSE URINE: NEGATIVE MG/DL
HCT VFR BLD CALC: 41.9 % (ref 34–48)
HEMOGLOBIN: 14 G/DL (ref 11.5–15.5)
IMMATURE GRANULOCYTES #: 0.02 E9/L
IMMATURE GRANULOCYTES %: 0.3 % (ref 0–5)
KETONES, URINE: NEGATIVE MG/DL
LEUKOCYTE ESTERASE, URINE: ABNORMAL
LYMPHOCYTES ABSOLUTE: 1.64 E9/L (ref 1.5–4)
LYMPHOCYTES RELATIVE PERCENT: 22.1 % (ref 20–42)
MCH RBC QN AUTO: 28.9 PG (ref 26–35)
MCHC RBC AUTO-ENTMCNC: 33.4 % (ref 32–34.5)
MCV RBC AUTO: 86.4 FL (ref 80–99.9)
METER GLUCOSE: 250 MG/DL (ref 74–99)
MONOCYTES ABSOLUTE: 0.93 E9/L (ref 0.1–0.95)
MONOCYTES RELATIVE PERCENT: 12.6 % (ref 2–12)
NEUTROPHILS ABSOLUTE: 4.57 E9/L (ref 1.8–7.3)
NEUTROPHILS RELATIVE PERCENT: 61.6 % (ref 43–80)
NITRITE, URINE: NEGATIVE
OVALOCYTES: ABNORMAL
PDW BLD-RTO: 21 FL (ref 11.5–15)
PH UA: 6.5 (ref 5–9)
PH VENOUS: 7.39 (ref 7.35–7.45)
PLATELET # BLD: 213 E9/L (ref 130–450)
PMV BLD AUTO: 10.4 FL (ref 7–12)
POIKILOCYTES: ABNORMAL
POLYCHROMASIA: ABNORMAL
POTASSIUM SERPL-SCNC: 5 MMOL/L (ref 3.5–5)
PRO-BNP: 50 PG/ML (ref 0–125)
PROTEIN UA: ABNORMAL MG/DL
RBC # BLD: 4.85 E12/L (ref 3.5–5.5)
RBC UA: ABNORMAL /HPF (ref 0–2)
SODIUM BLD-SCNC: 125 MMOL/L (ref 132–146)
SPECIFIC GRAVITY UA: 1.02 (ref 1–1.03)
TARGET CELLS: ABNORMAL
TOTAL PROTEIN: 7.3 G/DL (ref 6.4–8.3)
TROPONIN: 0.02 NG/ML (ref 0–0.03)
UROBILINOGEN, URINE: 2 E.U./DL
WBC # BLD: 7.4 E9/L (ref 4.5–11.5)
WBC UA: ABNORMAL /HPF (ref 0–5)

## 2020-02-10 PROCEDURE — 80048 BASIC METABOLIC PNL TOTAL CA: CPT

## 2020-02-10 PROCEDURE — 81001 URINALYSIS AUTO W/SCOPE: CPT

## 2020-02-10 PROCEDURE — 6360000002 HC RX W HCPCS: Performed by: STUDENT IN AN ORGANIZED HEALTH CARE EDUCATION/TRAINING PROGRAM

## 2020-02-10 PROCEDURE — 82140 ASSAY OF AMMONIA: CPT

## 2020-02-10 PROCEDURE — 83880 ASSAY OF NATRIURETIC PEPTIDE: CPT

## 2020-02-10 PROCEDURE — 96365 THER/PROPH/DIAG IV INF INIT: CPT

## 2020-02-10 PROCEDURE — 93010 ELECTROCARDIOGRAM REPORT: CPT | Performed by: INTERNAL MEDICINE

## 2020-02-10 PROCEDURE — 84484 ASSAY OF TROPONIN QUANT: CPT

## 2020-02-10 PROCEDURE — 2580000003 HC RX 258: Performed by: EMERGENCY MEDICINE

## 2020-02-10 PROCEDURE — G0378 HOSPITAL OBSERVATION PER HR: HCPCS

## 2020-02-10 PROCEDURE — 93005 ELECTROCARDIOGRAM TRACING: CPT | Performed by: STUDENT IN AN ORGANIZED HEALTH CARE EDUCATION/TRAINING PROGRAM

## 2020-02-10 PROCEDURE — 82962 GLUCOSE BLOOD TEST: CPT

## 2020-02-10 PROCEDURE — 96372 THER/PROPH/DIAG INJ SC/IM: CPT

## 2020-02-10 PROCEDURE — 80076 HEPATIC FUNCTION PANEL: CPT

## 2020-02-10 PROCEDURE — 2580000003 HC RX 258: Performed by: STUDENT IN AN ORGANIZED HEALTH CARE EDUCATION/TRAINING PROGRAM

## 2020-02-10 PROCEDURE — 6360000002 HC RX W HCPCS: Performed by: NURSE PRACTITIONER

## 2020-02-10 PROCEDURE — 85025 COMPLETE CBC W/AUTO DIFF WBC: CPT

## 2020-02-10 PROCEDURE — 71045 X-RAY EXAM CHEST 1 VIEW: CPT

## 2020-02-10 PROCEDURE — 99285 EMERGENCY DEPT VISIT HI MDM: CPT

## 2020-02-10 PROCEDURE — 6370000000 HC RX 637 (ALT 250 FOR IP): Performed by: NURSE PRACTITIONER

## 2020-02-10 PROCEDURE — 82800 BLOOD PH: CPT

## 2020-02-10 PROCEDURE — 82010 KETONE BODYS QUAN: CPT

## 2020-02-10 PROCEDURE — 2580000003 HC RX 258: Performed by: NURSE PRACTITIONER

## 2020-02-10 RX ORDER — SODIUM CHLORIDE 0.9 % (FLUSH) 0.9 %
10 SYRINGE (ML) INJECTION PRN
Status: DISCONTINUED | OUTPATIENT
Start: 2020-02-10 | End: 2020-02-14 | Stop reason: HOSPADM

## 2020-02-10 RX ORDER — SPIRONOLACTONE 25 MG/1
100 TABLET ORAL DAILY
Status: DISCONTINUED | OUTPATIENT
Start: 2020-02-10 | End: 2020-02-14 | Stop reason: HOSPADM

## 2020-02-10 RX ORDER — DEXTROSE MONOHYDRATE 50 MG/ML
100 INJECTION, SOLUTION INTRAVENOUS PRN
Status: DISCONTINUED | OUTPATIENT
Start: 2020-02-10 | End: 2020-02-14 | Stop reason: HOSPADM

## 2020-02-10 RX ORDER — FUROSEMIDE 20 MG/1
20 TABLET ORAL 3 TIMES DAILY
Status: DISCONTINUED | OUTPATIENT
Start: 2020-02-10 | End: 2020-02-14 | Stop reason: HOSPADM

## 2020-02-10 RX ORDER — HYDROCODONE BITARTRATE AND ACETAMINOPHEN 5; 325 MG/1; MG/1
1 TABLET ORAL EVERY 6 HOURS PRN
COMMUNITY

## 2020-02-10 RX ORDER — PANTOPRAZOLE SODIUM 40 MG/1
40 TABLET, DELAYED RELEASE ORAL DAILY
Status: DISCONTINUED | OUTPATIENT
Start: 2020-02-10 | End: 2020-02-14 | Stop reason: HOSPADM

## 2020-02-10 RX ORDER — AMLODIPINE BESYLATE 5 MG/1
5 TABLET ORAL DAILY
Status: DISCONTINUED | OUTPATIENT
Start: 2020-02-10 | End: 2020-02-14 | Stop reason: HOSPADM

## 2020-02-10 RX ORDER — ATORVASTATIN CALCIUM 40 MG/1
40 TABLET, FILM COATED ORAL NIGHTLY
Status: DISCONTINUED | OUTPATIENT
Start: 2020-02-10 | End: 2020-02-14 | Stop reason: HOSPADM

## 2020-02-10 RX ORDER — DEXTROSE MONOHYDRATE 25 G/50ML
12.5 INJECTION, SOLUTION INTRAVENOUS PRN
Status: DISCONTINUED | OUTPATIENT
Start: 2020-02-10 | End: 2020-02-14 | Stop reason: HOSPADM

## 2020-02-10 RX ORDER — NICOTINE POLACRILEX 4 MG
15 LOZENGE BUCCAL PRN
Status: DISCONTINUED | OUTPATIENT
Start: 2020-02-10 | End: 2020-02-14 | Stop reason: HOSPADM

## 2020-02-10 RX ORDER — ONDANSETRON 2 MG/ML
4 INJECTION INTRAMUSCULAR; INTRAVENOUS EVERY 6 HOURS PRN
Status: DISCONTINUED | OUTPATIENT
Start: 2020-02-10 | End: 2020-02-14 | Stop reason: HOSPADM

## 2020-02-10 RX ORDER — SODIUM CHLORIDE 0.9 % (FLUSH) 0.9 %
10 SYRINGE (ML) INJECTION EVERY 12 HOURS SCHEDULED
Status: DISCONTINUED | OUTPATIENT
Start: 2020-02-10 | End: 2020-02-14 | Stop reason: HOSPADM

## 2020-02-10 RX ORDER — HYDROCODONE BITARTRATE AND ACETAMINOPHEN 5; 325 MG/1; MG/1
1 TABLET ORAL EVERY 6 HOURS PRN
Status: DISCONTINUED | OUTPATIENT
Start: 2020-02-10 | End: 2020-02-14 | Stop reason: HOSPADM

## 2020-02-10 RX ORDER — ACETAMINOPHEN 325 MG/1
650 TABLET ORAL EVERY 4 HOURS PRN
Status: DISCONTINUED | OUTPATIENT
Start: 2020-02-10 | End: 2020-02-14 | Stop reason: HOSPADM

## 2020-02-10 RX ORDER — INSULIN GLARGINE 100 [IU]/ML
0.25 INJECTION, SOLUTION SUBCUTANEOUS NIGHTLY
Status: DISCONTINUED | OUTPATIENT
Start: 2020-02-10 | End: 2020-02-14 | Stop reason: HOSPADM

## 2020-02-10 RX ORDER — ALBUTEROL SULFATE 0.63 MG/3ML
0.63 SOLUTION RESPIRATORY (INHALATION) EVERY 6 HOURS PRN
Status: DISCONTINUED | OUTPATIENT
Start: 2020-02-10 | End: 2020-02-14 | Stop reason: HOSPADM

## 2020-02-10 RX ORDER — 0.9 % SODIUM CHLORIDE 0.9 %
500 INTRAVENOUS SOLUTION INTRAVENOUS ONCE
Status: COMPLETED | OUTPATIENT
Start: 2020-02-10 | End: 2020-02-10

## 2020-02-10 RX ORDER — ONDANSETRON 4 MG/1
4 TABLET, FILM COATED ORAL EVERY 8 HOURS PRN
Status: ON HOLD | COMMUNITY
End: 2020-02-14 | Stop reason: SDUPTHER

## 2020-02-10 RX ORDER — LACTULOSE 10 G/15ML
30 SOLUTION ORAL 3 TIMES DAILY
Status: DISCONTINUED | OUTPATIENT
Start: 2020-02-10 | End: 2020-02-13

## 2020-02-10 RX ADMIN — PANTOPRAZOLE SODIUM 40 MG: 40 TABLET, DELAYED RELEASE ORAL at 21:06

## 2020-02-10 RX ADMIN — SODIUM CHLORIDE 500 ML: 9 INJECTION, SOLUTION INTRAVENOUS at 14:15

## 2020-02-10 RX ADMIN — FUROSEMIDE 20 MG: 20 TABLET ORAL at 21:05

## 2020-02-10 RX ADMIN — INSULIN LISPRO 3 UNITS: 100 INJECTION, SOLUTION INTRAVENOUS; SUBCUTANEOUS at 21:10

## 2020-02-10 RX ADMIN — HYDROCODONE BITARTRATE AND ACETAMINOPHEN 1 TABLET: 5; 325 TABLET ORAL at 21:05

## 2020-02-10 RX ADMIN — LACTULOSE 20 G: 20 SOLUTION ORAL at 21:10

## 2020-02-10 RX ADMIN — ATORVASTATIN CALCIUM 40 MG: 40 TABLET, FILM COATED ORAL at 21:06

## 2020-02-10 RX ADMIN — CEFTRIAXONE 1 G: 1 INJECTION, POWDER, FOR SOLUTION INTRAMUSCULAR; INTRAVENOUS at 21:21

## 2020-02-10 RX ADMIN — ENOXAPARIN SODIUM 40 MG: 40 INJECTION SUBCUTANEOUS at 21:06

## 2020-02-10 RX ADMIN — SPIRONOLACTONE 100 MG: 25 TABLET ORAL at 21:05

## 2020-02-10 RX ADMIN — INSULIN GLARGINE 26 UNITS: 100 INJECTION, SOLUTION SUBCUTANEOUS at 21:10

## 2020-02-10 RX ADMIN — SODIUM CHLORIDE, PRESERVATIVE FREE 10 ML: 5 INJECTION INTRAVENOUS at 21:50

## 2020-02-10 RX ADMIN — AMLODIPINE BESYLATE 5 MG: 5 TABLET ORAL at 21:06

## 2020-02-10 ASSESSMENT — PAIN DESCRIPTION - ORIENTATION: ORIENTATION: LOWER;RIGHT

## 2020-02-10 ASSESSMENT — PAIN SCALES - GENERAL
PAINLEVEL_OUTOF10: 8
PAINLEVEL_OUTOF10: 8

## 2020-02-10 ASSESSMENT — ENCOUNTER SYMPTOMS
NAUSEA: 0
SORE THROAT: 0
DIARRHEA: 0
BLOOD IN STOOL: 0
VOMITING: 0
CONSTIPATION: 0
COLOR CHANGE: 0
SHORTNESS OF BREATH: 0
COUGH: 0
BACK PAIN: 0
ABDOMINAL PAIN: 0
ABDOMINAL DISTENTION: 0

## 2020-02-10 ASSESSMENT — PAIN - FUNCTIONAL ASSESSMENT: PAIN_FUNCTIONAL_ASSESSMENT: PREVENTS OR INTERFERES SOME ACTIVE ACTIVITIES AND ADLS

## 2020-02-10 ASSESSMENT — PAIN DESCRIPTION - PAIN TYPE: TYPE: CHRONIC PAIN

## 2020-02-10 ASSESSMENT — PAIN DESCRIPTION - ONSET: ONSET: GRADUAL

## 2020-02-10 ASSESSMENT — PAIN DESCRIPTION - PROGRESSION: CLINICAL_PROGRESSION: NOT CHANGED

## 2020-02-10 ASSESSMENT — PAIN DESCRIPTION - DESCRIPTORS: DESCRIPTORS: BURNING

## 2020-02-10 ASSESSMENT — PAIN DESCRIPTION - FREQUENCY: FREQUENCY: CONTINUOUS

## 2020-02-10 ASSESSMENT — PAIN DESCRIPTION - LOCATION: LOCATION: BACK

## 2020-02-10 NOTE — ED PROVIDER NOTES
The patient is a 60-year-old female who presents to the emergency department complaint of fatigue, lower extremity weakness. This is been occurring over the last week. She was brought in by her son who was concerned that she was having more difficulty walking and he noted that she took 10 minutes to open a . He just wanted to get her checked out to make sure things were okay. Patient currently reports that she feels okay although has had some weakness. She denies any pain at this time. She is alert and oriented x3 and has no focal deficits. Vital signs are stable. She gets a paracentesis every Wednesday and her last one was last Wednesday. Symptoms are constant, mild to moderate, worse on exertion, better with lying still. She has not had any recent falls but her last fall was 2 weeks ago. She has had some admissions recently secondary to her cirrhosis and ascites. The history is provided by the patient. Review of Systems   Constitutional: Positive for fatigue. Negative for activity change, appetite change, chills, diaphoresis and fever. HENT: Negative for congestion and sore throat. Eyes: Negative for visual disturbance. Respiratory: Negative for cough and shortness of breath. Cardiovascular: Negative for chest pain, palpitations and leg swelling. Gastrointestinal: Negative for abdominal distention, abdominal pain, blood in stool, constipation, diarrhea, nausea and vomiting. Endocrine: Negative for polyuria. Genitourinary: Negative for decreased urine volume, difficulty urinating, dysuria, flank pain and hematuria. Musculoskeletal: Negative for arthralgias, back pain, gait problem, joint swelling, myalgias, neck pain and neck stiffness. Skin: Negative for color change, pallor, rash and wound. Allergic/Immunologic: Negative for immunocompromised state. Neurological: Positive for weakness (Generalized).  Negative for dizziness, tremors, seizures, syncope, facial asymmetry, speech difficulty, light-headedness, numbness and headaches. Hematological: Negative for adenopathy. Does not bruise/bleed easily. Psychiatric/Behavioral: Negative. Physical Exam  Vitals signs reviewed. Constitutional:       General: She is not in acute distress. Appearance: She is well-developed. She is not ill-appearing, toxic-appearing or diaphoretic. HENT:      Head: Normocephalic and atraumatic. Right Ear: External ear normal.      Left Ear: External ear normal.      Nose: Nose normal. No congestion or rhinorrhea. Mouth/Throat:      Pharynx: No oropharyngeal exudate or posterior oropharyngeal erythema. Eyes:      General: No scleral icterus. Right eye: No discharge. Left eye: No discharge. Extraocular Movements: Extraocular movements intact. Conjunctiva/sclera: Conjunctivae normal.      Pupils: Pupils are equal, round, and reactive to light. Neck:      Musculoskeletal: Normal range of motion and neck supple. No neck rigidity or muscular tenderness. Thyroid: No thyromegaly. Vascular: No JVD. Trachea: No tracheal deviation. Cardiovascular:      Rate and Rhythm: Normal rate and regular rhythm. Pulses:           Radial pulses are 2+ on the right side and 2+ on the left side. Heart sounds: Normal heart sounds, S1 normal and S2 normal. Heart sounds not distant. No murmur. No friction rub. No gallop. No S3 or S4 sounds. Pulmonary:      Effort: Pulmonary effort is normal. No respiratory distress. Breath sounds: Normal breath sounds. No stridor, decreased air movement or transmitted upper airway sounds. No decreased breath sounds, wheezing, rhonchi or rales. Comments: Faint crackles in lung bases bilaterally  Chest:      Chest wall: No tenderness. Abdominal:      General: Bowel sounds are normal. There is distension (Mild). Palpations: Abdomen is soft.  There is no hepatomegaly, splenomegaly, mass or pulsatile mass. Tenderness: There is no abdominal tenderness. There is no right CVA tenderness, left CVA tenderness, guarding or rebound. Hernia: No hernia is present. Musculoskeletal: Normal range of motion. General: No swelling, tenderness, deformity or signs of injury. Right lower leg: No edema. Left lower leg: No edema. Comments: Patient moving all extremities without difficulty. She is able to hold both of her legs up off the bed for 10 seconds without any drift. No weakness exhibited on initial exam of the lower extremities. Lymphadenopathy:      Cervical: No cervical adenopathy. Skin:     General: Skin is warm and dry. Capillary Refill: Capillary refill takes less than 2 seconds. Coloration: Skin is not jaundiced or pale. Findings: No bruising, erythema, lesion or rash. Neurological:      General: No focal deficit present. Mental Status: She is alert and oriented to person, place, and time. Psychiatric:         Mood and Affect: Mood normal.         Behavior: Behavior normal.         Thought Content: Thought content normal.         Judgment: Judgment normal.          Procedures     MDM   Patient presents to the ED for fatigue. Differential diagnoses included but not limited to electrolyte disturbance, arrhythmia/dysrhythmia, ACS, viral syndrome, pneumonia, viral URI, liver failure, dehydration, metabolic encephalopathy, UTI, other process. Workup in the ED revealed hyponatremia, hyperbilirubinemia. Patient was given IV fluids, IV Rocephin. Patient requires continued workup and management of their symptoms and will be admitted to the hospital for further evaluation and treatment. ED Course as of Feb 10 2005   Mon Feb 10, 2020   3967 Case discussed with Alanna Alberts NP. She agrees to accept the patient for admission.     [LS]      ED Course User Index  [LS] Renee Hopkins DO          EKG: This EKG is signed and interpreted by me.     Rate: 1300 94/61 -- -- 97 -- 96 % -- --   02/10/20 1238 122/74 98.3 °F (36.8 °C) Oral 98 16 96 % 5' 7\" (1.702 m) 230 lb (104.3 kg)       Oxygen Saturation Interpretation: Normal    ------------------------------------------ PROGRESS NOTES ------------------------------------------    Counseling:  I have spoken with the patient and discussed todays results, in addition to providing specific details for the plan of care and counseling regarding the diagnosis and prognosis. Their questions are answered at this time and they are agreeable with the plan of admission.    --------------------------------- ADDITIONAL PROVIDER NOTES ---------------------------------  Consultations:  Time: [id number]. Spoke with Ayo Cabrera NP. Discussed case. They will admit the patient. This patient's ED course included: a personal history and physicial examination, re-evaluation prior to disposition, multiple bedside re-evaluations, IV medications, cardiac monitoring, continuous pulse oximetry and complex medical decision making and emergency management    This patient has remained hemodynamically stable during their ED course. Diagnosis:  1. Hyponatremia    2. Generalized weakness    3. Urinary tract infection without hematuria, site unspecified    4. Cirrhosis of liver with ascites, unspecified hepatic cirrhosis type ([facility]        Disposition:  Patient's disposition: Admit to telemetry  Patient's condition is stable.          Isabel Herrera DO  Resident  02/10/20 2006

## 2020-02-11 ENCOUNTER — APPOINTMENT (OUTPATIENT)
Dept: ULTRASOUND IMAGING | Age: 65
DRG: 433 | End: 2020-02-11
Payer: MEDICARE

## 2020-02-11 PROBLEM — E87.1 HYPONATREMIA: Status: ACTIVE | Noted: 2020-02-11

## 2020-02-11 PROBLEM — R10.9 ABDOMINAL PAIN: Status: RESOLVED | Noted: 2019-10-18 | Resolved: 2020-02-11

## 2020-02-11 PROBLEM — E87.1 HYPONATREMIA: Status: RESOLVED | Noted: 2020-02-10 | Resolved: 2020-02-11

## 2020-02-11 PROBLEM — E66.9 OBESITY (BMI 30-39.9): Chronic | Status: ACTIVE | Noted: 2020-02-11

## 2020-02-11 LAB
ALBUMIN SERPL-MCNC: 2.5 G/DL (ref 3.5–5.2)
ALP BLD-CCNC: 143 U/L (ref 35–104)
ALT SERPL-CCNC: 21 U/L (ref 0–32)
ANION GAP SERPL CALCULATED.3IONS-SCNC: 7 MMOL/L (ref 7–16)
ANION GAP SERPL CALCULATED.3IONS-SCNC: 9 MMOL/L (ref 7–16)
AST SERPL-CCNC: 37 U/L (ref 0–31)
BILIRUB SERPL-MCNC: 1.3 MG/DL (ref 0–1.2)
BILIRUBIN DIRECT: 0.5 MG/DL (ref 0–0.3)
BILIRUBIN, INDIRECT: 0.8 MG/DL (ref 0–1)
BUN BLDV-MCNC: 15 MG/DL (ref 8–23)
BUN BLDV-MCNC: 15 MG/DL (ref 8–23)
CALCIUM SERPL-MCNC: 8.7 MG/DL (ref 8.6–10.2)
CALCIUM SERPL-MCNC: 8.7 MG/DL (ref 8.6–10.2)
CHLORIDE BLD-SCNC: 95 MMOL/L (ref 98–107)
CHLORIDE BLD-SCNC: 96 MMOL/L (ref 98–107)
CHLORIDE URINE RANDOM: 53 MMOL/L
CO2: 21 MMOL/L (ref 22–29)
CO2: 22 MMOL/L (ref 22–29)
CREAT SERPL-MCNC: 0.8 MG/DL (ref 0.5–1)
CREAT SERPL-MCNC: 0.9 MG/DL (ref 0.5–1)
GFR AFRICAN AMERICAN: >60
GFR AFRICAN AMERICAN: >60
GFR NON-AFRICAN AMERICAN: >60 ML/MIN/1.73
GFR NON-AFRICAN AMERICAN: >60 ML/MIN/1.73
GLUCOSE BLD-MCNC: 214 MG/DL (ref 74–99)
GLUCOSE BLD-MCNC: 229 MG/DL (ref 74–99)
HBA1C MFR BLD: 8.4 % (ref 4–5.6)
HCT VFR BLD CALC: 39 % (ref 34–48)
HEMOGLOBIN: 13.1 G/DL (ref 11.5–15.5)
INR BLD: 1.4
MAGNESIUM: 2 MG/DL (ref 1.6–2.6)
MCH RBC QN AUTO: 28.9 PG (ref 26–35)
MCHC RBC AUTO-ENTMCNC: 33.6 % (ref 32–34.5)
MCV RBC AUTO: 85.9 FL (ref 80–99.9)
METER GLUCOSE: 192 MG/DL (ref 74–99)
METER GLUCOSE: 226 MG/DL (ref 74–99)
METER GLUCOSE: 231 MG/DL (ref 74–99)
OSMOLALITY URINE: 630 MOSM/KG (ref 300–900)
OSMOLALITY: 283 MOSM/KG (ref 285–310)
PDW BLD-RTO: 20.5 FL (ref 11.5–15)
PLATELET # BLD: 185 E9/L (ref 130–450)
PMV BLD AUTO: 10.7 FL (ref 7–12)
POTASSIUM REFLEX MAGNESIUM: 4.7 MMOL/L (ref 3.5–5)
POTASSIUM SERPL-SCNC: 4.2 MMOL/L (ref 3.5–5)
PROTHROMBIN TIME: 16.6 SEC (ref 9.3–12.4)
RBC # BLD: 4.54 E12/L (ref 3.5–5.5)
SODIUM BLD-SCNC: 124 MMOL/L (ref 132–146)
SODIUM BLD-SCNC: 126 MMOL/L (ref 132–146)
SODIUM URINE: 49 MMOL/L
T3 FREE: 2.4 PG/ML (ref 2–4.4)
T4 FREE: 1.33 NG/DL (ref 0.93–1.7)
TOTAL PROTEIN: 6.9 G/DL (ref 6.4–8.3)
TSH SERPL DL<=0.05 MIU/L-ACNC: 5.88 UIU/ML (ref 0.27–4.2)
URIC ACID, SERUM: 3.2 MG/DL (ref 2.4–5.7)
WBC # BLD: 8.2 E9/L (ref 4.5–11.5)

## 2020-02-11 PROCEDURE — 76705 ECHO EXAM OF ABDOMEN: CPT

## 2020-02-11 PROCEDURE — 6360000002 HC RX W HCPCS: Performed by: INTERNAL MEDICINE

## 2020-02-11 PROCEDURE — 51798 US URINE CAPACITY MEASURE: CPT

## 2020-02-11 PROCEDURE — 84481 FREE ASSAY (FT-3): CPT

## 2020-02-11 PROCEDURE — 84550 ASSAY OF BLOOD/URIC ACID: CPT

## 2020-02-11 PROCEDURE — 80076 HEPATIC FUNCTION PANEL: CPT

## 2020-02-11 PROCEDURE — 85027 COMPLETE CBC AUTOMATED: CPT

## 2020-02-11 PROCEDURE — 83036 HEMOGLOBIN GLYCOSYLATED A1C: CPT

## 2020-02-11 PROCEDURE — 84300 ASSAY OF URINE SODIUM: CPT

## 2020-02-11 PROCEDURE — 82436 ASSAY OF URINE CHLORIDE: CPT

## 2020-02-11 PROCEDURE — 82962 GLUCOSE BLOOD TEST: CPT

## 2020-02-11 PROCEDURE — 83735 ASSAY OF MAGNESIUM: CPT

## 2020-02-11 PROCEDURE — 96375 TX/PRO/DX INJ NEW DRUG ADDON: CPT

## 2020-02-11 PROCEDURE — 36415 COLL VENOUS BLD VENIPUNCTURE: CPT

## 2020-02-11 PROCEDURE — G0378 HOSPITAL OBSERVATION PER HR: HCPCS

## 2020-02-11 PROCEDURE — 2060000000 HC ICU INTERMEDIATE R&B

## 2020-02-11 PROCEDURE — 84439 ASSAY OF FREE THYROXINE: CPT

## 2020-02-11 PROCEDURE — 85610 PROTHROMBIN TIME: CPT

## 2020-02-11 PROCEDURE — 2580000003 HC RX 258: Performed by: NURSE PRACTITIONER

## 2020-02-11 PROCEDURE — 83935 ASSAY OF URINE OSMOLALITY: CPT

## 2020-02-11 PROCEDURE — 80048 BASIC METABOLIC PNL TOTAL CA: CPT

## 2020-02-11 PROCEDURE — 6360000002 HC RX W HCPCS: Performed by: NURSE PRACTITIONER

## 2020-02-11 PROCEDURE — 51701 INSERT BLADDER CATHETER: CPT

## 2020-02-11 PROCEDURE — 6370000000 HC RX 637 (ALT 250 FOR IP): Performed by: NURSE PRACTITIONER

## 2020-02-11 PROCEDURE — 84443 ASSAY THYROID STIM HORMONE: CPT

## 2020-02-11 PROCEDURE — 83930 ASSAY OF BLOOD OSMOLALITY: CPT

## 2020-02-11 RX ORDER — FUROSEMIDE 10 MG/ML
20 INJECTION INTRAMUSCULAR; INTRAVENOUS ONCE
Status: COMPLETED | OUTPATIENT
Start: 2020-02-11 | End: 2020-02-11

## 2020-02-11 RX ADMIN — LACTULOSE 20 G: 20 SOLUTION ORAL at 22:03

## 2020-02-11 RX ADMIN — ATORVASTATIN CALCIUM 40 MG: 40 TABLET, FILM COATED ORAL at 22:03

## 2020-02-11 RX ADMIN — ENOXAPARIN SODIUM 40 MG: 40 INJECTION SUBCUTANEOUS at 22:03

## 2020-02-11 RX ADMIN — INSULIN GLARGINE 26 UNITS: 100 INJECTION, SOLUTION SUBCUTANEOUS at 22:04

## 2020-02-11 RX ADMIN — RIFAXIMIN 550 MG: 550 TABLET ORAL at 00:10

## 2020-02-11 RX ADMIN — PANTOPRAZOLE SODIUM 40 MG: 40 TABLET, DELAYED RELEASE ORAL at 10:13

## 2020-02-11 RX ADMIN — INSULIN LISPRO 4 UNITS: 100 INJECTION, SOLUTION INTRAVENOUS; SUBCUTANEOUS at 16:57

## 2020-02-11 RX ADMIN — SODIUM CHLORIDE, PRESERVATIVE FREE 10 ML: 5 INJECTION INTRAVENOUS at 10:13

## 2020-02-11 RX ADMIN — LACTULOSE 20 G: 20 SOLUTION ORAL at 10:14

## 2020-02-11 RX ADMIN — SODIUM CHLORIDE, PRESERVATIVE FREE 10 ML: 5 INJECTION INTRAVENOUS at 22:03

## 2020-02-11 RX ADMIN — AMLODIPINE BESYLATE 5 MG: 5 TABLET ORAL at 10:13

## 2020-02-11 RX ADMIN — RIFAXIMIN 550 MG: 550 TABLET ORAL at 22:03

## 2020-02-11 RX ADMIN — INSULIN LISPRO 2 UNITS: 100 INJECTION, SOLUTION INTRAVENOUS; SUBCUTANEOUS at 22:24

## 2020-02-11 RX ADMIN — FUROSEMIDE 20 MG: 10 INJECTION, SOLUTION INTRAMUSCULAR; INTRAVENOUS at 15:02

## 2020-02-11 RX ADMIN — LACTULOSE 20 G: 20 SOLUTION ORAL at 15:02

## 2020-02-11 RX ADMIN — RIFAXIMIN 550 MG: 550 TABLET ORAL at 10:12

## 2020-02-11 RX ADMIN — INSULIN LISPRO 2 UNITS: 100 INJECTION, SOLUTION INTRAVENOUS; SUBCUTANEOUS at 12:39

## 2020-02-11 ASSESSMENT — PAIN SCALES - GENERAL
PAINLEVEL_OUTOF10: 0

## 2020-02-11 NOTE — PROGRESS NOTES
Call placed to dr Babs Valderrama covering- message left regarding pt sodium level and that pt has not urinated thru the night.

## 2020-02-11 NOTE — PROGRESS NOTES
P Quality Flow/Interdisciplinary Rounds Progress Note        Quality Flow Rounds held on February 11, 2020    Disciplines Attending:  Bedside Nurse, ,  and Nursing Unit Leadership    Brenda Mcmullen was admitted on 2/10/2020 12:31 PM    Anticipated Discharge Date:  Expected Discharge Date: 02/13/20    Disposition:    Tarik Score:  Tarik Scale Score: 17    Readmission Risk              Risk of Unplanned Readmission:        22           Discussed patient goal for the day, patient clinical progression, and barriers to discharge.   The following Goal(s) of the Day/Commitment(s) have been identified:    Us abd Alonza Osler  February 11, 2020

## 2020-02-11 NOTE — CONSULTS
and S2, regular on my exam.  ABDOMEN:  Soft. Nontender. Bowel sounds active. No organomegaly. LUNGS:  Good airflow bilaterally. SKIN:  Dry. PSYCHIATRIC:  Cooperative. NEUROLOGIC:   Cranial nerves grossly intact, II through XII. BLOOD WORK:  Sodium 124, potassium 4.7, chloride 96, CO2 21, creatinine  0.8, hemoglobin 13.1. Chest x-ray seen and reviewed, no acute findings. ASSESSMENT AND PLAN:  1. Hyponatremia. 2.  Hypovolmic in context of liver cirrhosis and recurrent ascites. 3.  Liver cirrhosis. 4.  Recurrent ascites. 5.  Diabetes type 2.  6.  Hypertension. 7.  Obesity. PLAN:  The patient clinically is euvolemic to mildly hypervolemic as  evident by distended abdomen slightly due to ascites. I feel her hyponatremia is due to hypervolemia in context of liver  cirrhosis and ascites. Her chronic hyponatremia is likely the same due to her underlying liver  cirrhosis and overall it reflects poor prognosis. RECOMMENDATION:      We will give one dose of Lasix IV 20 mg.     Place on fluid-restricted diet. Check urine osmolality, even sodium and urine chloride, serum uric acid. Follow up basic metabolic panel later on today. Thank you very much for allowing us to participate in taking care of the  patient.         Kylah Rand MD    D: 02/11/2020 12:41:10       T: 02/11/2020 14:07:25     [email or web address]  Job#: [id number]     Doc#: [id number]    CC:

## 2020-02-11 NOTE — CARE COORDINATION
2/11/2020  Social Work Discharge Planning: This worker met with Pt and her son  to discuss  role and transition of care/discharge planning. Pt and son reside in a 2 story home. Pt has a BSC on the first floor and also has a rollator and ww. Pt is active with [address]. JU order will be needed if appropriate at discharge. Awaiting Pt eval. SW gave Pt a list of [facility] choices in the event she needs to go to rehab. SW informed Pt that she is obs status and unless this changes to inpt, she would need to private pay for her ALMA stay. Pharmacy is [facility] in [facility].  Electronically signed by CHELITA Irving on 2/11/2020 at 2:23 PM

## 2020-02-11 NOTE — H&P
[address] [facility]  Attending History and Physical      CHIEF COMPLAINT:  weakness      HISTORY OF PRESENT ILLNESS:      The patient is a 71 y.o. female patient of dr Fleming Babinski who presents with complains of shortness of breath. Patient is well known to the hospitalist service. She was discharged from the hospital on 1/14/2020. At that time the patient presented with abdominal pain. The patient suffers cirrhosis and ascites. She was found to have significant ascites. She underwent paracentesis. She tolerated this well. She has regularly scheduled paracentesis as an outpatient. She was seen by Pt/Ot and discharge home with [facility]. She presents back to the ED with weakness. She offers no specific complaints. She has general weakness. She denies chest pain, shortness of breath, abdominal pain, nausea, vomiting, fevers, chills and diaphoresis. She had paracentesis on 1/29/2020, 2/5/2020. Patient states she is compliant with her diuretics. However, she drinks a lot of water daily. She states she has become weaker and weaker. She is not working with PT/OT. She says she does it on her own. She denies chest pain, shortness of breath, abdominal pain, nausea, vomiting, fevers, chills and diaphoresis. Past Medical History:    Past Medical History:   Diagnosis Date    Arthritis     Cerebral artery occlusion with cerebral infarction ([address] 09/2018    some memory loss, some right side weakness.     Cough     post anesthesia    Diabetes mellitus ([address]     Diverticulitis     GERD (gastroesophageal reflux disease)     Hyperlipidemia     Hypertension     Liver cirrhosis (HCC)     Polyp of esophagus     Polyp, stomach     PONV (postoperative nausea and vomiting)     Prolonged emergence from general anesthesia     Thyroid disease     no meds       Past Surgical History:    Past Surgical History:   Procedure Laterality Date    CAROTID ENDARTERECTOMY      october 2018    CHOLECYSTECTOMY      COLONOSCOPY      COLONOSCOPY N/A 6/11/2019    COLONOSCOPY POLYPECTOMY SNARE/COLD BIOPSY performed by Janine Siegel MD at [address] Left 10/29/2018    LEFT CAROTID ENDARTERECTOMY performed by Robbert Galeazzi, MD at [address] [facility] GASTROINTESTINAL ENDOSCOPY N/A 6/11/2019    EGD BIOPSY performed by Janine Siegel MD at [address] [facility] [address] [facility] [address]         Medications Prior to Admission:    Medications Prior to Admission: ondansetron (ZOFRAN) 4 MG tablet, Take 4 mg by mouth every 8 hours as needed for Nausea or Vomiting  HYDROcodone-acetaminophen (NORCO) 5-325 MG per tablet, Take 1 tablet by mouth every 6 hours as needed for Pain.   lactulose (CHRONULAC) 10 GM/15ML solution, Take 30 mLs by mouth 3 times daily  spironolactone (ALDACTONE) 100 MG tablet, Take 1 tablet by mouth daily  rifaximin (XIFAXAN) 550 MG tablet, Take 1 tablet by mouth 2 times daily  insulin aspart (NOVOLOG FLEXPEN) 100 UNIT/ML injection pen, Inject into the skin 3 times daily (before meals) 10 units tid with meals and sliding scale with max of 20 units total.  150-199=2 200-249=4 250-299=6 300-349=8 350-400=10  furosemide (LASIX) 20 MG tablet, Take 1 tablet by mouth 2 times daily Indications: pt takes 20mg daily (Patient taking differently: Take 20 mg by mouth 3 times daily )  calcium carbonate (OSCAL) 500 MG TABS tablet, Take 1,000 mg by mouth daily  amLODIPine (NORVASC) 5 MG tablet, Take 5 mg by mouth daily  insulin detemir (LEVEMIR) 100 UNIT/ML injection vial, Inject 55 Units into the skin nightly (Patient taking differently: Inject 45 Units into the skin nightly )  albuterol sulfate  (90 Base) MCG/ACT inhaler, INHALE 2 PUFFS THREE TIMES A DAY AS NEEDED FOR WHEEZING  atorvastatin (LIPITOR) 40 MG tablet, Take 1 tablet by mouth nightly  venlafaxine (EFFEXOR XR) 37.5 MG extended release capsule, TAKE ONE CAPSULE BY MOUTH EVERY DAY (Patient taking differently: Take 75 mg by mouth daily )  montelukast (SINGULAIR) 10 MG tablet, TAKE 1 TABLET BY MOUTH EVERY DAY AT NIGHT (Patient taking differently: Take 10 mg by mouth every morning )  pantoprazole (PROTONIX) 40 MG tablet, Take 40 mg by mouth daily Take morning of surgery with a sip of water  Milk Thistle 1000 MG CAPS, Take by mouth daily Ld 10-24-18  insulin lispro, 1 Unit Dial, (HUMALOG KWIKPEN) 100 UNIT/ML SOPN, Take 10 units with meals plus sliding scale. Max 60 units/day (Patient taking differently: Take 10 units with meals plus sliding scale. Max 60 units/day. PATIENT HAS NOT STARTED THIS MEDICATION YET>)  insulin glargine (BASAGLAR KWIKPEN) 100 UNIT/ML injection pen, Inject 48 units daily (Patient taking differently: Inject 48 units daily. PATIENT HAS NOT STARTED THIS MEDICATION YET>)  Insulin Syringe-Needle U-100 ([employer] INSULIN SYR 1CC/30G) 30G X 5/16\" 1 ML MISC, Use with insulin 4 times a day  INSULIN SYRINGE 1CC/29G ([employer] INS SYRINGE 1CC/29G) 29G X 1/2\" 1 ML MISC, 1 each by Does not apply route 4 times daily  Insulin Syringe-Needle U-100 29G X 5/16\" 1 ML MISC, Use with insulin 4 times a day  blood glucose monitor strips, One-Touch Verio strips. Checks 3  times/day before meals and at bedtime and as needed for symptoms of irregular blood glucose  blood glucose test strips (ONETOUCH VERIO) strip, 1 each by In Vitro route 4 times daily As needed. Blood Glucose Monitoring Suppl (ONETOUCH VERIO) w/Device KIT, To test 4x/day  amitriptyline (ELAVIL) 25 MG tablet, TAKE 1 TABLET BY MOUTH NIGHTLY AS NEEDED FOR SLEEP  Insulin Syringe-Needle U-100 ([employer] INS SYR .3CC/29G) 29G X 1/2\" 0.3 ML MISC, Four times a day with insulin  Misc. Devices (ADJUST BATH/SHOWER SEAT) MISC, Use daily  Misc.  Devices (COMMODE BEDSIDE) MISC, Use daily  blood glucose test strips (ASCENSIA AUTODISC VI;ONE TOUCH ULTRA TEST VI) strip, 1 each by In Vitro route daily As 02/11/2020    CL 96 02/11/2020    CO2 21 02/11/2020    BUN 15 02/11/2020    CREATININE 0.8 02/11/2020    GFRAA >60 02/11/2020    LABGLOM >60 02/11/2020    GLUCOSE 214 02/11/2020    PROT 6.9 02/11/2020    LABALBU 2.5 02/11/2020    CALCIUM 8.7 02/11/2020    BILITOT 1.3 02/11/2020    ALKPHOS 143 02/11/2020    AST 37 02/11/2020    ALT 21 02/11/2020     BMP:    Lab Results   Component Value Date     02/11/2020    K 4.7 02/11/2020    CL 96 02/11/2020    CO2 21 02/11/2020    BUN 15 02/11/2020    LABALBU 2.5 02/11/2020    CREATININE 0.8 02/11/2020    CALCIUM 8.7 02/11/2020    GFRAA >60 02/11/2020    LABGLOM >60 02/11/2020    GLUCOSE 214 02/11/2020     Magnesium:    Lab Results   Component Value Date    MG 2.0 02/11/2020     Phosphorus:    Lab Results   Component Value Date    PHOS 2.7 01/13/2019     PT/INR:    Lab Results   Component Value Date    PROTIME 16.6 02/11/2020    INR 1.4 02/11/2020     PTT:    Lab Results   Component Value Date    APTT 34.7 02/05/2020   [APTT}  Troponin:    Lab Results   Component Value Date    TROPONINI 0.02 02/10/2020     Last 3 Troponin:    Lab Results   Component Value Date    TROPONINI 0.02 02/10/2020    TROPONINI 0.01 10/27/2019    TROPONINI 0.01 10/18/2019     U/A:    Lab Results   Component Value Date    NITRITE negative 08/22/2018    COLORU DARK YELLOW 02/10/2020    PROTEINU TRACE 02/10/2020    PHUR 6.5 02/10/2020    LABCAST MODERATE 10/01/2018    WBCUA 5-10 02/10/2020    RBCUA NONE 02/10/2020    MUCUS Present 10/18/2019    BACTERIA FEW 02/10/2020    CLARITYU Clear 02/10/2020    SPECGRAV 1.025 02/10/2020    LEUKOCYTESUR SMALL 02/10/2020    UROBILINOGEN 2.0 02/10/2020    BILIRUBINUR Negative 02/10/2020    BILIRUBINUR Small 08/22/2018    BLOODU Negative 02/10/2020    GLUCOSEU Negative 02/10/2020     HgBA1c:    Lab Results   Component Value Date    LABA1C 8.4 02/11/2020     FLP:    Lab Results   Component Value Date    TRIG 136 09/09/2018    HDL 31 09/09/2018    LDLCALC 69 09/09/2018

## 2020-02-11 NOTE — CONSULTS
[facility]  Dr. Sundar Van M.D., Dr. Annia Potter M.D., RUTH Diggs.O., Dr. Gino Schmid M.D., Dr. Yong Saavedra D.O. Patient Name: Nolan Mejia  MRN: [id number]  : 1955 (71 y.o. female)  Allergies: is allergic to tramadol. Date of Service: 2020       Reason for Consultation:  Cirrhosis     HISTORY OF PRESENT ILLNESS:    Patient is a 59-year-old  female with a past medical history significant for diabetes mellitus, history of CVA, essential hypertension, hyperlipidemia and iver cirrhosis secondary to [place]. Patient presented to [facility] emergency with main point of shortness of breath. In the ER patient was found to have large amount of ascites and found to be hyponatremic. Patient was admitted to the fourth floor. Patient seen and evaluated the fourth floor. Patients son is at the bedside helps provide history. Patient admits to increasing weakness and fatigue over the last several weeks. Patient also admits to the need for frequent paracentesis. Patient is on state patient is not always compliant with a low-sodium diet. Or taking lactulose as prescribed. Patient denies any blood in her stool any hematemesis melena hematochezia. Patient denies any fevers or chills. REVIEW OF SYSTEMS:   Constitutional: Denies fever, chills, or unintentional weight loss. HEENT: Denies double or blurry vision, headaches, ear pain or ringing in the ears. No drainage from the ears, nose or throat. Cardiovascular: Denies any chest pain, irregular heartbeats, or palpitations. Respiratory: Denies shortness of breath, coughing, sputum production, hemoptysis, or wheezing. Gastrointestinal: Denies nausea, vomiting, diarrhea, or constipation. Denies any abdominal pain, black or bloody stools. Genitourinary: Denies any urinary urgency, frequency, hematuria. Voiding without difficulty. Endocrine: Denies sensitivity to heat or cold.  Denies changes in hair, skin or nails. Denies excessive thirst, hunger or going to the bathroom more frequently than usual.  Extremities: Denies swelling or calf pain. Musculoskeletal: Denies muscle or joint pain, stiffness, arthritis, gout, or instability. Dermatology / Skin: Denies any rashes, ulcers, or excoriations. Denies bruising. Neurology: Denies any bowel or bladder incontinence, headache or focal neurological deficits. No weakness or paresthesia. Denies numbness or tingling in the hands or feet. Psychiatric: Denies nervousness/anxiety, depression or memory loss. Past Medical History:  Past Medical History:   Diagnosis Date    Arthritis     Cerebral artery occlusion with cerebral infarction ([facility] [address] 09/2018    some memory loss, some right side weakness.  Cough     post anesthesia    Diabetes mellitus ([facility] [address]     Diverticulitis     GERD (gastroesophageal reflux disease)     Hyperlipidemia     Hypertension     Liver cirrhosis (HCC)     Polyp of esophagus     Polyp, stomach     PONV (postoperative nausea and vomiting)     Prolonged emergence from general anesthesia     Thyroid disease     no meds       Past Surgical History:  Past Surgical History:   Procedure Laterality Date    CAROTID ENDARTERECTOMY      october 2018    CHOLECYSTECTOMY      COLONOSCOPY      COLONOSCOPY N/A 6/11/2019    COLONOSCOPY POLYPECTOMY SNARE/COLD BIOPSY performed by Leandro Crain MD at [address] [place] [address] Left 10/29/2018    LEFT CAROTID ENDARTERECTOMY performed by Luis Cast MD at [facility] ENDOSCOPY N/A 6/11/2019    EGD BIOPSY performed by Leandro Crain MD at [address] Medications:  Prior to Admission medications    Medication Sig Start Date End Date Taking?  Authorizing Provider   ondansetron (ZOFRAN) 4 MG tablet Take 4 mg by mouth every 8 mouth every morning  10/31/18  Yes [address], DO   pantoprazole (PROTONIX) 40 MG tablet Take 40 mg by mouth daily Take morning of surgery with a sip of water   Yes Historical Provider, MD   Milk Thistle 1000 MG CAPS Take by mouth daily Ld 10-24-18   Yes Historical Provider, MD   insulin lispro, 1 Unit Dial, (HUMALOG KWIKPEN) 100 UNIT/ML SOPN Take 10 units with meals plus sliding scale. Max 60 units/day  Patient taking differently: Take 10 units with meals plus sliding scale. Max 60 units/day. PATIENT HAS NOT STARTED THIS MEDICATION YET> 2/5/20   Ramehs Lay MD   insulin glargine [facility]) 100 UNIT/ML injection pen Inject 48 units daily  Patient taking differently: Inject 48 units daily. PATIENT HAS NOT STARTED THIS MEDICATION YET> 2/5/20   Ramesh Lay MD   Insulin Syringe-Needle U-100 ([employer] INSULIN SYR 1CC/30G) 30G X 5/16\" 1 ML MISC Use with insulin 4 times a day 10/2/19   Ramesh Lay MD   INSULIN SYRINGE 1CC/29G (Екатерина Booty INS SYRINGE 1CC/29G) 29G X 1/2\" 1 ML MISC 1 each by Does not apply route 4 times daily 9/30/19   Ramesh Lay MD   Insulin Syringe-Needle U-100 29G X 5/16\" 1 ML MISC Use with insulin 4 times a day 9/26/19   Ramesh Lay MD   blood glucose monitor strips One-Touch Verio strips. Checks 3  times/day before meals and at bedtime and as needed for symptoms of irregular blood glucose 9/18/19   Ramesh Lay MD   blood glucose test strips (ONETOUCH VERIO) strip 1 each by In Vitro route 4 times daily As needed. 8/19/19   Ramesh Lay MD   Blood Glucose Monitoring Suppl ([employer]) w/Device KIT To test 4x/day 8/19/19   Ramesh Lay MD   amitriptyline (ELAVIL) 25 MG tablet TAKE 1 TABLET BY MOUTH NIGHTLY AS NEEDED FOR SLEEP 7/9/19   Maria Ines Betancourt,    Insulin Syringe-Needle U-100 ([employer] INS SYR .3CC/29G) 29G X 1/2\" 0.3 ML MISC Four times a day with insulin 3/14/19   MD Alma Kapoorc.  Devices (ADJUST BATH/SHOWER SEAT) MISC Use daily 10/11/18   Greg Betancourt, DO   Misc. Devices (COMMODE BEDSIDE) MISC Use daily 10/5/18   Greg Betancourt, DO   blood glucose test strips (ASCENSIA AUTODISC VI;ONE TOUCH ULTRA TEST VI) strip 1 each by In Vitro route daily As needed.  8/30/18   Josue Roper, DO       Allergies: Tramadol    Social History:  Social History     Socioeconomic History    Marital status:      Spouse name: Not on file    Number of children: Not on file    Years of education: Not on file    Highest education level: Not on file   Occupational History    Not on file   Social Needs    Financial resource strain: Not on file    Food insecurity:     Worry: Not on file     Inability: Not on file    Transportation needs:     Medical: Not on file     Non-medical: Not on file   Tobacco Use    Smoking status: Passive Smoke Exposure - Never Smoker    Smokeless tobacco: Never Used   Substance and Sexual Activity    Alcohol use: No    Drug use: Never    Sexual activity: Never   Lifestyle    Physical activity:     Days per week: Not on file     Minutes per session: Not on file    Stress: Not on file   Relationships    Social connections:     Talks on phone: Not on file     Gets together: Not on file     Attends Jewish service: Not on file     Active member of club or organization: Not on file     Attends meetings of clubs or organizations: Not on file     Relationship status: Not on file    Intimate partner violence:     Fear of current or ex partner: Not on file     Emotionally abused: Not on file     Physically abused: Not on file     Forced sexual activity: Not on file   Other Topics Concern    Not on file   Social History Narrative    Not on file       Family History:  Family History   Problem Relation Age of Onset    Heart Disease Mother 28    Diabetes Mother     Heart Disease Sister     Diabetes Sister          PHYSICAL EXAM:  Vital Signs: /62   Pulse 96   Temp 98.3 °F (36.8 °C) (Oral)   Resp 16   Ht 5' 7\" (1.702 m)   Wt 213 lb 9.6 oz (96.9 kg)   SpO2 97%   BMI 33.45 kg/m²   GENERAL APPEARANCE:  awake, alert, oriented, cooperative, and in no acute distress  EYES:  Lids and lashes normal, PERRLA, EOMI, sclera clear, conjunctiva normal  HENT:  Normocephalic, without obvious abnormality, atramatic, oral pharynx with moist mucus membranes, tonsils without erythema or exudates  NECK:  Supple with no carotid bruits, JVD or thyromegaly. No cervical adenopathy  LUNGS:  Clear to auscultation bilaterally with no wheezes, rales or rhonchi. No increased work of breathing, good air exchange. CARDIOVASCULAR: Regular rate and rhythm, no murmur  ABDOMEN: distened with flank dullness and positive fluid wave present   MUSCULOSKELETAL:  There is no redness, warmth, or swelling of the joints. Full range of motion noted. Motor strength is 5 out of 5 all extremities bilaterally. Tone is normal.  EXTREMITIES: No edema, 2+ pulses bilaterally (radial and dorsalis pedis)  NEUROLOGIC:  Awake, alert, oriented to name, place and time. Cranial nerves II-XII are grossly intact. Motor is 5 out of 5 bilaterally. SKIN: Normal skin color, texture, and turgor. There is no redness, warmth, or swelling. No bruising or bleeding, no mottling.    PSYCH: decreased affect      DATA:  Results for orders placed or performed during the hospital encounter of 02/10/20   CBC Auto Differential   Result Value Ref Range    WBC 7.4 4.5 - 11.5 E9/L    RBC 4.85 3.50 - 5.50 E12/L    Hemoglobin 14.0 11.5 - 15.5 g/dL    Hematocrit 41.9 34.0 - 48.0 %    MCV 86.4 80.0 - 99.9 fL    MCH 28.9 26.0 - 35.0 pg    MCHC 33.4 32.0 - 34.5 %    RDW 21.0 (H) 11.5 - 15.0 fL    Platelets 766 522 - 281 E9/L    MPV 10.4 7.0 - 12.0 fL    Neutrophils % 61.6 43.0 - 80.0 %    Immature Granulocytes % 0.3 0.0 - 5.0 %    Lymphocytes % 22.1 20.0 - 42.0 %    Monocytes % 12.6 (H) 2.0 - 12.0 %    Eosinophils % 2.2 0.0 - 6.0 %    Basophils % 1.2 0.0 - 2.0 % of the procedure such as infection bleeding and bowel perforation were explained to the patient. Patient consented to the procedure . Procedure: The patient was laid supine. She was prepped and draped in the normal sterile fashion ultrasound was used to localize the fluid collection. Right lateral abdomen was chosen as the site for paracentesis. The area was infiltrated with 1% Xylocaine for local anesthesia. A paracentesis catheter was introduced into the to the right abdomen until return of serous fluid. There was a total of 6600 mL of fluid removed from the abdomen. Patient tolerated the procedure well. Ultrasound-guided paracentesis of right abdomen with 6600 mL of fluid removed. Us Guided Paracentesis    Result Date: 2020  Patient MRN:  [id number] : 1955 Age: 71 years Gender: Female Order Date:  2020 12:41 PM EXAM: US GUIDED PARACENTESIS NUMBER OF IMAGES:  2 INDICATION: R18.8 Other ascites  COMPARISON: None PROCEDURE DETAILS AND FINDINGS: Informed consent was obtained. Ultrasound examination of the abdomen demonstrated ascites. Images were saved to PACS. The patient's abdomen was sterilely prepped and draped. A preprocedure timeout was called. Lidocaine was subcutaneously administered for local anesthesia. Under ultrasound guidance, a 6-Urdu Safe-T-Centesis catheter with inner stylet was percutaneously advanced into the peritoneal space in the right lower abdomen. The stylet was removed, and peritoneal fluid was drained. The catheter was removed from the abdomen when the paracentesis was complete. The site was cleaned and a sterile dressing applied. There were no complications. Uncomplicated right-sided paracentesis. This examination was performed and dictated by Lebron Ratliff. TIM Beltran with indirect supervision by Parminder Barrera MD, who has reviewed the provided imaging and has revised the report as necessary.      Us Guided Paracentesis    Result Date: 2020  Patient MRN: [id number] : 1955 Age: 71 years Gender: Female Order Date:  2020 4:30 PM EXAM: US GUIDED PARACENTESIS NUMBER OF IMAGES:  1 INDICATION:  paracentesis COMPARISON: None PARACENTESIS: Prior to start of procedure, routine scanning of the abdomen was performed using real-time ultrasound and revealed moderate amount ascites detected. Images were saved into PACs. Informed consent was obtained. A preprocedure timeout occurred at 1655 hours. The patient's abdomen was sterilely prepped and draped. Lidocaine was subcutaneously administered for local anesthesia. Under ultrasound guidance, a 6-Turkmen Safe-T-Centesis catheter with inner stylet was percutaneously advanced into the peritoneal space in the left lower abdomen. The stylet was removed, and ascites was drained. A total of 7400 mL of colored fluid was removed. The catheter was removed at the end of the procedure. The patient tolerated the procedure well. There were no complications. The patient was released in stable condition. Successful paracentesis. This procedure was performed and dictated by Maddy Cotter PA-C with indirect supervision, and Cameron Jiménez. Kalpesh reviewed and concurred with the findings. ASSESSMENT/PLAN    1.  Decompensated Liver Cirrhosis secondary to WEIR  -  Alternative etiology negative with no significant ETOH abuse   - MELD-NA- 22 7-10% Mortality Rate   - RUQ US negative for hepatic mass, with significant ascites present  - Last paracentesis 2/ with 6.6 liters taken off   - Obtain paracentesis with  Cell count albumin and protein levels, add albumin if greater than 5L taken off    - 2g Na sodium pt not complaint with in outpatient setting   - Would restart oral Aldactone 100mg and Lasix 40mg and monitor pts kidney function, room to increase both  - Grade 0-1 HE on exam continue with Lactulose and rifaximin titrate to 2-3 soft bowel movements a day, attempt to limit narcotics  - No signs of variceal bleed on exam Hgb stable and

## 2020-02-11 NOTE — PROGRESS NOTES
Physical Therapy    Facility/Department: [address] INTERNAL MEDICINE 2      NAME: Sergio Goodson  : 1955  MRN: [id number]    Date of Service: 2020    Attempted to see pt for PT evaluation. Transport present to take pt off unit.   Diamond SALAZAR [id number]

## 2020-02-12 LAB
ALBUMIN SERPL-MCNC: 2.6 G/DL (ref 3.5–5.2)
ALP BLD-CCNC: 147 U/L (ref 35–104)
ALT SERPL-CCNC: 21 U/L (ref 0–32)
ANION GAP SERPL CALCULATED.3IONS-SCNC: 13 MMOL/L (ref 7–16)
ANISOCYTOSIS: ABNORMAL
AST SERPL-CCNC: 33 U/L (ref 0–31)
BASOPHILS ABSOLUTE: 0.09 E9/L (ref 0–0.2)
BASOPHILS RELATIVE PERCENT: 1.1 % (ref 0–2)
BILIRUB SERPL-MCNC: 1.7 MG/DL (ref 0–1.2)
BUN BLDV-MCNC: 15 MG/DL (ref 8–23)
BURR CELLS: ABNORMAL
CALCIUM SERPL-MCNC: 8.8 MG/DL (ref 8.6–10.2)
CHLORIDE BLD-SCNC: 97 MMOL/L (ref 98–107)
CO2: 20 MMOL/L (ref 22–29)
CREAT SERPL-MCNC: 0.8 MG/DL (ref 0.5–1)
EOSINOPHILS ABSOLUTE: 0.16 E9/L (ref 0.05–0.5)
EOSINOPHILS RELATIVE PERCENT: 2 % (ref 0–6)
GFR AFRICAN AMERICAN: >60
GFR NON-AFRICAN AMERICAN: >60 ML/MIN/1.73
GLUCOSE BLD-MCNC: 165 MG/DL (ref 74–99)
HCT VFR BLD CALC: 38.4 % (ref 34–48)
HEMOGLOBIN: 13 G/DL (ref 11.5–15.5)
IMMATURE GRANULOCYTES #: 0.06 E9/L
IMMATURE GRANULOCYTES %: 0.7 % (ref 0–5)
LYMPHOCYTES ABSOLUTE: 2.18 E9/L (ref 1.5–4)
LYMPHOCYTES RELATIVE PERCENT: 26.6 % (ref 20–42)
MAGNESIUM: 2 MG/DL (ref 1.6–2.6)
MCH RBC QN AUTO: 29 PG (ref 26–35)
MCHC RBC AUTO-ENTMCNC: 33.9 % (ref 32–34.5)
MCV RBC AUTO: 85.5 FL (ref 80–99.9)
METER GLUCOSE: 205 MG/DL (ref 74–99)
METER GLUCOSE: 207 MG/DL (ref 74–99)
METER GLUCOSE: 208 MG/DL (ref 74–99)
MONOCYTES ABSOLUTE: 1.06 E9/L (ref 0.1–0.95)
MONOCYTES RELATIVE PERCENT: 12.9 % (ref 2–12)
NEUTROPHILS ABSOLUTE: 4.65 E9/L (ref 1.8–7.3)
NEUTROPHILS RELATIVE PERCENT: 56.7 % (ref 43–80)
OVALOCYTES: ABNORMAL
PDW BLD-RTO: 20.1 FL (ref 11.5–15)
PLATELET # BLD: 198 E9/L (ref 130–450)
PMV BLD AUTO: 10.7 FL (ref 7–12)
POIKILOCYTES: ABNORMAL
POLYCHROMASIA: ABNORMAL
POTASSIUM SERPL-SCNC: 4.1 MMOL/L (ref 3.5–5)
RBC # BLD: 4.49 E12/L (ref 3.5–5.5)
SODIUM BLD-SCNC: 130 MMOL/L (ref 132–146)
TOTAL PROTEIN: 7 G/DL (ref 6.4–8.3)
WBC # BLD: 8.2 E9/L (ref 4.5–11.5)

## 2020-02-12 PROCEDURE — 51798 US URINE CAPACITY MEASURE: CPT

## 2020-02-12 PROCEDURE — 80053 COMPREHEN METABOLIC PANEL: CPT

## 2020-02-12 PROCEDURE — 2580000003 HC RX 258: Performed by: NURSE PRACTITIONER

## 2020-02-12 PROCEDURE — 36415 COLL VENOUS BLD VENIPUNCTURE: CPT

## 2020-02-12 PROCEDURE — 6360000002 HC RX W HCPCS: Performed by: NURSE PRACTITIONER

## 2020-02-12 PROCEDURE — 2060000000 HC ICU INTERMEDIATE R&B

## 2020-02-12 PROCEDURE — 82962 GLUCOSE BLOOD TEST: CPT

## 2020-02-12 PROCEDURE — 6370000000 HC RX 637 (ALT 250 FOR IP): Performed by: NURSE PRACTITIONER

## 2020-02-12 PROCEDURE — 97535 SELF CARE MNGMENT TRAINING: CPT

## 2020-02-12 PROCEDURE — 83735 ASSAY OF MAGNESIUM: CPT

## 2020-02-12 PROCEDURE — 97165 OT EVAL LOW COMPLEX 30 MIN: CPT

## 2020-02-12 PROCEDURE — 85025 COMPLETE CBC W/AUTO DIFF WBC: CPT

## 2020-02-12 PROCEDURE — 97161 PT EVAL LOW COMPLEX 20 MIN: CPT

## 2020-02-12 RX ORDER — PROMETHAZINE HYDROCHLORIDE 25 MG/ML
25 INJECTION, SOLUTION INTRAMUSCULAR; INTRAVENOUS EVERY 6 HOURS PRN
Status: DISCONTINUED | OUTPATIENT
Start: 2020-02-12 | End: 2020-02-14 | Stop reason: HOSPADM

## 2020-02-12 RX ADMIN — INSULIN LISPRO 2 UNITS: 100 INJECTION, SOLUTION INTRAVENOUS; SUBCUTANEOUS at 20:58

## 2020-02-12 RX ADMIN — RIFAXIMIN 550 MG: 550 TABLET ORAL at 08:11

## 2020-02-12 RX ADMIN — HYDROCODONE BITARTRATE AND ACETAMINOPHEN 1 TABLET: 5; 325 TABLET ORAL at 20:57

## 2020-02-12 RX ADMIN — LACTULOSE 20 G: 20 SOLUTION ORAL at 13:40

## 2020-02-12 RX ADMIN — INSULIN LISPRO 4 UNITS: 100 INJECTION, SOLUTION INTRAVENOUS; SUBCUTANEOUS at 16:43

## 2020-02-12 RX ADMIN — SODIUM CHLORIDE, PRESERVATIVE FREE 10 ML: 5 INJECTION INTRAVENOUS at 21:02

## 2020-02-12 RX ADMIN — INSULIN LISPRO 4 UNITS: 100 INJECTION, SOLUTION INTRAVENOUS; SUBCUTANEOUS at 11:35

## 2020-02-12 RX ADMIN — INSULIN GLARGINE 26 UNITS: 100 INJECTION, SOLUTION SUBCUTANEOUS at 20:58

## 2020-02-12 RX ADMIN — PANTOPRAZOLE SODIUM 40 MG: 40 TABLET, DELAYED RELEASE ORAL at 08:12

## 2020-02-12 RX ADMIN — ENOXAPARIN SODIUM 40 MG: 40 INJECTION SUBCUTANEOUS at 20:57

## 2020-02-12 RX ADMIN — RIFAXIMIN 550 MG: 550 TABLET ORAL at 20:57

## 2020-02-12 RX ADMIN — INSULIN LISPRO 2 UNITS: 100 INJECTION, SOLUTION INTRAVENOUS; SUBCUTANEOUS at 06:23

## 2020-02-12 RX ADMIN — LACTULOSE 20 G: 20 SOLUTION ORAL at 08:11

## 2020-02-12 RX ADMIN — ATORVASTATIN CALCIUM 40 MG: 40 TABLET, FILM COATED ORAL at 20:57

## 2020-02-12 RX ADMIN — SODIUM CHLORIDE, PRESERVATIVE FREE 10 ML: 5 INJECTION INTRAVENOUS at 08:12

## 2020-02-12 RX ADMIN — AMLODIPINE BESYLATE 5 MG: 5 TABLET ORAL at 08:12

## 2020-02-12 ASSESSMENT — PAIN DESCRIPTION - ONSET: ONSET: ON-GOING

## 2020-02-12 ASSESSMENT — PAIN SCALES - GENERAL
PAINLEVEL_OUTOF10: 1
PAINLEVEL_OUTOF10: 0
PAINLEVEL_OUTOF10: 0
PAINLEVEL_OUTOF10: 7
PAINLEVEL_OUTOF10: 0

## 2020-02-12 ASSESSMENT — PAIN DESCRIPTION - PAIN TYPE: TYPE: CHRONIC PAIN

## 2020-02-12 ASSESSMENT — PAIN DESCRIPTION - FREQUENCY: FREQUENCY: CONTINUOUS

## 2020-02-12 ASSESSMENT — PAIN - FUNCTIONAL ASSESSMENT: PAIN_FUNCTIONAL_ASSESSMENT: PREVENTS OR INTERFERES SOME ACTIVE ACTIVITIES AND ADLS

## 2020-02-12 ASSESSMENT — PAIN DESCRIPTION - LOCATION: LOCATION: ABDOMEN

## 2020-02-12 ASSESSMENT — PAIN DESCRIPTION - ORIENTATION: ORIENTATION: MID

## 2020-02-12 ASSESSMENT — PAIN DESCRIPTION - DESCRIPTORS: DESCRIPTORS: ACHING;CONSTANT;SORE

## 2020-02-12 NOTE — PLAN OF CARE
Problem: Falls - Risk of:  Goal: Will remain free from falls  Description  Will remain free from falls  2/12/2020 0933 by Kendy Mckee RN  Outcome: Met This Shift  2/12/2020 0602 by Antonio Cooper RN  Outcome: Met This Shift  Goal: Absence of physical injury  Description  Absence of physical injury  2/12/2020 0933 by Kendy Mckee RN  Outcome: Met This Shift  2/12/2020 0602 by Antonio Cooper RN  Outcome: Met This Shift     Problem: Mental Status - Impaired:  Goal: Mental status restored to baseline  2/12/2020 0933 by Kendy Mckee RN  Outcome: Met This Shift  2/12/2020 0602 by Antonio Cooper RN  Outcome: Ongoing     Problem: Pain:  Goal: Pain level will decrease  Description  Pain level will decrease  2/12/2020 0933 by Kendy Mckee RN  Outcome: Met This Shift  2/12/2020 0602 by Antonio Cooper RN  Outcome: Met This Shift  Goal: Control of acute pain  Description  Control of acute pain  2/12/2020 0933 by Kendy Mckee RN  Outcome: Met This Shift  2/12/2020 0602 by Antonio Cooper RN  Outcome: Met This Shift  Goal: Control of chronic pain  Description  Control of chronic pain  2/12/2020 0933 by Kendy Mckee RN  Outcome: Met This Shift  2/12/2020 0602 by Antonio Cooper RN  Outcome: Met This Shift

## 2020-02-12 NOTE — PROGRESS NOTES
Physical Therapy    Facility/Department: [address] INTERNAL MEDICINE 2  Initial Assessment    NAME: Brenda Mcmullen  : 1955  MRN: [id number]    Date of Service: 2020      Patient Diagnosis(es): The primary encounter diagnosis was Hyponatremia. Diagnoses of Generalized weakness, Urinary tract infection without hematuria, site unspecified, and Cirrhosis of liver with ascites, unspecified hepatic cirrhosis type [facility]) were also pertinent to this visit. has a past medical history of Arthritis, Cerebral artery occlusion with cerebral infarction ([address] Cough, Diabetes mellitus ([address] Diverticulitis, GERD (gastroesophageal reflux disease), Hyperlipidemia, Hypertension, Liver cirrhosis ([facility] [address] Polyp of esophagus, Polyp, stomach, PONV (postoperative nausea and vomiting), Prolonged emergence from general anesthesia, and Thyroid disease. has a past surgical history that includes Cholecystectomy; Tonsillectomy; Colonoscopy; Upper gastrointestinal endoscopy; Hysterectomy (); pr thromboendartectmy neck,neck incis (Left, 10/29/2018); Carotid endarterectomy; Colonoscopy (N/A, 2019); Upper gastrointestinal endoscopy (N/A, 2019); and vascular surgery. Referring Provider:  BENITO Saunders CNP    Date of Service: 2020    Evaluating Therapist: Eris Viramontes PT        Room #:410  DIAGNOSIS: Hyponatremia  Additional Pertinent History:CVA  PRECAUTIONS: falls, alarm    Social:  Pt lives with son in a 2 floor plan has been staying first floor. Ambulates with ww. Increasing difficulty with mobility at home     Initial Evaluation  Date: 20 Treatment      Short Term/ Long Term   Goals   Was pt agreeable to Eval/treatment? yes     Does pt have pain?  Abdominal pain     Bed Mobility  Rolling: mod assist  Supine to sit: mod assist  Sit to supine: NT  Scooting: mod assist  Min assist   Transfers Sit to stand: mod assist  Stand to sit: mod assist  Stand pivot: mod assist with ww  Min assist Diamond PT [id number]

## 2020-02-12 NOTE — PROGRESS NOTES
Component Value Date    PHOS 2.7 01/13/2019     PT/INR:    Lab Results   Component Value Date    PROTIME 16.6 02/11/2020    INR 1.4 02/11/2020     PTT:    Lab Results   Component Value Date    APTT 34.7 02/05/2020   [APTT}     Assessment:    Patient Active Problem List   Diagnosis    History of hyperthyroidism    Cir[facility] of liver with ascites ([facility] [address]    Diabetes mellitus type 2, uncontrolled (HCC)    Hyperlipidemia LDL goal <100    History of CVA (cerebrovascular accident)    Essential hypertension    Obesity (BMI 30-39. 9)    Hyponatremia       Plan:    Stable. As per GI. Paracentesis as needed. Blood glucose ok, continue to adjust basal/bolus insulin therapy  Continue aggressive lipid therapy  Continue Pt/Ot and risk factor modifications. Blood pressure ok, continue current medications  Continue to encourage weight loss. Sodium improved with water restriction and diuretics. Pt/Ot evaluations for discharge planning. Ok to discharge. Patient needs rehab.      Nando Drop    9:44 AM  2/12/2020

## 2020-02-12 NOTE — PROGRESS NOTES
UB Dressing Min A  SBA   LB Dressing Max A  Min A - with use of AE, as needed/appropriate   Bathing Max A  Min A - with use of AE/DME, as needed/appropriate   Toileting Mod A for BSC transfer; Max A for perineal hygiene while standing with walker following bowel movement. SBA   Bed Mobility  Supine-to-Sit: Mod A  SBA   Functional Transfers Sit-to-Stand: Mod A   from EOB and BSC. Cues given to maximize safety with functional transfers. Decreased safety awareness noted during stand-to-sit throughout this session, requiring consistent cues. SBA   Functional Mobility Mod A   (with walker) for few steps between EOB, BSC, and bedside recliner chair. Increased time needed. SBA - with use of device, as needed/appropriate   Balance Sitting: Fair+  (at EOB)  Standing: Poor+  (with walker)  Fair+ dynamic standing balance during completion of ADLs and other functional tasks. Activity Tolerance Fair-  Patient will demonstrate Good understanding and consistent implementation of energy conservation techniques and work simplification techniques into ADL routines. Visual/  Perceptual WFL     N/A     Long-Term Goal: Patient will increase functional independence to PLOF in order to allow patient to live in least restrictive environment. Strength: ROM: Additional Information:    R UE  Proximally: 3-/5  Distally: 4-/5  0 - 90 degrees of active shoulder flexion; distal AROM WFL grossly    L UE Proximally: 3-/5  Distally: 3+/5  0 - 90 degrees of active shoulder flexion; distal AROM WFL grossly      Hearing: WFL  Sensation: No complaints of numbness/tingling in B UEs. Tone: WFL  Edema: No    Comments: RN approved patient's participation in [address] [place] [address] activities. Upon arrival, patient supine in bed. At end of session, patient seated in bedside recliner chair with call light and phone within reach, B LEs elevated, chair alarm activated, waffle cushion in place, and all lines and tubes intact.  Patient would benefit from continued Orthotic manage/training  [id number]     Total Timed Treatment 10 1     Evaluation time includes thorough review of current medical information, gathering information on past medical history/social history and prior level of function, completion of standardized testing/informal observation of tasks, assessment of data, and development of POC/Goals. Helen Washington, OTR/L  License Number: [id number]

## 2020-02-12 NOTE — CARE COORDINATION
Left voicemail with Iemlda at [facility] in regards to Acceptance at [facility]. Await response.

## 2020-02-12 NOTE — FLOWSHEET NOTE
Please note the following skin assessment:    Redness to willie groin and abdominal folds, excoriation on right groin fold. Blanchable redness to buttocks, excoriation to left inner buttock. Blanchable redness and boggy to willie heels- heelbows on, legs elevated, feet off bed. Abrasions to left great toe, right 2nd toe, bottom of left foot. Bruising to BUE  Bruising to abdomen.    Skin dry and flaky

## 2020-02-12 NOTE — PLAN OF CARE
Problem: Falls - Risk of:  Goal: Will remain free from falls  Description  Will remain free from falls  Outcome: Met This Shift     Problem: Falls - Risk of:  Goal: Absence of physical injury  Description  Absence of physical injury  Outcome: Met This Shift     Problem: Pain:  Goal: Pain level will decrease  Description  Pain level will decrease  Outcome: Met This Shift     Problem: Pain:  Goal: Control of acute pain  Description  Control of acute pain  Outcome: Met This Shift     Problem: Pain:  Goal: Control of chronic pain  Description  Control of chronic pain  Outcome: Met This Shift     Problem: FLUID AND ELECTROLYTE IMBALANCE  Goal: Fluid and electrolyte balance are achieved/maintained  Outcome: Ongoing     Problem: Mental Status - Impaired:  Goal: Mental status restored to baseline  Outcome: Ongoing     Problem: Mobility - Impaired:  Goal: Mobility level is maintained or improved  Outcome: Ongoing

## 2020-02-12 NOTE — PROGRESS NOTES
[facility] Quality Flow/Interdisciplinary Rounds Progress Note        Quality Flow Rounds held on February 12, 2020    Disciplines Attending:  Bedside Nurse, ,  and Nursing Unit Leadership    Jose Pearson was admitted on 2/10/2020 12:31 PM    Anticipated Discharge Date:  Expected Discharge Date: 02/13/20    Disposition:    Tarik Score:  Tarik Scale Score: 16    Readmission Risk              Risk of Unplanned Readmission:        22           Discussed patient goal for the day, patient clinical progression, and barriers to discharge.   The following Goal(s) of the Day/Commitment(s) have been identified:  Other  dc planning      Milly Youngblood  February 12, 2020

## 2020-02-12 NOTE — DISCHARGE INSTR - COC
Continuity of Care Form    Patient Name: Wai Sims   :  1955  MRN:  [id number]    Admit date:  2/10/2020  Discharge date:  20    Code Status Order: Full Code   Advance Directives:   [facility] Documentation     Date/Time Healthcare Directive Type of Healthcare Directive Copy in [facility] [address] Agent's Name Healthcare Agent's Phone Number    02/10/20 1627  No, patient does not have an advance directive for healthcare treatment -- -- -- -- --          Admitting Physician:  Brigitte Win MD  PCP: Jordan Andre DO    Discharging Nurse: Pavithra Colunga Unit/Room#: [address] Unit Phone Number: [phone]    Emergency Contact:   Extended Emergency Contact Information  Primary Emergency Contact: Андрей Carlson  Address: [address] [place] [address] Phone: [phone]  Work Phone: [phone]  Mobile Phone: [phone]  Relation: Child  Secondary Emergency Contact: [facility] Phone: [phone]  Work Phone: [phone]  Mobile Phone: [phone]  Relation: Child    Past Surgical History:  Past Surgical History:   Procedure Laterality Date    CAROTID ENDARTERECTOMY      2018    CHOLECYSTECTOMY      COLONOSCOPY      COLONOSCOPY N/A 2019    COLONOSCOPY POLYPECTOMY SNARE/COLD BIOPSY performed by Nadeen Pérez MD at [address] [place] [address] Drive  2003   [address] Left 10/29/2018    LEFT CAROTID ENDARTERECTOMY performed by Maggie Khan MD at [facility] ENDOSCOPY N/A 2019    EGD BIOPSY performed by Nadeen Pérez MD at [facility]         Immunization History:   Immunization History   Administered Date(s) Administered    Influenza, Quadv, IM, (6 mo and older Fluzone, Flulaval, Concerns: At Risk for Falls    Impairments/Disabilities:      None    Nutrition Therapy:  Current Nutrition Therapy:   - Oral Diet:  Carb Control 4 carbs/meal (1800kcals/day) and Low Sodium (2gm)    Routes of Feeding: Oral  Liquids: Thin Liquids  Daily Fluid Restriction: yes - amount 1200  Last Modified Barium Swallow with Video (Video Swallowing Test): not done    Treatments at the Time of Hospital Discharge:   Respiratory Treatments:   Oxygen Therapy:  is not on home oxygen therapy. Ventilator:    - No ventilator support    Rehab Therapies: Physical Therapy and Occupational Therapy  Weight Bearing Status/Restrictions: No weight bearing restirctions  Other Medical Equipment (for information only, NOT a DME order):  walker, bedside commode and hospital bed  Other Treatments:     Patient's personal belongings (please select all that are sent with patient):  None    RN SIGNATURE:  Electronically signed by Mark Macias RN on 2/14/20 at 10:57 AM    CASE MANAGEMENT/SOCIAL WORK SECTION    Inpatient Status Date:     Readmission Risk Assessment Score:  Readmission Risk              Risk of Unplanned Readmission:        22           Discharging to Facility/ Agency   · Name:   · Address:  · Phone:  · Fax:    Dialysis Facility (if applicable)   · Name:  · Address:  · Dialysis Schedule:  · Phone:  · Fax:    / signature: {Esignature:[id number]}    PHYSICIAN SECTION    Prognosis: Good    Condition at Discharge: Stable    Rehab Potential (if transferring to Rehab): Good    Recommended Labs or Other Treatments After Discharge: BMP weekly on mondays- send to Dr. Gallo Burden     Physician Certification: I certify the above information and transfer of Дмитрий Smith  is necessary for the continuing treatment of the diagnosis listed and that she requires [facility] for greater 30 days.      Update Admission H&P: No change in H&P    PHYSICIAN SIGNATURE: Electronically signed by Katy Calloway MD on 2/12/2020 at 9:45 AM    Follow up with dr Yan Brown in 1 week. Follow up with dr Gemma liao in 2-3 weeks. Follow up with dr Jory Heard in 2-3 weeks.

## 2020-02-12 NOTE — PROGRESS NOTES
present  - Last paracentesis 2/5 with 6.6 liters taken off   - Abdominal US negative for significant amount of ascites   - 2g Na sodium pt not complaint with in outpatient setting   - Would restart oral Aldactone 100mg and Lasix 40mg and monitor pts kidney function, room to increase both  - Grade 0-1 HE on exam continue with Lactulose and rifaximin titrate to 2-3 soft bowel movements a day, attempt to limit narcotics  - No signs of variceal bleed on exam Hgb stable and at baseline no indication for Rocephin   - Recommend 10% weight loss over the next year      2. Hyponatremia- improved up to 131   - 2gm Na diet disused and reinforced with pt and son at the bedside   - Nephology consulted and following        Pt was seen, d/w, and examined with Dr. Ish Valderrama DO  GI Fellow   2/12/2020  10:39 AM    Pt seen and independently examined. Pertinent notes and lab work reviewed. Monitor reviewed showing normal sinus rhythm. D/w Dr. Umberto Jorge. Agree with physical exam and A&P. Discussed with patient/family at bedside - all questions answered - agreeable with the plan as delineated.       Nam Mcnally MD  2/12/2020  2:41 PM

## 2020-02-13 LAB
ANION GAP SERPL CALCULATED.3IONS-SCNC: 9 MMOL/L (ref 7–16)
ANION GAP SERPL CALCULATED.3IONS-SCNC: 9 MMOL/L (ref 7–16)
BUN BLDV-MCNC: 17 MG/DL (ref 8–23)
BUN BLDV-MCNC: 17 MG/DL (ref 8–23)
CALCIUM SERPL-MCNC: 8.6 MG/DL (ref 8.6–10.2)
CALCIUM SERPL-MCNC: 9 MG/DL (ref 8.6–10.2)
CHLORIDE BLD-SCNC: 91 MMOL/L (ref 98–107)
CHLORIDE BLD-SCNC: 93 MMOL/L (ref 98–107)
CO2: 20 MMOL/L (ref 22–29)
CO2: 21 MMOL/L (ref 22–29)
CREAT SERPL-MCNC: 0.9 MG/DL (ref 0.5–1)
CREAT SERPL-MCNC: 0.9 MG/DL (ref 0.5–1)
GFR AFRICAN AMERICAN: >60
GFR AFRICAN AMERICAN: >60
GFR NON-AFRICAN AMERICAN: >60 ML/MIN/1.73
GFR NON-AFRICAN AMERICAN: >60 ML/MIN/1.73
GLUCOSE BLD-MCNC: 158 MG/DL (ref 74–99)
GLUCOSE BLD-MCNC: 258 MG/DL (ref 74–99)
METER GLUCOSE: 152 MG/DL (ref 74–99)
METER GLUCOSE: 180 MG/DL (ref 74–99)
METER GLUCOSE: 205 MG/DL (ref 74–99)
METER GLUCOSE: 249 MG/DL (ref 74–99)
POTASSIUM SERPL-SCNC: 4.4 MMOL/L (ref 3.5–5)
POTASSIUM SERPL-SCNC: 4.4 MMOL/L (ref 3.5–5)
SODIUM BLD-SCNC: 120 MMOL/L (ref 132–146)
SODIUM BLD-SCNC: 123 MMOL/L (ref 132–146)

## 2020-02-13 PROCEDURE — 6370000000 HC RX 637 (ALT 250 FOR IP): Performed by: NURSE PRACTITIONER

## 2020-02-13 PROCEDURE — 80048 BASIC METABOLIC PNL TOTAL CA: CPT

## 2020-02-13 PROCEDURE — 36415 COLL VENOUS BLD VENIPUNCTURE: CPT

## 2020-02-13 PROCEDURE — 6360000002 HC RX W HCPCS: Performed by: INTERNAL MEDICINE

## 2020-02-13 PROCEDURE — 6360000002 HC RX W HCPCS: Performed by: NURSE PRACTITIONER

## 2020-02-13 PROCEDURE — 6370000000 HC RX 637 (ALT 250 FOR IP): Performed by: HOSPITALIST

## 2020-02-13 PROCEDURE — 2580000003 HC RX 258: Performed by: NURSE PRACTITIONER

## 2020-02-13 PROCEDURE — 82962 GLUCOSE BLOOD TEST: CPT

## 2020-02-13 PROCEDURE — 2580000003 HC RX 258: Performed by: INTERNAL MEDICINE

## 2020-02-13 PROCEDURE — 2060000000 HC ICU INTERMEDIATE R&B

## 2020-02-13 RX ORDER — POLYETHYLENE GLYCOL 3350 17 G/17G
17 POWDER, FOR SOLUTION ORAL 2 TIMES DAILY
Status: DISCONTINUED | OUTPATIENT
Start: 2020-02-13 | End: 2020-02-14 | Stop reason: HOSPADM

## 2020-02-13 RX ORDER — LACTULOSE 10 G/15ML
30 SOLUTION ORAL 2 TIMES DAILY
Status: DISCONTINUED | OUTPATIENT
Start: 2020-02-13 | End: 2020-02-14 | Stop reason: HOSPADM

## 2020-02-13 RX ORDER — SODIUM CHLORIDE 9 MG/ML
INJECTION, SOLUTION INTRAVENOUS CONTINUOUS
Status: DISCONTINUED | OUTPATIENT
Start: 2020-02-13 | End: 2020-02-14 | Stop reason: HOSPADM

## 2020-02-13 RX ORDER — FUROSEMIDE 10 MG/ML
20 INJECTION INTRAMUSCULAR; INTRAVENOUS 2 TIMES DAILY
Status: DISCONTINUED | OUTPATIENT
Start: 2020-02-13 | End: 2020-02-13

## 2020-02-13 RX ADMIN — AMLODIPINE BESYLATE 5 MG: 5 TABLET ORAL at 09:13

## 2020-02-13 RX ADMIN — INSULIN LISPRO 4 UNITS: 100 INJECTION, SOLUTION INTRAVENOUS; SUBCUTANEOUS at 16:15

## 2020-02-13 RX ADMIN — LACTULOSE 20 G: 20 SOLUTION ORAL at 20:04

## 2020-02-13 RX ADMIN — LACTULOSE 20 G: 20 SOLUTION ORAL at 14:29

## 2020-02-13 RX ADMIN — PANTOPRAZOLE SODIUM 40 MG: 40 TABLET, DELAYED RELEASE ORAL at 09:13

## 2020-02-13 RX ADMIN — ENOXAPARIN SODIUM 40 MG: 40 INJECTION SUBCUTANEOUS at 20:04

## 2020-02-13 RX ADMIN — ATORVASTATIN CALCIUM 40 MG: 40 TABLET, FILM COATED ORAL at 20:04

## 2020-02-13 RX ADMIN — INSULIN LISPRO 2 UNITS: 100 INJECTION, SOLUTION INTRAVENOUS; SUBCUTANEOUS at 06:21

## 2020-02-13 RX ADMIN — SODIUM CHLORIDE, PRESERVATIVE FREE 10 ML: 5 INJECTION INTRAVENOUS at 20:04

## 2020-02-13 RX ADMIN — RIFAXIMIN 550 MG: 550 TABLET ORAL at 09:13

## 2020-02-13 RX ADMIN — SODIUM CHLORIDE: 9 INJECTION, SOLUTION INTRAVENOUS at 21:51

## 2020-02-13 RX ADMIN — INSULIN GLARGINE 26 UNITS: 100 INJECTION, SOLUTION SUBCUTANEOUS at 20:09

## 2020-02-13 RX ADMIN — INSULIN LISPRO 2 UNITS: 100 INJECTION, SOLUTION INTRAVENOUS; SUBCUTANEOUS at 20:10

## 2020-02-13 RX ADMIN — POLYETHYLENE GLYCOL 3350 17 G: 17 POWDER, FOR SOLUTION ORAL at 21:14

## 2020-02-13 RX ADMIN — RIFAXIMIN 550 MG: 550 TABLET ORAL at 20:04

## 2020-02-13 RX ADMIN — INSULIN LISPRO 2 UNITS: 100 INJECTION, SOLUTION INTRAVENOUS; SUBCUTANEOUS at 11:26

## 2020-02-13 RX ADMIN — SODIUM CHLORIDE, PRESERVATIVE FREE 10 ML: 5 INJECTION INTRAVENOUS at 09:13

## 2020-02-13 RX ADMIN — FUROSEMIDE 20 MG: 10 INJECTION, SOLUTION INTRAVENOUS at 10:43

## 2020-02-13 RX ADMIN — FUROSEMIDE 20 MG: 10 INJECTION, SOLUTION INTRAVENOUS at 17:16

## 2020-02-13 ASSESSMENT — PAIN SCALES - GENERAL
PAINLEVEL_OUTOF10: 0
PAINLEVEL_OUTOF10: 0

## 2020-02-13 NOTE — PROGRESS NOTES
-- -- -- -- -- -- 212 lb 12.8 oz (96.5 kg)   02/12/20 0600 135/67 97.5 °F (36.4 °C) Axillary 98 16 94 % --   02/11/20 2145 135/76 97.5 °F (36.4 °C) Oral 93 18 97 % --   @      Intake/Output Summary (Last 24 hours) at 2/12/2020 1916  Last data filed at 2/12/2020 1836  Gross per 24 hour   Intake 630 ml   Output 1025 ml   Net -395 ml         Wt Readings from Last 3 Encounters:   02/12/20 212 lb 12.8 oz (96.5 kg)   01/29/20 230 lb (104.3 kg)   01/14/20 242 lb 8 oz (110 kg)       Constitutional:  in no acute distress  Oral: mucus membranes moist  Neck: no JVD  Cardiovascular: S1, S2 regular rhythm, no murmur,or rub  Respiratory:  No crackles, no wheeze  Gastrointestinal:  Soft, nontender, nondistended, NABS  Ext: no edema, feet warm  Skin: dry, no rash  Neuro: awake, alert, interactive      DATA:    Recent Labs     02/10/20  1318 02/11/20  0435 02/12/20  0340   WBC 7.4 8.2 8.2   HGB 14.0 13.1 13.0   HCT 41.9 39.0 38.4   MCV 86.4 85.9 85.5    185 198     Recent Labs     02/10/20  1318 02/11/20  0435 02/11/20  1943 02/12/20  0340   * 124* 126* 130*   K 5.0 4.7 4.2 4.1   CL 92* 96* 95* 97*   CO2 22 21* 22 20*   MG  --  2.0  --  2.0   BUN 14 15 15 15   CREATININE 0.9 0.8 0.9 0.8   ALT 23 21  --  21   AST 44* 37*  --  33*   BILIDIR 0.5* 0.5*  --   --    BILITOT 1.4* 1.3*  --  1.7*   ALKPHOS 152* 143*  --  147*       No results found for: LABPROT    ASSESSMENT / RECOMMENDATIONS:      1. Hyponatremia Hypervolmic in context of liver cirrhosis and recurrent ascites. The patient clinically is euvolemic to mildly hypervolemic as  evident by distended abdomen slightly due to ascites.     I feel her hyponatremia is due to hypervolemia in context of liver  cirrhosis and ascites.     Her chronic hyponatremia is likely the same due to her underlying liver  cirrhosis and overall it reflects poor prognosis. 2.  Liver cirrhosis. 3.  Recurrent ascites. 4.  Diabetes type 2.  5.  Hypertension.   6.  Obesity.     PLAN: Na up to 130   Place on lasix 20 mg po along with aldacton 100 mg daily   Continue  fluid-restricted diet.         Electronically signed by Jose Harmon MD on 2/12/2020 at 7:16 PM

## 2020-02-13 NOTE — PROGRESS NOTES
 Heart Disease Mother 28    Diabetes Mother     Heart Disease Sister     Diabetes Sister         reports that she is a non-smoker but has been exposed to tobacco smoke. The exposure started about 65 years ago. She has never used smokeless tobacco. She reports that she does not drink alcohol or use drugs. Review of Systems:   Constitutional: no fevers , no chills , feels ok   Eyes: no eye pain , no itching , no drainage  Ears, nose, mouth, throat, and face: no ear ,nose pain , hearing is ok ,no nasal drainage   Respiratory: no sob ,no cough ,no wheezing . Cardiovascular: no chest pain , no palpitation ,no sob . Gastrointestinal: no nausea, vomiting , constipation , no abdominal pain . Genitourinary:no urinary retention , no burning , dysuria . No polyuria   Hematologic/lymphatic: no bleeding , no cougulation issues . Musculoskeletal:no joint pain , no swelling . Neurological: no headaches ,no weakness , no numbness . Endocrine: no thirst , no weight issues .      MEDS (scheduled):    furosemide  20 mg Intravenous BID    polyethylene glycol  17 g Oral BID    lactulose  30 mL Oral BID    sodium chloride flush  10 mL Intravenous 2 times per day    enoxaparin  40 mg Subcutaneous Daily    amLODIPine  5 mg Oral Daily    atorvastatin  40 mg Oral Nightly    [Held by provider] furosemide  20 mg Oral TID    pantoprazole  40 mg Oral Daily    rifaximin  550 mg Oral BID    [Held by provider] spironolactone  100 mg Oral Daily    insulin glargine  0.25 Units/kg Subcutaneous Nightly    insulin lispro  0-12 Units Subcutaneous TID WC    insulin lispro  0-6 Units Subcutaneous Nightly       MEDS (infusions):   dextrose         MEDS (prn):  promethazine, albuterol, sodium chloride flush, magnesium hydroxide, ondansetron, acetaminophen, glucose, dextrose, glucagon (rDNA), dextrose, HYDROcodone-acetaminophen    PHYSICAL EXAM:     Patient Vitals for the past 24 hrs:   BP Temp Temp src Pulse Resp SpO2 Weight   02/13/20 1601 131/71 98 °F (36.7 °C) Oral 97 16 -- --   02/13/20 0900 130/69 98.7 °F (37.1 °C) Oral 105 16 95 % --   02/13/20 0109 -- -- -- -- -- -- 203 lb 3.2 oz (92.2 kg)   02/12/20 2325 122/66 98.1 °F (36.7 °C) Oral 110 16 96 % --   02/12/20 1915 139/76 98.3 °F (36.8 °C) Oral 104 16 96 % --   @      Intake/Output Summary (Last 24 hours) at 2/13/2020 1634  Last data filed at 2/13/2020 0541  Gross per 24 hour   Intake 360 ml   Output 375 ml   Net -15 ml         Wt Readings from Last 3 Encounters:   02/13/20 203 lb 3.2 oz (92.2 kg)   01/29/20 230 lb (104.3 kg)   01/14/20 242 lb 8 oz (110 kg)       Constitutional:  in no acute distress  Oral: mucus membranes moist  Neck: no JVD  Cardiovascular: S1, S2 regular rhythm, no murmur,or rub  Respiratory:  No crackles, no wheeze  Gastrointestinal:  Soft, nontender, nondistended, NABS  Ext: no edema, feet warm  Skin: dry, no rash  Neuro: awake, alert, interactive      DATA:    Recent Labs     02/11/20  0435 02/12/20  0340   WBC 8.2 8.2   HGB 13.1 13.0   HCT 39.0 38.4   MCV 85.9 85.5    198     Recent Labs     02/11/20  0435 02/11/20  1943 02/12/20  0340 02/13/20  0930   * 126* 130* 123*   K 4.7 4.2 4.1 4.4   CL 96* 95* 97* 93*   CO2 21* 22 20* 21*   MG 2.0  --  2.0  --    BUN 15 15 15 17   CREATININE 0.8 0.9 0.8 0.9   ALT 21  --  21  --    AST 37*  --  33*  --    BILIDIR 0.5*  --   --   --    BILITOT 1.3*  --  1.7*  --    ALKPHOS 143*  --  147*  --        No results found for: LABPROT    ASSESSMENT / RECOMMENDATIONS:      1. Hyponatremia Hypervolmic in context of liver cirrhosis and recurrent ascites. The patient clinically is euvolemic to mildly hypervolemic as  evident by distended abdomen slightly due to ascites.     I feel her hyponatremia is due to hypervolemia in context of liver  cirrhosis and ascites.     Her chronic hyponatremia is likely the same due to her underlying liver  cirrhosis and overall it reflects poor prognosis.     2.  Liver cirrhosis. 3.  Recurrent ascites. 4.  Diabetes type 2.  5.  Hypertension.   6.  Obesity.     PLAN:      -Iv lasix 20 iv bid today   -Upon discharge place lasix 20 mg po along with aldacton 100 mg daily   -Continue  fluid-restricted diet.         Electronically signed by Gen Lux MD on 2/13/2020 at 4:34 PM

## 2020-02-13 NOTE — PLAN OF CARE
Problem: Falls - Risk of:  Goal: Will remain free from falls  Description  Will remain free from falls  Outcome: Met This Shift  Goal: Absence of physical injury  Description  Absence of physical injury  Outcome: Met This Shift     Problem: Pain:  Goal: Pain level will decrease  Description  Pain level will decrease  Outcome: Met This Shift     Problem: Mental Status - Impaired:  Goal: Mental status restored to baseline  Outcome: Ongoing     Problem: Mobility - Impaired:  Goal: Mobility level is maintained or improved  Outcome: Ongoing     Problem: FLUID AND ELECTROLYTE IMBALANCE  Goal: Fluid and electrolyte balance are achieved/maintained  Outcome: Not Met This Shift

## 2020-02-13 NOTE — PROGRESS NOTES
PROGRESS NOTE    By Rain Penny D.O GI Fellow    [facility]  Dr. Lata Cardona MD, Dr. Karmen Eller MD, Dr Dutch Gonzalez, Dr. Mandie Bryant MD, Dr. Paula Morales,       [facility]  71 y.o.  female    SUBJECTIVE: Pt sitting up in chair. Pt has no complaints. OBJECTIVE:    /69   Pulse 105   Temp 98.7 °F (37.1 °C) (Oral)   Resp 16   Ht 5' 7\" (1.702 m)   Wt 203 lb 3.2 oz (92.2 kg)   SpO2 95%   BMI 31.83 kg/m²     Gen: NAD, AAO x 3  HEENT:PEERL, no icterus  Heart: RRR, no M/R/G  Lungs: CTAB  Abd.: soft, NT, ND, BS +, no G/R, no HSM  Extr.: no C/C/E, no bruising         Lab Results   Component Value Date    WBC 8.2 02/12/2020    WBC 8.2 02/11/2020    WBC 7.4 02/10/2020    HGB 13.0 02/12/2020    HGB 13.1 02/11/2020    HGB 14.0 02/10/2020    HCT 38.4 02/12/2020    MCV 85.5 02/12/2020    RDW 20.1 02/12/2020     02/12/2020     02/11/2020     02/10/2020     Lab Results   Component Value Date     02/12/2020    K 4.1 02/12/2020    K 4.7 02/11/2020    CL 97 02/12/2020    CO2 20 02/12/2020    BUN 15 02/12/2020    CREATININE 0.8 02/12/2020    CALCIUM 8.8 02/12/2020    PROT 7.0 02/12/2020    LABALBU 2.6 02/12/2020    BILITOT 1.7 02/12/2020    BILITOT 1.3 02/11/2020    BILITOT 1.4 02/10/2020    ALKPHOS 147 02/12/2020    ALKPHOS 143 02/11/2020    ALKPHOS 152 02/10/2020    AST 33 02/12/2020    AST 37 02/11/2020    AST 44 02/10/2020    ALT 21 02/12/2020    ALT 21 02/11/2020    ALT 23 02/10/2020     Lab Results   Component Value Date    LIPASE 22 01/11/2020    LIPASE 47 10/27/2019    LIPASE 54 10/18/2019     Lab Results   Component Value Date    AMYLASE 60 05/15/2017         ASSESSMENT/PLAN:  1.  Decompensated Liver Cirrhosis secondary to WEIR  -  Alternative etiology negative with no significant ETOH abuse   - MELD-NA- 22 7-10% Mortality Rate   - RUQ US negative for hepatic mass, with significant ascites present  - Last paracentesis 2/5 with 6.6 liters taken off - Abdominal US negative for significant amount of ascites   - 2g Na sodium pt not complaint with in outpatient setting   - Would restart oral Aldactone 100mg and Lasix 40mg and monitor pts kidney function, room to increase both  - Grade 0-1 HE on exam continue with Lactulose and rifaximin titrate to 2-3 soft bowel movements a day, attempt to limit narcotics  - No signs of variceal bleed on exam Hgb stable and at baseline no indication for Rocephin   - Recommend 10% weight loss over the next year      2. Hyponatremia  - BMP pending this am   - 2gm Na diet disused and reinforced with pt and son at the bedside   - Nephology consulted and following        Pt was seen, d/w, and examined with Dr. Deisi Alfaro DO  GI Fellow   2/13/2020  9:55 AM    Pt seen and independently examined. Pertinent notes and lab work reviewed. D/w Dr. Oksana Pereyra with physical exam and A&P. Discussed with patient - all questions answered - agreeable with the plan as delineated.       Jose Alberto Silvestre MD  2/13/2020  3:38 PM

## 2020-02-13 NOTE — PROGRESS NOTES
[facility] Quality Flow/Interdisciplinary Rounds Progress Note        Quality Flow Rounds held on February 13, 2020    Disciplines Attending:  Bedside Nurse, ,  and Nursing Unit Leadership    Linda Lopez was admitted on 2/10/2020 12:31 PM    Anticipated Discharge Date:  Expected Discharge Date: 02/13/20    Disposition:    Tarik Score:  Tarik Scale Score: 17    Readmission Risk              Risk of Unplanned Readmission:        23           Discussed patient goal for the day, patient clinical progression, and barriers to discharge.   The following Goal(s) of the Day/Commitment(s) have been identified:  Other  discharge      Esteban Rosales  February 13, 2020

## 2020-02-13 NOTE — CARE COORDINATION
2/13/2020  Social Work Discharge Planning:SW was informed by Regan Corbin that [address] [place] [address] can accept Pt today. N-17 generated, hens completed and transport form is in chart. Electronically signed by CHELITA Campbell on 2/13/2020 at 9:28 AM    2/13/2020  Social Work Discharge Planning:  SW set up [facility] [employer] [employer] transport via [employer] to go to [address] [place] [address] today at [facility]. Nurse here, Regan Corbin at [facility] and Pts son were notified. Electronically signed by CHELITA Campbell on 2/13/2020 at 11:13 AM    2/13/2020  Social Work Discharge Planning:  SW was just informed by nurse that Pt will not discharge today. SW cancelled transport and notified [facility].  Electronically signed by CHELITA Campbell on 2/13/2020 at 11:16 AM

## 2020-02-14 VITALS
SYSTOLIC BLOOD PRESSURE: 115 MMHG | BODY MASS INDEX: 31.96 KG/M2 | HEART RATE: 97 BPM | HEIGHT: 67 IN | RESPIRATION RATE: 18 BRPM | DIASTOLIC BLOOD PRESSURE: 67 MMHG | OXYGEN SATURATION: 98 % | TEMPERATURE: 97.9 F | WEIGHT: 203.6 LBS

## 2020-02-14 LAB
ANION GAP SERPL CALCULATED.3IONS-SCNC: 10 MMOL/L (ref 7–16)
BUN BLDV-MCNC: 16 MG/DL (ref 8–23)
CALCIUM SERPL-MCNC: 8.3 MG/DL (ref 8.6–10.2)
CHLORIDE BLD-SCNC: 95 MMOL/L (ref 98–107)
CO2: 22 MMOL/L (ref 22–29)
CREAT SERPL-MCNC: 0.8 MG/DL (ref 0.5–1)
GFR AFRICAN AMERICAN: >60
GFR NON-AFRICAN AMERICAN: >60 ML/MIN/1.73
GLUCOSE BLD-MCNC: 173 MG/DL (ref 74–99)
METER GLUCOSE: 172 MG/DL (ref 74–99)
METER GLUCOSE: 176 MG/DL (ref 74–99)
POTASSIUM SERPL-SCNC: 3.7 MMOL/L (ref 3.5–5)
REASON FOR REJECTION: NORMAL
REJECTED TEST: NORMAL
SODIUM BLD-SCNC: 127 MMOL/L (ref 132–146)

## 2020-02-14 PROCEDURE — 6370000000 HC RX 637 (ALT 250 FOR IP): Performed by: HOSPITALIST

## 2020-02-14 PROCEDURE — 97535 SELF CARE MNGMENT TRAINING: CPT

## 2020-02-14 PROCEDURE — 6370000000 HC RX 637 (ALT 250 FOR IP): Performed by: NURSE PRACTITIONER

## 2020-02-14 PROCEDURE — 36415 COLL VENOUS BLD VENIPUNCTURE: CPT

## 2020-02-14 PROCEDURE — 2580000003 HC RX 258: Performed by: INTERNAL MEDICINE

## 2020-02-14 PROCEDURE — 82962 GLUCOSE BLOOD TEST: CPT

## 2020-02-14 PROCEDURE — 80048 BASIC METABOLIC PNL TOTAL CA: CPT

## 2020-02-14 PROCEDURE — 6370000000 HC RX 637 (ALT 250 FOR IP): Performed by: INTERNAL MEDICINE

## 2020-02-14 RX ORDER — FUROSEMIDE 20 MG/1
20 TABLET ORAL DAILY
Qty: 30 TABLET | Refills: 0 | Status: SHIPPED | OUTPATIENT
Start: 2020-02-15 | End: 2020-04-01

## 2020-02-14 RX ORDER — ONDANSETRON 4 MG/1
4 TABLET, FILM COATED ORAL EVERY 8 HOURS PRN
Qty: 30 TABLET | Refills: 1 | Status: SHIPPED | OUTPATIENT
Start: 2020-02-14

## 2020-02-14 RX ORDER — FUROSEMIDE 20 MG/1
20 TABLET ORAL DAILY
Status: DISCONTINUED | OUTPATIENT
Start: 2020-02-14 | End: 2020-02-14 | Stop reason: HOSPADM

## 2020-02-14 RX ADMIN — PANTOPRAZOLE SODIUM 40 MG: 40 TABLET, DELAYED RELEASE ORAL at 08:09

## 2020-02-14 RX ADMIN — FUROSEMIDE 20 MG: 20 TABLET ORAL at 11:19

## 2020-02-14 RX ADMIN — RIFAXIMIN 550 MG: 550 TABLET ORAL at 08:09

## 2020-02-14 RX ADMIN — AMLODIPINE BESYLATE 5 MG: 5 TABLET ORAL at 08:09

## 2020-02-14 RX ADMIN — LACTULOSE 20 G: 20 SOLUTION ORAL at 08:09

## 2020-02-14 RX ADMIN — INSULIN LISPRO 2 UNITS: 100 INJECTION, SOLUTION INTRAVENOUS; SUBCUTANEOUS at 11:23

## 2020-02-14 RX ADMIN — INSULIN LISPRO 2 UNITS: 100 INJECTION, SOLUTION INTRAVENOUS; SUBCUTANEOUS at 06:56

## 2020-02-14 RX ADMIN — POLYETHYLENE GLYCOL 3350 17 G: 17 POWDER, FOR SOLUTION ORAL at 08:09

## 2020-02-14 RX ADMIN — SODIUM CHLORIDE: 9 INJECTION, SOLUTION INTRAVENOUS at 11:19

## 2020-02-14 ASSESSMENT — PAIN SCALES - GENERAL
PAINLEVEL_OUTOF10: 0
PAINLEVEL_OUTOF10: 0

## 2020-02-14 NOTE — PROGRESS NOTES
Occupational Therapy  OT BEDSIDE TREATMENT NOTE      Date:2020  Patient Name: Дмитрий Smith  MRN: [id number]  : 1955  Room: [address] [place] [address]-A     Referring Provider: BENITO Turk - CNP  Evaluating OT: ARLEN Mitchell/L - [id number]     AM-PAC Daily Activity Raw Score: 15/24  Recommended Adaptive Equipment: Continue to assess.      Diagnosis: Hyponatremia [E87.1]     Pertinent Medical History: HTN, DM, arthritis, stroke     Precautions: falls, bed/chair alarms, skin integrity     Home Living: Patient lives with her son (who works midnights) in a two-floor home (few steps to enter); patient's bedroom and bathroom are on the second floor. Patient has been unable to access second floor for the past few weeks, per report of patient's son; patient was sleeping on a fold-out couch on the main living level and using a BSC recently. Bathroom Setup: BSC on main living level; tub shower (with tub seat and handheld shower head) on second floor  Equipment Owned: BSC, rollator, tub seat     Prior Level of Function (PLOF): Per report of patient and patient's son, patient was requiring increased assistance with most ADLs; patient's son is primarily responsible for completion of all IADLs. Patient's son reported that patient had been experiencing increased difficulty with functional transfers/mobility recently.   Driving: No - patient's son assists with transportation     Pain Level: No c/o pain  Cognition: Patient alert and oriented grossly with fair ability to follow commands.     Functional Assessment:    Initial Eval Status  Date: 2020 Treatment Status  Date: 20 Short Term Goals  Treatment Frequency/Duration: 2-5x/week for 3-7 days PRN   Feeding Setup   Independent   Grooming Min A   SBA  (seated)   UB Dressing Min A   SBA   LB Dressing Max A  Max A to adjust B socks Min A - with use of AE, as needed/appropriate   Bathing Max A   Min A - with use of AE/DME, as needed/appropriate   Toileting

## 2020-02-14 NOTE — PROGRESS NOTES
Subjective: The patient is awake and alert. No problems overnight. Denies chest pain, angina, and dyspnea. Denies abdominal pain. Tolerating diet. No nausea or vomiting. Objective:    /67   Pulse 97   Temp 97.9 °F (36.6 °C) (Oral)   Resp 18   Ht 5' 7\" (1.702 m)   Wt 203 lb 9.6 oz (92.4 kg)   SpO2 98%   BMI 31.89 kg/m²     Current medications that patient is taking have been reviewed. Heart:  RRR, no murmurs, gallops, or rubs.   Lungs:  CTA bilaterally, no wheeze, rales or rhonchi  Abd: bowel sounds present, soft, nontender, nondistended, no masses  Extrem:  No cyanosis or edema    CBC with Differential:    Lab Results   Component Value Date    WBC 8.2 02/12/2020    RBC 4.49 02/12/2020    HGB 13.0 02/12/2020    HCT 38.4 02/12/2020     02/12/2020    MCV 85.5 02/12/2020    MCH 29.0 02/12/2020    MCHC 33.9 02/12/2020    RDW 20.1 02/12/2020    LYMPHOPCT 26.6 02/12/2020    MONOPCT 12.9 02/12/2020    BASOPCT 1.1 02/12/2020    MONOSABS 1.06 02/12/2020    LYMPHSABS 2.18 02/12/2020    EOSABS 0.16 02/12/2020    BASOSABS 0.09 02/12/2020     CMP:    Lab Results   Component Value Date     02/14/2020    K 3.7 02/14/2020    K 4.7 02/11/2020    CL 95 02/14/2020    CO2 22 02/14/2020    BUN 16 02/14/2020    CREATININE 0.8 02/14/2020    GFRAA >60 02/14/2020    LABGLOM >60 02/14/2020    GLUCOSE 173 02/14/2020    PROT 7.0 02/12/2020    LABALBU 2.6 02/12/2020    CALCIUM 8.3 02/14/2020    BILITOT 1.7 02/12/2020    ALKPHOS 147 02/12/2020    AST 33 02/12/2020    ALT 21 02/12/2020     BMP:    Lab Results   Component Value Date     02/14/2020    K 3.7 02/14/2020    K 4.7 02/11/2020    CL 95 02/14/2020    CO2 22 02/14/2020    BUN 16 02/14/2020    LABALBU 2.6 02/12/2020    CREATININE 0.8 02/14/2020    CALCIUM 8.3 02/14/2020    GFRAA >60 02/14/2020    LABGLOM >60 02/14/2020    GLUCOSE 173 02/14/2020     Magnesium:    Lab Results   Component Value Date    MG 2.0 02/12/2020     Phosphorus:    Lab Results Component Value Date    PHOS 2.7 01/13/2019     PT/INR:    Lab Results   Component Value Date    PROTIME 16.6 02/11/2020    INR 1.4 02/11/2020     PTT:    Lab Results   Component Value Date    APTT 34.7 02/05/2020   [APTT}     Assessment:    Patient Active Problem List   Diagnosis    History of hyperthyroidism    Cirrhosis of liver with ascites ([facility] [address]    Diabetes mellitus type 2, uncontrolled (HCC)    Hyperlipidemia LDL goal <100    History of CVA (cerebrovascular accident)    Essential hypertension    Obesity (BMI 30-39. 9)    Hyponatremia       Plan:    Stable. As per GI. Paracentesis as needed. Blood glucose ok, continue to adjust basal/bolus insulin therapy  Continue aggressive lipid therapy  Continue Pt/Ot and risk factor modifications. Blood pressure ok, continue current medications  Continue to encourage weight loss. Sodium improved with water restriction and diuretics. Pt/Ot evaluations for discharge planning. Ok to discharge once ok with nephrology. waiting on social aspects of medicine for discharge. patient needs rehab.      Lorena Pérez    1:23 PM  2/14/2020

## 2020-02-14 NOTE — PROGRESS NOTES
PROGRESS NOTE    By Georgia Rivero D.O GI Fellow    [facility]  Dr. Yahir Olvera MD, Dr. Alex Vail MD, Dr Luba Sequeira, Dr. Reynaldo Field MD, Dr. Melly Arvizu, DO Armenta Waterman  71 y.o.  female    SUBJECTIVE: Pt resting comfortably this am. Pt has no complaints. Pt tolerating lactulose. OBJECTIVE:    /67   Pulse 97   Temp 97.9 °F (36.6 °C) (Oral)   Resp 18   Ht 5' 7\" (1.702 m)   Wt 203 lb 9.6 oz (92.4 kg)   SpO2 98%   BMI 31.89 kg/m²     Gen: NAD, AAO x 3  HEENT:PEERL, no icterus  Heart: RRR, no M/R/G  Lungs: CTAB  Abd.: soft, NT, ND, BS +, no G/R, no HSM  Extr.: no C/C/E, no bruising         Lab Results   Component Value Date    WBC 8.2 02/12/2020    WBC 8.2 02/11/2020    WBC 7.4 02/10/2020    HGB 13.0 02/12/2020    HGB 13.1 02/11/2020    HGB 14.0 02/10/2020    HCT 38.4 02/12/2020    MCV 85.5 02/12/2020    RDW 20.1 02/12/2020     02/12/2020     02/11/2020     02/10/2020     Lab Results   Component Value Date     02/14/2020    K 3.7 02/14/2020    K 4.7 02/11/2020    CL 95 02/14/2020    CO2 22 02/14/2020    BUN 16 02/14/2020    CREATININE 0.8 02/14/2020    CALCIUM 8.3 02/14/2020    PROT 7.0 02/12/2020    LABALBU 2.6 02/12/2020    BILITOT 1.7 02/12/2020    BILITOT 1.3 02/11/2020    BILITOT 1.4 02/10/2020    ALKPHOS 147 02/12/2020    ALKPHOS 143 02/11/2020    ALKPHOS 152 02/10/2020    AST 33 02/12/2020    AST 37 02/11/2020    AST 44 02/10/2020    ALT 21 02/12/2020    ALT 21 02/11/2020    ALT 23 02/10/2020     Lab Results   Component Value Date    LIPASE 22 01/11/2020    LIPASE 47 10/27/2019    LIPASE 54 10/18/2019     Lab Results   Component Value Date    AMYLASE 60 05/15/2017         ASSESSMENT/PLAN:  1.  Decompensated Liver Cirrhosis secondary to WEIR  -  Alternative etiology negative with no significant ETOH abuse   - MELD-NA- 22 7-10% Mortality Rate   - RUQ US negative for hepatic mass, with significant ascites present  - Last

## 2020-02-14 NOTE — PROGRESS NOTES
[facility] MD Jenn Ring MD Terrye Maroon, MD. Abhijit Eckert MD   Progress Note    2/14/2020    SUBJECTIVE:   Seen today , feels ok , stable vitals . PROBLEM LIST:    Principal Problem:    Hyponatremia  Active Problems:    History of hyperthyroidism    Cirrhosis of liver with ascites (HCC)    Diabetes mellitus type 2, uncontrolled (HCC)    Hyperlipidemia LDL goal <100    History of CVA (cerebrovascular accident)    Essential hypertension    Obesity (BMI 30-39. 9)  Resolved Problems:    Abdominal pain    Hyponatremia       DIET:    DIET CARB CONTROL; Low Sodium (2 GM); Daily Fluid Restriction: 1200 ml  Dietary Nutrition Supplements: Diabetic Oral Supplement       Allergies : Tramadol    Past Medical History:   Diagnosis Date    Arthritis     Cerebral artery occlusion with cerebral infarction ([facility] [address] 09/2018    some memory loss, some right side weakness.     Cough     post anesthesia    Diabetes mellitus ([facility] [facility]     Diverticulitis     GERD (gastroesophageal reflux disease)     Hyperlipidemia     Hypertension     Liver cirrhosis (HCC)     Polyp of esophagus     Polyp, stomach     PONV (postoperative nausea and vomiting)     Prolonged emergence from general anesthesia     Thyroid disease     no meds       Past Surgical History:   Procedure Laterality Date    CAROTID ENDARTERECTOMY      october 2018    CHOLECYSTECTOMY      COLONOSCOPY      COLONOSCOPY N/A 6/11/2019    COLONOSCOPY POLYPECTOMY SNARE/COLD BIOPSY performed by Charley Hill MD at [facility] Left 10/29/2018    LEFT CAROTID ENDARTERECTOMY performed by Maria T Jiang MD at [address] [place] [address] ENDOSCOPY      UPPER GASTROINTESTINAL ENDOSCOPY N/A 6/11/2019    EGD BIOPSY performed by Charley Hill MD at [facility] ENDOSCOPY    VASCULAR SURGERY         Family History   Problem Relation Age of Onset Obesity.     PLAN:      -f/u BMP from today , if same trend then ok to d/c from nephrology POV   -Upon discharge place lasix 20 mg po along with aldacton 100 mg daily   -Continue  fluid-restricted diet.   -BMP in 3-4 days from d/c(results to be faxed to office         Electronically signed by Kathy Carranza MD on 2/14/2020 at 12:08 PM

## 2020-02-14 NOTE — PLAN OF CARE
Problem: Falls - Risk of:  Goal: Will remain free from falls  Description  Will remain free from falls  2/14/2020 0047 by Luiz Lujan RN  Outcome: Met This Shift  2/13/2020 1816 by Ying Chahal RN  Outcome: Met This Shift  Goal: Absence of physical injury  Description  Absence of physical injury  2/14/2020 0047 by Luiz Lujan RN  Outcome: Met This Shift  2/13/2020 1816 by Ying Chahal RN  Outcome: Met This Shift     Problem: Pain:  Description  Pain management should include both nonpharmacologic and pharmacologic interventions. Goal: Pain level will decrease  Description  Pain level will decrease  2/14/2020 0047 by Luiz Lujan RN  Outcome: Met This Shift  2/13/2020 1816 by Ying Chahal RN  Outcome: Met This Shift  Goal: Control of acute pain  Description  Control of acute pain  Outcome: Met This Shift  Goal: Control of chronic pain  Description  Control of chronic pain  Outcome: Met This Shift     Problem: FLUID AND ELECTROLYTE IMBALANCE  Goal: Fluid and electrolyte balance are achieved/maintained  2/14/2020 0047 by Luiz Lujna RN  Outcome: Not Met This Shift  2/13/2020 1816 by Ying Chahal RN  Outcome: Not Met This Shift  Notified Dr Bravo Jara. Orders received.   Problem: Mental Status - Impaired:  Goal: Mental status restored to baseline  2/14/2020 0047 by Luiz Lujan RN  Outcome: Met This Shift  2/13/2020 1816 by Ying Chahal RN  Outcome: Ongoing     Problem: Mobility - Impaired:  Goal: Mobility level is maintained or improved  2/14/2020 0047 by Luiz Lujan RN  Outcome: Met This Shift  2/13/2020 1816 by Ying Chahal RN  Outcome: Ongoing

## 2020-02-14 NOTE — CARE COORDINATION
2/14/2020  ,Social Work Discharge Planning:   set up RUTH.SHEILA Carrasquillo, [place] transport via [employer] for Pt to go to [facility] today at [employer]. Nurse here, Frances Ramirez at [facility] and Pts son were notified. if Pt doesn't get discharge then son, ALMA and [employer] will need to be notified. Pts son; will call in transport payment. Electronically signed by CHELITA Barron on 2/14/2020 at 9:46 AM

## 2020-02-18 ENCOUNTER — OFFICE VISIT (OUTPATIENT)
Dept: ENDOCRINOLOGY | Age: 65
End: 2020-02-18
Payer: MEDICARE

## 2020-02-18 VITALS
HEIGHT: 67 IN | WEIGHT: 204 LBS | BODY MASS INDEX: 32.02 KG/M2 | OXYGEN SATURATION: 98 % | DIASTOLIC BLOOD PRESSURE: 70 MMHG | SYSTOLIC BLOOD PRESSURE: 118 MMHG | RESPIRATION RATE: 16 BRPM | HEART RATE: 88 BPM

## 2020-02-18 PROCEDURE — 1090F PRES/ABSN URINE INCON ASSESS: CPT | Performed by: INTERNAL MEDICINE

## 2020-02-18 PROCEDURE — G8482 FLU IMMUNIZE ORDER/ADMIN: HCPCS | Performed by: INTERNAL MEDICINE

## 2020-02-18 PROCEDURE — 1123F ACP DISCUSS/DSCN MKR DOCD: CPT | Performed by: INTERNAL MEDICINE

## 2020-02-18 PROCEDURE — 1036F TOBACCO NON-USER: CPT | Performed by: INTERNAL MEDICINE

## 2020-02-18 PROCEDURE — G8400 PT W/DXA NO RESULTS DOC: HCPCS | Performed by: INTERNAL MEDICINE

## 2020-02-18 PROCEDURE — 2022F DILAT RTA XM EVC RTNOPTHY: CPT | Performed by: INTERNAL MEDICINE

## 2020-02-18 PROCEDURE — G8417 CALC BMI ABV UP PARAM F/U: HCPCS | Performed by: INTERNAL MEDICINE

## 2020-02-18 PROCEDURE — G8427 DOCREV CUR MEDS BY ELIG CLIN: HCPCS | Performed by: INTERNAL MEDICINE

## 2020-02-18 PROCEDURE — 1111F DSCHRG MED/CURRENT MED MERGE: CPT | Performed by: INTERNAL MEDICINE

## 2020-02-18 PROCEDURE — 3052F HG A1C>EQUAL 8.0%<EQUAL 9.0%: CPT | Performed by: INTERNAL MEDICINE

## 2020-02-18 PROCEDURE — 3017F COLORECTAL CA SCREEN DOC REV: CPT | Performed by: INTERNAL MEDICINE

## 2020-02-18 PROCEDURE — 4040F PNEUMOC VAC/ADMIN/RCVD: CPT | Performed by: INTERNAL MEDICINE

## 2020-02-18 PROCEDURE — 99214 OFFICE O/P EST MOD 30 MIN: CPT | Performed by: INTERNAL MEDICINE

## 2020-02-18 NOTE — H&P
units/day (Patient not taking: Reported on 2/18/2020), Disp: 6 pen, Rfl: 3    insulin glargine (BASAGLAR KWIKPEN) 100 UNIT/ML injection pen, Inject 48 units daily (Patient not taking: Reported on 2/18/2020), Disp: 5 pen, Rfl: 3    insulin aspart (NOVOLOG FLEXPEN) 100 UNIT/ML injection pen, Inject into the skin 3 times daily (before meals) 10 units tid with meals and sliding scale with max of 20 units total.  150-199=2 200-249=4 250-299=6 300-349=8 350-400=10, Disp: , Rfl:     Insulin Syringe-Needle U-100 ([employer] INSULIN SYR 1CC/30G) 30G X 5/16\" 1 ML MISC, Use with insulin 4 times a day, Disp: 250 each, Rfl: 12    INSULIN SYRINGE 1CC/29G ([employer] INS SYRINGE 1CC/29G) 29G X 1/2\" 1 ML MISC, 1 each by Does not apply route 4 times daily, Disp: 200 each, Rfl: 5    Insulin Syringe-Needle U-100 29G X 5/16\" 1 ML MISC, Use with insulin 4 times a day, Disp: 250 each, Rfl: 12    blood glucose monitor strips, One-Touch Verio strips. Checks 3  times/day before meals and at bedtime and as needed for symptoms of irregular blood glucose, Disp: 250 strip, Rfl: 5    blood glucose test strips (ONETOUCH VERIO) strip, 1 each by In Vitro route 4 times daily As needed. , Disp: 150 each, Rfl: 5    amitriptyline (ELAVIL) 25 MG tablet, TAKE 1 TABLET BY MOUTH NIGHTLY AS NEEDED FOR SLEEP, Disp: 30 tablet, Rfl: 3    albuterol sulfate  (90 Base) MCG/ACT inhaler, INHALE 2 PUFFS THREE TIMES A DAY AS NEEDED FOR WHEEZING, Disp: , Rfl: 0    Insulin Syringe-Needle U-100 ([employer] INS SYR .3CC/29G) 29G X 1/2\" 0.3 ML MISC, Four times a day with insulin, Disp: 250 each, Rfl: 3    venlafaxine (EFFEXOR XR) 37.5 MG extended release capsule, TAKE ONE CAPSULE BY MOUTH EVERY DAY (Patient taking differently: Take 37.5 mg by mouth daily ), Disp: 90 capsule, Rfl: 2    montelukast (SINGULAIR) 10 MG tablet, TAKE 1 TABLET BY MOUTH EVERY DAY AT NIGHT (Patient taking differently: Take 10 mg by mouth every morning ), Disp: 90 tablet, Rfl: 2    Misc. MCHC 33.9 02/12/2020    RDW 20.1 02/12/2020    LYMPHOPCT 26.6 02/12/2020    MONOPCT 12.9 02/12/2020    BASOPCT 1.1 02/12/2020    MONOSABS 1.06 02/12/2020    LYMPHSABS 2.18 02/12/2020    EOSABS 0.16 02/12/2020    BASOSABS 0.09 02/12/2020     Platelets:    Lab Results   Component Value Date     02/12/2020     BUN/Creatinine:    Lab Results   Component Value Date    BUN 16 02/14/2020    CREATININE 0.8 02/14/2020       ASSESSMENT AND PLAN:  1.  U/S guided paracentesis. 2.  Procedure options, risks and benefits reviewed with patient. Patient expresses understanding.     Electronically signed by BENITO Sanford CNP,  on 2/5/20 at 12:30 PM

## 2020-02-18 NOTE — DISCHARGE SUMMARY
Physician Discharge Summary     Patient ID:  Дмитрий Smith  [id number]  71 y.o.  1955    Admit date: 2/10/2020    Discharge date and time:  2/14/2020    Admission Diagnoses:   Patient Active Problem List   Diagnosis    History of hyperthyroidism    [facility]osis [facility] with ascites ([facility] [address]    Diabetes mellitus type 2, uncontrolled ([facility] [address]    Hyperlipidemia LDL goal <100    History of CVA (cerebrovascular accident)    Essential hypertension    Obesity (BMI 30-39. 9)    Hyponatremia       Discharge Diagnoses: as above    Consults: GI and nephrology    Procedures: see chart    Hospital Course: patient was admitted with weakness. She was found to suffer hyponatremia. The patient suffers cirrhosis and ascites as a result. She was seen by GI an nephrology. She was fluid restricted. Her sodium improved. She was seen by Pt/Ot and rehab was recommended. Discharge Exam:  See progress note from today    Condition:  stable    Disposition: rehab    Patient Instructions:   Discharge Medication List as of 2/14/2020  2:33 PM      CONTINUE these medications which have CHANGED    Details   furosemide (LASIX) 20 MG tablet Take 1 tablet by mouth daily, Disp-30 tablet, R-0Normal         CONTINUE these medications which have NOT CHANGED    Details   HYDROcodone-acetaminophen (NORCO) 5-325 MG per tablet Take 1 tablet by mouth every 6 hours as needed for Pain. Historical Med      ondansetron (ZOFRAN) 4 MG tablet Take 4 mg by mouth every 8 hours as needed for Nausea or VomitingHistorical Med      insulin lispro, 1 Unit Dial, (HUMALOG KWIKPEN) 100 UNIT/ML SOPN Take 10 units with meals plus sliding scale.  Max 60 units/day, Disp-6 pen, R-3Normal      insulin glargine (BASAGLAR KWIKPEN) 100 UNIT/ML injection pen Inject 48 units daily, Disp-5 pen, R-3Normal      lactulose (CHRONULAC) 10 GM/15ML solution Take 30 mLs by mouth 3 times daily, Disp-1 Bottle, R-1Normal      spironolactone (ALDACTONE) 100 MG tablet Take 1 tablet by mouth daily, Disp-30 tablet, R-3Normal      rifaximin (XIFAXAN) 550 MG tablet Take 1 tablet by mouth 2 times daily, Disp-42 tablet, R-0Normal      insulin aspart (NOVOLOG FLEXPEN) 100 UNIT/ML injection pen Inject into the skin 3 times daily (before meals) 10 units tid with meals and sliding scale with max of 20 units total.   150-199=2  200-249=4  250-299=6  300-349=8  350-400=10Historical Med      calcium carbonate (OSCAL) 500 MG TABS tablet Take 1,000 mg by mouth dailyHistorical Med      amLODIPine (NORVASC) 5 MG tablet Take 5 mg by mouth dailyHistorical Med      Insulin Syringe-Needle U-100 ([employer] INSULIN SYR 1CC/30G) 30G X 5/16\" 1 ML MISC Disp-250 each, R-12, NormalUse with insulin 4 times a day      INSULIN SYRINGE 1CC/29G ([employer] INS SYRINGE 1CC/29G) 29G X 1/2\" 1 ML MISC 4 TIMES DAILY Starting Mon 9/30/2019, Disp-200 each, R-5, Normal      Insulin Syringe-Needle U-100 29G X 5/16\" 1 ML MISC Disp-250 each, R-12, NormalUse with insulin 4 times a day      !! blood glucose monitor strips One-Touch Verio strips. Checks 3  times/day before meals and at bedtime and as needed for symptoms of irregular blood glucose, Disp-250 strip, R-5, Normal      !! blood glucose test strips (ONETOUCH VERIO) strip 4 TIMES DAILY Starting Mon 8/19/2019, Disp-150 each, R-5, NormalAs needed.       Blood Glucose Monitoring Suppl (Timmy Lim) w/Device KIT To test 4x/day, Disp-1 kit, R-0Normal      insulin detemir (LEVEMIR) 100 UNIT/ML injection vial Inject 55 Units into the skin nightly, Disp-4 vial, R-5Normal      amitriptyline (ELAVIL) 25 MG tablet TAKE 1 TABLET BY MOUTH NIGHTLY AS NEEDED FOR SLEEP, Disp-30 tablet, R-3Normal      albuterol sulfate  (90 Base) MCG/ACT inhaler INHALE 2 PUFFS THREE TIMES A DAY AS NEEDED FOR WHEEZING, R-0Historical Med      Insulin Syringe-Needle U-100 ([employer] INS SYR .3CC/29G) 29G X 1/2\" 0.3 ML MISC Disp-250 each, R-3, NormalFour times a day with insulin      atorvastatin (LIPITOR) 40 MG tablet Take 1 tablet by mouth nightly, Disp-30 tablet, R-3Normal      venlafaxine (EFFEXOR XR) 37.5 MG extended release capsule TAKE ONE CAPSULE BY MOUTH EVERY DAY, Disp-90 capsule, R-2Normal      montelukast (SINGULAIR) 10 MG tablet TAKE 1 TABLET BY MOUTH EVERY DAY AT NIGHT, Disp-90 tablet, R-2Normal      pantoprazole (PROTONIX) 40 MG tablet Take 40 mg by mouth daily Take morning of surgery with a sip of waterHistorical Med      !! Misc. Devices (ADJUST BATH/SHOWER SEAT) MISC Disp-1 each, R-0, PrintUse daily      !! Misc. Devices (COMMODE BEDSIDE) MISC Disp-1 each, R-0, PrintUse daily      Milk Thistle 1000 MG CAPS Take by mouth daily Ld 10-24-18Historical Med      !! blood glucose test strips (ASCENSIA AUTODISC VI;ONE TOUCH ULTRA TEST VI) strip DAILY Starting Thu 8/30/2018, Disp-100 each, R-3, NormalAs needed. !! - Potential duplicate medications found. Please discuss with provider. STOP taking these medications       insulin regular (HUMULIN R;NOVOLIN R) 100 UNIT/ML injection Comments:   Reason for Stopping:             Activity: activity as tolerated  Diet: low fat, low cholesterol diet    Follow up with dr Kelsi Medrano in 1 week. Follow up with dr Jacinto liao in 2-3 weeks. Follow up with dr Carol Mccloud in 2-3 weeks.       Note that over 30 minutes was spent in preparing discharge papers, discussing discharge with patient, medication review, etc.    Signed:  Caroline Tang    2/18/2020  7:30 AM

## 2020-02-18 NOTE — BRIEF OP NOTE
Brief Postoperative Note    Alida Coombs  YOB: 1955  [id number]    Pre-operative Diagnosis: ascites    Post-operative Diagnosis: Same    Procedure:paracentesis    Anesthesia: Local    Estimated Blood Loss: < 10 cc    Provider: Mariam Jacobs M.D.     Complications: none    Specimen obtained: Yes, fluid, serous    Findings: fluid, drained with catheter drainage      Electronically signed by BENITO Mae CNP  2/5/20 12:30 PM

## 2020-02-18 NOTE — PROGRESS NOTES
no CAD, no PVD, + Stroke (9/2018)   Kimber  receives Flushot every year and up to date with the Pneumonia vaccine     Regarding hyperthyroidism   The patient was diagnosed with hyperthyroidism in 2012 and was on Methimazole until 9/2018   Methimazole was discontinued in 9/2018   Recent labs 2/11/2020 - showed TSH of 5.8 and free T4 1.33     Thyroid US 4/2/2012  Overall thyroid is normal size. There is a 14 mm somewhat ill-defined border, slight hypoechoic possible nodule lower pole right lobe. Remainder right left lobes and isthmus shows no other focal nodules. There is overall heterogenous echogenicity throughout the thyroid. No other abnormalities are seen. Thyroid Uptake and scan 5/8/2012  Two and 24-hour uptake values were 9% and 28.8% respectively (Normal)     Most recent thyroid US 2/1/2019   The right lobe of thyroid measures 4.8 x 1.7 x 2.4 cm --> no nodules   The left lobe of thyroid measures 4.3 x 1.2 x 1.9 cm --> There are 2 small lesions in the left lobe of thyroid one of which measures 2 mm a second which measures 2 mm  There is a hypoechoic lesion in the isthmus measuring 9 x 5 x 8 mm    PAST MEDICAL HISTORY   Past Medical History:   Diagnosis Date    Arthritis     Cerebral artery occlusion with cerebral infarction ([address] 09/2018    some memory loss, some right side weakness.     Cough     post anesthesia    Diabetes mellitus ([address]     Diverticulitis     GERD (gastroesophageal reflux disease)     Hyperlipidemia     Hypertension     Liver cirrhosis (HCC)     Polyp of esophagus     Polyp, stomach     PONV (postoperative nausea and vomiting)     Prolonged emergence from general anesthesia     Thyroid disease     no meds     PAST SURGICAL HISTORY   Past Surgical History:   Procedure Laterality Date    CAROTID ENDARTERECTOMY      october 2018    CHOLECYSTECTOMY      COLONOSCOPY      COLONOSCOPY N/A 6/11/2019    COLONOSCOPY POLYPECTOMY SNARE/COLD BIOPSY performed by Elvi Fletcher DRUG REACTIONS   Allergies   Allergen Reactions    Tramadol Other (See Comments)     Disoriented, Dizziness  Disoriented, Dizziness       CURRENT MEDICATIONS     Current Outpatient Medications   Medication Sig Dispense Refill    insulin detemir (LEVEMIR FLEXTOUCH) 100 UNIT/ML injection pen Inject into the skin nightly 55 units daily      furosemide (LASIX) 20 MG tablet Take 1 tablet by mouth daily 30 tablet 0    ondansetron (ZOFRAN) 4 MG tablet Take 1 tablet by mouth every 8 hours as needed for Nausea or Vomiting (take one before luch and supper today and tomorrow than as needed) 30 tablet 1    HYDROcodone-acetaminophen (NORCO) 5-325 MG per tablet Take 1 tablet by mouth every 6 hours as needed for Pain.  lactulose (CHRONULAC) 10 GM/15ML solution Take 30 mLs by mouth 3 times daily 1 Bottle 1    spironolactone (ALDACTONE) 100 MG tablet Take 1 tablet by mouth daily 30 tablet 3    rifaximin (XIFAXAN) 550 MG tablet Take 1 tablet by mouth 2 times daily 42 tablet 0    insulin aspart (NOVOLOG FLEXPEN) 100 UNIT/ML injection pen Inject into the skin 3 times daily (before meals) 10 units tid with meals and sliding scale with max of 20 units total.   150-199=2  200-249=4  250-299=6  300-349=8  350-400=10      calcium carbonate (OSCAL) 500 MG TABS tablet Take 1,000 mg by mouth daily      amLODIPine (NORVASC) 5 MG tablet Take 5 mg by mouth daily      Insulin Syringe-Needle U-100 ([employer]ER INSULIN SYR 1CC/30G) 30G X 5/16\" 1 ML MISC Use with insulin 4 times a day 250 each 12    INSULIN SYRINGE 1CC/29G ([employer] INS SYRINGE 1CC/29G) 29G X 1/2\" 1 ML MISC 1 each by Does not apply route 4 times daily 200 each 5    Insulin Syringe-Needle U-100 29G X 5/16\" 1 ML MISC Use with insulin 4 times a day 250 each 12    blood glucose monitor strips One-Touch Verio strips.  Checks 3  times/day before meals and at bedtime and as needed for symptoms of irregular blood glucose 250 strip 5    blood glucose test strips (ONETOUCH VERIO) 03:40 AM    RDW 20.1 (H) 02/12/2020 03:40 AM     02/12/2020 03:40 AM    MPV 10.7 02/12/2020 03:40 AM      Lab Results   Component Value Date/Time     (L) 02/14/2020 06:58 AM    K 3.7 02/14/2020 06:58 AM    K 4.7 02/11/2020 04:35 AM    CO2 22 02/14/2020 06:58 AM    BUN 16 02/14/2020 06:58 AM    CALCIUM 8.3 (L) 02/14/2020 06:58 AM      Lab Results   Component Value Date    LABA1C 8.4 02/11/2020    GLUCOSE 173 02/14/2020    MALBCR 218.8 10/17/2018    LABMICR 273.5 10/17/2018    LABCREA 125 10/17/2018     Lab Results   Component Value Date    CHOL 127 09/09/2018    CHOL 169 04/13/2018    CHOL 169 12/18/2014    TRIG 136 09/09/2018    TRIG 168 04/13/2018    TRIG 148 12/18/2014    HDL 31 09/09/2018    HDL 39 04/13/2018    HDL 42 12/18/2014     Lab Results   Component Value Date    VITD25 33 04/13/2018     Lab Results   Component Value Date/Time    TSH 5.880 (H) 02/11/2020 04:35 AM    T4FREE 1.33 02/11/2020 04:35 AM    FT3 2.4 02/11/2020 04:35 AM    TSI 85 10/17/2018 11:02 AM    TPOABS 464.9 (H) 10/17/2018 11:02 AM    THGAB 1.0 10/17/2018 11:02 AM       Medical Records/Labs/Images review:   I personally reviewed and summarized previous records   All labs were reviewed independently     [address] Yahaira Coyle Mark Anthony Santana, a 71 y.o.-old female seen in for the following issues     Diabetes Mellitus Type 2    · Improving control but still above goal.   · Increase levemir 60 units twice a day   · Take novolog 42 units with breakfast, 42 units with lunch, 45 units with supper  + sliding scale   · Off Metformin (h/o liver disease)   · Advised to check blood sugars 4 times a day before meals and at bedtime and fax the results to our office in a week.   · Discussed with patient A1c and blood sugar goals   · Optimal blood sugars: 100-140 pre-prandial, < 180 peak post-prandial  · The patient counseled about the complications of uncontrolled diabetes   · Patient was counselled about the importance of self-blood

## 2020-02-19 ENCOUNTER — HOSPITAL ENCOUNTER (OUTPATIENT)
Dept: CARDIOLOGY | Age: 65
Discharge: HOME | End: 2020-02-19
Payer: COMMERCIAL

## 2020-02-19 ENCOUNTER — HOSPITAL ENCOUNTER (OUTPATIENT)
Dept: ULTRASOUND IMAGING | Age: 65
Discharge: HOME | End: 2020-02-21
Payer: COMMERCIAL

## 2020-02-19 ENCOUNTER — OFFICE VISIT (OUTPATIENT)
Dept: VASCULAR SURGERY | Age: 65
End: 2020-02-19
Payer: MEDICARE

## 2020-02-19 PROBLEM — I65.23 BILATERAL CAROTID ARTERY STENOSIS: Chronic | Status: ACTIVE | Noted: 2020-02-19

## 2020-02-19 PROCEDURE — 1111F DSCHRG MED/CURRENT MED MERGE: CPT | Performed by: SURGERY

## 2020-02-19 PROCEDURE — G8427 DOCREV CUR MEDS BY ELIG CLIN: HCPCS | Performed by: SURGERY

## 2020-02-19 PROCEDURE — 1123F ACP DISCUSS/DSCN MKR DOCD: CPT | Performed by: SURGERY

## 2020-02-19 PROCEDURE — G8482 FLU IMMUNIZE ORDER/ADMIN: HCPCS | Performed by: SURGERY

## 2020-02-19 PROCEDURE — 3017F COLORECTAL CA SCREEN DOC REV: CPT | Performed by: SURGERY

## 2020-02-19 PROCEDURE — 1090F PRES/ABSN URINE INCON ASSESS: CPT | Performed by: SURGERY

## 2020-02-19 PROCEDURE — 1036F TOBACCO NON-USER: CPT | Performed by: SURGERY

## 2020-02-19 PROCEDURE — G8417 CALC BMI ABV UP PARAM F/U: HCPCS | Performed by: SURGERY

## 2020-02-19 PROCEDURE — 4040F PNEUMOC VAC/ADMIN/RCVD: CPT | Performed by: SURGERY

## 2020-02-19 PROCEDURE — G8400 PT W/DXA NO RESULTS DOC: HCPCS | Performed by: SURGERY

## 2020-02-19 PROCEDURE — 93880 EXTRACRANIAL BILAT STUDY: CPT

## 2020-02-19 PROCEDURE — 99213 OFFICE O/P EST LOW 20 MIN: CPT | Performed by: SURGERY

## 2020-02-19 PROCEDURE — 76705 ECHO EXAM OF ABDOMEN: CPT

## 2020-02-19 NOTE — PROGRESS NOTES
Vascular Surgery Outpatient Progress Note      Chief Complaint   Patient presents with    Circulatory Problem     Follow up carotid stenosis       HISTORY OF PRESENT ILLNESS:                The patient is a 71 y.o. female who returns for follow-up evaluation of patient with a history of carotid artery disease. Currently she is doing well she denies any right-sided left-sided weakness numbness or vision changes. She denies any speech changes and otherwise is doing well. She is recently had banding of her varices. She otherwise is doing well. Her main issue is her frequent paracentesis. She had to stop aspirin secondary to these issues. Past Medical History:        Diagnosis Date    Arthritis     Cerebral artery occlusion with cerebral infarction ([facility] [address] 09/2018    some memory loss, some right side weakness.  Cough     post anesthesia    Diabetes mellitus ([facility] [address]     Diverticulitis     GERD (gastroesophageal reflux disease)     Hyperlipidemia     Hypertension     Liver cirrhosis (HCC)     Polyp of esophagus     Polyp, stomach     PONV (postoperative nausea and vomiting)     Prolonged emergence from general anesthesia     Thyroid disease     no meds     Past Surgical History:        Procedure Laterality Date    CAROTID ENDARTERECTOMY      october 2018    CHOLECYSTECTOMY      COLONOSCOPY      COLONOSCOPY N/A 6/11/2019    COLONOSCOPY POLYPECTOMY SNARE/COLD BIOPSY performed by Bhumika Jacobo MD at [address] [place] [address] 10/29/2018    LEFT CAROTID ENDARTERECTOMY performed by Mandi Kuhn MD at [facility] ENDOSCOPY N/A 6/11/2019    EGD BIOPSY performed by Bhumika [facility]o MD at [facility] ENDOSCOPY    VASCULAR SURGERY       Current Medications:   Prior to Admission medications    Medication Sig Start Date End Date Taking?  Authorizing Provider Yes Jhony Eldridge MD   Blood Glucose Monitoring Suppl (Timmy Lim) w/Device KIT To test 4x/day 8/19/19  Yes Jhony Eldridge MD   amitriptyline (ELAVIL) 25 MG tablet TAKE 1 TABLET BY MOUTH NIGHTLY AS NEEDED FOR SLEEP 7/9/19  Yes Maliha Betancourt DO   albuterol sulfate  (90 Base) MCG/ACT inhaler INHALE 2 PUFFS THREE TIMES A DAY AS NEEDED FOR WHEEZING 5/7/19  Yes Historical Provider, MD   Insulin Syringe-Needle U-100 ([employer] INS SYR .3CC/29G) 29G X 1/2\" 0.3 ML MISC Four times a day with insulin 3/14/19  Yes Jhony Eldridge MD   atorvastatin (LIPITOR) 40 MG tablet Take 1 tablet by mouth nightly 12/26/18  Yes Mandi Aldana,    venlafaxine (EFFEXOR XR) 37.5 MG extended release capsule TAKE ONE CAPSULE BY MOUTH EVERY DAY  Patient taking differently: Take 37.5 mg by mouth daily  11/6/18  Yes Erika Betancourt, DO   montelukast (SINGULAIR) 10 MG tablet TAKE 1 TABLET BY MOUTH EVERY DAY AT NIGHT  Patient taking differently: Take 10 mg by mouth every morning  10/31/18  Yes Zulema Stiles,    pantoprazole (PROTONIX) 40 MG tablet Take 40 mg by mouth daily Take morning of surgery with a sip of water   Yes Historical Provider, MD   Misc. Devices (ADJUST BATH/SHOWER SEAT) MISC Use daily 10/11/18  Yes Zulema Stiles DO   Misc. Devices (COMMODE BEDSIDE) MISC Use daily 10/5/18  Yes Maliha Betancourt DO   Milk Thistle 1000 MG CAPS Take by mouth daily Ld 10-24-18   Yes Historical Provider, MD   blood glucose test strips (ASCENSIA AUTODISC VI;ONE TOUCH ULTRA TEST VI) strip 1 each by In Vitro route daily As needed. 8/30/18  Yes Maliha Betancourt DO   HYDROcodone-acetaminophen (NORCO) 5-325 MG per tablet Take 1 tablet by mouth every 6 hours as needed for Pain. Historical Provider, MD   insulin lispro, 1 Unit Dial, (HUMALOG KWIKPEN) 100 UNIT/ML SOPN Take 10 units with meals plus sliding scale.  Max 60 units/day  Patient not taking: Reported on 2/18/2020 2/5/20   Jhony Eldridge MD   insulin

## 2020-02-21 ENCOUNTER — OFFICE VISIT (OUTPATIENT)
Dept: SURGERY | Age: 65
End: 2020-02-21
Payer: MEDICARE

## 2020-02-21 VITALS — HEIGHT: 67 IN | RESPIRATION RATE: 16 BRPM | BODY MASS INDEX: 32.02 KG/M2 | WEIGHT: 204 LBS

## 2020-02-21 PROCEDURE — 4040F PNEUMOC VAC/ADMIN/RCVD: CPT | Performed by: PLASTIC SURGERY

## 2020-02-21 PROCEDURE — G8400 PT W/DXA NO RESULTS DOC: HCPCS | Performed by: PLASTIC SURGERY

## 2020-02-21 PROCEDURE — 99204 OFFICE O/P NEW MOD 45 MIN: CPT | Performed by: PLASTIC SURGERY

## 2020-02-21 PROCEDURE — G8482 FLU IMMUNIZE ORDER/ADMIN: HCPCS | Performed by: PLASTIC SURGERY

## 2020-02-21 PROCEDURE — G8417 CALC BMI ABV UP PARAM F/U: HCPCS | Performed by: PLASTIC SURGERY

## 2020-02-21 PROCEDURE — 3017F COLORECTAL CA SCREEN DOC REV: CPT | Performed by: PLASTIC SURGERY

## 2020-02-21 PROCEDURE — 1090F PRES/ABSN URINE INCON ASSESS: CPT | Performed by: PLASTIC SURGERY

## 2020-02-21 PROCEDURE — 1036F TOBACCO NON-USER: CPT | Performed by: PLASTIC SURGERY

## 2020-02-21 PROCEDURE — 1111F DSCHRG MED/CURRENT MED MERGE: CPT | Performed by: PLASTIC SURGERY

## 2020-02-21 PROCEDURE — G8427 DOCREV CUR MEDS BY ELIG CLIN: HCPCS | Performed by: PLASTIC SURGERY

## 2020-02-21 PROCEDURE — 1123F ACP DISCUSS/DSCN MKR DOCD: CPT | Performed by: PLASTIC SURGERY

## 2020-02-21 NOTE — PROGRESS NOTES
Department of Plastic Surgery - Adult  Attending Consult Note      CHIEF COMPLAINT:  Lesion of right ear and scalp     History Obtained From:  patient    HISTORY OF PRESENT ILLNESS:                The patient is a 71 y.o. female who presents with right ear and scalp lesions. The patient states that they first noticed the lesion 1 year ago for right ear and 4 months for scalp. It has not grown in size since they first noticed the lesion. The lesion has  changed in color and has not had discharge or bleeding. The pt has nothad the lesion biopsied previously. The patient has not had the lesion removed previously. The patient states the lesion is mild painfull. The pt denies any associated symptoms. Past Medical History:    Past Medical History:   Diagnosis Date    Arthritis     Cerebral artery occlusion with cerebral infarction ([facility] [address] 09/2018    some memory loss, some right side weakness.     Cough     post anesthesia    Diabetes mellitus ([address]     Diverticulitis     GERD (gastroesophageal reflux disease)     Hyperlipidemia     Hypertension     Liver cirrhosis (HCC)     Polyp of esophagus     Polyp, stomach     PONV (postoperative nausea and vomiting)     Prolonged emergence from general anesthesia     Thyroid disease     no meds     Past Surgical History:    Past Surgical History:   Procedure Laterality Date    CAROTID ENDARTERECTOMY      october 2018    CHOLECYSTECTOMY      COLONOSCOPY      COLONOSCOPY N/A 6/11/2019    COLONOSCOPY POLYPECTOMY SNARE/COLD BIOPSY performed by Bhumika Jacobo MD at [address] [place] [address] 10/29/2018    LEFT CAROTID ENDARTERECTOMY performed by Mandi Kuhn MD at [facility] ENDOSCOPY N/A 6/11/2019    EGD BIOPSY performed by Bhumika Jacobo MD at [facility] ENDOSCOPY    VASCULAR SURGERY       Current Medications:   No bleeds. CARDIOVASCULAR:  negative for  chest pain, dyspnea, palpitations, syncope  GASTROINTESTINAL:  negative for nausea, vomiting, change in bowel habits, diarrhea, constipation and abdominal pain  EXTREMITIES: negative for edema  MUSCULOSKELETAL: negative for muscle weakness  SKIN: positive for lesionnegative for itching or rashes. HEME: negative for easy brusing or bleeding  BEHAVIOR/PSYCH:  negative for poor appetite, increased appetite, decreased sleep and poor concentration      PHYSICAL EXAM:    VITALS:  Resp 16   Ht 5' 7\" (1.702 m)   Wt 204 lb (92.5 kg)   BMI 31.95 kg/m²   CONSTITUTIONAL:  awake, alert, cooperative, no apparent distress, and appears stated age  EYES: PERRLA, EOMI, no signs of occular infection  LUNGS:  No increased work of breathing, good air exchange  CARDIOVASCULAR:  Normal apical impulse, regular rate and rhythm   EXTREMITIES: no signs of clubbing or cyanosis. MUSCULOSKELETAL: negative for flaccid muscle tone or spastic movements. NEURO: Cranial nerves II-XII grossly intact. No signs of agitated mood. SKIN: Helical curve of Right ear, 7mm x 7 mm brown, irregular shaped, raised lesion; middle portion of scalp, two red, minimally raised, 3x3mm circular lesions. no signs of bleeding,drainage or infection. Non tender to palpation.      DATA:    Labs: CBC:   Lab Results   Component Value Date    WBC 8.2 02/12/2020    RBC 4.49 02/12/2020    HGB 13.0 02/12/2020    HCT 38.4 02/12/2020    MCV 85.5 02/12/2020    MCH 29.0 02/12/2020    MCHC 33.9 02/12/2020    RDW 20.1 02/12/2020     02/12/2020    MPV 10.7 02/12/2020     BMP:    Lab Results   Component Value Date     02/14/2020    K 3.7 02/14/2020    K 4.7 02/11/2020    CL 95 02/14/2020    CO2 22 02/14/2020    BUN 16 02/14/2020    LABALBU 2.6 02/12/2020    CREATININE 0.8 02/14/2020    CALCIUM 8.3 02/14/2020    GFRAA >60 02/14/2020    LABGLOM >60 02/14/2020    GLUCOSE 173 02/14/2020       Radiology Review:  No radiology needed

## 2020-02-26 ENCOUNTER — HOSPITAL ENCOUNTER (OUTPATIENT)
Dept: ULTRASOUND IMAGING | Age: 65
Discharge: HOME | End: 2020-02-28
Payer: MEDICARE

## 2020-02-26 VITALS
BODY MASS INDEX: 32.96 KG/M2 | HEIGHT: 67 IN | OXYGEN SATURATION: 95 % | RESPIRATION RATE: 16 BRPM | HEART RATE: 95 BPM | WEIGHT: 210 LBS | DIASTOLIC BLOOD PRESSURE: 65 MMHG | SYSTOLIC BLOOD PRESSURE: 122 MMHG

## 2020-02-26 PROCEDURE — 49083 ABD PARACENTESIS W/IMAGING: CPT

## 2020-02-26 PROCEDURE — 6360000002 HC RX W HCPCS: Performed by: INTERNAL MEDICINE

## 2020-02-26 PROCEDURE — P9047 ALBUMIN (HUMAN), 25%, 50ML: HCPCS | Performed by: INTERNAL MEDICINE

## 2020-02-26 RX ORDER — SODIUM CHLORIDE 0.9 % (FLUSH) 0.9 %
10 SYRINGE (ML) INJECTION PRN
Status: DISCONTINUED | OUTPATIENT
Start: 2020-02-26 | End: 2020-02-29 | Stop reason: HOSPADM

## 2020-02-26 RX ORDER — ALBUMIN (HUMAN) 12.5 G/50ML
50 SOLUTION INTRAVENOUS ONCE
Status: COMPLETED | OUTPATIENT
Start: 2020-02-26 | End: 2020-02-26

## 2020-02-26 RX ADMIN — ALBUMIN (HUMAN) 50 G: 0.25 INJECTION, SOLUTION INTRAVENOUS at 14:06

## 2020-02-26 NOTE — H&P
Interventional Radiology  Attending Pre-operative History and Physical    DIAGNOSIS:    Patient Active Problem List   Diagnosis    History of hyperthyroidism    Cirrhosis of liver with ascites ([facility] [address]    Diabetes mellitus type 2, uncontrolled ([facility] [address]    Hyperlipidemia LDL goal <100    History of CVA (cerebrovascular accident)    Essential hypertension    Obesity (BMI 30-39. 9)    Hyponatremia    Bilateral carotid artery stenosis       CHIEF COMPLAINT: Ascites      Current Outpatient Medications:     tuberculin (APLISOL) 5 UNIT/0.1ML injection, Inject 5 Units into the skin once, Disp: , Rfl:     insulin detemir (LEVEMIR FLEXTOUCH) 100 UNIT/ML injection pen, Inject into the skin nightly 55 units daily, Disp: , Rfl:     furosemide (LASIX) 20 MG tablet, Take 1 tablet by mouth daily, Disp: 30 tablet, Rfl: 0    ondansetron (ZOFRAN) 4 MG tablet, Take 1 tablet by mouth every 8 hours as needed for Nausea or Vomiting (take one before luch and supper today and tomorrow than as needed), Disp: 30 tablet, Rfl: 1    HYDROcodone-acetaminophen (NORCO) 5-325 MG per tablet, Take 1 tablet by mouth every 6 hours as needed for Pain., Disp: , Rfl:     insulin lispro, 1 Unit Dial, (HUMALOG KWIKPEN) 100 UNIT/ML SOPN, Take 10 units with meals plus sliding scale.  Max 60 units/day, Disp: 6 pen, Rfl: 3    insulin glargine (BASAGLAR KWIKPEN) 100 UNIT/ML injection pen, Inject 48 units daily, Disp: 5 pen, Rfl: 3    lactulose (CHRONULAC) 10 GM/15ML solution, Take 30 mLs by mouth 3 times daily, Disp: 1 Bottle, Rfl: 1    spironolactone (ALDACTONE) 100 MG tablet, Take 1 tablet by mouth daily, Disp: 30 tablet, Rfl: 3    rifaximin (XIFAXAN) 550 MG tablet, Take 1 tablet by mouth 2 times daily, Disp: 42 tablet, Rfl: 0    insulin aspart (NOVOLOG FLEXPEN) 100 UNIT/ML injection pen, Inject into the skin 3 times daily (before meals) 10 units tid with meals and sliding scale with max of 20 units total.  150-199=2 200-249=4 250-299=6 surgery with a sip of water, Disp: , Rfl:     Misc. Devices (ADJUST BATH/SHOWER SEAT) MISC, Use daily, Disp: 1 each, Rfl: 0    Misc. Devices (COMMODE BEDSIDE) MISC, Use daily, Disp: 1 each, Rfl: 0    Milk Thistle 1000 MG CAPS, Take by mouth daily Ld 10-24-18, Disp: , Rfl:     blood glucose test strips (ASCENSIA AUTODISC VI;ONE TOUCH ULTRA TEST VI) strip, 1 each by In Vitro route daily As needed. , Disp: 100 each, Rfl: 3    Current Facility-Administered Medications:     sodium chloride flush 0.9 % injection 10 mL, 10 mL, Intravenous, PRN, Rexie Tierra Amarilla, APRN - CNP    Allergies   Allergen Reactions    Tramadol Other (See Comments)     Disoriented, Dizziness  Disoriented, Dizziness       Past Medical History:   Diagnosis Date    Arthritis     Cerebral artery occlusion with cerebral infarction ([facility] [address] 09/2018    some memory loss, some right side weakness.     Cough     post anesthesia    Diabetes mellitus ([address]     Diverticulitis     GERD (gastroesophageal reflux disease)     Hyperlipidemia     Hypertension     Liver cirrhosis (HCC)     Polyp of esophagus     Polyp, stomach     PONV (postoperative nausea and vomiting)     Prolonged emergence from general anesthesia     Thyroid disease     no meds       Past Surgical History:   Procedure Laterality Date    CAROTID ENDARTERECTOMY      october 2018    CHOLECYSTECTOMY      COLONOSCOPY      COLONOSCOPY N/A 6/11/2019    COLONOSCOPY POLYPECTOMY SNARE/COLD BIOPSY performed by Star Dennison MD at [address] [place] [address] 10/29/2018    LEFT CAROTID ENDARTERECTOMY performed by Ej Hess MD at [address] [place] [address] ENDOSCOPY      UPPER GASTROINTESTINAL ENDOSCOPY N/A 6/11/2019    EGD BIOPSY performed by Star Dennison MD at [facility] ENDOSCOPY    VASCULAR SURGERY         Family History   Problem Relation Age of Onset    Heart Disease Mother 28    Diabetes Mother     Heart Disease Sister     Diabetes Sister        Social History     Socioeconomic History    Marital status:      Spouse name: Not on file    Number of children: Not on file    Years of education: Not on file    Highest education level: Not on file   Occupational History    Not on file   Social Needs    Financial resource strain: Not on file    Food insecurity:     Worry: Not on file     Inability: Not on file    Transportation needs:     Medical: Not on file     Non-medical: Not on file   Tobacco Use    Smoking status: Passive Smoke Exposure - Never Smoker    Smokeless tobacco: Never Used   Substance and Sexual Activity    Alcohol use: No    Drug use: Never    Sexual activity: Never   Lifestyle    Physical activity:     Days per week: Not on file     Minutes per session: Not on file    Stress: Not on file   Relationships    Social connections:     Talks on phone: Not on file     Gets together: Not on file     Attends Tenri[employer] service: Not on file     Active member of club or organization: Not on file     Attends meetings of clubs or organizations: Not on file     Relationship status: Not on file    Intimate partner violence:     Fear of current or ex partner: Not on file     Emotionally abused: Not on file     Physically abused: Not on file     Forced sexual activity: Not on file   Other Topics Concern    Not on file   Social History Narrative    Not on file       ROS: Non-contributory other than as noted above    PHYSICAL EXAM:      Heent: Alert and orientated. Heart:  Regular rhythm    Lungs:  SOB secondary to abdominal distention. Abdomen:  Firmly distended, nontender, hypoactive bowel sounds.        DATA:  CBC:   Lab Results   Component Value Date    WBC 8.2 02/12/2020    RBC 4.49 02/12/2020    HGB 13.0 02/12/2020    HCT 38.4 02/12/2020    MCV 85.5 02/12/2020    MCH 29.0 02/12/2020    MCHC 33.9 02/12/2020    RDW 20.1 02/12/2020     02/12/2020    MPV 10.7

## 2020-03-04 ENCOUNTER — HOSPITAL ENCOUNTER (OUTPATIENT)
Dept: ULTRASOUND IMAGING | Age: 65
Discharge: HOME | End: 2020-03-06
Payer: MEDICARE

## 2020-03-04 VITALS
BODY MASS INDEX: 32.96 KG/M2 | HEART RATE: 95 BPM | WEIGHT: 210 LBS | RESPIRATION RATE: 16 BRPM | SYSTOLIC BLOOD PRESSURE: 121 MMHG | HEIGHT: 67 IN | OXYGEN SATURATION: 96 % | DIASTOLIC BLOOD PRESSURE: 62 MMHG

## 2020-03-04 PROCEDURE — 49083 ABD PARACENTESIS W/IMAGING: CPT

## 2020-03-04 RX ORDER — SODIUM CHLORIDE 0.9 % (FLUSH) 0.9 %
10 SYRINGE (ML) INJECTION PRN
Status: DISCONTINUED | OUTPATIENT
Start: 2020-03-04 | End: 2020-03-07 | Stop reason: HOSPADM

## 2020-03-04 NOTE — H&P
MPV 10.7 02/12/2020     CBC with Differential:    Lab Results   Component Value Date    WBC 8.2 02/12/2020    RBC 4.49 02/12/2020    HGB 13.0 02/12/2020    HCT 38.4 02/12/2020     02/12/2020    MCV 85.5 02/12/2020    MCH 29.0 02/12/2020    MCHC 33.9 02/12/2020    RDW 20.1 02/12/2020    LYMPHOPCT 26.6 02/12/2020    MONOPCT 12.9 02/12/2020    BASOPCT 1.1 02/12/2020    MONOSABS 1.06 02/12/2020    LYMPHSABS 2.18 02/12/2020    EOSABS 0.16 02/12/2020    BASOSABS 0.09 02/12/2020     Platelets:    Lab Results   Component Value Date     02/12/2020     BUN/Creatinine:    Lab Results   Component Value Date    BUN 16 02/14/2020    CREATININE 0.8 02/14/2020       ASSESSMENT AND PLAN:  1.  U/S guided paracentesis. 2.  Procedure options, risks and benefits reviewed with patient. Patient expresses understanding.     Electronically signed by BENITO Elkins CNP,  on 3/4/20 at 10:15 AM

## 2020-03-10 ENCOUNTER — APPOINTMENT (OUTPATIENT)
Dept: GENERAL RADIOLOGY | Age: 65
DRG: 433 | End: 2020-03-10
Payer: MEDICARE

## 2020-03-10 ENCOUNTER — HOSPITAL ENCOUNTER (INPATIENT)
Age: 65
LOS: 7 days | Discharge: HOME HEALTH | DRG: 433 | End: 2020-03-17
Attending: EMERGENCY MEDICINE | Admitting: INTERNAL MEDICINE
Payer: MEDICARE

## 2020-03-10 PROBLEM — E87.5 HYPERKALEMIA: Status: ACTIVE | Noted: 2020-03-10

## 2020-03-10 LAB
ALBUMIN SERPL-MCNC: 2.8 G/DL (ref 3.5–5.2)
ALP BLD-CCNC: 177 U/L (ref 35–104)
ALT SERPL-CCNC: 32 U/L (ref 0–32)
AMMONIA: 43.3 UMOL/L (ref 11–51)
ANION GAP SERPL CALCULATED.3IONS-SCNC: 10 MMOL/L (ref 7–16)
ANISOCYTOSIS: ABNORMAL
AST SERPL-CCNC: 49 U/L (ref 0–31)
BASOPHILS ABSOLUTE: 0.12 E9/L (ref 0–0.2)
BASOPHILS RELATIVE PERCENT: 1.2 % (ref 0–2)
BILIRUB SERPL-MCNC: 1.4 MG/DL (ref 0–1.2)
BILIRUBIN DIRECT: 0.6 MG/DL (ref 0–0.3)
BILIRUBIN, INDIRECT: 0.8 MG/DL (ref 0–1)
BUN BLDV-MCNC: 16 MG/DL (ref 8–23)
CALCIUM SERPL-MCNC: 9 MG/DL (ref 8.6–10.2)
CHLORIDE BLD-SCNC: 89 MMOL/L (ref 98–107)
CO2: 21 MMOL/L (ref 22–29)
CREAT SERPL-MCNC: 0.9 MG/DL (ref 0.5–1)
EOSINOPHILS ABSOLUTE: 0.18 E9/L (ref 0.05–0.5)
EOSINOPHILS RELATIVE PERCENT: 1.7 % (ref 0–6)
GFR AFRICAN AMERICAN: >60
GFR NON-AFRICAN AMERICAN: >60 ML/MIN/1.73
GLUCOSE BLD-MCNC: 208 MG/DL (ref 74–99)
HCT VFR BLD CALC: 41.1 % (ref 34–48)
HEMOGLOBIN: 14.1 G/DL (ref 11.5–15.5)
IMMATURE GRANULOCYTES #: 0.12 E9/L
IMMATURE GRANULOCYTES %: 1.2 % (ref 0–5)
LYMPHOCYTES ABSOLUTE: 1.98 E9/L (ref 1.5–4)
LYMPHOCYTES RELATIVE PERCENT: 19.1 % (ref 20–42)
MCH RBC QN AUTO: 30.5 PG (ref 26–35)
MCHC RBC AUTO-ENTMCNC: 34.3 % (ref 32–34.5)
MCV RBC AUTO: 89 FL (ref 80–99.9)
MONOCYTES ABSOLUTE: 1.19 E9/L (ref 0.1–0.95)
MONOCYTES RELATIVE PERCENT: 11.5 % (ref 2–12)
NEUTROPHILS ABSOLUTE: 6.78 E9/L (ref 1.8–7.3)
NEUTROPHILS RELATIVE PERCENT: 65.3 % (ref 43–80)
OVALOCYTES: ABNORMAL
PDW BLD-RTO: 20.3 FL (ref 11.5–15)
PLATELET # BLD: 265 E9/L (ref 130–450)
PMV BLD AUTO: 10.1 FL (ref 7–12)
POIKILOCYTES: ABNORMAL
POLYCHROMASIA: ABNORMAL
POTASSIUM REFLEX MAGNESIUM: 5.8 MMOL/L (ref 3.5–5)
PRO-BNP: 77 PG/ML (ref 0–125)
RBC # BLD: 4.62 E12/L (ref 3.5–5.5)
SCHISTOCYTES: ABNORMAL
SODIUM BLD-SCNC: 120 MMOL/L (ref 132–146)
TOTAL PROTEIN: 7.2 G/DL (ref 6.4–8.3)
TROPONIN: 0.03 NG/ML (ref 0–0.03)
WBC # BLD: 10.4 E9/L (ref 4.5–11.5)

## 2020-03-10 PROCEDURE — 2060000000 HC ICU INTERMEDIATE R&B

## 2020-03-10 PROCEDURE — 2580000003 HC RX 258: Performed by: PHYSICIAN ASSISTANT

## 2020-03-10 PROCEDURE — 71045 X-RAY EXAM CHEST 1 VIEW: CPT

## 2020-03-10 PROCEDURE — 36415 COLL VENOUS BLD VENIPUNCTURE: CPT

## 2020-03-10 PROCEDURE — 80048 BASIC METABOLIC PNL TOTAL CA: CPT

## 2020-03-10 PROCEDURE — 83880 ASSAY OF NATRIURETIC PEPTIDE: CPT

## 2020-03-10 PROCEDURE — 6360000002 HC RX W HCPCS: Performed by: PHYSICIAN ASSISTANT

## 2020-03-10 PROCEDURE — 82140 ASSAY OF AMMONIA: CPT

## 2020-03-10 PROCEDURE — 93005 ELECTROCARDIOGRAM TRACING: CPT | Performed by: PHYSICIAN ASSISTANT

## 2020-03-10 PROCEDURE — 2500000003 HC RX 250 WO HCPCS: Performed by: PHYSICIAN ASSISTANT

## 2020-03-10 PROCEDURE — 6370000000 HC RX 637 (ALT 250 FOR IP): Performed by: PHYSICIAN ASSISTANT

## 2020-03-10 PROCEDURE — 99284 EMERGENCY DEPT VISIT MOD MDM: CPT

## 2020-03-10 PROCEDURE — 84484 ASSAY OF TROPONIN QUANT: CPT

## 2020-03-10 PROCEDURE — 80076 HEPATIC FUNCTION PANEL: CPT

## 2020-03-10 PROCEDURE — 85025 COMPLETE CBC W/AUTO DIFF WBC: CPT

## 2020-03-10 RX ORDER — AMITRIPTYLINE HYDROCHLORIDE 25 MG/1
25 TABLET, FILM COATED ORAL NIGHTLY PRN
Status: DISCONTINUED | OUTPATIENT
Start: 2020-03-11 | End: 2020-03-11

## 2020-03-10 RX ORDER — NICOTINE POLACRILEX 4 MG
15 LOZENGE BUCCAL PRN
Status: DISCONTINUED | OUTPATIENT
Start: 2020-03-10 | End: 2020-03-12 | Stop reason: SDUPTHER

## 2020-03-10 RX ORDER — ATORVASTATIN CALCIUM 40 MG/1
40 TABLET, FILM COATED ORAL NIGHTLY
Status: DISCONTINUED | OUTPATIENT
Start: 2020-03-11 | End: 2020-03-17 | Stop reason: HOSPADM

## 2020-03-10 RX ORDER — FUROSEMIDE 20 MG/1
20 TABLET ORAL DAILY
Status: DISCONTINUED | OUTPATIENT
Start: 2020-03-11 | End: 2020-03-17 | Stop reason: HOSPADM

## 2020-03-10 RX ORDER — PANTOPRAZOLE SODIUM 40 MG/1
40 TABLET, DELAYED RELEASE ORAL DAILY
Status: DISCONTINUED | OUTPATIENT
Start: 2020-03-11 | End: 2020-03-17 | Stop reason: HOSPADM

## 2020-03-10 RX ORDER — AMLODIPINE BESYLATE 5 MG/1
5 TABLET ORAL DAILY
Status: DISCONTINUED | OUTPATIENT
Start: 2020-03-11 | End: 2020-03-17 | Stop reason: HOSPADM

## 2020-03-10 RX ORDER — DEXTROSE MONOHYDRATE 50 MG/ML
100 INJECTION, SOLUTION INTRAVENOUS PRN
Status: DISCONTINUED | OUTPATIENT
Start: 2020-03-10 | End: 2020-03-17 | Stop reason: HOSPADM

## 2020-03-10 RX ORDER — LACTULOSE 10 G/15ML
30 SOLUTION ORAL 3 TIMES DAILY
Status: DISCONTINUED | OUTPATIENT
Start: 2020-03-11 | End: 2020-03-17 | Stop reason: HOSPADM

## 2020-03-10 RX ORDER — SODIUM CHLORIDE 0.9 % (FLUSH) 0.9 %
10 SYRINGE (ML) INJECTION EVERY 12 HOURS SCHEDULED
Status: DISCONTINUED | OUTPATIENT
Start: 2020-03-11 | End: 2020-03-17 | Stop reason: HOSPADM

## 2020-03-10 RX ORDER — DEXTROSE MONOHYDRATE 25 G/50ML
12.5 INJECTION, SOLUTION INTRAVENOUS PRN
Status: DISCONTINUED | OUTPATIENT
Start: 2020-03-10 | End: 2020-03-12 | Stop reason: SDUPTHER

## 2020-03-10 RX ORDER — DEXTROSE MONOHYDRATE 25 G/50ML
25 INJECTION, SOLUTION INTRAVENOUS ONCE
Status: COMPLETED | OUTPATIENT
Start: 2020-03-10 | End: 2020-03-10

## 2020-03-10 RX ORDER — HYDROCODONE BITARTRATE AND ACETAMINOPHEN 5; 325 MG/1; MG/1
1 TABLET ORAL EVERY 6 HOURS PRN
Status: DISCONTINUED | OUTPATIENT
Start: 2020-03-10 | End: 2020-03-17 | Stop reason: HOSPADM

## 2020-03-10 RX ORDER — NICOTINE POLACRILEX 4 MG
15 LOZENGE BUCCAL PRN
Status: DISCONTINUED | OUTPATIENT
Start: 2020-03-10 | End: 2020-03-17 | Stop reason: HOSPADM

## 2020-03-10 RX ORDER — ONDANSETRON 2 MG/ML
4 INJECTION INTRAMUSCULAR; INTRAVENOUS EVERY 6 HOURS PRN
Status: DISCONTINUED | OUTPATIENT
Start: 2020-03-10 | End: 2020-03-17 | Stop reason: HOSPADM

## 2020-03-10 RX ORDER — VENLAFAXINE HYDROCHLORIDE 37.5 MG/1
37.5 CAPSULE, EXTENDED RELEASE ORAL DAILY
Status: DISCONTINUED | OUTPATIENT
Start: 2020-03-11 | End: 2020-03-17 | Stop reason: HOSPADM

## 2020-03-10 RX ORDER — ACETAMINOPHEN 650 MG/1
650 SUPPOSITORY RECTAL EVERY 6 HOURS PRN
Status: DISCONTINUED | OUTPATIENT
Start: 2020-03-10 | End: 2020-03-17 | Stop reason: HOSPADM

## 2020-03-10 RX ORDER — ACETAMINOPHEN 325 MG/1
650 TABLET ORAL EVERY 6 HOURS PRN
Status: DISCONTINUED | OUTPATIENT
Start: 2020-03-10 | End: 2020-03-17 | Stop reason: HOSPADM

## 2020-03-10 RX ORDER — DEXTROSE MONOHYDRATE 25 G/50ML
12.5 INJECTION, SOLUTION INTRAVENOUS PRN
Status: DISCONTINUED | OUTPATIENT
Start: 2020-03-10 | End: 2020-03-17 | Stop reason: HOSPADM

## 2020-03-10 RX ORDER — SODIUM CHLORIDE 0.9 % (FLUSH) 0.9 %
10 SYRINGE (ML) INJECTION PRN
Status: DISCONTINUED | OUTPATIENT
Start: 2020-03-10 | End: 2020-03-17 | Stop reason: HOSPADM

## 2020-03-10 RX ORDER — SODIUM POLYSTYRENE SULFONATE 15 G/60ML
15 SUSPENSION ORAL; RECTAL ONCE
Status: DISCONTINUED | OUTPATIENT
Start: 2020-03-10 | End: 2020-03-10 | Stop reason: RX

## 2020-03-10 RX ORDER — MONTELUKAST SODIUM 10 MG/1
10 TABLET ORAL EVERY MORNING
Status: DISCONTINUED | OUTPATIENT
Start: 2020-03-11 | End: 2020-03-17 | Stop reason: HOSPADM

## 2020-03-10 RX ORDER — DEXTROSE MONOHYDRATE 50 MG/ML
100 INJECTION, SOLUTION INTRAVENOUS PRN
Status: DISCONTINUED | OUTPATIENT
Start: 2020-03-10 | End: 2020-03-12 | Stop reason: SDUPTHER

## 2020-03-10 RX ORDER — SODIUM POLYSTYRENE SULFONATE 4.1 MEQ/G
15 POWDER, FOR SUSPENSION ORAL; RECTAL ONCE
Status: COMPLETED | OUTPATIENT
Start: 2020-03-10 | End: 2020-03-10

## 2020-03-10 RX ORDER — SPIRONOLACTONE 25 MG/1
100 TABLET ORAL DAILY
Status: CANCELLED | OUTPATIENT
Start: 2020-03-11

## 2020-03-10 RX ORDER — PROMETHAZINE HYDROCHLORIDE 25 MG/1
12.5 TABLET ORAL EVERY 6 HOURS PRN
Status: DISCONTINUED | OUTPATIENT
Start: 2020-03-10 | End: 2020-03-17 | Stop reason: HOSPADM

## 2020-03-10 RX ORDER — IPRATROPIUM BROMIDE AND ALBUTEROL SULFATE 2.5; .5 MG/3ML; MG/3ML
1 SOLUTION RESPIRATORY (INHALATION)
Status: DISCONTINUED | OUTPATIENT
Start: 2020-03-11 | End: 2020-03-17 | Stop reason: HOSPADM

## 2020-03-10 RX ADMIN — CALCIUM GLUCONATE 1 G: 98 INJECTION, SOLUTION INTRAVENOUS at 22:07

## 2020-03-10 RX ADMIN — DEXTROSE MONOHYDRATE 25 G: 25 INJECTION, SOLUTION INTRAVENOUS at 22:07

## 2020-03-10 RX ADMIN — SODIUM POLYSTYRENE SULFONATE 15 G: 1 POWDER ORAL; RECTAL at 22:07

## 2020-03-10 RX ADMIN — INSULIN HUMAN 6 UNITS: 100 INJECTION, SOLUTION PARENTERAL at 22:07

## 2020-03-10 RX ADMIN — SODIUM BICARBONATE 50 MEQ: 84 INJECTION, SOLUTION INTRAVENOUS at 20:51

## 2020-03-10 ASSESSMENT — ENCOUNTER SYMPTOMS
COLOR CHANGE: 0
SINUS PAIN: 0
BACK PAIN: 0
SINUS PRESSURE: 0
NAUSEA: 0
FACIAL SWELLING: 0
COUGH: 0
DIARRHEA: 0
ABDOMINAL PAIN: 0
SHORTNESS OF BREATH: 1
CHEST TIGHTNESS: 0
TROUBLE SWALLOWING: 0
SORE THROAT: 0
VOMITING: 0
CONSTIPATION: 0

## 2020-03-10 ASSESSMENT — PAIN DESCRIPTION - DESCRIPTORS: DESCRIPTORS: CONSTANT

## 2020-03-10 ASSESSMENT — PAIN DESCRIPTION - PAIN TYPE: TYPE: CHRONIC PAIN

## 2020-03-10 ASSESSMENT — PAIN DESCRIPTION - LOCATION: LOCATION: ABDOMEN

## 2020-03-10 ASSESSMENT — PAIN SCALES - GENERAL: PAINLEVEL_OUTOF10: 8

## 2020-03-10 NOTE — ED PROVIDER NOTES
stated within HPI, all other systems reviewed and are negative.      --------------------------------------------- PAST HISTORY ---------------------------------------------  Past Medical History:  has a past medical history of Arthritis, Cerebral artery occlusion with cerebral infarction ([facility] [address] Cough, Diabetes mellitus ([facility] [address] Diverticulitis, GERD (gastroesophageal reflux disease), Hyperlipidemia, Hypertension, Liver cirrhosis ([facility] [address] Polyp of esophagus, Polyp, stomach, PONV (postoperative nausea and vomiting), Prolonged emergence from general anesthesia, and Thyroid disease. Past Surgical History:  has a past surgical history that includes Cholecystectomy; Tonsillectomy; Colonoscopy; Upper gastrointestinal endoscopy; Hysterectomy (2003); pr thromboendartectmy neck,neck incis (Left, 10/29/2018); Carotid endarterectomy; Colonoscopy (N/A, 6/11/2019); Upper gastrointestinal endoscopy (N/A, 6/11/2019); and vascular surgery. Social History:  reports that she is a non-smoker but has been exposed to tobacco smoke. The exposure started about 65 years ago. She has never used smokeless tobacco. She reports that she does not drink alcohol or use drugs. Family History: family history includes Diabetes in her mother and sister; Heart Disease in her sister; Heart Disease (age of onset: 28) in her mother. The patients home medications have been reviewed.     Allergies: Tramadol    -------------------------------------------------- RESULTS -------------------------------------------------  All laboratory and radiology results have been personally reviewed by myself   LABS:  Results for orders placed or performed during the hospital encounter of 64/55/83   Basic Metabolic Panel w/ Reflex to MG   Result Value Ref Range    Sodium 120 (L) 132 - 146 mmol/L    Potassium reflex Magnesium 5.8 (H) 3.5 - 5.0 mmol/L    Chloride 89 (L) 98 - 107 mmol/L    CO2 21 (L) 22 - 29 mmol/L    Anion Gap 10 7 - 16 mmol/L    Glucose 208 (H) 74 the hyperkalemia with 6 units of insulin, D50, calcium gluconate and Kayexalate. Fluids will be held until sodium is reevaluated due to the patient having significant third spacing of fluid. She is due for paracentesis tomorrow. The patient's Spironolactone is also to be held. I discussed the plan with Cate Hennessy PA-C. Patient will be admitted to telemetry under the care of Dr. Jeffrey Kenney for further treatment and management with nephrology on consult. Counseling: The emergency provider has spoken with the patient and family member patient and son and discussed todays results, in addition to providing specific details for the plan of care and counseling regarding the diagnosis and prognosis. Questions are answered at this time and they are agreeable with the plan.      --------------------------------- IMPRESSION AND DISPOSITION ---------------------------------    IMPRESSION  1. Hyperkalemia    2. Hyponatremia    3. Cirrhosis of liver with ascites, unspecified hepatic cirrhosis type ([address]        DISPOSITION  Disposition: Discharge to home  Patient condition is good      Electronically signed by Kimberly Bustamante PA-C   DD: 3/10/20  **This report was transcribed using voice recognition software. Every effort was made to ensure accuracy; however, inadvertent computerized transcription errors may be present.   END OF ED PROVIDER NOTE          Kimberly Bustamante PA-C  03/10/20 [id number]

## 2020-03-11 LAB
ALBUMIN SERPL-MCNC: 2.5 G/DL (ref 3.5–5.2)
ALP BLD-CCNC: 153 U/L (ref 35–104)
ALT SERPL-CCNC: 31 U/L (ref 0–32)
ANION GAP SERPL CALCULATED.3IONS-SCNC: 10 MMOL/L (ref 7–16)
ANION GAP SERPL CALCULATED.3IONS-SCNC: 11 MMOL/L (ref 7–16)
ANION GAP SERPL CALCULATED.3IONS-SCNC: 11 MMOL/L (ref 7–16)
ANION GAP SERPL CALCULATED.3IONS-SCNC: 12 MMOL/L (ref 7–16)
AST SERPL-CCNC: 47 U/L (ref 0–31)
BACTERIA: ABNORMAL /HPF
BASOPHILS ABSOLUTE: 0.1 E9/L (ref 0–0.2)
BASOPHILS RELATIVE PERCENT: 1.1 % (ref 0–2)
BILIRUB SERPL-MCNC: 1.9 MG/DL (ref 0–1.2)
BILIRUBIN URINE: NEGATIVE
BLOOD, URINE: NEGATIVE
BUN BLDV-MCNC: 14 MG/DL (ref 8–23)
BUN BLDV-MCNC: 15 MG/DL (ref 8–23)
CALCIUM SERPL-MCNC: 8.5 MG/DL (ref 8.6–10.2)
CALCIUM SERPL-MCNC: 8.6 MG/DL (ref 8.6–10.2)
CALCIUM SERPL-MCNC: 8.6 MG/DL (ref 8.6–10.2)
CALCIUM SERPL-MCNC: 8.8 MG/DL (ref 8.6–10.2)
CHLORIDE BLD-SCNC: 89 MMOL/L (ref 98–107)
CHLORIDE BLD-SCNC: 91 MMOL/L (ref 98–107)
CHLORIDE URINE RANDOM: 53 MMOL/L
CLARITY: CLEAR
CO2: 22 MMOL/L (ref 22–29)
CO2: 23 MMOL/L (ref 22–29)
COLOR: YELLOW
CORTISOL TOTAL: 7.61 MCG/DL (ref 2.68–18.4)
CREAT SERPL-MCNC: 0.8 MG/DL (ref 0.5–1)
CREAT SERPL-MCNC: 0.8 MG/DL (ref 0.5–1)
CREAT SERPL-MCNC: 1.1 MG/DL (ref 0.5–1)
CREAT SERPL-MCNC: 1.1 MG/DL (ref 0.5–1)
CREATININE URINE: 98 MG/DL (ref 29–226)
EKG ATRIAL RATE: 93 BPM
EKG P AXIS: -7 DEGREES
EKG P-R INTERVAL: 150 MS
EKG Q-T INTERVAL: 370 MS
EKG QRS DURATION: 88 MS
EKG QTC CALCULATION (BAZETT): 460 MS
EKG R AXIS: -17 DEGREES
EKG T AXIS: 64 DEGREES
EKG VENTRICULAR RATE: 93 BPM
EOSINOPHILS ABSOLUTE: 0.27 E9/L (ref 0.05–0.5)
EOSINOPHILS RELATIVE PERCENT: 2.9 % (ref 0–6)
GFR AFRICAN AMERICAN: >60
GFR NON-AFRICAN AMERICAN: 50 ML/MIN/1.73
GFR NON-AFRICAN AMERICAN: 50 ML/MIN/1.73
GFR NON-AFRICAN AMERICAN: >60 ML/MIN/1.73
GFR NON-AFRICAN AMERICAN: >60 ML/MIN/1.73
GLUCOSE BLD-MCNC: 151 MG/DL (ref 74–99)
GLUCOSE BLD-MCNC: 163 MG/DL (ref 74–99)
GLUCOSE BLD-MCNC: 217 MG/DL (ref 74–99)
GLUCOSE BLD-MCNC: 234 MG/DL (ref 74–99)
GLUCOSE URINE: 100 MG/DL
HCT VFR BLD CALC: 39 % (ref 34–48)
HEMOGLOBIN: 13.3 G/DL (ref 11.5–15.5)
IMMATURE GRANULOCYTES #: 0.09 E9/L
IMMATURE GRANULOCYTES %: 1 % (ref 0–5)
INR BLD: 1.5
KETONES, URINE: NEGATIVE MG/DL
LEUKOCYTE ESTERASE, URINE: ABNORMAL
LYMPHOCYTES ABSOLUTE: 2.15 E9/L (ref 1.5–4)
LYMPHOCYTES RELATIVE PERCENT: 22.8 % (ref 20–42)
MAGNESIUM: 1.8 MG/DL (ref 1.6–2.6)
MCH RBC QN AUTO: 30.6 PG (ref 26–35)
MCHC RBC AUTO-ENTMCNC: 34.1 % (ref 32–34.5)
MCV RBC AUTO: 89.9 FL (ref 80–99.9)
METER GLUCOSE: 145 MG/DL (ref 74–99)
METER GLUCOSE: 188 MG/DL (ref 74–99)
METER GLUCOSE: 189 MG/DL (ref 74–99)
METER GLUCOSE: 191 MG/DL (ref 74–99)
METER GLUCOSE: 221 MG/DL (ref 74–99)
MONOCYTES ABSOLUTE: 1.27 E9/L (ref 0.1–0.95)
MONOCYTES RELATIVE PERCENT: 13.5 % (ref 2–12)
NEUTROPHILS ABSOLUTE: 5.54 E9/L (ref 1.8–7.3)
NEUTROPHILS RELATIVE PERCENT: 58.7 % (ref 43–80)
NITRITE, URINE: NEGATIVE
OSMOLALITY: 275 MOSM/KG (ref 285–310)
PDW BLD-RTO: 19.9 FL (ref 11.5–15)
PH UA: 7 (ref 5–9)
PHOSPHORUS: 3.5 MG/DL (ref 2.5–4.5)
PLATELET # BLD: 247 E9/L (ref 130–450)
PMV BLD AUTO: 10.3 FL (ref 7–12)
POTASSIUM REFLEX MAGNESIUM: 5 MMOL/L (ref 3.5–5)
POTASSIUM SERPL-SCNC: 4.5 MMOL/L (ref 3.5–5)
POTASSIUM SERPL-SCNC: 4.8 MMOL/L (ref 3.5–5)
POTASSIUM SERPL-SCNC: 5 MMOL/L (ref 3.5–5)
POTASSIUM SERPL-SCNC: 5.5 MMOL/L (ref 3.5–5)
PROTEIN PROTEIN: 24 MG/DL (ref 0–12)
PROTEIN UA: NEGATIVE MG/DL
PROTEIN/CREAT RATIO: 0.2
PROTEIN/CREAT RATIO: 0.2 (ref 0–0.2)
PROTHROMBIN TIME: 17.4 SEC (ref 9.3–12.4)
RBC # BLD: 4.34 E12/L (ref 3.5–5.5)
RBC UA: ABNORMAL /HPF (ref 0–2)
SODIUM BLD-SCNC: 122 MMOL/L (ref 132–146)
SODIUM BLD-SCNC: 122 MMOL/L (ref 132–146)
SODIUM BLD-SCNC: 123 MMOL/L (ref 132–146)
SODIUM BLD-SCNC: 124 MMOL/L (ref 132–146)
SODIUM URINE: 50 MMOL/L
SPECIFIC GRAVITY UA: 1.01 (ref 1–1.03)
TOTAL PROTEIN: 6.8 G/DL (ref 6.4–8.3)
UROBILINOGEN, URINE: 2 E.U./DL
WBC # BLD: 9.4 E9/L (ref 4.5–11.5)
WBC UA: ABNORMAL /HPF (ref 0–5)

## 2020-03-11 PROCEDURE — 2060000000 HC ICU INTERMEDIATE R&B

## 2020-03-11 PROCEDURE — 80048 BASIC METABOLIC PNL TOTAL CA: CPT

## 2020-03-11 PROCEDURE — 80053 COMPREHEN METABOLIC PANEL: CPT

## 2020-03-11 PROCEDURE — 82962 GLUCOSE BLOOD TEST: CPT

## 2020-03-11 PROCEDURE — 94664 DEMO&/EVAL PT USE INHALER: CPT

## 2020-03-11 PROCEDURE — 82436 ASSAY OF URINE CHLORIDE: CPT

## 2020-03-11 PROCEDURE — 97165 OT EVAL LOW COMPLEX 30 MIN: CPT

## 2020-03-11 PROCEDURE — 94640 AIRWAY INHALATION TREATMENT: CPT

## 2020-03-11 PROCEDURE — 6360000002 HC RX W HCPCS: Performed by: PHYSICIAN ASSISTANT

## 2020-03-11 PROCEDURE — P9047 ALBUMIN (HUMAN), 25%, 50ML: HCPCS | Performed by: INTERNAL MEDICINE

## 2020-03-11 PROCEDURE — 6370000000 HC RX 637 (ALT 250 FOR IP): Performed by: PHYSICIAN ASSISTANT

## 2020-03-11 PROCEDURE — 81001 URINALYSIS AUTO W/SCOPE: CPT

## 2020-03-11 PROCEDURE — 36415 COLL VENOUS BLD VENIPUNCTURE: CPT

## 2020-03-11 PROCEDURE — 6370000000 HC RX 637 (ALT 250 FOR IP): Performed by: INTERNAL MEDICINE

## 2020-03-11 PROCEDURE — 83735 ASSAY OF MAGNESIUM: CPT

## 2020-03-11 PROCEDURE — 2580000003 HC RX 258: Performed by: INTERNAL MEDICINE

## 2020-03-11 PROCEDURE — 83930 ASSAY OF BLOOD OSMOLALITY: CPT

## 2020-03-11 PROCEDURE — 2580000003 HC RX 258: Performed by: PHYSICIAN ASSISTANT

## 2020-03-11 PROCEDURE — 82533 TOTAL CORTISOL: CPT

## 2020-03-11 PROCEDURE — 84100 ASSAY OF PHOSPHORUS: CPT

## 2020-03-11 PROCEDURE — 85025 COMPLETE CBC W/AUTO DIFF WBC: CPT

## 2020-03-11 PROCEDURE — 6360000002 HC RX W HCPCS: Performed by: INTERNAL MEDICINE

## 2020-03-11 PROCEDURE — 84156 ASSAY OF PROTEIN URINE: CPT

## 2020-03-11 PROCEDURE — 82570 ASSAY OF URINE CREATININE: CPT

## 2020-03-11 PROCEDURE — 84300 ASSAY OF URINE SODIUM: CPT

## 2020-03-11 PROCEDURE — 97161 PT EVAL LOW COMPLEX 20 MIN: CPT

## 2020-03-11 PROCEDURE — 85610 PROTHROMBIN TIME: CPT

## 2020-03-11 PROCEDURE — 93010 ELECTROCARDIOGRAM REPORT: CPT | Performed by: INTERNAL MEDICINE

## 2020-03-11 RX ORDER — SODIUM CHLORIDE 9 MG/ML
INJECTION, SOLUTION INTRAVENOUS CONTINUOUS
Status: DISCONTINUED | OUTPATIENT
Start: 2020-03-11 | End: 2020-03-11

## 2020-03-11 RX ORDER — MUPIROCIN CALCIUM 20 MG/G
CREAM TOPICAL DAILY
COMMUNITY

## 2020-03-11 RX ORDER — AMITRIPTYLINE HYDROCHLORIDE 25 MG/1
25 TABLET, FILM COATED ORAL NIGHTLY PRN
Status: DISCONTINUED | OUTPATIENT
Start: 2020-03-11 | End: 2020-03-17 | Stop reason: HOSPADM

## 2020-03-11 RX ORDER — ALBUMIN (HUMAN) 12.5 G/50ML
25 SOLUTION INTRAVENOUS EVERY 8 HOURS
Status: COMPLETED | OUTPATIENT
Start: 2020-03-12 | End: 2020-03-12

## 2020-03-11 RX ORDER — DEXTROSE MONOHYDRATE 25 G/50ML
25 INJECTION, SOLUTION INTRAVENOUS ONCE
Status: COMPLETED | OUTPATIENT
Start: 2020-03-11 | End: 2020-03-11

## 2020-03-11 RX ADMIN — IPRATROPIUM BROMIDE AND ALBUTEROL SULFATE 1 AMPULE: 2.5; .5 SOLUTION RESPIRATORY (INHALATION) at 09:46

## 2020-03-11 RX ADMIN — LACTULOSE 20 G: 20 SOLUTION ORAL at 14:30

## 2020-03-11 RX ADMIN — SODIUM CHLORIDE: 9 INJECTION, SOLUTION INTRAVENOUS at 04:14

## 2020-03-11 RX ADMIN — INSULIN LISPRO 1 UNITS: 100 INJECTION, SOLUTION INTRAVENOUS; SUBCUTANEOUS at 11:33

## 2020-03-11 RX ADMIN — INSULIN HUMAN 6 UNITS: 100 INJECTION, SOLUTION PARENTERAL at 04:07

## 2020-03-11 RX ADMIN — SODIUM CHLORIDE, PRESERVATIVE FREE 10 ML: 5 INJECTION INTRAVENOUS at 20:16

## 2020-03-11 RX ADMIN — VENLAFAXINE HYDROCHLORIDE 37.5 MG: 37.5 CAPSULE, EXTENDED RELEASE ORAL at 08:59

## 2020-03-11 RX ADMIN — CALCIUM GLUCONATE 1 G: 98 INJECTION, SOLUTION INTRAVENOUS at 04:07

## 2020-03-11 RX ADMIN — ALBUMIN (HUMAN) 25 G: 0.25 INJECTION, SOLUTION INTRAVENOUS at 23:54

## 2020-03-11 RX ADMIN — RIFAXIMIN 550 MG: 550 TABLET ORAL at 20:15

## 2020-03-11 RX ADMIN — ENOXAPARIN SODIUM 40 MG: 40 INJECTION SUBCUTANEOUS at 09:00

## 2020-03-11 RX ADMIN — AMITRIPTYLINE HYDROCHLORIDE 25 MG: 25 TABLET, FILM COATED ORAL at 00:52

## 2020-03-11 RX ADMIN — INSULIN LISPRO 1 UNITS: 100 INJECTION, SOLUTION INTRAVENOUS; SUBCUTANEOUS at 20:20

## 2020-03-11 RX ADMIN — FUROSEMIDE 20 MG: 20 TABLET ORAL at 09:00

## 2020-03-11 RX ADMIN — SODIUM CHLORIDE, PRESERVATIVE FREE 10 ML: 5 INJECTION INTRAVENOUS at 23:55

## 2020-03-11 RX ADMIN — IPRATROPIUM BROMIDE AND ALBUTEROL SULFATE 1 AMPULE: 2.5; .5 SOLUTION RESPIRATORY (INHALATION) at 12:19

## 2020-03-11 RX ADMIN — DEXTROSE MONOHYDRATE 25 G: 25 INJECTION, SOLUTION INTRAVENOUS at 04:07

## 2020-03-11 RX ADMIN — IPRATROPIUM BROMIDE AND ALBUTEROL SULFATE 1 AMPULE: 2.5; .5 SOLUTION RESPIRATORY (INHALATION) at 17:25

## 2020-03-11 RX ADMIN — AMLODIPINE BESYLATE 5 MG: 5 TABLET ORAL at 09:00

## 2020-03-11 RX ADMIN — INSULIN LISPRO 1 UNITS: 100 INJECTION, SOLUTION INTRAVENOUS; SUBCUTANEOUS at 06:02

## 2020-03-11 RX ADMIN — LACTULOSE 20 G: 20 SOLUTION ORAL at 08:59

## 2020-03-11 RX ADMIN — RIFAXIMIN 550 MG: 550 TABLET ORAL at 08:59

## 2020-03-11 RX ADMIN — MONTELUKAST SODIUM 10 MG: 10 TABLET, FILM COATED ORAL at 08:59

## 2020-03-11 RX ADMIN — INSULIN LISPRO 2 UNITS: 100 INJECTION, SOLUTION INTRAVENOUS; SUBCUTANEOUS at 16:36

## 2020-03-11 RX ADMIN — HYDROCODONE BITARTRATE AND ACETAMINOPHEN 1 TABLET: 5; 325 TABLET ORAL at 08:59

## 2020-03-11 RX ADMIN — LACTULOSE 20 G: 20 SOLUTION ORAL at 20:15

## 2020-03-11 RX ADMIN — ATORVASTATIN CALCIUM 40 MG: 40 TABLET, FILM COATED ORAL at 20:15

## 2020-03-11 RX ADMIN — IPRATROPIUM BROMIDE AND ALBUTEROL SULFATE 1 AMPULE: 2.5; .5 SOLUTION RESPIRATORY (INHALATION) at 20:50

## 2020-03-11 RX ADMIN — RIFAXIMIN 550 MG: 550 TABLET ORAL at 00:52

## 2020-03-11 RX ADMIN — PANTOPRAZOLE SODIUM 40 MG: 40 TABLET, DELAYED RELEASE ORAL at 09:00

## 2020-03-11 ASSESSMENT — PAIN DESCRIPTION - LOCATION: LOCATION: ABDOMEN

## 2020-03-11 ASSESSMENT — PAIN SCALES - GENERAL
PAINLEVEL_OUTOF10: 0
PAINLEVEL_OUTOF10: 8
PAINLEVEL_OUTOF10: 0
PAINLEVEL_OUTOF10: 0

## 2020-03-11 ASSESSMENT — PAIN - FUNCTIONAL ASSESSMENT: PAIN_FUNCTIONAL_ASSESSMENT: PREVENTS OR INTERFERES SOME ACTIVE ACTIVITIES AND ADLS

## 2020-03-11 ASSESSMENT — PAIN DESCRIPTION - PAIN TYPE: TYPE: CHRONIC PAIN

## 2020-03-11 ASSESSMENT — PAIN DESCRIPTION - DESCRIPTORS: DESCRIPTORS: ACHING;DISCOMFORT

## 2020-03-11 NOTE — H&P
Bria Brar M.D. History and Physical      CHIEF COMPLAINT: Abdominal distention and low sodium    Reason for Admission: As above    History Obtained From: Patient/EMR    HISTORY OF PRESENT ILLNESS:      The patient is a 71 y.o. female of Jarret Garrido DO with significant past medical history of diabetes, hypertension, hyperlipidemia, nonalcoholic liver cirrhosis, recurrent hyponatremia who presents with abdominal distention from ascites. Patient indicates she went to her PCP and blood work was performed. Sodium was noted to be in the low 120s and therefore patient was directed to the ER for admission. She denies confusion, disorientation or unsteadiness on feet. She does get weekly paracenteses therapeutically for her ongoing ascites. On my evaluation, she is comfortable in no apparent acute distress. Sodium this morning is 124. No shortness of breath, chest pain, change in bowel habits is reported. /66   Pulse 95   Temp 98.4 °F (36.9 °C) (Oral)   Resp 18   Ht 5' 7\" (1.702 m)   Wt 212 lb (96.2 kg)   SpO2 96%   BMI 33.20 kg/m²   Admission blood work with sodium 120 potassium 5.8 chloride 89 and CO2 21. Past Medical History:        Diagnosis Date    Arthritis     Cerebral artery occlusion with cerebral infarction ([address] 09/2018    some memory loss, some right side weakness.     Cough     post anesthesia    Diabetes mellitus ([address]     Diverticulitis     GERD (gastroesophageal reflux disease)     Hyperlipidemia     Hypertension     Liver cirrhosis (HCC)     Polyp of esophagus     Polyp, stomach     PONV (postoperative nausea and vomiting)     Prolonged emergence from general anesthesia     Thyroid disease     no meds     Past Surgical History:        Procedure Laterality Date    CAROTID ENDARTERECTOMY      october 2018    CHOLECYSTECTOMY      COLONOSCOPY      COLONOSCOPY N/A 6/11/2019    COLONOSCOPY POLYPECTOMY SNARE/COLD BIOPSY performed by CAPSULE BY MOUTH EVERY DAY (Patient taking differently: Take 37.5 mg by mouth daily )  montelukast (SINGULAIR) 10 MG tablet, TAKE 1 TABLET BY MOUTH EVERY DAY AT NIGHT (Patient taking differently: Take 10 mg by mouth every morning )  pantoprazole (PROTONIX) 40 MG tablet, Take 40 mg by mouth daily Take morning of surgery with a sip of water  Milk Thistle 1000 MG CAPS, Take by mouth daily Ld 10-24-18  tuberculin (APLISOL) 5 UNIT/0.1ML injection, Inject 5 Units into the skin once  Insulin Syringe-Needle U-100 ([employer] INSULIN SYR 1CC/30G) 30G X 5/16\" 1 ML MISC, Use with insulin 4 times a day  INSULIN SYRINGE 1CC/29G ([employer] INS SYRINGE 1CC/29G) 29G X 1/2\" 1 ML MISC, 1 each by Does not apply route 4 times daily  Insulin Syringe-Needle U-100 29G X 5/16\" 1 ML MISC, Use with insulin 4 times a day  blood glucose monitor strips, One-Touch Verio strips. Checks 3  times/day before meals and at bedtime and as needed for symptoms of irregular blood glucose  blood glucose test strips (ONETOUCH VERIO) strip, 1 each by In Vitro route 4 times daily As needed. Blood Glucose Monitoring Suppl (ONETOUCH VERIO) w/Device KIT, To test 4x/day  Insulin Syringe-Needle U-100 ([employer] INS SYR .3CC/29G) 29G X 1/2\" 0.3 ML MISC, Four times a day with insulin  Misc. Devices (ADJUST BATH/SHOWER SEAT) MISC, Use daily  Misc. Devices (COMMODE BEDSIDE) MISC, Use daily  blood glucose test strips (ASCENSIA AUTODISC VI;ONE TOUCH ULTRA TEST VI) strip, 1 each by In Vitro route daily As needed. Allergies:  Tramadol    Social History:   TOBACCO:   reports that she is a non-smoker but has been exposed to tobacco smoke. The exposure started about 65 years ago. She has never used smokeless tobacco.  ETOH:   reports no history of alcohol use.   MARITAL STATUS:    OCCUPATION:      Family History:       Problem Relation Age of Onset    Heart Disease Mother 28    Diabetes Mother     Heart Disease Sister     Diabetes Sister        REVIEW OF SYSTEMS:    General

## 2020-03-11 NOTE — PROGRESS NOTES
Physical Therapy    Facility/Department: [address] INTERMEDIATE 1  Initial Assessment    NAME: Dinah Medeiros  : 1955  MRN: [id number]    Date of Service: 3/11/2020       REQUIRES PT FOLLOW UP: Yes       Patient Diagnosis(es): The primary encounter diagnosis was Hyperkalemia. Diagnoses of Hyponatremia and Cirrhosis of liver with ascites, unspecified hepatic cirrhosis type [facility]) were also pertinent to this visit. has a past medical history of Arthritis, Cerebral artery occlusion with cerebral infarction ([address] Cough, Diabetes mellitus ([facility] [address] Diverticulitis, GERD (gastroesophageal reflux disease), Hyperlipidemia, Hypertension, Liver cirrhosis ([facility] [address] Polyp of esophagus, Polyp, stomach, PONV (postoperative nausea and vomiting), Prolonged emergence from general anesthesia, and Thyroid disease. has a past surgical history that includes Cholecystectomy; Tonsillectomy; Colonoscopy; Upper gastrointestinal endoscopy; Hysterectomy (); pr thromboendartectmy neck,neck incis (Left, 10/29/2018); Carotid endarterectomy; Colonoscopy (N/A, 2019); Upper gastrointestinal endoscopy (N/A, 2019); and vascular surgery. Evaluating Therapist: Torin Mix PT     Referring Provider:  AMENA Agustin    Room #: 448   DIAGNOSIS: hyponatremia  History: weekly paracentesis   PRECAUTIONS: falls    Social:  Pt lives with con  in a  2  floor plan  With first set up, 3  steps and  1 rails to enter. Prior to admission pt walked with  rollator      Initial Evaluation  Date:  3/11/2020 Treatment      Short Term/ Long Term   Goals   Was pt agreeable to Eval/treatment? yes      Does pt have pain?        Bed Mobility  Rolling: NT   Supine to sit:  SBA   Sit to supine:  NT   Scooting:  SBA    S/I    Transfers Sit to stand:  CGA/min assist   Stand to sit:  CGA   Stand pivot:  CGA/min assist    S/I    Ambulation     15  feet with ww  with  CGA/min assist    100 feet with  ww  with  S/I        Stair negotiation: ascended and descended NT    4  steps with  1 rail with  SBA/CGA    LE ROM  WFL     LE strength  WFL, reports R sided weakness due to previous CVA      AM- PAC RAW score   17/ 24            Pt is alert and Oriented x  3     Balance: CGA/min assist   Endurance: decreased   Bed/Chair alarm: Yes      ASSESSMENT  Pt displays functional ability as noted in the objective portion of this evaluation. Treatment/Education:    Pt reports dizziness with mobility. Pulse ox 93% after gait. Pt educated on fall risk, hand placement with transfers, proper breathing technique        Patient response to education:   Pt verbalized understanding Pt demonstrated skill Pt requires further education in this area   x With cues   x       Comments:  Pt left  In chair after session, with call light in reach. Rehab potential is Good for reaching above PT goals. Pts/ family goals   1. None stated     Patient and or family understand(s) diagnosis, prognosis, and plan of care. -  Yes     PLAN  PT care will be provided in accordance with the objectives noted above. Whenever appropriate, clear delegation orders will be provided for nursing staff. Exercises and functional mobility practice will be used as well as appropriate assistive devices or modalities to obtain goals. Patient and family education will also be administered as needed. Frequency of treatments will be 2-5x/week x  5 days. Time in:  1015   Time out: 1027       Evaluation Time includes thorough review of current medical information, gathering information on past medical history/social history and prior level of function, completion of standardized testing/informal observation of tasks, assessment of data and education on plan of care and goals.     CPT codes:  [x] Low Complexity PT evaluation 52647  [] Moderate Complexity PT evaluation 92353  [] High Complexity PT evaluation 07493  [] PT Re-evaluation 05879  [] Gait training 86997  minutes  [] Therapeutic activities

## 2020-03-11 NOTE — PLAN OF CARE
Problem: Falls - Risk of:  Goal: Will remain free from falls  Description: Will remain free from falls  Outcome: Met This Shift  Goal: Absence of physical injury  Description: Absence of physical injury  Outcome: Met This Shift     Problem: Pain:  Goal: Pain level will decrease  Description: Pain level will decrease  Outcome: Met This Shift  Goal: Control of acute pain  Description: Control of acute pain  Outcome: Met This Shift  Goal: Control of chronic pain  Description: Control of chronic pain  Outcome: Met This Shift

## 2020-03-11 NOTE — PROGRESS NOTES
Sensation:  No c/o numbness or tingling   Tone: WFL                             Comments/Treatment: Patient educated on adapted techniques for completion of ADL, safe functional transfers and mobility. Patient required cues for follow through with proper hand/foot placement, pacing, safety, attention, sequencing and technique in bed mobility, functional transfers, functional mobility, UB dressing and LB dressing in preparation for maximum independence in all self care tasks.      Eval Complexity: Low     Assessment of current deficits   Functional mobility [x]? ADLs [x]? Strength [x]? Cognition []? Functional transfers  [x]? IADLs [x]? Safety Awareness [x]? Endurance [x]? Fine Motor Coordination []? Balance [x]? Vision/perception []? Sensation []? Gross Motor Coordination []? ROM []? Delirium []? Motor Control []?     Plan of Care:   ADL retraining [x]? Equipment needs [x]? Neuromuscular re-education [x]? Energy Conservation Techniques [x]? Functional Transfer training [x]? Patient and/or Family Education [x]? Functional Mobility training [x]? Environmental Modifications [x]? Cognitive re-training []? Compensatory techniques for ADLs [x]? Splinting Needs []? Positioning to improve overall function [x]? Therapeutic Activity [x]? Therapeutic Exercise  [x]? Visual/Perceptual: []? Delirium prevention/treatment  []? Other:  []?     Rehab Potential: Good for established goals     Patient / Family Goal: To get home.        Patient and/or family were instructed on functional diagnosis, prognosis/goals and OT plan of care. Pt verbalized good understanding.     Upon arrival, patient supine in bed.  At end of session, patient seated in armchair

## 2020-03-11 NOTE — ED NOTES
SBAR faxed and confirmed by Suszanne Scale. Questions and concerns addressed.      eT Madrid RN  03/10/20 0631

## 2020-03-12 ENCOUNTER — APPOINTMENT (OUTPATIENT)
Dept: ULTRASOUND IMAGING | Age: 65
DRG: 433 | End: 2020-03-12
Payer: MEDICARE

## 2020-03-12 LAB
ALBUMIN SERPL-MCNC: 2.7 G/DL (ref 3.5–5.2)
ALP BLD-CCNC: 142 U/L (ref 35–104)
ALT SERPL-CCNC: 27 U/L (ref 0–32)
ANION GAP SERPL CALCULATED.3IONS-SCNC: 12 MMOL/L (ref 7–16)
ANION GAP SERPL CALCULATED.3IONS-SCNC: 13 MMOL/L (ref 7–16)
ANION GAP SERPL CALCULATED.3IONS-SCNC: 14 MMOL/L (ref 7–16)
AST SERPL-CCNC: 42 U/L (ref 0–31)
BILIRUB SERPL-MCNC: 2 MG/DL (ref 0–1.2)
BUN BLDV-MCNC: 14 MG/DL (ref 8–23)
BUN BLDV-MCNC: 14 MG/DL (ref 8–23)
BUN BLDV-MCNC: 15 MG/DL (ref 8–23)
CALCIUM SERPL-MCNC: 8.1 MG/DL (ref 8.6–10.2)
CALCIUM SERPL-MCNC: 8.6 MG/DL (ref 8.6–10.2)
CALCIUM SERPL-MCNC: 8.6 MG/DL (ref 8.6–10.2)
CHLORIDE BLD-SCNC: 89 MMOL/L (ref 98–107)
CHLORIDE BLD-SCNC: 89 MMOL/L (ref 98–107)
CHLORIDE BLD-SCNC: 90 MMOL/L (ref 98–107)
CHLORIDE URINE RANDOM: 25 MMOL/L
CO2: 20 MMOL/L (ref 22–29)
CO2: 20 MMOL/L (ref 22–29)
CO2: 21 MMOL/L (ref 22–29)
CREAT SERPL-MCNC: 0.9 MG/DL (ref 0.5–1)
CREAT SERPL-MCNC: 0.9 MG/DL (ref 0.5–1)
CREAT SERPL-MCNC: 1 MG/DL (ref 0.5–1)
FERRITIN: 82 NG/ML
GFR AFRICAN AMERICAN: >60
GFR NON-AFRICAN AMERICAN: 56 ML/MIN/1.73
GFR NON-AFRICAN AMERICAN: >60 ML/MIN/1.73
GFR NON-AFRICAN AMERICAN: >60 ML/MIN/1.73
GLUCOSE BLD-MCNC: 167 MG/DL (ref 74–99)
GLUCOSE BLD-MCNC: 173 MG/DL (ref 74–99)
GLUCOSE BLD-MCNC: 278 MG/DL (ref 74–99)
HCT VFR BLD CALC: 34.4 % (ref 34–48)
HEMOGLOBIN: 11.9 G/DL (ref 11.5–15.5)
INR BLD: 1.5
IRON SATURATION: 40 % (ref 15–50)
IRON: 63 MCG/DL (ref 37–145)
MAGNESIUM: 1.8 MG/DL (ref 1.6–2.6)
MCH RBC QN AUTO: 30.5 PG (ref 26–35)
MCHC RBC AUTO-ENTMCNC: 34.6 % (ref 32–34.5)
MCV RBC AUTO: 88.2 FL (ref 80–99.9)
METER GLUCOSE: 148 MG/DL (ref 74–99)
METER GLUCOSE: 151 MG/DL (ref 74–99)
METER GLUCOSE: 205 MG/DL (ref 74–99)
METER GLUCOSE: 237 MG/DL (ref 74–99)
OSMOLALITY URINE: 565 MOSM/KG (ref 300–900)
PDW BLD-RTO: 19.9 FL (ref 11.5–15)
PHOSPHORUS: 4.1 MG/DL (ref 2.5–4.5)
PLATELET # BLD: 206 E9/L (ref 130–450)
PMV BLD AUTO: 10.1 FL (ref 7–12)
POTASSIUM SERPL-SCNC: 4.3 MMOL/L (ref 3.5–5)
POTASSIUM SERPL-SCNC: 4.4 MMOL/L (ref 3.5–5)
POTASSIUM SERPL-SCNC: 4.8 MMOL/L (ref 3.5–5)
POTASSIUM, UR: 88.8 MMOL/L
PROTHROMBIN TIME: 18.2 SEC (ref 9.3–12.4)
RBC # BLD: 3.9 E12/L (ref 3.5–5.5)
SODIUM BLD-SCNC: 122 MMOL/L (ref 132–146)
SODIUM BLD-SCNC: 123 MMOL/L (ref 132–146)
SODIUM BLD-SCNC: 123 MMOL/L (ref 132–146)
SODIUM URINE: <20 MMOL/L
TOTAL IRON BINDING CAPACITY: 156 MCG/DL (ref 250–450)
TOTAL PROTEIN: 6.6 G/DL (ref 6.4–8.3)
WBC # BLD: 8.2 E9/L (ref 4.5–11.5)

## 2020-03-12 PROCEDURE — 2580000003 HC RX 258: Performed by: INTERNAL MEDICINE

## 2020-03-12 PROCEDURE — 82728 ASSAY OF FERRITIN: CPT

## 2020-03-12 PROCEDURE — 85027 COMPLETE CBC AUTOMATED: CPT

## 2020-03-12 PROCEDURE — 6370000000 HC RX 637 (ALT 250 FOR IP): Performed by: PHYSICIAN ASSISTANT

## 2020-03-12 PROCEDURE — 84300 ASSAY OF URINE SODIUM: CPT

## 2020-03-12 PROCEDURE — 83540 ASSAY OF IRON: CPT

## 2020-03-12 PROCEDURE — P9047 ALBUMIN (HUMAN), 25%, 50ML: HCPCS | Performed by: INTERNAL MEDICINE

## 2020-03-12 PROCEDURE — 82436 ASSAY OF URINE CHLORIDE: CPT

## 2020-03-12 PROCEDURE — 83735 ASSAY OF MAGNESIUM: CPT

## 2020-03-12 PROCEDURE — 85610 PROTHROMBIN TIME: CPT

## 2020-03-12 PROCEDURE — 2580000003 HC RX 258: Performed by: PHYSICIAN ASSISTANT

## 2020-03-12 PROCEDURE — 0W9G3ZZ DRAINAGE OF PERITONEAL CAVITY, PERCUTANEOUS APPROACH: ICD-10-PCS | Performed by: RADIOLOGY

## 2020-03-12 PROCEDURE — 80048 BASIC METABOLIC PNL TOTAL CA: CPT

## 2020-03-12 PROCEDURE — 36415 COLL VENOUS BLD VENIPUNCTURE: CPT

## 2020-03-12 PROCEDURE — 6360000002 HC RX W HCPCS: Performed by: INTERNAL MEDICINE

## 2020-03-12 PROCEDURE — 82962 GLUCOSE BLOOD TEST: CPT

## 2020-03-12 PROCEDURE — 97530 THERAPEUTIC ACTIVITIES: CPT

## 2020-03-12 PROCEDURE — 84133 ASSAY OF URINE POTASSIUM: CPT

## 2020-03-12 PROCEDURE — 84100 ASSAY OF PHOSPHORUS: CPT

## 2020-03-12 PROCEDURE — 49083 ABD PARACENTESIS W/IMAGING: CPT

## 2020-03-12 PROCEDURE — 2060000000 HC ICU INTERMEDIATE R&B

## 2020-03-12 PROCEDURE — 94640 AIRWAY INHALATION TREATMENT: CPT

## 2020-03-12 PROCEDURE — 80053 COMPREHEN METABOLIC PANEL: CPT

## 2020-03-12 PROCEDURE — 83550 IRON BINDING TEST: CPT

## 2020-03-12 PROCEDURE — 83935 ASSAY OF URINE OSMOLALITY: CPT

## 2020-03-12 RX ORDER — SODIUM CHLORIDE 9 MG/ML
INJECTION, SOLUTION INTRAVENOUS CONTINUOUS
Status: DISCONTINUED | OUTPATIENT
Start: 2020-03-12 | End: 2020-03-13

## 2020-03-12 RX ADMIN — MONTELUKAST SODIUM 10 MG: 10 TABLET, FILM COATED ORAL at 09:10

## 2020-03-12 RX ADMIN — SODIUM CHLORIDE, PRESERVATIVE FREE 10 ML: 5 INJECTION INTRAVENOUS at 13:46

## 2020-03-12 RX ADMIN — HYDROCODONE BITARTRATE AND ACETAMINOPHEN 1 TABLET: 5; 325 TABLET ORAL at 12:19

## 2020-03-12 RX ADMIN — RIFAXIMIN 550 MG: 550 TABLET ORAL at 21:00

## 2020-03-12 RX ADMIN — SODIUM CHLORIDE: 9 INJECTION, SOLUTION INTRAVENOUS at 13:45

## 2020-03-12 RX ADMIN — INSULIN LISPRO 1 UNITS: 100 INJECTION, SOLUTION INTRAVENOUS; SUBCUTANEOUS at 16:59

## 2020-03-12 RX ADMIN — AMLODIPINE BESYLATE 5 MG: 5 TABLET ORAL at 09:10

## 2020-03-12 RX ADMIN — INSULIN LISPRO 1 UNITS: 100 INJECTION, SOLUTION INTRAVENOUS; SUBCUTANEOUS at 21:36

## 2020-03-12 RX ADMIN — ALBUMIN (HUMAN) 25 G: 0.25 INJECTION, SOLUTION INTRAVENOUS at 09:09

## 2020-03-12 RX ADMIN — IPRATROPIUM BROMIDE AND ALBUTEROL SULFATE 1 AMPULE: 2.5; .5 SOLUTION RESPIRATORY (INHALATION) at 21:05

## 2020-03-12 RX ADMIN — VENLAFAXINE HYDROCHLORIDE 37.5 MG: 37.5 CAPSULE, EXTENDED RELEASE ORAL at 09:10

## 2020-03-12 RX ADMIN — LACTULOSE 20 G: 20 SOLUTION ORAL at 21:00

## 2020-03-12 RX ADMIN — LACTULOSE 20 G: 20 SOLUTION ORAL at 13:49

## 2020-03-12 RX ADMIN — SODIUM CHLORIDE, PRESERVATIVE FREE 10 ML: 5 INJECTION INTRAVENOUS at 09:10

## 2020-03-12 RX ADMIN — ATORVASTATIN CALCIUM 40 MG: 40 TABLET, FILM COATED ORAL at 21:00

## 2020-03-12 RX ADMIN — INSULIN LISPRO 1 UNITS: 100 INJECTION, SOLUTION INTRAVENOUS; SUBCUTANEOUS at 09:10

## 2020-03-12 RX ADMIN — RIFAXIMIN 550 MG: 550 TABLET ORAL at 09:10

## 2020-03-12 RX ADMIN — IPRATROPIUM BROMIDE AND ALBUTEROL SULFATE 1 AMPULE: 2.5; .5 SOLUTION RESPIRATORY (INHALATION) at 16:13

## 2020-03-12 RX ADMIN — ALBUMIN (HUMAN) 25 G: 0.25 INJECTION, SOLUTION INTRAVENOUS at 16:25

## 2020-03-12 RX ADMIN — IPRATROPIUM BROMIDE AND ALBUTEROL SULFATE 1 AMPULE: 2.5; .5 SOLUTION RESPIRATORY (INHALATION) at 09:53

## 2020-03-12 RX ADMIN — INSULIN LISPRO 2 UNITS: 100 INJECTION, SOLUTION INTRAVENOUS; SUBCUTANEOUS at 12:19

## 2020-03-12 RX ADMIN — LACTULOSE 20 G: 20 SOLUTION ORAL at 09:10

## 2020-03-12 RX ADMIN — HYDROCODONE BITARTRATE AND ACETAMINOPHEN 1 TABLET: 5; 325 TABLET ORAL at 18:22

## 2020-03-12 RX ADMIN — IPRATROPIUM BROMIDE AND ALBUTEROL SULFATE 1 AMPULE: 2.5; .5 SOLUTION RESPIRATORY (INHALATION) at 13:24

## 2020-03-12 RX ADMIN — PANTOPRAZOLE SODIUM 40 MG: 40 TABLET, DELAYED RELEASE ORAL at 09:10

## 2020-03-12 ASSESSMENT — PAIN SCALES - GENERAL
PAINLEVEL_OUTOF10: 0
PAINLEVEL_OUTOF10: 0
PAINLEVEL_OUTOF10: 6
PAINLEVEL_OUTOF10: 10

## 2020-03-12 ASSESSMENT — PAIN DESCRIPTION - PROGRESSION: CLINICAL_PROGRESSION: GRADUALLY WORSENING

## 2020-03-12 ASSESSMENT — PAIN DESCRIPTION - PAIN TYPE: TYPE: ACUTE PAIN

## 2020-03-12 ASSESSMENT — PAIN DESCRIPTION - LOCATION: LOCATION: ABDOMEN

## 2020-03-12 ASSESSMENT — PAIN DESCRIPTION - ORIENTATION: ORIENTATION: RIGHT;MID

## 2020-03-12 ASSESSMENT — PAIN DESCRIPTION - FREQUENCY: FREQUENCY: INTERMITTENT

## 2020-03-12 ASSESSMENT — PAIN DESCRIPTION - DESCRIPTORS: DESCRIPTORS: ACHING;CRAMPING

## 2020-03-12 ASSESSMENT — PAIN DESCRIPTION - ONSET: ONSET: GRADUAL

## 2020-03-12 ASSESSMENT — PAIN - FUNCTIONAL ASSESSMENT: PAIN_FUNCTIONAL_ASSESSMENT: PREVENTS OR INTERFERES SOME ACTIVE ACTIVITIES AND ADLS

## 2020-03-12 NOTE — INTERVAL H&P NOTE
H&P Update    Patient's History and Physical  was reviewed. The patient appears likely to able to tolerate the procedure, paracentesis. Risk and benefits discussed including ultimate complications, possibly death and consent obtained.     Maggie Arnold PA-C

## 2020-03-12 NOTE — CONSULTS
[facility] MD Marina Lamas MD Lenoria Somerset, MD Jeral Greaves, NATE Abraham, TOMI  Consultation  3/11/2020    Thank you for consult  Full note dictated, to follow. Briefly, 71 y.o. longstanding history of cirrhosis, chronic/recurrent severe ascites, who undergoes weekly paracenteses q. Wednesday, presented from her family physicians office with severe hyponatremia. Besides discomfort associated with the severe ascites, denies complaint. A/R:  1. Hyponatremia, severe hypervolemic, secondary to cirrhosis and water overload, exacerbated (mildly so) by hyperglycemia, and diarrhea, further exacerbated by venlafaxine    2. Hyperkalemia secondary to decreased effective circulating volume, acute kidney injury, Spironolactone normalized status post treatment, and conservative-rate IV normal saline    3. Cirrhosis, with recurrent and severe ascites    4.   Hypertension    May stop IV normal saline  Paracentesis today  Continue lactulose, rifaximin,  Continue to hold spironolactone  Would not institute fluid restriction  Do not start salt tablets  In the absence of acute mental status change, hypertonic saline and increasing diuretics are not appropriate now  Tolvaptan also not appropriate in patient with cirrhosis  Hold a.m. dose of furosemide until after [Na+] is known  Will consider albumin infusion if 9 PM [Na+] evening is getting worse  Repeat urine studies in the morning  Follow labs, Sj Hernandez MD

## 2020-03-12 NOTE — PROGRESS NOTES
IRFLOWSHEET    Date: 3/12/2020    Time: 10:47 AM     Exam: Ultrasound Guided Paracentesis    Radiologist Performing Procedure: Mikaela Ang PA-C/Dr. BASILIO [facility]    Nurse Reporting/Phone: 2011     Nurse Receiving: Kenzie Ibarra    Permit signed:      Yes    ID Band Checklist: Yes    Allergies: Tramadol     TIME OUT: 5712    PROCEDURE START TIME: 1110    Puncture Site: left Abdomen    Puncture Time: 0085    Catheters: 6Fr. LABS:   Lab Results   Component Value Date    INR 1.5 03/12/2020    PROTIME 18.2 (H) 03/12/2020           Lab Results   Component Value Date    CREATININE 1.0 03/12/2020    BUN 15 03/12/2020          Lab Results   Component Value Date    HGB 11.9 03/12/2020    HCT 34.4 03/12/2020     03/12/2020         PROCEDURE END TIME: 1150    Total Contrast: N/A    Fluoroscopy Time: N/A    Complications:  None    Comments: Pt brought to IR room for paracentesis from  Nursing unit. Pt is alert and oriented, states having abdominal discomfort  prior to procedure. Ultrasound images taken prior to procedure. Procedure is explained to patient, including possible risks. Pt verbalizes understanding and consent from signed. Ultrasound images taken prior to procedure. Procedure is explained to patient, including possible risks. Pt verbalizes understanding and consent form signed. Procedure done under sterile technique and guidance of ultrasound imaging. 1% Lidocaine is used during procedure for comfort measures. A total of 8,100ml's of yellow colored ascitic fluid drained during procedure. Catheter removed and op-site dressing applied to site with no bleeding noted. Pt developed abdominal pain after 7 liters, after 8,100 patient wished to stop any further draining. Pt report given to nurse, pt transferred back to floor via cart by transport staff.

## 2020-03-12 NOTE — PROGRESS NOTES
Subjective: The patient is awake and alert. No acute events overnight. Denies chest pain, angina, SOB   Feels better but dizzy on standing   Abdomen much softer     Objective:    /63   Pulse 98   Temp 98.3 °F (36.8 °C) (Oral)   Resp 18   Ht 5' 7\" (1.702 m)   Wt 215 lb (97.5 kg)   SpO2 96%   BMI 33.67 kg/m²     In: 180 [P.O.:180]  Out: -     HEENT: NCAT,  PERRLA, No JVD  Heart:  RRR, no murmurs, gallops, or rubs. Lungs:  CTA bilaterally, no wheeze, rales or rhonchi  Abd: bowel sounds present, nontender, nondistended, no masses  Extrem:  No clubbing, cyanosis, or edema     Recent Labs     03/10/20  1854 03/11/20  0808 03/12/20  0420   WBC 10.4 9.4 8.2   HGB 14.1 13.3 11.9   HCT 41.1 39.0 34.4    247 206       Recent Labs     03/11/20  2130 03/12/20  0420 03/12/20  1350   * 123* 123*   K 4.5 4.8 4.4   CL 89* 90* 89*   CO2 22 21* 20*   BUN 15 15 14   CREATININE 1.1* 1.0 0.9   CALCIUM 8.5* 8.6 8.6       Assessment:    Patient Active Problem List   Diagnosis    History of hyperthyroidism    [facility]rh[facility] of [address] with ascites [address] [address]    Diabetes mellitus type 2, uncontrolled (HCC)    Hyperlipidemia LDL goal <100    History of CVA (cerebrovascular accident)    Essential hypertension    Obesity (BMI 30-39. 9)    Hyponatremia    Bilateral carotid artery stenosis    Hyperkalemia       Plan:    Admit to telemetry for evaluation of hyponatremia  Above secondary to fluid shifts from recurrent ascites  Na still low at 123 - asymptomatic except on stabding feels dizzy   Patient known to Dr. Tucker Aj- appreciate input   Monitor electrolytes     Liver cirrhosis with recurrent ascites  Check PT/INR  S/p  paracentesis  Therapeutically- 8.1 l removed   montior bp   Check labs in am      DVT Prophylaxis   PT/OT  Discharge planning       All consultants notes reviewed    Heather Wright MD  4:53 PM  3/12/2020

## 2020-03-12 NOTE — PROGRESS NOTES
[facility] MD Juana Henson, MD Kayla Pinto, CNP   Karla Han, TOMI  Progress Note    3/12/2020    SUBJECTIVE:   3/12: 8 L paracentesis yesterday. Today her abdomen is \"sore. \"  Persistent nausea, anorexia. Does not feel like eating anything. Trying to drink but not being particularly successful. No lightheadedness. No peripheral swelling. Loose stools. (-) sob/davis/cp/palp ongoing fatigue, generalized weakness. PROBLEM LIST:    Principal Problem:    Hyponatremia  Active Problems:    Diabetes mellitus type 2, uncontrolled ([address]    Hyperlipidemia LDL goal <100    Essential hypertension    Hyperkalemia  Resolved Problems:    * No resolved hospital problems.  *         DIET:    DIET CARB CONTROL; Daily Fluid Restriction: 1500 ml     MEDS (scheduled):    albumin human  25 g Intravenous Q8H    ipratropium-albuterol  1 ampule Inhalation Q4H WA    amLODIPine  5 mg Oral Daily    atorvastatin  40 mg Oral Nightly    furosemide  20 mg Oral Daily    lactulose  30 mL Oral TID    montelukast  10 mg Oral QAM    pantoprazole  40 mg Oral Daily    rifaximin  550 mg Oral BID    venlafaxine  37.5 mg Oral Daily    insulin lispro  0-6 Units Subcutaneous TID WC    insulin lispro  0-3 Units Subcutaneous Nightly    sodium chloride flush  10 mL Intravenous 2 times per day    enoxaparin  40 mg Subcutaneous Daily       MEDS (infusions):   sodium chloride      dextrose      dextrose         MEDS (prn):  amitriptyline, HYDROcodone-acetaminophen, glucose, dextrose, glucagon (rDNA), dextrose, sodium chloride flush, acetaminophen **OR** acetaminophen, magnesium hydroxide, promethazine **OR** ondansetron, glucose, dextrose, glucagon (rDNA), dextrose    PHYSICAL EXAM:     Patient Vitals for the past 24 hrs:   BP Temp Temp src Pulse Resp SpO2 Weight   03/12/20 1046 (!) 156/80 -- -- 101 18 96 % --   03/12/20 0547 133/68 97.7 °F (36.5 °C) Oral 98 18 93 % -- 03/12/20 0030 -- -- -- -- -- -- 215 lb (97.5 kg)   03/11/20 2345 98/64 98.2 °F (36.8 °C) Oral 98 18 95 % --   03/11/20 2050 -- -- -- -- 16 96 % --   03/11/20 1723 -- -- -- -- 18 97 % --   03/11/20 1601 130/75 98 °F (36.7 °C) Oral 96 18 -- --   @      Intake/Output Summary (Last 24 hours) at 3/12/2020 1329  Last data filed at 3/12/2020 0919  Gross per 24 hour   Intake 400 ml   Output 400 ml   Net 0 ml         Wt Readings from Last 3 Encounters:   03/12/20 215 lb (97.5 kg)   03/04/20 210 lb (95.3 kg)   02/26/20 210 lb (95.3 kg)       Constitutional:  in no acute distress  Oral: mucus membranes moist  Neck: no JVD  Cardiovascular: S1, S2 regular rhythm, no murmur,or rub  Respiratory:  No crackles, no wheeze  Gastrointestinal:  Soft, nontender, mildly distended, NABS  Ext: no edema, feet warm  Skin: dry, no rash  Neuro: awake, alert, interactive      DATA:    Recent Labs     03/10/20  1854 03/11/20  0808 03/12/20  0420   WBC 10.4 9.4 8.2   HGB 14.1 13.3 11.9   HCT 41.1 39.0 34.4   MCV 89.0 89.9 88.2    247 206     Recent Labs     03/10/20  1854  03/11/20  0808 03/11/20  1700 03/11/20  2130 03/12/20  0420   *   < > 124* 122* 122* 123*   K 5.8*   < > 5.0  5.0 4.8 4.5 4.8   CL 89*   < > 89* 89* 89* 90*   CO2 21*   < > 23 22 22 21*   MG  --   --  1.8  --   --  1.8   PHOS  --   --  3.5  --   --  4.1   BUN 16   < > 14 15 15 15   CREATININE 0.9   < > 0.8 1.1* 1.1* 1.0   ALT 32  --  31  --   --  27   AST 49*  --  47*  --   --  42*   BILIDIR 0.6*  --   --   --   --   --    BILITOT 1.4*  --  1.9*  --   --  2.0*   ALKPHOS 177*  --  153*  --   --  142*    < > = values in this interval not displayed. Lab Results   Component Value Date    LABPROT 0.2 03/11/2020    LABPROT 0.2 03/11/2020       Assessment  71 y. o. longstanding history of cirrhosis, chronic/recurrent severe ascites, who undergoes weekly paracenteses q. Wednesday, presented from her family physicians office with severe hyponatremia     1.

## 2020-03-12 NOTE — CONSULTS
[facility] MD Rosibel Reyez MD Ladonna Chris, MD Rowena Boot, TOMI Myers  Consultation  Patient's Name: Landry Osuna  10:29 AM  3/12/2020    Nephrologist: Rosibel Barajas MD    Reason for Consult: Hyponatremia  Requesting Physician:  Collins Goodson DO    Chief Complaint: Ascites, hyponatremia    History Obtained From: Patient, chart    History of Present Ilness:    Ms. Philip Jeter is a pleasant 40-year-old woman with a longstanding history of cirrhosis, chronic/recurrent severe ascites, who undergoes weekly paracenteses q. Wednesday. She presented to [facility] last evening from her family physicians office with severe hyponatremia. Besides discomfort associated with the severe ascites, denies complaint. She notes that today is a day she was to have undergone paracentesis. She typically undergoes high-volume paracentesis, at least 5 L, though she tells me she has had many as 8 L removed at one time before. She denies abdominal pain, per se. Denies fever or chill, nausea or vomiting, though she does have anorexia. She denies chest pain or palpitations, shortness of breath, dyspnea with exertion, cough wheeze or sputum production. She does have poor exercise tolerance this is not new. Denies peripheral swelling or new skin rash or lesion. No mental or visual status changes.     Sodium on presentation was 120 with a serum potassium of 5.8. She was given Kayexalate, calcium gluconate, insulin/D50. Sodium improved 123 as of 2 AM, potassium improved to 5.5. By this morning at 8 AM, sodium improved a little more to 124, potassium was normalized at 5. Past Medical History:   Diagnosis Date    Arthritis     Cerebral artery occlusion with cerebral infarction ([facility] [address] 09/2018    some memory loss, some right side weakness.     Cough     post anesthesia    Diabetes mellitus ([address]     Diverticulitis     GERD (gastroesophageal TID WC  insulin lispro (HUMALOG) injection vial 0-3 Units, Nightly  glucose (GLUTOSE) 40 % oral gel 15 g, PRN  dextrose 50 % IV solution, PRN  glucagon (rDNA) injection 1 mg, PRN  dextrose 5 % solution, PRN  sodium chloride flush 0.9 % injection 10 mL, 2 times per day  sodium chloride flush 0.9 % injection 10 mL, PRN  acetaminophen (TYLENOL) tablet 650 mg, Q6H PRN    Or  acetaminophen (TYLENOL) suppository 650 mg, Q6H PRN  magnesium hydroxide (MILK OF MAGNESIA) 400 MG/5ML suspension 30 mL, Daily PRN  promethazine (PHENERGAN) tablet 12.5 mg, Q6H PRN    Or  ondansetron (ZOFRAN) injection 4 mg, Q6H PRN  enoxaparin (LOVENOX) injection 40 mg, Daily  glucose (GLUTOSE) 40 % oral gel 15 g, PRN  dextrose 50 % IV solution, PRN  glucagon (rDNA) injection 1 mg, PRN  dextrose 5 % solution, PRN        Review of Systems:   Pertinent items are noted in HPI. Otherwise unremarkable    Physical exam:   Vital signs reviewed  Age-appropriate white woman in no apparent distress  NC/AT EOMI sclera conjunctive a are clear, the very mildly icteric. Mucous membranes are moist  Neck soft supple trachea midline no bruit  Decreased air entry bilaterally at the bases due to decreased ability to expand, though no crackles, no wheeze  Regular rhythm normal S1 and S2 no murmur  Abdomen markedly distended, tense, very mildly diffusely tender without rebound guarding or referred pain.   Bowel sounds are muffled  Distal extremities reveal very scant distal lower extremity edema  Pulses 1-2+ x4  Moves all 4 extremities  No asterixis  Cranial nerves II through XII grossly intact  Awake alert appropriate, interactive    Data:   Labs:  CBC with Differential:    Lab Results   Component Value Date    WBC 8.2 03/12/2020    RBC 3.90 03/12/2020    HGB 11.9 03/12/2020    HCT 34.4 03/12/2020     03/12/2020    MCV 88.2 03/12/2020    MCH 30.5 03/12/2020    MCHC 34.6 03/12/2020    RDW 19.9 03/12/2020    LYMPHOPCT 22.8 03/11/2020    MONOPCT 13.5 03/11/2020 undergoes weekly paracenteses q. Wednesday, presented from her family physicians office with severe hyponatremia    1. Hyponatremia, severe hypervolemic, secondary to cirrhosis and water overload, exacerbated (mildly so) by hyperglycemia, and diarrhea, further exacerbated by venlafaxine. Urinary indices do not reflect prerenal scenario. Not certain of the timing of when the furosemide was administered relative to when the urine sample was sent.     2. Hyperkalemia secondary to decreased effective circulating volume, acute kidney injury, Spironolactone normalized status post treatment, and conservative-rate IV normal saline     3. Cirrhosis, with recurrent and severe ascites     4. Hypertension     May stop IV normal saline  Paracentesis today  Continue lactulose, rifaximin,  Continue to hold spironolactone  Would not institute fluid restriction  Do not start salt tablets  In the absence of acute mental status change, hypertonic saline and increasing diuretics are not appropriate now  Tolvaptan also not appropriate in patient with cirrhosis  Hold a.m. dose of furosemide until after [Na+] is known  Will consider albumin infusion if 9 PM [Na+] evening is getting worse  Repeat urine studies in the morning  Follow labs, UO      Thank you for the opportunity to participate in the care of your pleasant patient. We look forward to following along with you.     Electronically signed by Alok Barajas MD

## 2020-03-12 NOTE — PLAN OF CARE
Problem: Falls - Risk of:  Goal: Will remain free from falls  Description: Will remain free from falls  3/11/2020 2300 by Delmi Vasquez RN  Outcome: Met This Shift  3/11/2020 1533 by Delmi Vasquez RN  Outcome: Met This Shift  Goal: Absence of physical injury  Description: Absence of physical injury  3/11/2020 2300 by Delmi Vasquez RN  Outcome: Met This Shift  3/11/2020 1533 by Delmi Vasquez RN  Outcome: Met This Shift     Problem: Pain:  Goal: Pain level will decrease  Description: Pain level will decrease  3/11/2020 2300 by Delmi Vasquez RN  Outcome: Met This Shift  3/11/2020 1533 by Delmi Vasquez RN  Outcome: Met This Shift  Goal: Control of acute pain  Description: Control of acute pain  3/11/2020 2300 by Delmi Vasquez RN  Outcome: Met This Shift  3/11/2020 1533 by Delmi Vasquez RN  Outcome: Met This Shift  Goal: Control of chronic pain  Description: Control of chronic pain  3/11/2020 2300 by Delmi Vasquez RN  Outcome: Met This Shift  3/11/2020 1533 by Delmi Vasquez RN  Outcome: Met This Shift

## 2020-03-13 LAB
ANION GAP SERPL CALCULATED.3IONS-SCNC: 11 MMOL/L (ref 7–16)
BASOPHILS ABSOLUTE: 0.09 E9/L (ref 0–0.2)
BASOPHILS RELATIVE PERCENT: 1 % (ref 0–2)
BUN BLDV-MCNC: 12 MG/DL (ref 8–23)
CALCIUM SERPL-MCNC: 8.3 MG/DL (ref 8.6–10.2)
CHLORIDE BLD-SCNC: 94 MMOL/L (ref 98–107)
CO2: 20 MMOL/L (ref 22–29)
CREAT SERPL-MCNC: 0.8 MG/DL (ref 0.5–1)
EOSINOPHILS ABSOLUTE: 0.22 E9/L (ref 0.05–0.5)
EOSINOPHILS RELATIVE PERCENT: 2.5 % (ref 0–6)
GFR AFRICAN AMERICAN: >60
GFR NON-AFRICAN AMERICAN: >60 ML/MIN/1.73
GLUCOSE BLD-MCNC: 224 MG/DL (ref 74–99)
HCT VFR BLD CALC: 34.8 % (ref 34–48)
HEMOGLOBIN: 11.9 G/DL (ref 11.5–15.5)
IMMATURE GRANULOCYTES #: 0.07 E9/L
IMMATURE GRANULOCYTES %: 0.8 % (ref 0–5)
INR BLD: 1.6
LYMPHOCYTES ABSOLUTE: 1.69 E9/L (ref 1.5–4)
LYMPHOCYTES RELATIVE PERCENT: 19.1 % (ref 20–42)
MAGNESIUM: 1.8 MG/DL (ref 1.6–2.6)
MCH RBC QN AUTO: 30.6 PG (ref 26–35)
MCHC RBC AUTO-ENTMCNC: 34.2 % (ref 32–34.5)
MCV RBC AUTO: 89.5 FL (ref 80–99.9)
METER GLUCOSE: 153 MG/DL (ref 74–99)
METER GLUCOSE: 220 MG/DL (ref 74–99)
METER GLUCOSE: 247 MG/DL (ref 74–99)
METER GLUCOSE: 269 MG/DL (ref 74–99)
MONOCYTES ABSOLUTE: 0.99 E9/L (ref 0.1–0.95)
MONOCYTES RELATIVE PERCENT: 11.2 % (ref 2–12)
NEUTROPHILS ABSOLUTE: 5.78 E9/L (ref 1.8–7.3)
NEUTROPHILS RELATIVE PERCENT: 65.4 % (ref 43–80)
PDW BLD-RTO: 19.9 FL (ref 11.5–15)
PHOSPHORUS: 2.9 MG/DL (ref 2.5–4.5)
PLATELET # BLD: 190 E9/L (ref 130–450)
PMV BLD AUTO: 10.4 FL (ref 7–12)
POTASSIUM REFLEX MAGNESIUM: 4.4 MMOL/L (ref 3.5–5)
POTASSIUM SERPL-SCNC: 4.4 MMOL/L (ref 3.5–5)
PROTHROMBIN TIME: 18.7 SEC (ref 9.3–12.4)
RBC # BLD: 3.89 E12/L (ref 3.5–5.5)
SODIUM BLD-SCNC: 125 MMOL/L (ref 132–146)
WBC # BLD: 8.8 E9/L (ref 4.5–11.5)

## 2020-03-13 PROCEDURE — 2580000003 HC RX 258: Performed by: INTERNAL MEDICINE

## 2020-03-13 PROCEDURE — 36415 COLL VENOUS BLD VENIPUNCTURE: CPT

## 2020-03-13 PROCEDURE — 82962 GLUCOSE BLOOD TEST: CPT

## 2020-03-13 PROCEDURE — 6360000002 HC RX W HCPCS: Performed by: INTERNAL MEDICINE

## 2020-03-13 PROCEDURE — 94640 AIRWAY INHALATION TREATMENT: CPT

## 2020-03-13 PROCEDURE — 97530 THERAPEUTIC ACTIVITIES: CPT

## 2020-03-13 PROCEDURE — 83735 ASSAY OF MAGNESIUM: CPT

## 2020-03-13 PROCEDURE — 6370000000 HC RX 637 (ALT 250 FOR IP): Performed by: INTERNAL MEDICINE

## 2020-03-13 PROCEDURE — 97110 THERAPEUTIC EXERCISES: CPT

## 2020-03-13 PROCEDURE — 6370000000 HC RX 637 (ALT 250 FOR IP): Performed by: PHYSICIAN ASSISTANT

## 2020-03-13 PROCEDURE — 6360000002 HC RX W HCPCS: Performed by: PHYSICIAN ASSISTANT

## 2020-03-13 PROCEDURE — 2060000000 HC ICU INTERMEDIATE R&B

## 2020-03-13 PROCEDURE — 80048 BASIC METABOLIC PNL TOTAL CA: CPT

## 2020-03-13 PROCEDURE — 84100 ASSAY OF PHOSPHORUS: CPT

## 2020-03-13 PROCEDURE — 2580000003 HC RX 258: Performed by: PHYSICIAN ASSISTANT

## 2020-03-13 PROCEDURE — P9047 ALBUMIN (HUMAN), 25%, 50ML: HCPCS | Performed by: INTERNAL MEDICINE

## 2020-03-13 PROCEDURE — 85025 COMPLETE CBC W/AUTO DIFF WBC: CPT

## 2020-03-13 PROCEDURE — 85610 PROTHROMBIN TIME: CPT

## 2020-03-13 RX ORDER — ALBUMIN (HUMAN) 12.5 G/50ML
25 SOLUTION INTRAVENOUS EVERY 8 HOURS
Status: COMPLETED | OUTPATIENT
Start: 2020-03-13 | End: 2020-03-14

## 2020-03-13 RX ADMIN — SODIUM CHLORIDE, PRESERVATIVE FREE 10 ML: 5 INJECTION INTRAVENOUS at 21:45

## 2020-03-13 RX ADMIN — INSULIN LISPRO 1 UNITS: 100 INJECTION, SOLUTION INTRAVENOUS; SUBCUTANEOUS at 21:55

## 2020-03-13 RX ADMIN — INSULIN LISPRO 3 UNITS: 100 INJECTION, SOLUTION INTRAVENOUS; SUBCUTANEOUS at 16:09

## 2020-03-13 RX ADMIN — INSULIN LISPRO 2 UNITS: 100 INJECTION, SOLUTION INTRAVENOUS; SUBCUTANEOUS at 11:15

## 2020-03-13 RX ADMIN — AMITRIPTYLINE HYDROCHLORIDE 25 MG: 25 TABLET, FILM COATED ORAL at 21:45

## 2020-03-13 RX ADMIN — SODIUM CHLORIDE, PRESERVATIVE FREE 10 ML: 5 INJECTION INTRAVENOUS at 16:26

## 2020-03-13 RX ADMIN — ALBUMIN (HUMAN) 25 G: 0.25 INJECTION, SOLUTION INTRAVENOUS at 14:18

## 2020-03-13 RX ADMIN — IPRATROPIUM BROMIDE AND ALBUTEROL SULFATE 1 AMPULE: 2.5; .5 SOLUTION RESPIRATORY (INHALATION) at 08:16

## 2020-03-13 RX ADMIN — IPRATROPIUM BROMIDE AND ALBUTEROL SULFATE 1 AMPULE: 2.5; .5 SOLUTION RESPIRATORY (INHALATION) at 12:14

## 2020-03-13 RX ADMIN — RIFAXIMIN 550 MG: 550 TABLET ORAL at 21:45

## 2020-03-13 RX ADMIN — PANTOPRAZOLE SODIUM 40 MG: 40 TABLET, DELAYED RELEASE ORAL at 08:20

## 2020-03-13 RX ADMIN — IPRATROPIUM BROMIDE AND ALBUTEROL SULFATE 1 AMPULE: 2.5; .5 SOLUTION RESPIRATORY (INHALATION) at 16:32

## 2020-03-13 RX ADMIN — VENLAFAXINE HYDROCHLORIDE 37.5 MG: 37.5 CAPSULE, EXTENDED RELEASE ORAL at 08:20

## 2020-03-13 RX ADMIN — ENOXAPARIN SODIUM 40 MG: 40 INJECTION SUBCUTANEOUS at 08:20

## 2020-03-13 RX ADMIN — RIFAXIMIN 550 MG: 550 TABLET ORAL at 08:20

## 2020-03-13 RX ADMIN — MONTELUKAST SODIUM 10 MG: 10 TABLET, FILM COATED ORAL at 08:20

## 2020-03-13 RX ADMIN — IPRATROPIUM BROMIDE AND ALBUTEROL SULFATE 1 AMPULE: 2.5; .5 SOLUTION RESPIRATORY (INHALATION) at 20:43

## 2020-03-13 RX ADMIN — LACTULOSE 20 G: 20 SOLUTION ORAL at 08:20

## 2020-03-13 RX ADMIN — FUROSEMIDE 20 MG: 20 TABLET ORAL at 08:20

## 2020-03-13 RX ADMIN — SODIUM CHLORIDE: 9 INJECTION, SOLUTION INTRAVENOUS at 04:25

## 2020-03-13 RX ADMIN — HYDROCODONE BITARTRATE AND ACETAMINOPHEN 1 TABLET: 5; 325 TABLET ORAL at 06:55

## 2020-03-13 RX ADMIN — ATORVASTATIN CALCIUM 40 MG: 40 TABLET, FILM COATED ORAL at 21:45

## 2020-03-13 RX ADMIN — LACTULOSE 20 G: 20 SOLUTION ORAL at 21:45

## 2020-03-13 RX ADMIN — INSULIN LISPRO 1 UNITS: 100 INJECTION, SOLUTION INTRAVENOUS; SUBCUTANEOUS at 06:52

## 2020-03-13 RX ADMIN — LACTULOSE 20 G: 20 SOLUTION ORAL at 14:36

## 2020-03-13 RX ADMIN — ALBUMIN (HUMAN) 25 G: 0.25 INJECTION, SOLUTION INTRAVENOUS at 21:45

## 2020-03-13 ASSESSMENT — PAIN SCALES - GENERAL
PAINLEVEL_OUTOF10: 0
PAINLEVEL_OUTOF10: 7
PAINLEVEL_OUTOF10: 0
PAINLEVEL_OUTOF10: 0

## 2020-03-13 NOTE — PROGRESS NOTES
using [employer] for support  S/I    Ambulation     15  feet with ww  with  CGA/min assist  15 feet x 1 using WW for support CGA for balance  100 feet with  ww  with  S/I        Stair negotiation: ascended and descended NT    4  steps with  1 rail with  SBA/CGA    LE ROM  WFL     LE strength  WFL, reports R sided weakness due to previous CVA      AM- PAC RAW score   17/ 24 17/24          Balance: fair minus dynamic using [employer] for support    Pt performed therapeutic exercise of the following: seated B ankle pumps AROM; B LAQ's/hamstring curls, hip ABd/ADd and heel slide motions with minimal manual resistance x 20    Patient education  Pt was educated on exercise, transfers    Patient response to education:   Pt verbalized understanding Pt demonstrated skill Pt requires further education in this area   yes yes yes     ASSESSMENT:   Comments: Nurse ok with rx. Sherice very slow, inconsistent and laboring, Pt states LEs feel weak and unstable, states unable to walk farther. Close chair follow used for safety. After gait, Pt assisted on and off the bedside commode. Pt unsteady, unsafe to gait or transfer alone presently. Treatment: Pt practiced and was instructed in the following treatment: therapeutic exercise promoting circulation and strengthening, UE usage to assist with transfer safety. Pt was left in a bedside chair with call light in reach. Chair/bed alarm: chair alarm active    Time in 1316   Time out 1352     Total Treatment Time 36 minutes   CPT codes:     Therapeutic activities 86310 20 minutes   Therapeutic exercises 62351 16 minutes       Pt is making fair progress toward established Physical Therapy goals as per functional mobility performed and exercise participation. Continue with physical therapy current plan of care.     Naima Aguilar [facility]   License Number: [id number]

## 2020-03-13 NOTE — PROGRESS NOTES
Notified Dr. Hoff [place] of BMP results.  Electronically signed by Rica Brambila RN on 3/12/2020 at 8:56 PM

## 2020-03-13 NOTE — PROGRESS NOTES
Weight   03/13/20 0817 -- -- -- -- -- 93 % --   03/13/20 0759 (!) 98/58 98.2 °F (36.8 °C) Oral 99 18 94 % --   03/13/20 0715 98/64 98 °F (36.7 °C) Oral 95 20 94 % --   03/13/20 0100 -- -- -- -- -- -- 215 lb 6.4 oz (97.7 kg)   03/13/20 0040 126/66 98.2 °F (36.8 °C) Oral 94 16 95 % --   03/12/20 2100 -- -- -- 98 -- 94 % --   03/12/20 1423 115/63 98.3 °F (36.8 °C) Oral 98 18 -- --   @      Intake/Output Summary (Last 24 hours) at 3/13/2020 1301  Last data filed at 3/13/2020 1256  Gross per 24 hour   Intake 540 ml   Output 100 ml   Net 440 ml         Wt Readings from Last 3 Encounters:   03/13/20 215 lb 6.4 oz (97.7 kg)   03/04/20 210 lb (95.3 kg)   02/26/20 210 lb (95.3 kg)       Constitutional:  in no acute distress  Oral: mucus membranes moist  Neck: no JVD  Cardiovascular: S1, S2 regular rhythm, no murmur,or rub  Respiratory:  No crackles, no wheeze  Gastrointestinal:  Soft, nontender, mildly distended, NABS  Ext: no edema, feet warm  Skin: dry, no rash  Neuro: awake, alert, interactive      DATA:    Recent Labs     03/11/20  0808 03/12/20  0420 03/13/20  1210   WBC 9.4 8.2 8.8   HGB 13.3 11.9 11.9   HCT 39.0 34.4 34.8   MCV 89.9 88.2 89.5    206 190     Recent Labs     03/10/20  1854  03/11/20  0808  03/12/20  0420 03/12/20  1350 03/12/20 2015 03/13/20  1210   *   < > 124*   < > 123* 123* 122* 125*   K 5.8*   < > 5.0  5.0   < > 4.8 4.4 4.3 4.4   CL 89*   < > 89*   < > 90* 89* 89* 94*   CO2 21*   < > 23   < > 21* 20* 20* 20*   MG  --   --  1.8  --  1.8  --   --  1.8   PHOS  --   --  3.5  --  4.1  --   --  2.9   BUN 16   < > 14   < > 15 14 14 12   CREATININE 0.9   < > 0.8   < > 1.0 0.9 0.9 0.8   ALT 32  --  31  --  27  --   --   --    AST 49*  --  47*  --  42*  --   --   --    BILIDIR 0.6*  --   --   --   --   --   --   --    BILITOT 1.4*  --  1.9*  --  2.0*  --   --   --    ALKPHOS 177*  --  153*  --  142*  --   --   --     < > = values in this interval not displayed.        Lab Results   Component

## 2020-03-14 PROBLEM — E87.1 HYPONATREMIA: Status: RESOLVED | Noted: 2020-02-11 | Resolved: 2020-03-14

## 2020-03-14 PROBLEM — I65.23 BILATERAL CAROTID ARTERY STENOSIS: Chronic | Status: RESOLVED | Noted: 2020-02-19 | Resolved: 2020-03-14

## 2020-03-14 PROBLEM — E87.1 HYPONATREMIA: Status: ACTIVE | Noted: 2020-03-14

## 2020-03-14 LAB
ANION GAP SERPL CALCULATED.3IONS-SCNC: 10 MMOL/L (ref 7–16)
ANION GAP SERPL CALCULATED.3IONS-SCNC: 13 MMOL/L (ref 7–16)
BUN BLDV-MCNC: 11 MG/DL (ref 8–23)
BUN BLDV-MCNC: 11 MG/DL (ref 8–23)
CALCIUM SERPL-MCNC: 8.7 MG/DL (ref 8.6–10.2)
CALCIUM SERPL-MCNC: 9 MG/DL (ref 8.6–10.2)
CHLORIDE BLD-SCNC: 94 MMOL/L (ref 98–107)
CHLORIDE BLD-SCNC: 97 MMOL/L (ref 98–107)
CO2: 21 MMOL/L (ref 22–29)
CO2: 22 MMOL/L (ref 22–29)
CREAT SERPL-MCNC: 0.8 MG/DL (ref 0.5–1)
CREAT SERPL-MCNC: 0.9 MG/DL (ref 0.5–1)
GFR AFRICAN AMERICAN: >60
GFR AFRICAN AMERICAN: >60
GFR NON-AFRICAN AMERICAN: >60 ML/MIN/1.73
GFR NON-AFRICAN AMERICAN: >60 ML/MIN/1.73
GLUCOSE BLD-MCNC: 166 MG/DL (ref 74–99)
GLUCOSE BLD-MCNC: 241 MG/DL (ref 74–99)
INR BLD: 1.7
MAGNESIUM: 2 MG/DL (ref 1.6–2.6)
METER GLUCOSE: 151 MG/DL (ref 74–99)
METER GLUCOSE: 220 MG/DL (ref 74–99)
METER GLUCOSE: 227 MG/DL (ref 74–99)
METER GLUCOSE: 235 MG/DL (ref 74–99)
PHOSPHORUS: 2.8 MG/DL (ref 2.5–4.5)
POTASSIUM REFLEX MAGNESIUM: 4.1 MMOL/L (ref 3.5–5)
POTASSIUM SERPL-SCNC: 4.1 MMOL/L (ref 3.5–5)
POTASSIUM SERPL-SCNC: 4.2 MMOL/L (ref 3.5–5)
PROTHROMBIN TIME: 20.4 SEC (ref 9.3–12.4)
SODIUM BLD-SCNC: 126 MMOL/L (ref 132–146)
SODIUM BLD-SCNC: 131 MMOL/L (ref 132–146)

## 2020-03-14 PROCEDURE — 2060000000 HC ICU INTERMEDIATE R&B

## 2020-03-14 PROCEDURE — P9047 ALBUMIN (HUMAN), 25%, 50ML: HCPCS | Performed by: INTERNAL MEDICINE

## 2020-03-14 PROCEDURE — 6360000002 HC RX W HCPCS: Performed by: PHYSICIAN ASSISTANT

## 2020-03-14 PROCEDURE — 83735 ASSAY OF MAGNESIUM: CPT

## 2020-03-14 PROCEDURE — 80048 BASIC METABOLIC PNL TOTAL CA: CPT

## 2020-03-14 PROCEDURE — 6370000000 HC RX 637 (ALT 250 FOR IP): Performed by: PHYSICIAN ASSISTANT

## 2020-03-14 PROCEDURE — 94640 AIRWAY INHALATION TREATMENT: CPT

## 2020-03-14 PROCEDURE — 6360000002 HC RX W HCPCS: Performed by: INTERNAL MEDICINE

## 2020-03-14 PROCEDURE — 84100 ASSAY OF PHOSPHORUS: CPT

## 2020-03-14 PROCEDURE — 2580000003 HC RX 258: Performed by: PHYSICIAN ASSISTANT

## 2020-03-14 PROCEDURE — 85610 PROTHROMBIN TIME: CPT

## 2020-03-14 PROCEDURE — 36415 COLL VENOUS BLD VENIPUNCTURE: CPT

## 2020-03-14 PROCEDURE — 82962 GLUCOSE BLOOD TEST: CPT

## 2020-03-14 RX ADMIN — LACTULOSE 20 G: 20 SOLUTION ORAL at 14:01

## 2020-03-14 RX ADMIN — MONTELUKAST SODIUM 10 MG: 10 TABLET, FILM COATED ORAL at 08:32

## 2020-03-14 RX ADMIN — IPRATROPIUM BROMIDE AND ALBUTEROL SULFATE 1 AMPULE: 2.5; .5 SOLUTION RESPIRATORY (INHALATION) at 17:00

## 2020-03-14 RX ADMIN — INSULIN LISPRO 2 UNITS: 100 INJECTION, SOLUTION INTRAVENOUS; SUBCUTANEOUS at 11:32

## 2020-03-14 RX ADMIN — ALBUMIN (HUMAN) 25 G: 0.25 INJECTION, SOLUTION INTRAVENOUS at 05:40

## 2020-03-14 RX ADMIN — IPRATROPIUM BROMIDE AND ALBUTEROL SULFATE 1 AMPULE: 2.5; .5 SOLUTION RESPIRATORY (INHALATION) at 21:29

## 2020-03-14 RX ADMIN — IPRATROPIUM BROMIDE AND ALBUTEROL SULFATE 1 AMPULE: 2.5; .5 SOLUTION RESPIRATORY (INHALATION) at 08:51

## 2020-03-14 RX ADMIN — RIFAXIMIN 550 MG: 550 TABLET ORAL at 21:16

## 2020-03-14 RX ADMIN — SODIUM CHLORIDE, PRESERVATIVE FREE 10 ML: 5 INJECTION INTRAVENOUS at 21:18

## 2020-03-14 RX ADMIN — ENOXAPARIN SODIUM 40 MG: 40 INJECTION SUBCUTANEOUS at 08:32

## 2020-03-14 RX ADMIN — IPRATROPIUM BROMIDE AND ALBUTEROL SULFATE 1 AMPULE: 2.5; .5 SOLUTION RESPIRATORY (INHALATION) at 12:24

## 2020-03-14 RX ADMIN — INSULIN LISPRO 1 UNITS: 100 INJECTION, SOLUTION INTRAVENOUS; SUBCUTANEOUS at 06:52

## 2020-03-14 RX ADMIN — VENLAFAXINE HYDROCHLORIDE 37.5 MG: 37.5 CAPSULE, EXTENDED RELEASE ORAL at 08:32

## 2020-03-14 RX ADMIN — LACTULOSE 20 G: 20 SOLUTION ORAL at 21:16

## 2020-03-14 RX ADMIN — PANTOPRAZOLE SODIUM 40 MG: 40 TABLET, DELAYED RELEASE ORAL at 08:32

## 2020-03-14 RX ADMIN — RIFAXIMIN 550 MG: 550 TABLET ORAL at 08:32

## 2020-03-14 RX ADMIN — AMLODIPINE BESYLATE 5 MG: 5 TABLET ORAL at 08:32

## 2020-03-14 RX ADMIN — INSULIN LISPRO 2 UNITS: 100 INJECTION, SOLUTION INTRAVENOUS; SUBCUTANEOUS at 16:14

## 2020-03-14 RX ADMIN — SODIUM CHLORIDE, PRESERVATIVE FREE 10 ML: 5 INJECTION INTRAVENOUS at 05:40

## 2020-03-14 RX ADMIN — FUROSEMIDE 20 MG: 20 TABLET ORAL at 08:32

## 2020-03-14 RX ADMIN — LACTULOSE 20 G: 20 SOLUTION ORAL at 08:32

## 2020-03-14 RX ADMIN — SODIUM CHLORIDE, PRESERVATIVE FREE 10 ML: 5 INJECTION INTRAVENOUS at 08:33

## 2020-03-14 RX ADMIN — ATORVASTATIN CALCIUM 40 MG: 40 TABLET, FILM COATED ORAL at 21:17

## 2020-03-14 RX ADMIN — INSULIN LISPRO 1 UNITS: 100 INJECTION, SOLUTION INTRAVENOUS; SUBCUTANEOUS at 21:16

## 2020-03-14 ASSESSMENT — PAIN SCALES - GENERAL
PAINLEVEL_OUTOF10: 0
PAINLEVEL_OUTOF10: 0

## 2020-03-14 NOTE — PROGRESS NOTES
Pulse ox was 96% on room air at rest.  Ambulated patient on room air. Oxygen saturation was 91-96% on room air while ambulating. Recovery pulse ox was 96% on room air.

## 2020-03-15 LAB
ANION GAP SERPL CALCULATED.3IONS-SCNC: 12 MMOL/L (ref 7–16)
BASOPHILS ABSOLUTE: 0.07 E9/L (ref 0–0.2)
BASOPHILS RELATIVE PERCENT: 1 % (ref 0–2)
BUN BLDV-MCNC: 11 MG/DL (ref 8–23)
CALCIUM SERPL-MCNC: 8.6 MG/DL (ref 8.6–10.2)
CHLORIDE BLD-SCNC: 97 MMOL/L (ref 98–107)
CO2: 20 MMOL/L (ref 22–29)
CREAT SERPL-MCNC: 0.8 MG/DL (ref 0.5–1)
EOSINOPHILS ABSOLUTE: 0.1 E9/L (ref 0.05–0.5)
EOSINOPHILS RELATIVE PERCENT: 1.4 % (ref 0–6)
GFR AFRICAN AMERICAN: >60
GFR NON-AFRICAN AMERICAN: >60 ML/MIN/1.73
GLUCOSE BLD-MCNC: 211 MG/DL (ref 74–99)
HCT VFR BLD CALC: 33.4 % (ref 34–48)
HEMOGLOBIN: 11.4 G/DL (ref 11.5–15.5)
IMMATURE GRANULOCYTES #: 0.08 E9/L
IMMATURE GRANULOCYTES %: 1.1 % (ref 0–5)
INR BLD: 1.8
LYMPHOCYTES ABSOLUTE: 1.39 E9/L (ref 1.5–4)
LYMPHOCYTES RELATIVE PERCENT: 19.3 % (ref 20–42)
MAGNESIUM: 1.9 MG/DL (ref 1.6–2.6)
MCH RBC QN AUTO: 30.2 PG (ref 26–35)
MCHC RBC AUTO-ENTMCNC: 34.1 % (ref 32–34.5)
MCV RBC AUTO: 88.4 FL (ref 80–99.9)
METER GLUCOSE: 192 MG/DL (ref 74–99)
METER GLUCOSE: 240 MG/DL (ref 74–99)
METER GLUCOSE: 241 MG/DL (ref 74–99)
MONOCYTES ABSOLUTE: 0.98 E9/L (ref 0.1–0.95)
MONOCYTES RELATIVE PERCENT: 13.6 % (ref 2–12)
NEUTROPHILS ABSOLUTE: 4.58 E9/L (ref 1.8–7.3)
NEUTROPHILS RELATIVE PERCENT: 63.6 % (ref 43–80)
PDW BLD-RTO: 19.5 FL (ref 11.5–15)
PLATELET # BLD: 159 E9/L (ref 130–450)
PMV BLD AUTO: 10.4 FL (ref 7–12)
POTASSIUM SERPL-SCNC: 4.2 MMOL/L (ref 3.5–5)
PROTHROMBIN TIME: 21.5 SEC (ref 9.3–12.4)
RBC # BLD: 3.78 E12/L (ref 3.5–5.5)
SODIUM BLD-SCNC: 129 MMOL/L (ref 132–146)
WBC # BLD: 7.2 E9/L (ref 4.5–11.5)

## 2020-03-15 PROCEDURE — P9047 ALBUMIN (HUMAN), 25%, 50ML: HCPCS | Performed by: INTERNAL MEDICINE

## 2020-03-15 PROCEDURE — 6370000000 HC RX 637 (ALT 250 FOR IP): Performed by: PHYSICIAN ASSISTANT

## 2020-03-15 PROCEDURE — 6360000002 HC RX W HCPCS: Performed by: PHYSICIAN ASSISTANT

## 2020-03-15 PROCEDURE — 82962 GLUCOSE BLOOD TEST: CPT

## 2020-03-15 PROCEDURE — 6360000002 HC RX W HCPCS: Performed by: INTERNAL MEDICINE

## 2020-03-15 PROCEDURE — 80048 BASIC METABOLIC PNL TOTAL CA: CPT

## 2020-03-15 PROCEDURE — 85610 PROTHROMBIN TIME: CPT

## 2020-03-15 PROCEDURE — 36415 COLL VENOUS BLD VENIPUNCTURE: CPT

## 2020-03-15 PROCEDURE — 2580000003 HC RX 258: Performed by: PHYSICIAN ASSISTANT

## 2020-03-15 PROCEDURE — 6370000000 HC RX 637 (ALT 250 FOR IP): Performed by: INTERNAL MEDICINE

## 2020-03-15 PROCEDURE — 83735 ASSAY OF MAGNESIUM: CPT

## 2020-03-15 PROCEDURE — 94640 AIRWAY INHALATION TREATMENT: CPT

## 2020-03-15 PROCEDURE — 85025 COMPLETE CBC W/AUTO DIFF WBC: CPT

## 2020-03-15 PROCEDURE — 2060000000 HC ICU INTERMEDIATE R&B

## 2020-03-15 RX ORDER — MAGNESIUM SULFATE IN WATER 40 MG/ML
2 INJECTION, SOLUTION INTRAVENOUS ONCE
Status: COMPLETED | OUTPATIENT
Start: 2020-03-15 | End: 2020-03-15

## 2020-03-15 RX ORDER — SPIRONOLACTONE 25 MG/1
25 TABLET ORAL DAILY
Status: DISCONTINUED | OUTPATIENT
Start: 2020-03-15 | End: 2020-03-17 | Stop reason: HOSPADM

## 2020-03-15 RX ORDER — ALBUMIN (HUMAN) 12.5 G/50ML
25 SOLUTION INTRAVENOUS EVERY 8 HOURS
Status: COMPLETED | OUTPATIENT
Start: 2020-03-15 | End: 2020-03-16

## 2020-03-15 RX ADMIN — RIFAXIMIN 550 MG: 550 TABLET ORAL at 10:05

## 2020-03-15 RX ADMIN — SODIUM CHLORIDE, PRESERVATIVE FREE 10 ML: 5 INJECTION INTRAVENOUS at 10:06

## 2020-03-15 RX ADMIN — VENLAFAXINE HYDROCHLORIDE 37.5 MG: 37.5 CAPSULE, EXTENDED RELEASE ORAL at 10:05

## 2020-03-15 RX ADMIN — IPRATROPIUM BROMIDE AND ALBUTEROL SULFATE 1 AMPULE: 2.5; .5 SOLUTION RESPIRATORY (INHALATION) at 12:07

## 2020-03-15 RX ADMIN — LACTULOSE 20 G: 20 SOLUTION ORAL at 10:06

## 2020-03-15 RX ADMIN — ALBUMIN (HUMAN) 25 G: 0.25 INJECTION, SOLUTION INTRAVENOUS at 22:27

## 2020-03-15 RX ADMIN — HYDROCODONE BITARTRATE AND ACETAMINOPHEN 1 TABLET: 5; 325 TABLET ORAL at 00:26

## 2020-03-15 RX ADMIN — ENOXAPARIN SODIUM 40 MG: 40 INJECTION SUBCUTANEOUS at 10:07

## 2020-03-15 RX ADMIN — INSULIN LISPRO 2 UNITS: 100 INJECTION, SOLUTION INTRAVENOUS; SUBCUTANEOUS at 18:02

## 2020-03-15 RX ADMIN — LACTULOSE 20 G: 20 SOLUTION ORAL at 15:22

## 2020-03-15 RX ADMIN — AMLODIPINE BESYLATE 5 MG: 5 TABLET ORAL at 10:05

## 2020-03-15 RX ADMIN — INSULIN LISPRO 1 UNITS: 100 INJECTION, SOLUTION INTRAVENOUS; SUBCUTANEOUS at 07:27

## 2020-03-15 RX ADMIN — IPRATROPIUM BROMIDE AND ALBUTEROL SULFATE 1 AMPULE: 2.5; .5 SOLUTION RESPIRATORY (INHALATION) at 19:57

## 2020-03-15 RX ADMIN — ACETAMINOPHEN 650 MG: 325 TABLET ORAL at 20:18

## 2020-03-15 RX ADMIN — RIFAXIMIN 550 MG: 550 TABLET ORAL at 20:18

## 2020-03-15 RX ADMIN — SPIRONOLACTONE 25 MG: 25 TABLET ORAL at 20:18

## 2020-03-15 RX ADMIN — MAGNESIUM SULFATE HEPTAHYDRATE 2 G: 40 INJECTION, SOLUTION INTRAVENOUS at 10:15

## 2020-03-15 RX ADMIN — ATORVASTATIN CALCIUM 40 MG: 40 TABLET, FILM COATED ORAL at 20:18

## 2020-03-15 RX ADMIN — SODIUM CHLORIDE, PRESERVATIVE FREE 10 ML: 5 INJECTION INTRAVENOUS at 16:16

## 2020-03-15 RX ADMIN — SODIUM CHLORIDE, PRESERVATIVE FREE 10 ML: 5 INJECTION INTRAVENOUS at 20:21

## 2020-03-15 RX ADMIN — IPRATROPIUM BROMIDE AND ALBUTEROL SULFATE 1 AMPULE: 2.5; .5 SOLUTION RESPIRATORY (INHALATION) at 09:01

## 2020-03-15 RX ADMIN — FUROSEMIDE 20 MG: 20 TABLET ORAL at 10:06

## 2020-03-15 RX ADMIN — PANTOPRAZOLE SODIUM 40 MG: 40 TABLET, DELAYED RELEASE ORAL at 10:05

## 2020-03-15 RX ADMIN — MONTELUKAST SODIUM 10 MG: 10 TABLET, FILM COATED ORAL at 10:06

## 2020-03-15 RX ADMIN — LACTULOSE 20 G: 20 SOLUTION ORAL at 20:19

## 2020-03-15 RX ADMIN — IPRATROPIUM BROMIDE AND ALBUTEROL SULFATE 1 AMPULE: 2.5; .5 SOLUTION RESPIRATORY (INHALATION) at 16:26

## 2020-03-15 RX ADMIN — INSULIN LISPRO 1 UNITS: 100 INJECTION, SOLUTION INTRAVENOUS; SUBCUTANEOUS at 20:23

## 2020-03-15 RX ADMIN — ALBUMIN (HUMAN) 25 G: 0.25 INJECTION, SOLUTION INTRAVENOUS at 15:10

## 2020-03-15 ASSESSMENT — PAIN SCALES - GENERAL
PAINLEVEL_OUTOF10: 6
PAINLEVEL_OUTOF10: 8
PAINLEVEL_OUTOF10: 0
PAINLEVEL_OUTOF10: 0
PAINLEVEL_OUTOF10: 8

## 2020-03-15 ASSESSMENT — PAIN DESCRIPTION - ONSET
ONSET: ON-GOING
ONSET: ON-GOING

## 2020-03-15 ASSESSMENT — PAIN DESCRIPTION - ORIENTATION
ORIENTATION: RIGHT;LEFT;MID
ORIENTATION: MID;RIGHT

## 2020-03-15 ASSESSMENT — PAIN DESCRIPTION - DESCRIPTORS
DESCRIPTORS: ACHING;CRAMPING
DESCRIPTORS: ACHING;CONSTANT;DISCOMFORT

## 2020-03-15 ASSESSMENT — PAIN DESCRIPTION - FREQUENCY
FREQUENCY: CONTINUOUS
FREQUENCY: CONTINUOUS

## 2020-03-15 ASSESSMENT — PAIN DESCRIPTION - PROGRESSION: CLINICAL_PROGRESSION: NOT CHANGED

## 2020-03-15 ASSESSMENT — PAIN DESCRIPTION - PAIN TYPE: TYPE: ACUTE PAIN

## 2020-03-15 ASSESSMENT — PAIN DESCRIPTION - LOCATION
LOCATION: ABDOMEN
LOCATION: ABDOMEN

## 2020-03-15 NOTE — PROGRESS NOTES
[facility] MD Oscar Woody MD Justo Nap, MD Petra Michael, CNP   Karla Han, TOMI  Progress Note    3/15/2020    SUBJECTIVE:   3/12: 8 L paracentesis yesterday. Today her abdomen is \"sore. \"  Persistent nausea, anorexia. Does not feel like eating anything. Trying to drink but not being particularly successful. No lightheadedness. No peripheral swelling. Loose stools. (-) sob/davis/cp/palp ongoing fatigue, generalized weakness. 3/13: Feeling better. Ate a little bit today. Drinking a bit better, as well. Abdomen distended, though \"not too bad. \"  Nausea vomiting. No heartburn. No dyspnea at rest.  No peripheral swelling. 3/14: Feeling a little bit better, though notes that, \"I think I need another paracentesis. \"  Denies dyspnea at rest on room air.  3/15: Feeling little better. Abdomen a little more distended. Appetite little better. Otherwise ROS unremarkable. PROBLEM LIST:    Principal Problem:    Hyponatremia  Active Problems:    Cirrhosis of liver with ascites (HCC)    Diabetes mellitus type 2, uncontrolled (HCC)    Hyperlipidemia LDL goal <100    History of CVA (cerebrovascular accident)    Essential hypertension    Obesity (BMI 30-39. 9)    Hyperkalemia  Resolved Problems:    Hyponatremia         DIET:    DIET CARB CONTROL; Daily Fluid Restriction: 1500 ml     MEDS (scheduled):    albumin human  25 g Intravenous Q8H    ipratropium-albuterol  1 ampule Inhalation Q4H WA    amLODIPine  5 mg Oral Daily    atorvastatin  40 mg Oral Nightly    furosemide  20 mg Oral Daily    lactulose  30 mL Oral TID    montelukast  10 mg Oral QAM    pantoprazole  40 mg Oral Daily    rifaximin  550 mg Oral BID    venlafaxine  37.5 mg Oral Daily    insulin lispro  0-6 Units Subcutaneous TID WC    insulin lispro  0-3 Units Subcutaneous Nightly    sodium chloride flush  10 mL Intravenous 2 times per day    enoxaparin  40 mg Subcutaneous Daily       MEDS

## 2020-03-15 NOTE — PROGRESS NOTES
Notified Amira Almonte NP, of pt being lowered to floor did not hit head. Vital signs stable. Abrasion noted to knee. Assisted back to bed without incident. Pt requesting family not be called at this time. She will notify tomorrow. Nursing supervisor notified.

## 2020-03-15 NOTE — PROGRESS NOTES
Dr radha patterson covering dr Dimas Nayak. Subjective: The patient is awake and alert. Status post paracentesis (3/12/2020). No problems overnight. Denies chest pain, angina, and dyspnea. Denies abdominal pain. Tolerating diet. No nausea or vomiting. Objective:    /70   Pulse 91   Temp 98.8 °F (37.1 °C) (Axillary)   Resp 16   Ht 5' 7\" (1.702 m)   Wt 200 lb 8 oz (90.9 kg)   SpO2 95%   BMI 31.40 kg/m²     Current medications that patient is taking have been reviewed. Heart:  RRR, no murmurs, gallops, or rubs.   Lungs:  CTA bilaterally, no wheeze, rales or rhonchi  Abd: bowel sounds present, soft, nontender, distended with fluid waves, no masses  Extrem:  No cyanosis or edema    CBC with Differential:    Lab Results   Component Value Date    WBC 7.2 03/15/2020    RBC 3.78 03/15/2020    HGB 11.4 03/15/2020    HCT 33.4 03/15/2020     03/15/2020    MCV 88.4 03/15/2020    MCH 30.2 03/15/2020    MCHC 34.1 03/15/2020    RDW 19.5 03/15/2020    LYMPHOPCT 19.3 03/15/2020    MONOPCT 13.6 03/15/2020    BASOPCT 1.0 03/15/2020    MONOSABS 0.98 03/15/2020    LYMPHSABS 1.39 03/15/2020    EOSABS 0.10 03/15/2020    BASOSABS 0.07 03/15/2020     CMP:    Lab Results   Component Value Date     03/15/2020    K 4.2 03/15/2020    K 4.1 03/14/2020    CL 97 03/15/2020    CO2 20 03/15/2020    BUN 11 03/15/2020    CREATININE 0.8 03/15/2020    GFRAA >60 03/15/2020    LABGLOM >60 03/15/2020    GLUCOSE 211 03/15/2020    PROT 6.6 03/12/2020    LABALBU 2.7 03/12/2020    CALCIUM 8.6 03/15/2020    BILITOT 2.0 03/12/2020    ALKPHOS 142 03/12/2020    AST 42 03/12/2020    ALT 27 03/12/2020     BMP:    Lab Results   Component Value Date     03/15/2020    K 4.2 03/15/2020    K 4.1 03/14/2020    CL 97 03/15/2020    CO2 20 03/15/2020    BUN 11 03/15/2020    LABALBU 2.7 03/12/2020    CREATININE 0.8 03/15/2020    CALCIUM 8.6 03/15/2020    GFRAA >60 03/15/2020    LABGLOM >60 03/15/2020    GLUCOSE 211 03/15/2020

## 2020-03-15 NOTE — PROGRESS NOTES
Assisting pt to restroom pt felt dizzy while holding on to pt was reaching for bedside commode un able to pull commode close enough to pt pt assisted to floor scrapping left knee pt did not hit head nursing staff called for help

## 2020-03-15 NOTE — PLAN OF CARE
Problem: Falls - Risk of:  Goal: Will remain free from falls  Description: Will remain free from falls  3/15/2020 1109 by Candace Skinner RN  Outcome: Met This Shift     Problem: Falls - Risk of:  Goal: Absence of physical injury  Description: Absence of physical injury  Outcome: Met This Shift     Problem: Pain:  Goal: Pain level will decrease  Description: Pain level will decrease  Outcome: Met This Shift     Problem: Pain:  Goal: Control of acute pain  Description: Control of acute pain  Outcome: Met This Shift

## 2020-03-16 ENCOUNTER — APPOINTMENT (OUTPATIENT)
Dept: ULTRASOUND IMAGING | Age: 65
DRG: 433 | End: 2020-03-16
Payer: MEDICARE

## 2020-03-16 LAB
ALBUMIN SERPL-MCNC: 3.5 G/DL (ref 3.5–5.2)
ALP BLD-CCNC: 115 U/L (ref 35–104)
ALT SERPL-CCNC: 20 U/L (ref 0–32)
ANION GAP SERPL CALCULATED.3IONS-SCNC: 11 MMOL/L (ref 7–16)
APTT: 41.2 SEC (ref 24.5–35.1)
AST SERPL-CCNC: 34 U/L (ref 0–31)
BASOPHILS ABSOLUTE: 0.08 E9/L (ref 0–0.2)
BASOPHILS RELATIVE PERCENT: 1.3 % (ref 0–2)
BILIRUB SERPL-MCNC: 1.7 MG/DL (ref 0–1.2)
BUN BLDV-MCNC: 13 MG/DL (ref 8–23)
CALCIUM SERPL-MCNC: 8.5 MG/DL (ref 8.6–10.2)
CHLORIDE BLD-SCNC: 96 MMOL/L (ref 98–107)
CO2: 21 MMOL/L (ref 22–29)
CREAT SERPL-MCNC: 0.8 MG/DL (ref 0.5–1)
EOSINOPHILS ABSOLUTE: 0.28 E9/L (ref 0.05–0.5)
EOSINOPHILS RELATIVE PERCENT: 4.5 % (ref 0–6)
GFR AFRICAN AMERICAN: >60
GFR NON-AFRICAN AMERICAN: >60 ML/MIN/1.73
GLUCOSE BLD-MCNC: 192 MG/DL (ref 74–99)
HCT VFR BLD CALC: 32.3 % (ref 34–48)
HEMOGLOBIN: 10.8 G/DL (ref 11.5–15.5)
IMMATURE GRANULOCYTES #: 0.03 E9/L
IMMATURE GRANULOCYTES %: 0.5 % (ref 0–5)
INR BLD: 1.8
LYMPHOCYTES ABSOLUTE: 1.52 E9/L (ref 1.5–4)
LYMPHOCYTES RELATIVE PERCENT: 24.7 % (ref 20–42)
MAGNESIUM: 2.2 MG/DL (ref 1.6–2.6)
MCH RBC QN AUTO: 29.9 PG (ref 26–35)
MCHC RBC AUTO-ENTMCNC: 33.4 % (ref 32–34.5)
MCV RBC AUTO: 89.5 FL (ref 80–99.9)
METER GLUCOSE: 181 MG/DL (ref 74–99)
METER GLUCOSE: 198 MG/DL (ref 74–99)
METER GLUCOSE: 271 MG/DL (ref 74–99)
METER GLUCOSE: 326 MG/DL (ref 74–99)
MONOCYTES ABSOLUTE: 0.92 E9/L (ref 0.1–0.95)
MONOCYTES RELATIVE PERCENT: 14.9 % (ref 2–12)
NEUTROPHILS ABSOLUTE: 3.33 E9/L (ref 1.8–7.3)
NEUTROPHILS RELATIVE PERCENT: 54.1 % (ref 43–80)
PDW BLD-RTO: 19.8 FL (ref 11.5–15)
PHOSPHORUS: 2.7 MG/DL (ref 2.5–4.5)
PLATELET # BLD: 132 E9/L (ref 130–450)
PMV BLD AUTO: 9.8 FL (ref 7–12)
POTASSIUM SERPL-SCNC: 4 MMOL/L (ref 3.5–5)
PROTHROMBIN TIME: 21.9 SEC (ref 9.3–12.4)
RBC # BLD: 3.61 E12/L (ref 3.5–5.5)
SODIUM BLD-SCNC: 128 MMOL/L (ref 132–146)
TOTAL PROTEIN: 6 G/DL (ref 6.4–8.3)
WBC # BLD: 6.2 E9/L (ref 4.5–11.5)

## 2020-03-16 PROCEDURE — 6370000000 HC RX 637 (ALT 250 FOR IP): Performed by: INTERNAL MEDICINE

## 2020-03-16 PROCEDURE — 85730 THROMBOPLASTIN TIME PARTIAL: CPT

## 2020-03-16 PROCEDURE — 6370000000 HC RX 637 (ALT 250 FOR IP): Performed by: PHYSICIAN ASSISTANT

## 2020-03-16 PROCEDURE — 84100 ASSAY OF PHOSPHORUS: CPT

## 2020-03-16 PROCEDURE — 85610 PROTHROMBIN TIME: CPT

## 2020-03-16 PROCEDURE — C1729 CATH, DRAINAGE: HCPCS

## 2020-03-16 PROCEDURE — 80053 COMPREHEN METABOLIC PANEL: CPT

## 2020-03-16 PROCEDURE — 82962 GLUCOSE BLOOD TEST: CPT

## 2020-03-16 PROCEDURE — 97535 SELF CARE MNGMENT TRAINING: CPT

## 2020-03-16 PROCEDURE — 36415 COLL VENOUS BLD VENIPUNCTURE: CPT

## 2020-03-16 PROCEDURE — 2580000003 HC RX 258: Performed by: PHYSICIAN ASSISTANT

## 2020-03-16 PROCEDURE — 94640 AIRWAY INHALATION TREATMENT: CPT

## 2020-03-16 PROCEDURE — 2060000000 HC ICU INTERMEDIATE R&B

## 2020-03-16 PROCEDURE — P9047 ALBUMIN (HUMAN), 25%, 50ML: HCPCS | Performed by: INTERNAL MEDICINE

## 2020-03-16 PROCEDURE — 2500000003 HC RX 250 WO HCPCS: Performed by: PHYSICIAN ASSISTANT

## 2020-03-16 PROCEDURE — 6360000002 HC RX W HCPCS: Performed by: INTERNAL MEDICINE

## 2020-03-16 PROCEDURE — 97530 THERAPEUTIC ACTIVITIES: CPT

## 2020-03-16 PROCEDURE — 85025 COMPLETE CBC W/AUTO DIFF WBC: CPT

## 2020-03-16 PROCEDURE — 0W9G3ZZ DRAINAGE OF PERITONEAL CAVITY, PERCUTANEOUS APPROACH: ICD-10-PCS | Performed by: RADIOLOGY

## 2020-03-16 PROCEDURE — 83735 ASSAY OF MAGNESIUM: CPT

## 2020-03-16 RX ORDER — LIDOCAINE HYDROCHLORIDE 10 MG/ML
10 INJECTION, SOLUTION INFILTRATION; PERINEURAL ONCE
Status: COMPLETED | OUTPATIENT
Start: 2020-03-16 | End: 2020-03-16

## 2020-03-16 RX ORDER — ALBUMIN (HUMAN) 12.5 G/50ML
25 SOLUTION INTRAVENOUS EVERY 8 HOURS
Status: COMPLETED | OUTPATIENT
Start: 2020-03-16 | End: 2020-03-17

## 2020-03-16 RX ADMIN — ALBUMIN (HUMAN) 25 G: 0.25 INJECTION, SOLUTION INTRAVENOUS at 05:51

## 2020-03-16 RX ADMIN — MONTELUKAST SODIUM 10 MG: 10 TABLET, FILM COATED ORAL at 09:55

## 2020-03-16 RX ADMIN — INSULIN LISPRO 3 UNITS: 100 INJECTION, SOLUTION INTRAVENOUS; SUBCUTANEOUS at 11:22

## 2020-03-16 RX ADMIN — SODIUM CHLORIDE, PRESERVATIVE FREE 10 ML: 5 INJECTION INTRAVENOUS at 21:21

## 2020-03-16 RX ADMIN — SODIUM CHLORIDE, PRESERVATIVE FREE 10 ML: 5 INJECTION INTRAVENOUS at 05:52

## 2020-03-16 RX ADMIN — ALBUMIN (HUMAN) 25 G: 0.25 INJECTION, SOLUTION INTRAVENOUS at 11:37

## 2020-03-16 RX ADMIN — ALBUMIN (HUMAN) 25 G: 0.25 INJECTION, SOLUTION INTRAVENOUS at 18:36

## 2020-03-16 RX ADMIN — AMITRIPTYLINE HYDROCHLORIDE 25 MG: 25 TABLET, FILM COATED ORAL at 21:21

## 2020-03-16 RX ADMIN — HYDROCODONE BITARTRATE AND ACETAMINOPHEN 1 TABLET: 5; 325 TABLET ORAL at 09:55

## 2020-03-16 RX ADMIN — HYDROCODONE BITARTRATE AND ACETAMINOPHEN 1 TABLET: 5; 325 TABLET ORAL at 21:21

## 2020-03-16 RX ADMIN — IPRATROPIUM BROMIDE AND ALBUTEROL SULFATE 1 AMPULE: 2.5; .5 SOLUTION RESPIRATORY (INHALATION) at 21:16

## 2020-03-16 RX ADMIN — RIFAXIMIN 550 MG: 550 TABLET ORAL at 09:55

## 2020-03-16 RX ADMIN — LACTULOSE 20 G: 20 SOLUTION ORAL at 21:21

## 2020-03-16 RX ADMIN — SPIRONOLACTONE 25 MG: 25 TABLET ORAL at 09:55

## 2020-03-16 RX ADMIN — LACTULOSE 20 G: 20 SOLUTION ORAL at 09:55

## 2020-03-16 RX ADMIN — SODIUM CHLORIDE, PRESERVATIVE FREE 10 ML: 5 INJECTION INTRAVENOUS at 12:28

## 2020-03-16 RX ADMIN — RIFAXIMIN 550 MG: 550 TABLET ORAL at 21:21

## 2020-03-16 RX ADMIN — VENLAFAXINE HYDROCHLORIDE 37.5 MG: 37.5 CAPSULE, EXTENDED RELEASE ORAL at 09:55

## 2020-03-16 RX ADMIN — FUROSEMIDE 20 MG: 20 TABLET ORAL at 09:55

## 2020-03-16 RX ADMIN — AMLODIPINE BESYLATE 5 MG: 5 TABLET ORAL at 09:55

## 2020-03-16 RX ADMIN — IPRATROPIUM BROMIDE AND ALBUTEROL SULFATE 1 AMPULE: 2.5; .5 SOLUTION RESPIRATORY (INHALATION) at 12:38

## 2020-03-16 RX ADMIN — INSULIN LISPRO 2 UNITS: 100 INJECTION, SOLUTION INTRAVENOUS; SUBCUTANEOUS at 21:23

## 2020-03-16 RX ADMIN — IPRATROPIUM BROMIDE AND ALBUTEROL SULFATE 1 AMPULE: 2.5; .5 SOLUTION RESPIRATORY (INHALATION) at 17:03

## 2020-03-16 RX ADMIN — INSULIN LISPRO 1 UNITS: 100 INJECTION, SOLUTION INTRAVENOUS; SUBCUTANEOUS at 05:58

## 2020-03-16 RX ADMIN — INSULIN LISPRO 1 UNITS: 100 INJECTION, SOLUTION INTRAVENOUS; SUBCUTANEOUS at 16:34

## 2020-03-16 RX ADMIN — LACTULOSE 20 G: 20 SOLUTION ORAL at 14:00

## 2020-03-16 RX ADMIN — PANTOPRAZOLE SODIUM 40 MG: 40 TABLET, DELAYED RELEASE ORAL at 09:55

## 2020-03-16 RX ADMIN — LIDOCAINE HYDROCHLORIDE: 10 INJECTION, SOLUTION INFILTRATION; PERINEURAL at 08:56

## 2020-03-16 RX ADMIN — ATORVASTATIN CALCIUM 40 MG: 40 TABLET, FILM COATED ORAL at 21:21

## 2020-03-16 ASSESSMENT — PAIN - FUNCTIONAL ASSESSMENT: PAIN_FUNCTIONAL_ASSESSMENT: ACTIVITIES ARE NOT PREVENTED

## 2020-03-16 ASSESSMENT — PAIN DESCRIPTION - PAIN TYPE: TYPE: ACUTE PAIN

## 2020-03-16 ASSESSMENT — PAIN SCALES - GENERAL
PAINLEVEL_OUTOF10: 7
PAINLEVEL_OUTOF10: 7
PAINLEVEL_OUTOF10: 0

## 2020-03-16 ASSESSMENT — PAIN DESCRIPTION - FREQUENCY: FREQUENCY: CONTINUOUS

## 2020-03-16 ASSESSMENT — PAIN DESCRIPTION - LOCATION: LOCATION: ABDOMEN

## 2020-03-16 ASSESSMENT — PAIN DESCRIPTION - ONSET: ONSET: ON-GOING

## 2020-03-16 ASSESSMENT — PAIN DESCRIPTION - PROGRESSION: CLINICAL_PROGRESSION: GRADUALLY IMPROVING

## 2020-03-16 ASSESSMENT — PAIN DESCRIPTION - DESCRIPTORS: DESCRIPTORS: ACHING

## 2020-03-16 ASSESSMENT — PAIN DESCRIPTION - ORIENTATION: ORIENTATION: MID

## 2020-03-16 NOTE — PROGRESS NOTES
IRFLOWSHEET    Date: 3/16/2020    Time: 9:00 AM     Exam: Ultrasound Guided Paracentesis    Radiologist Performing Procedure: Mendoza/[facility]    Nurse Reporting/Phone: [id number]     Nurse Receiving: Levi Lizzette    Permit signed:      Yes    ID Band Checklist: Yes    Allergies: Tramadol     TIME OUT: 5835    PROCEDURE START TIME: 0845    Puncture Site: RLQ    Puncture Time: 4377    Catheters: 6fr    LABS:   Lab Results   Component Value Date    INR 1.8 03/16/2020    PROTIME 21.9 (H) 03/16/2020           Lab Results   Component Value Date    CREATININE 0.8 03/16/2020    BUN 13 03/16/2020          Lab Results   Component Value Date    HGB 10.8 (L) 03/16/2020    HCT 32.3 (L) 03/16/2020     03/16/2020         PROCEDURE END TIME: 0925    Total Contrast: n/a    Fluoroscopy Time: n/a    Complications:  None    Comments: Patient arrival for paracentesis. Vitals taken, and consent signed. [facility] in to speak with the patient about the procedure, all questions answered. Abdomen scanned, prepped and centesis catheter inserted RLQ with ultrasound guidance  @.0855 Patient tolerated well. 6250 ml drained of yellow colored ascitic fluid. Centesis catheter removed @0925 . Puncture site cleansed and dry dressing applied. No bleeding, swelling or complications noted. Report called to floor nurse. Berlin Gomez

## 2020-03-16 NOTE — PROGRESS NOTES
Subjective: The patient is awake and alert. Status post paracentesis (3/12/2020). No problems overnight. Denies chest pain, angina, and dyspnea. Denies abdominal pain. Tolerating diet. No nausea or vomiting. Objective:    /60   Pulse 93   Temp 98.2 °F (36.8 °C) (Oral)   Resp 20   Ht 5' 7\" (1.702 m)   Wt 201 lb 4.8 oz (91.3 kg)   SpO2 94%   BMI 31.53 kg/m²     Current medications that patient is taking have been reviewed. Heart:  RRR, no murmurs, gallops, or rubs.   Lungs:  CTA bilaterally, no wheeze, rales or rhonchi  Abd: bowel sounds present, soft, nontender, distended with fluid waves, no masses  Extrem:  No cyanosis or edema    CBC with Differential:    Lab Results   Component Value Date    WBC 6.2 03/16/2020    RBC 3.61 03/16/2020    HGB 10.8 03/16/2020    HCT 32.3 03/16/2020     03/16/2020    MCV 89.5 03/16/2020    MCH 29.9 03/16/2020    MCHC 33.4 03/16/2020    RDW 19.8 03/16/2020    LYMPHOPCT 24.7 03/16/2020    MONOPCT 14.9 03/16/2020    BASOPCT 1.3 03/16/2020    MONOSABS 0.92 03/16/2020    LYMPHSABS 1.52 03/16/2020    EOSABS 0.28 03/16/2020    BASOSABS 0.08 03/16/2020     CMP:    Lab Results   Component Value Date     03/16/2020    K 4.0 03/16/2020    K 4.1 03/14/2020    CL 96 03/16/2020    CO2 21 03/16/2020    BUN 13 03/16/2020    CREATININE 0.8 03/16/2020    GFRAA >60 03/16/2020    LABGLOM >60 03/16/2020    GLUCOSE 192 03/16/2020    PROT 6.0 03/16/2020    LABALBU 3.5 03/16/2020    CALCIUM 8.5 03/16/2020    BILITOT 1.7 03/16/2020    ALKPHOS 115 03/16/2020    AST 34 03/16/2020    ALT 20 03/16/2020     BMP:    Lab Results   Component Value Date     03/16/2020    K 4.0 03/16/2020    K 4.1 03/14/2020    CL 96 03/16/2020    CO2 21 03/16/2020    BUN 13 03/16/2020    LABALBU 3.5 03/16/2020    CREATININE 0.8 03/16/2020    CALCIUM 8.5 03/16/2020    GFRAA >60 03/16/2020    LABGLOM >60 03/16/2020    GLUCOSE 192 03/16/2020     Magnesium:    Lab Results   Component Value

## 2020-03-16 NOTE — INTERVAL H&P NOTE
H&P Update    Patient's History and Physical  was reviewed. The patient appears likely to able to tolerate the procedure, paracentesis. Risk and benefits discussed including ultimate complications, possibly death and consent obtained.     Yi Sandoval PA-C

## 2020-03-16 NOTE — CARE COORDINATION
Social Work/Discharge Planning:  Received notification patient is interested in snf at discharge. Met with patient and discussed snf options. Patient requested for this worker to call her son Arnoldo Pal to confirm discharge plan. Called Arnoldo Pal (ph: [phone]) and he states he will call patient insurance company to confirm if snf will be covered. Patient had a recent stay at [place] [facility] the [facility] [place] and is in her co-pay days. Arnoldo Pal called this worker following discussion with [employer] and his mother and indicated plan is to return home with [facility]. Arnoldo Pal states they can not afford private duty home care. Igorsreekanth Steffbrennen states he provides assistance for his mother during the day and he has a camera to watch her at night while he is at work. Patient will need a resume home health care order prior to discharge. Notified RN. Will continue to follow.   Electronically signed by CHELITA Ellis on 3/16/2020 at 1:06 PM

## 2020-03-16 NOTE — BRIEF OP NOTE
Brief Postoperative Note  ______________________________________________________________      PARACENTESIS BRIEF PROCEDURE NOTE    Patient Name: Lupe Galeazzi   YOB: 1955  Medical Record Number: [id number]  Date of Procedure: 3/16/20  Room/Bed: 6692/8140-N    Pre-operative Diagnosis: Ascites    Post-operative Diagnosis: Ascites    H and P reviewed prior to the procedure without change. Sonographic images were saved and stored in PACS. See radiology dictation in PACs for image review and additional procedural information. Performed by: Derik Knowles PA-C under indirect supervision by Veta Severin. Amira Stallings MD.    Procedure: Prior to start of procedure, routine scanning of all four abdominal quadrants was performed using real-time ultrasound and revealed moderate amount of ascites fluid present. Decision was made to proceed with procedure. After obtaining consent, the patient was placed in the supine position with the head of the bed slightly elevated and the appropriate landmarks were identified. The skin over the puncture site in the right lower quadrant region was prepped with betadine and draped in a sterile fashion. Local anesthesia was obtained by infiltration using 1% Lidocaine without epinephrine. A paracentesis catheter was then advanced into the abdominal cavity over a needle and the needle was withdrawn. Fluid return was serous colored. A total volume of 6250 mL was withdrawn and was processed in accordance with the ordering provider's request. The catheter was then withdrawn and a sterile dressing was placed over the site. The patient tolerated the procedure well. Complications: None. Estimated Blood Loss: < 10 cc.         Electronically signed by Pascale Crawford   DD: 3/16/20  8:53 AM EDT

## 2020-03-17 VITALS
BODY MASS INDEX: 31.6 KG/M2 | SYSTOLIC BLOOD PRESSURE: 125 MMHG | DIASTOLIC BLOOD PRESSURE: 68 MMHG | HEART RATE: 96 BPM | RESPIRATION RATE: 18 BRPM | WEIGHT: 201.3 LBS | TEMPERATURE: 98 F | HEIGHT: 67 IN | OXYGEN SATURATION: 95 %

## 2020-03-17 LAB
ALBUMIN SERPL-MCNC: 4 G/DL (ref 3.5–5.2)
ALP BLD-CCNC: 95 U/L (ref 35–104)
ALT SERPL-CCNC: 18 U/L (ref 0–32)
ANION GAP SERPL CALCULATED.3IONS-SCNC: 12 MMOL/L (ref 7–16)
AST SERPL-CCNC: 31 U/L (ref 0–31)
BASOPHILS ABSOLUTE: 0.07 E9/L (ref 0–0.2)
BASOPHILS RELATIVE PERCENT: 1.3 % (ref 0–2)
BILIRUB SERPL-MCNC: 2.2 MG/DL (ref 0–1.2)
BUN BLDV-MCNC: 12 MG/DL (ref 8–23)
CALCIUM SERPL-MCNC: 8.8 MG/DL (ref 8.6–10.2)
CHLORIDE BLD-SCNC: 98 MMOL/L (ref 98–107)
CO2: 20 MMOL/L (ref 22–29)
CREAT SERPL-MCNC: 0.7 MG/DL (ref 0.5–1)
EOSINOPHILS ABSOLUTE: 0.22 E9/L (ref 0.05–0.5)
EOSINOPHILS RELATIVE PERCENT: 4 % (ref 0–6)
GFR AFRICAN AMERICAN: >60
GFR NON-AFRICAN AMERICAN: >60 ML/MIN/1.73
GLUCOSE BLD-MCNC: 272 MG/DL (ref 74–99)
HCT VFR BLD CALC: 33 % (ref 34–48)
HEMOGLOBIN: 11.1 G/DL (ref 11.5–15.5)
IMMATURE GRANULOCYTES #: 0.03 E9/L
IMMATURE GRANULOCYTES %: 0.5 % (ref 0–5)
INR BLD: 2
LYMPHOCYTES ABSOLUTE: 1.33 E9/L (ref 1.5–4)
LYMPHOCYTES RELATIVE PERCENT: 23.9 % (ref 20–42)
MAGNESIUM: 1.9 MG/DL (ref 1.6–2.6)
MCH RBC QN AUTO: 30.7 PG (ref 26–35)
MCHC RBC AUTO-ENTMCNC: 33.6 % (ref 32–34.5)
MCV RBC AUTO: 91.2 FL (ref 80–99.9)
METER GLUCOSE: 162 MG/DL (ref 74–99)
METER GLUCOSE: 259 MG/DL (ref 74–99)
MONOCYTES ABSOLUTE: 0.83 E9/L (ref 0.1–0.95)
MONOCYTES RELATIVE PERCENT: 14.9 % (ref 2–12)
NEUTROPHILS ABSOLUTE: 3.08 E9/L (ref 1.8–7.3)
NEUTROPHILS RELATIVE PERCENT: 55.4 % (ref 43–80)
PDW BLD-RTO: 19.9 FL (ref 11.5–15)
PHOSPHORUS: 2.7 MG/DL (ref 2.5–4.5)
PLATELET # BLD: 131 E9/L (ref 130–450)
PMV BLD AUTO: 10.6 FL (ref 7–12)
POTASSIUM SERPL-SCNC: 3.9 MMOL/L (ref 3.5–5)
PROTHROMBIN TIME: 22.9 SEC (ref 9.3–12.4)
RBC # BLD: 3.62 E12/L (ref 3.5–5.5)
SODIUM BLD-SCNC: 130 MMOL/L (ref 132–146)
TOTAL PROTEIN: 6.1 G/DL (ref 6.4–8.3)
WBC # BLD: 5.6 E9/L (ref 4.5–11.5)

## 2020-03-17 PROCEDURE — 36415 COLL VENOUS BLD VENIPUNCTURE: CPT

## 2020-03-17 PROCEDURE — 80053 COMPREHEN METABOLIC PANEL: CPT

## 2020-03-17 PROCEDURE — 6360000002 HC RX W HCPCS: Performed by: INTERNAL MEDICINE

## 2020-03-17 PROCEDURE — 2580000003 HC RX 258: Performed by: PHYSICIAN ASSISTANT

## 2020-03-17 PROCEDURE — P9047 ALBUMIN (HUMAN), 25%, 50ML: HCPCS | Performed by: INTERNAL MEDICINE

## 2020-03-17 PROCEDURE — 6370000000 HC RX 637 (ALT 250 FOR IP): Performed by: INTERNAL MEDICINE

## 2020-03-17 PROCEDURE — 85025 COMPLETE CBC W/AUTO DIFF WBC: CPT

## 2020-03-17 PROCEDURE — 6370000000 HC RX 637 (ALT 250 FOR IP): Performed by: PHYSICIAN ASSISTANT

## 2020-03-17 PROCEDURE — 85610 PROTHROMBIN TIME: CPT

## 2020-03-17 PROCEDURE — 94640 AIRWAY INHALATION TREATMENT: CPT

## 2020-03-17 PROCEDURE — 82962 GLUCOSE BLOOD TEST: CPT

## 2020-03-17 PROCEDURE — 83735 ASSAY OF MAGNESIUM: CPT

## 2020-03-17 PROCEDURE — 6360000002 HC RX W HCPCS: Performed by: PHYSICIAN ASSISTANT

## 2020-03-17 PROCEDURE — 84100 ASSAY OF PHOSPHORUS: CPT

## 2020-03-17 RX ORDER — AMLODIPINE BESYLATE 5 MG/1
5 TABLET ORAL DAILY
Qty: 30 TABLET | Refills: 0 | Status: SHIPPED | OUTPATIENT
Start: 2020-03-18

## 2020-03-17 RX ORDER — SPIRONOLACTONE 25 MG/1
25 TABLET ORAL DAILY
Qty: 30 TABLET | Refills: 0 | Status: SHIPPED | OUTPATIENT
Start: 2020-03-18 | End: 2020-04-01

## 2020-03-17 RX ADMIN — INSULIN LISPRO 3 UNITS: 100 INJECTION, SOLUTION INTRAVENOUS; SUBCUTANEOUS at 11:32

## 2020-03-17 RX ADMIN — AMLODIPINE BESYLATE 5 MG: 5 TABLET ORAL at 08:09

## 2020-03-17 RX ADMIN — ENOXAPARIN SODIUM 40 MG: 40 INJECTION SUBCUTANEOUS at 08:09

## 2020-03-17 RX ADMIN — FUROSEMIDE 20 MG: 20 TABLET ORAL at 08:09

## 2020-03-17 RX ADMIN — SPIRONOLACTONE 25 MG: 25 TABLET ORAL at 08:09

## 2020-03-17 RX ADMIN — SODIUM CHLORIDE, PRESERVATIVE FREE 10 ML: 5 INJECTION INTRAVENOUS at 08:10

## 2020-03-17 RX ADMIN — SODIUM CHLORIDE, PRESERVATIVE FREE 10 ML: 5 INJECTION INTRAVENOUS at 02:08

## 2020-03-17 RX ADMIN — LACTULOSE 20 G: 20 SOLUTION ORAL at 08:09

## 2020-03-17 RX ADMIN — INSULIN LISPRO 1 UNITS: 100 INJECTION, SOLUTION INTRAVENOUS; SUBCUTANEOUS at 06:45

## 2020-03-17 RX ADMIN — RIFAXIMIN 550 MG: 550 TABLET ORAL at 08:10

## 2020-03-17 RX ADMIN — IPRATROPIUM BROMIDE AND ALBUTEROL SULFATE 1 AMPULE: 2.5; .5 SOLUTION RESPIRATORY (INHALATION) at 11:51

## 2020-03-17 RX ADMIN — VENLAFAXINE HYDROCHLORIDE 37.5 MG: 37.5 CAPSULE, EXTENDED RELEASE ORAL at 08:09

## 2020-03-17 RX ADMIN — ALBUMIN (HUMAN) 25 G: 0.25 INJECTION, SOLUTION INTRAVENOUS at 02:08

## 2020-03-17 RX ADMIN — PANTOPRAZOLE SODIUM 40 MG: 40 TABLET, DELAYED RELEASE ORAL at 08:09

## 2020-03-17 RX ADMIN — MONTELUKAST SODIUM 10 MG: 10 TABLET, FILM COATED ORAL at 08:09

## 2020-03-17 RX ADMIN — IPRATROPIUM BROMIDE AND ALBUTEROL SULFATE 1 AMPULE: 2.5; .5 SOLUTION RESPIRATORY (INHALATION) at 09:49

## 2020-03-17 ASSESSMENT — PAIN SCALES - GENERAL
PAINLEVEL_OUTOF10: 0
PAINLEVEL_OUTOF10: 0

## 2020-03-17 NOTE — CARE COORDINATION
Social Work/Discharge Planning:  HHC and discharge order noted. Notified Francisco Bruce from [facility] of orders.   Electronically signed by CHELITA Martins on 3/17/2020 at 9:52 AM

## 2020-03-17 NOTE — PROGRESS NOTES
3/17/2020  2:58 PM              Nutrition Therapy      Type and Reason for Visit: CINDY Nutrition Re-Screen    Nutrition Screen:   · Have you recently lost weight without trying? - 0 to 1 pound (0 points)   · Have you been eating poorly because of a decreased appetite? - No (0 points)   · Malnutrition Screening Tool Score - 0    Dietitian Assessment of Nutrition Re-Screen: Pt admit w/hyponatremia and ascites. s/p paracentesis and feeling better. Discharge planning in progress. No open or nonhealing wounds. No significant wt changes (loss w/paracentesis ~6L off 3/16). Average intake at meals ~50% and may change with decreased ascites(Paracentesis).           Electronically signed by Claudio Mccormick RD, CNSC, LD on 3/17/20 at 2:58 PM EDT    Contact Number: [phone]

## 2020-03-17 NOTE — PROGRESS NOTES
[facility] MD Gale Bruce MD Manuel Pao, MD Arlin Mire, CNP   Karla Han, TOMI  Progress Note    3/17/2020    SUBJECTIVE:   3/12: 8 L paracentesis yesterday. Today her abdomen is \"sore. \"  Persistent nausea, anorexia. Does not feel like eating anything. Trying to drink but not being particularly successful. No lightheadedness. No peripheral swelling. Loose stools. (-) sob/davis/cp/palp ongoing fatigue, generalized weakness. 3/13: Feeling better. Ate a little bit today. Drinking a bit better, as well. Abdomen distended, though \"not too bad. \"  Nausea vomiting. No heartburn. No dyspnea at rest.  No peripheral swelling. 3/14: Feeling a little bit better, though notes that, \"I think I need another paracentesis. \"  Denies dyspnea at rest on room air.  3/15: Feeling little better. Abdomen a little more distended. Appetite little better. Otherwise ROS unremarkable. 3/16: Paracentesis for 6.2 L this morning. Well-tolerated. Abdomen a little \"sore. \"  No dyspnea at rest supine on room air. No nausea or vomiting. 3/17: Resting comfortable. No new complaint. PROBLEM LIST:    Principal Problem:    Hyponatremia  Active Problems:    Cirrhosis of liver with ascites (HCC)    Diabetes mellitus type 2, uncontrolled (HCC)    Hyperlipidemia LDL goal <100    History of CVA (cerebrovascular accident)    Essential hypertension    Obesity (BMI 30-39. 9)    Hyperkalemia  Resolved Problems:    Hyponatremia         DIET:    DIET CARB CONTROL; Daily Fluid Restriction: 1500 ml     MEDS (scheduled):    spironolactone  25 mg Oral Daily    ipratropium-albuterol  1 ampule Inhalation Q4H WA    amLODIPine  5 mg Oral Daily    atorvastatin  40 mg Oral Nightly    furosemide  20 mg Oral Daily    lactulose  30 mL Oral TID    montelukast  10 mg Oral QAM    pantoprazole  40 mg Oral Daily    rifaximin  550 mg Oral BID    venlafaxine  37.5 mg Oral Daily    insulin lispro 0-6 Units Subcutaneous TID WC    insulin lispro  0-3 Units Subcutaneous Nightly    sodium chloride flush  10 mL Intravenous 2 times per day    enoxaparin  40 mg Subcutaneous Daily       MEDS (infusions):   dextrose         MEDS (prn):  amitriptyline, HYDROcodone-acetaminophen, glucose, dextrose, glucagon (rDNA), dextrose, sodium chloride flush, acetaminophen **OR** acetaminophen, magnesium hydroxide, promethazine **OR** ondansetron    PHYSICAL EXAM:     Patient Vitals for the past 24 hrs:   BP Temp Temp src Pulse Resp SpO2   03/17/20 0949 -- -- -- -- 18 95 %   03/17/20 0800 125/68 98 °F (36.7 °C) Oral 96 16 97 %   03/17/20 0115 107/63 98.3 °F (36.8 °C) Oral 91 16 93 %   03/16/20 1459 (!) 103/57 98.3 °F (36.8 °C) Oral 89 18 --   @      Intake/Output Summary (Last 24 hours) at 3/17/2020 1351  Last data filed at 3/17/2020 0935  Gross per 24 hour   Intake 440 ml   Output --   Net 440 ml         Wt Readings from Last 3 Encounters:   03/16/20 201 lb 4.8 oz (91.3 kg)   03/04/20 210 lb (95.3 kg)   02/26/20 210 lb (95.3 kg)       Constitutional:  in no acute distress  Oral: mucus membranes moist  Neck: no JVD  Cardiovascular: S1, S2 regular rhythm, no murmur,or rub  Respiratory:  No crackles, no wheeze  Gastrointestinal:  Soft, nontender, moderately distended, worsening NABS  Ext: no edema, feet warm  Skin: dry, no rash  Neuro: awake, alert, interactive      DATA:    Recent Labs     03/15/20  0315 03/16/20  0348 03/17/20  0940   WBC 7.2 6.2 5.6   HGB 11.4* 10.8* 11.1*   HCT 33.4* 32.3* 33.0*   MCV 88.4 89.5 91.2    132 131     Recent Labs     03/15/20  0315 03/16/20  0348 03/17/20  0940   * 128* 130*   K 4.2 4.0 3.9   CL 97* 96* 98   CO2 20* 21* 20*   MG 1.9 2.2 1.9   PHOS  --  2.7 2.7   BUN 11 13 12   CREATININE 0.8 0.8 0.7   ALT  --  20 18   AST  --  34* 31   BILITOT  --  1.7* 2.2*   ALKPHOS  --  115* 95       Lab Results   Component Value Date    LABPROT 0.2 03/11/2020    LABPROT 0.2 03/11/2020

## 2020-03-17 NOTE — DISCHARGE SUMMARY
Physician Discharge Summary     Patient ID:  Norberto Mccray  [id number]  71 y.o.  1955    Admit date: 3/10/2020    Discharge date and time:  3/17/2020    Admission Diagnoses:   Patient Active Problem List   Diagnosis    Cirrhosis of liver with ascites ([address]    Diabetes mellitus type 2, uncontrolled ([address]    Hyperlipidemia LDL goal <100    History of CVA (cerebrovascular accident)    Essential hypertension    Obesity (BMI 30-39. 9)    Hyperkalemia    Hyponatremia       Discharge Diagnoses: as above    Consults: GI and nephrology    Procedures: see chart    Hospital Course: patient was admitted with abdominal distension. She suffers cirrhosis. She was found to suffer ascites and hyponatremia. She was seen by GI and nephrology. She underwent 2 paracentesis. She had her sodium level corrected. She was seen by Pt/Ot and rehab was recommended. At that time of this transcription, the patient was contemplating [address] versus ALMA placement. Discharge Exam:  See progress note from today    Condition:  stable    Disposition: home    Patient Instructions:   Current Discharge Medication List      CONTINUE these medications which have CHANGED    Details   amLODIPine (NORVASC) 5 MG tablet Take 1 tablet by mouth daily  Qty: 30 tablet, Refills: 0      spironolactone (ALDACTONE) 25 MG tablet Take 1 tablet by mouth daily  Qty: 30 tablet, Refills: 0         CONTINUE these medications which have NOT CHANGED    Details   mupirocin (BACTROBAN) 2 % cream Apply topically daily Apply to wound on big toe daily.       insulin detemir (LEVEMIR FLEXTOUCH) 100 UNIT/ML injection pen Inject into the skin nightly 42 units daily      furosemide (LASIX) 20 MG tablet Take 1 tablet by mouth daily  Qty: 30 tablet, Refills: 0      ondansetron (ZOFRAN) 4 MG tablet Take 1 tablet by mouth every 8 hours as needed for Nausea or Vomiting (take one before luch and supper today and tomorrow than as needed)  Qty: 30 tablet, Refills: 1 !! blood glucose monitor strips One-Touch Verio strips. Checks 3  times/day before meals and at bedtime and as needed for symptoms of irregular blood glucose  Qty: 250 strip, Refills: 5    Associated Diagnoses: IDDM (insulin dependent diabetes mellitus) ([address]      ! ! blood glucose test strips (ONETOUCH VERIO) strip 1 each by In Vitro route 4 times daily As needed. Qty: 150 each, Refills: 5    Associated Diagnoses: IDDM (insulin dependent diabetes mellitus) ([facility])      Blood Glucose Monitoring Suppl (ONETOUCH VERIO) w/Device KIT To test 4x/day  Qty: 1 kit, Refills: 0    Associated Diagnoses: IDDM (insulin dependent diabetes mellitus) ([facility])      Insulin Syringe-Needle U-100 ([employer] INS SYR .3CC/29G) 29G X 1/2\" 0.3 ML MISC Four times a day with insulin  Qty: 250 each, Refills: 3    Associated Diagnoses: IDDM (insulin dependent diabetes mellitus) ([address]      ! ! Misc. Devices (ADJUST BATH/SHOWER SEAT) MISC Use daily  Qty: 1 each, Refills: 0    Associated Diagnoses: Cerebrovascular accident (CVA), unspecified mechanism ([address]      ! ! Misc. Devices (COMMODE BEDSIDE) MISC Use daily  Qty: 1 each, Refills: 0    Associated Diagnoses: Cerebrovascular accident (CVA), unspecified mechanism ([address]      ! ! blood glucose test strips (ASCENSIA AUTODISC VI;ONE TOUCH ULTRA TEST VI) strip 1 each by In Vitro route daily As needed. Qty: 100 each, Refills: 3    Associated Diagnoses: Type 2 diabetes mellitus with diabetic polyneuropathy, with long-term current use of insulin ([address]       ! ! - Potential duplicate medications found. Please discuss with provider. STOP taking these medications       tuberculin (APLISOL) 5 UNIT/0.1ML injection Comments:   Reason for Stopping:         Milk Thistle 1000 MG CAPS Comments:   Reason for Stopping:             Activity: activity as tolerated  Diet: low fat, low cholesterol diet    Follow up with dr Lolis Shaffer in 1 week. Follow up with dr Merle Lombardo in 2-3 weeks.     Follow up with dr Lizzy Dubois sheldon in 2-3 weeks.       Note that over 30 minutes was spent in preparing discharge papers, discussing discharge with patient, medication review, etc.    Signed:  Christiano Brand    3/17/2020  8:51 AM

## 2020-03-23 ENCOUNTER — HOSPITAL ENCOUNTER (OUTPATIENT)
Age: 65
Discharge: HOME | End: 2020-03-25
Payer: MEDICARE

## 2020-03-23 LAB
ANION GAP SERPL CALCULATED.3IONS-SCNC: 14 MMOL/L (ref 7–16)
BUN BLDV-MCNC: 25 MG/DL (ref 8–23)
CALCIUM SERPL-MCNC: 8.9 MG/DL (ref 8.6–10.2)
CHLORIDE BLD-SCNC: 94 MMOL/L (ref 98–107)
CO2: 18 MMOL/L (ref 22–29)
CREAT SERPL-MCNC: 0.9 MG/DL (ref 0.5–1)
GFR AFRICAN AMERICAN: >60
GFR NON-AFRICAN AMERICAN: >60 ML/MIN/1.73
GLUCOSE BLD-MCNC: 295 MG/DL (ref 74–99)
POTASSIUM SERPL-SCNC: 4.7 MMOL/L (ref 3.5–5)
SODIUM BLD-SCNC: 126 MMOL/L (ref 132–146)

## 2020-03-23 PROCEDURE — 36415 COLL VENOUS BLD VENIPUNCTURE: CPT

## 2020-03-23 PROCEDURE — 80048 BASIC METABOLIC PNL TOTAL CA: CPT

## 2020-03-25 ENCOUNTER — HOSPITAL ENCOUNTER (OUTPATIENT)
Dept: ULTRASOUND IMAGING | Age: 65
Discharge: HOME | End: 2020-03-27
Payer: MEDICARE

## 2020-03-25 VITALS
DIASTOLIC BLOOD PRESSURE: 66 MMHG | HEART RATE: 103 BPM | WEIGHT: 201 LBS | HEIGHT: 67 IN | OXYGEN SATURATION: 96 % | RESPIRATION RATE: 18 BRPM | BODY MASS INDEX: 31.55 KG/M2 | SYSTOLIC BLOOD PRESSURE: 131 MMHG

## 2020-03-25 PROCEDURE — 49083 ABD PARACENTESIS W/IMAGING: CPT

## 2020-03-25 PROCEDURE — 6360000002 HC RX W HCPCS: Performed by: INTERNAL MEDICINE

## 2020-03-25 PROCEDURE — P9047 ALBUMIN (HUMAN), 25%, 50ML: HCPCS | Performed by: INTERNAL MEDICINE

## 2020-03-25 RX ORDER — ALBUMIN (HUMAN) 12.5 G/50ML
25 SOLUTION INTRAVENOUS ONCE
Status: COMPLETED | OUTPATIENT
Start: 2020-03-25 | End: 2020-03-25

## 2020-03-25 RX ORDER — SODIUM CHLORIDE 0.9 % (FLUSH) 0.9 %
10 SYRINGE (ML) INJECTION PRN
Status: DISCONTINUED | OUTPATIENT
Start: 2020-03-25 | End: 2020-03-28 | Stop reason: HOSPADM

## 2020-03-25 RX ADMIN — ALBUMIN (HUMAN) 25 G: 0.25 INJECTION, SOLUTION INTRAVENOUS at 12:15

## 2020-03-25 ASSESSMENT — PAIN - FUNCTIONAL ASSESSMENT
PAIN_FUNCTIONAL_ASSESSMENT: 0-10
PAIN_FUNCTIONAL_ASSESSMENT: 0-10

## 2020-03-25 NOTE — H&P
Interventional Radiology  Attending Pre-operative History and Physical    DIAGNOSIS:    Patient Active Problem List   Diagnosis    [facility]    Diabetes mellitus type 2, uncontrolled ([facility]    Hyperlipidemia LDL goal <100    History of CVA (cerebrovascular accident)    Essential hypertension    Obesity (BMI 30-39. 9)    Hyperkalemia    Hyponatremia       CHIEF COMPLAINT: Ascites    Current Outpatient Medications:     amLODIPine (NORVASC) 5 MG tablet, Take 1 tablet by mouth daily, Disp: 30 tablet, Rfl: 0    spironolactone (ALDACTONE) 25 MG tablet, Take 1 tablet by mouth daily, Disp: 30 tablet, Rfl: 0    mupirocin (BACTROBAN) 2 % cream, Apply topically daily Apply to wound on big toe daily. , Disp: , Rfl:     insulin detemir (LEVEMIR FLEXTOUCH) 100 UNIT/ML injection pen, Inject into the skin nightly 42 units daily, Disp: , Rfl:     furosemide (LASIX) 20 MG tablet, Take 1 tablet by mouth daily, Disp: 30 tablet, Rfl: 0    ondansetron (ZOFRAN) 4 MG tablet, Take 1 tablet by mouth every 8 hours as needed for Nausea or Vomiting (take one before luch and supper today and tomorrow than as needed), Disp: 30 tablet, Rfl: 1    HYDROcodone-acetaminophen (NORCO) 5-325 MG per tablet, Take 1 tablet by mouth every 6 hours as needed for Pain., Disp: , Rfl:     lactulose (CHRONULAC) 10 GM/15ML solution, Take 30 mLs by mouth 3 times daily, Disp: 1 Bottle, Rfl: 1    rifaximin (XIFAXAN) 550 MG tablet, Take 1 tablet by mouth 2 times daily, Disp: 42 tablet, Rfl: 0    insulin aspart (NOVOLOG FLEXPEN) 100 UNIT/ML injection pen, Inject into the skin 3 times daily (before meals) 10 units tid with meals and sliding scale with max of 20 units total.  150-199=2 200-249=4 250-299=6 300-349=8 350-400=10, Disp: , Rfl:     calcium carbonate (OSCAL) 500 MG TABS tablet, Take 1,000 mg by mouth daily, Disp: , Rfl:     Insulin Syringe-Needle U-100 ([employer] INSULIN SYR 1CC/30G) 30G X 5/16\" 1 ML MISC, Use (COMMODE BEDSIDE) MISC, Use daily, Disp: 1 each, Rfl: 0    No Known Allergies    Past Medical History:   Diagnosis Date    Arthritis     Cerebral artery occlusion with cerebral infarction ([facility] 09/2018    some memory loss, some right side weakness.     Cough     post anesthesia    Diabetes mellitus ([facility]     Diverticulitis     GERD (gastroesophageal reflux disease)     Hyperlipidemia     Hypertension     Liver cirrhosis (HCC)     Polyp of esophagus     Polyp, stomach     PONV (postoperative nausea and vomiting)     Prolonged emergence from general anesthesia     Thyroid disease     no meds       Past Surgical History:   Procedure Laterality Date    CAROTID ENDARTERECTOMY      october 2018    CHOLECYSTECTOMY      COLONOSCOPY      COLONOSCOPY N/A 6/11/2019    COLONOSCOPY POLYPECTOMY SNARE/COLD BIOPSY performed by Heber Hill MD at [address] [place] [address] Drive  2003    ME THROMBOENDARTECTMY [facility] Left 10/29/2018    LEFT CAROTID ENDARTERECTOMY performed by Sushma Prieto MD at [address] [facility] ENDOSCOPY      UPPER GASTROINTESTINAL ENDOSCOPY N/A 6/11/2019    EGD BIOPSY performed by Heber Hill MD at [facility] ENDOSCOPY    VASCULAR SURGERY         Family History   Problem Relation Age of Onset    Heart Disease Mother 28    Diabetes Mother     Heart Disease Sister     Diabetes Sister        Social History     Socioeconomic History    Marital status:      Spouse name: Not on file    Number of children: Not on file    Years of education: Not on file    Highest education level: Not on file   Occupational History    Not on file   Social Needs    Financial resource strain: Not on file    Food insecurity     Worry: Not on file     Inability: Not on file    Transportation needs     Medical: Not on file     Non-medical: Not on file   Tobacco Use    Smoking status: Passive Smoke Exposure - Never Smoker    Smokeless tobacco: Never Used   Substance and Sexual Activity    Alcohol use: No    Drug use: Never    Sexual activity: Never   Lifestyle    Physical activity     Days per week: Not on file     Minutes per session: Not on file    Stress: Not on file   Relationships    Social connections     Talks on phone: Not on file     Gets together: Not on file     Attends [employer]ology service: Not on file     Active member of club or organization: Not on file     Attends meetings of clubs or organizations: Not on file     Relationship status: Not on file    Intimate partner violence     Fear of current or ex partner: Not on file     Emotionally abused: Not on file     Physically abused: Not on file     Forced sexual activity: Not on file   Other Topics Concern    Not on file   Social History Narrative    Not on file       ROS: Non-contributory other than as noted above    PHYSICAL EXAM:      Heent: Alert and orientated. Heart:  Regular rhythm    Lungs:  Clear to ascultation    Abdomen:  Soft, distended, hyperactive bowel sounds.        DATA:  CBC:   Lab Results   Component Value Date    WBC 5.6 03/17/2020    RBC 3.62 03/17/2020    HGB 11.1 03/17/2020    HCT 33.0 03/17/2020    MCV 91.2 03/17/2020    MCH 30.7 03/17/2020    MCHC 33.6 03/17/2020    RDW 19.9 03/17/2020     03/17/2020    MPV 10.6 03/17/2020     CBC with Differential:    Lab Results   Component Value Date    WBC 5.6 03/17/2020    RBC 3.62 03/17/2020    HGB 11.1 03/17/2020    HCT 33.0 03/17/2020     03/17/2020    MCV 91.2 03/17/2020    MCH 30.7 03/17/2020    MCHC 33.6 03/17/2020    RDW 19.9 03/17/2020    LYMPHOPCT 23.9 03/17/2020    MONOPCT 14.9 03/17/2020    BASOPCT 1.3 03/17/2020    MONOSABS 0.83 03/17/2020    LYMPHSABS 1.33 03/17/2020    EOSABS 0.22 03/17/2020    BASOSABS 0.07 03/17/2020     Platelets:    Lab Results   Component Value Date     03/17/2020     BUN/Creatinine:    Lab Results   Component Value Date    BUN 25 03/23/2020    CREATININE 0.9 03/23/2020

## 2020-04-01 ENCOUNTER — HOSPITAL ENCOUNTER (OUTPATIENT)
Age: 65
Discharge: HOME | End: 2020-04-01
Payer: MEDICARE

## 2020-04-01 ENCOUNTER — HOSPITAL ENCOUNTER (OUTPATIENT)
Dept: ULTRASOUND IMAGING | Age: 65
Discharge: HOME | End: 2020-04-03
Payer: MEDICARE

## 2020-04-01 VITALS
SYSTOLIC BLOOD PRESSURE: 123 MMHG | OXYGEN SATURATION: 99 % | WEIGHT: 202 LBS | RESPIRATION RATE: 19 BRPM | BODY MASS INDEX: 31.71 KG/M2 | HEIGHT: 67 IN | HEART RATE: 98 BPM | DIASTOLIC BLOOD PRESSURE: 61 MMHG

## 2020-04-01 LAB
ALBUMIN SERPL-MCNC: 2.9 G/DL (ref 3.5–5.2)
ALP BLD-CCNC: 159 U/L (ref 35–104)
ALT SERPL-CCNC: 26 U/L (ref 0–32)
ANION GAP SERPL CALCULATED.3IONS-SCNC: 17 MMOL/L (ref 7–16)
APTT: 34.6 SEC (ref 24.5–35.1)
AST SERPL-CCNC: 51 U/L (ref 0–31)
BASOPHILS ABSOLUTE: 0.13 E9/L (ref 0–0.2)
BASOPHILS RELATIVE PERCENT: 1.7 % (ref 0–2)
BILIRUB SERPL-MCNC: 2.4 MG/DL (ref 0–1.2)
BUN BLDV-MCNC: 13 MG/DL (ref 8–23)
CALCIUM SERPL-MCNC: 8.9 MG/DL (ref 8.6–10.2)
CHLORIDE BLD-SCNC: 87 MMOL/L (ref 98–107)
CO2: 20 MMOL/L (ref 22–29)
CREAT SERPL-MCNC: 1 MG/DL (ref 0.5–1)
EOSINOPHILS ABSOLUTE: 0.18 E9/L (ref 0.05–0.5)
EOSINOPHILS RELATIVE PERCENT: 2.3 % (ref 0–6)
GFR AFRICAN AMERICAN: >60
GFR NON-AFRICAN AMERICAN: 56 ML/MIN/1.73
GLUCOSE BLD-MCNC: 265 MG/DL (ref 74–99)
HCT VFR BLD CALC: 42.9 % (ref 34–48)
HEMOGLOBIN: 14.4 G/DL (ref 11.5–15.5)
IMMATURE GRANULOCYTES #: 0.08 E9/L
IMMATURE GRANULOCYTES %: 1 % (ref 0–5)
INR BLD: 1.4
LYMPHOCYTES ABSOLUTE: 2.1 E9/L (ref 1.5–4)
LYMPHOCYTES RELATIVE PERCENT: 26.8 % (ref 20–42)
MAGNESIUM: 2.1 MG/DL (ref 1.6–2.6)
MCH RBC QN AUTO: 31 PG (ref 26–35)
MCHC RBC AUTO-ENTMCNC: 33.6 % (ref 32–34.5)
MCV RBC AUTO: 92.3 FL (ref 80–99.9)
MONOCYTES ABSOLUTE: 1.02 E9/L (ref 0.1–0.95)
MONOCYTES RELATIVE PERCENT: 13 % (ref 2–12)
NEUTROPHILS ABSOLUTE: 4.32 E9/L (ref 1.8–7.3)
NEUTROPHILS RELATIVE PERCENT: 55.2 % (ref 43–80)
PDW BLD-RTO: 19.7 FL (ref 11.5–15)
PLATELET # BLD: 298 E9/L (ref 130–450)
PMV BLD AUTO: 10.3 FL (ref 7–12)
POTASSIUM SERPL-SCNC: 4.2 MMOL/L (ref 3.5–5)
PROTHROMBIN TIME: 15.9 SEC (ref 9.3–12.4)
RBC # BLD: 4.65 E12/L (ref 3.5–5.5)
SODIUM BLD-SCNC: 124 MMOL/L (ref 132–146)
TOTAL PROTEIN: 7.1 G/DL (ref 6.4–8.3)
WBC # BLD: 7.8 E9/L (ref 4.5–11.5)

## 2020-04-01 PROCEDURE — 80053 COMPREHEN METABOLIC PANEL: CPT

## 2020-04-01 PROCEDURE — 36415 COLL VENOUS BLD VENIPUNCTURE: CPT

## 2020-04-01 PROCEDURE — 85610 PROTHROMBIN TIME: CPT

## 2020-04-01 PROCEDURE — 6360000002 HC RX W HCPCS: Performed by: INTERNAL MEDICINE

## 2020-04-01 PROCEDURE — 49083 ABD PARACENTESIS W/IMAGING: CPT

## 2020-04-01 PROCEDURE — 85025 COMPLETE CBC W/AUTO DIFF WBC: CPT

## 2020-04-01 PROCEDURE — P9047 ALBUMIN (HUMAN), 25%, 50ML: HCPCS | Performed by: INTERNAL MEDICINE

## 2020-04-01 PROCEDURE — 83735 ASSAY OF MAGNESIUM: CPT

## 2020-04-01 PROCEDURE — 85730 THROMBOPLASTIN TIME PARTIAL: CPT

## 2020-04-01 RX ORDER — SODIUM CHLORIDE 0.9 % (FLUSH) 0.9 %
10 SYRINGE (ML) INJECTION PRN
Status: DISCONTINUED | OUTPATIENT
Start: 2020-04-01 | End: 2020-04-04 | Stop reason: HOSPADM

## 2020-04-01 RX ORDER — ALBUMIN (HUMAN) 12.5 G/50ML
25 SOLUTION INTRAVENOUS ONCE
Status: COMPLETED | OUTPATIENT
Start: 2020-04-01 | End: 2020-04-01

## 2020-04-01 RX ADMIN — ALBUMIN (HUMAN) 25 G: 0.25 INJECTION, SOLUTION INTRAVENOUS at 11:20

## 2020-04-01 ASSESSMENT — PAIN - FUNCTIONAL ASSESSMENT: PAIN_FUNCTIONAL_ASSESSMENT: 0-10

## 2020-04-01 NOTE — H&P
use: Never    Sexual activity: Never   Lifestyle    Physical activity     Days per week: Not on file     Minutes per session: Not on file    Stress: Not on file   Relationships    Social connections     Talks on phone: Not on file     Gets together: Not on file     Attends Adventism service: Not on file     Active member of club or organization: Not on file     Attends meetings of clubs or organizations: Not on file     Relationship status: Not on file    Intimate partner violence     Fear of current or ex partner: Not on file     Emotionally abused: Not on file     Physically abused: Not on file     Forced sexual activity: Not on file   Other Topics Concern    Not on file   Social History Narrative    Not on file       ROS: Non-contributory other than as noted above    PHYSICAL EXAM:      Constitutional:  Awake and alert. cooperative. Heart:  Normal regular rhythm    Lungs:  demonstrate no contraindications to proceed    Abdomen:   Moderate distension, NT, scattered ecchymosis    DATA:  CBC:   Lab Results   Component Value Date    WBC 7.8 04/01/2020    RBC 4.65 04/01/2020    HGB 14.4 04/01/2020    HCT 42.9 04/01/2020    MCV 92.3 04/01/2020    MCH 31.0 04/01/2020    MCHC 33.6 04/01/2020    RDW 19.7 04/01/2020     04/01/2020    MPV 10.3 04/01/2020     CBC with Differential:    Lab Results   Component Value Date    WBC 7.8 04/01/2020    RBC 4.65 04/01/2020    HGB 14.4 04/01/2020    HCT 42.9 04/01/2020     04/01/2020    MCV 92.3 04/01/2020    MCH 31.0 04/01/2020    MCHC 33.6 04/01/2020    RDW 19.7 04/01/2020    LYMPHOPCT 26.8 04/01/2020    MONOPCT 13.0 04/01/2020    BASOPCT 1.7 04/01/2020    MONOSABS 1.02 04/01/2020    LYMPHSABS 2.10 04/01/2020    EOSABS 0.18 04/01/2020    BASOSABS 0.13 04/01/2020     Platelets:    Lab Results   Component Value Date     04/01/2020     BUN/Creatinine:    Lab Results   Component Value Date    BUN 13 04/01/2020    CREATININE 1.0 04/01/2020       ASSESSMENT

## 2020-04-01 NOTE — BRIEF OP NOTE
Brief Postoperative Note  ______________________________________________________________      PARACENTESIS BRIEF PROCEDURE NOTE    Patient Name: Orlando Rosa   YOB: 1955  Medical Record Number: [id number]  Date of Procedure: 4/1/20  Room/Bed: Room/bed info not found    Pre-operative Diagnosis: Ascites    Post-operative Diagnosis: Ascites    H and P reviewed prior to the procedure without change. Sonographic images were saved and stored in PACS. See radiology dictation in PACs for image review and additional procedural information. Performed by: AMENA Rosenthal under indirect supervision by Carisa Swartz MD.    Procedure: Prior to start of procedure, routine scanning of all four abdominal quadrants was performed using real-time ultrasound and revealed sufficient amount of ascites fluid present. Decision was made to proceed with procedure. After obtaining consent, the patient was placed in the supine position with the head of the bed slightly elevated and the appropriate landmarks were identified. The skin over the puncture site in the right lower quadrant region was prepped with betadine and draped in a sterile fashion. Local anesthesia was obtained by infiltration using 1% Lidocaine without epinephrine. A paracentesis catheter was then advanced into the abdominal cavity over a needle and the needle was withdrawn. Fluid return was clear yellow colored. A total volume of 5000mL was withdrawn and was processed in accordance with the ordering provider(s) requests (see Epic Orders--> Labs for laboratory order details). The catheter was then withdrawn and a sterile dressing was placed over the site. The patient tolerated the procedure well. Complications: None. Estimated Blood Loss: < 10 cc.         Electronically signed by AMENA Rosenthal   DD: 4/1/20  12:38 PM EDT

## 2020-04-01 NOTE — H&P (VIEW-ONLY)
Interventional Radiology  PRE-OPERATIVE H&P FOR PARACENTESIS    DIAGNOSIS:    Patient Active Problem List   Diagnosis    [facility]osis of liver with ascites ([facility]    Diabetes mellitus type 2, uncontrolled ([facility] [address] [facility]    Hyperlipidemia LDL goal <100    History of CVA (cerebrovascular accident)    Essential hypertension    Obesity (BMI 30-39. 9)    Hyperkalemia    Hyponatremia       CHIEF COMPLAINT: Abdominal Ascites      Current Outpatient Medications:     amLODIPine (NORVASC) 5 MG tablet, Take 1 tablet by mouth daily, Disp: 30 tablet, Rfl: 0    mupirocin (BACTROBAN) 2 % cream, Apply topically daily Apply to wound on big toe daily. , Disp: , Rfl:     insulin detemir (LEVEMIR FLEXTOUCH) 100 UNIT/ML injection pen, Inject into the skin nightly 42 units daily, Disp: , Rfl:     ondansetron (ZOFRAN) 4 MG tablet, Take 1 tablet by mouth every 8 hours as needed for Nausea or Vomiting (take one before luch and supper today and tomorrow than as needed), Disp: 30 tablet, Rfl: 1    HYDROcodone-acetaminophen (NORCO) 5-325 MG per tablet, Take 1 tablet by mouth every 6 hours as needed for Pain., Disp: , Rfl:     lactulose (CHRONULAC) 10 GM/15ML solution, Take 30 mLs by mouth 3 times daily, Disp: 1 Bottle, Rfl: 1    rifaximin (XIFAXAN) 550 MG tablet, Take 1 tablet by mouth 2 times daily, Disp: 42 tablet, Rfl: 0    insulin aspart (NOVOLOG FLEXPEN) 100 UNIT/ML injection pen, Inject into the skin 3 times daily (before meals) 10 units tid with meals and sliding scale with max of 20 units total.  150-199=2 200-249=4 250-299=6 300-349=8 350-400=10, Disp: , Rfl:     calcium carbonate (OSCAL) 500 MG TABS tablet, Take 1,000 mg by mouth daily, Disp: , Rfl:     Insulin Syringe-Needle U-100 ([employer] INSULIN SYR 1CC/30G) 30G X 5/16\" 1 ML MISC, Use with insulin 4 times a day, Disp: 250 each, Rfl: 12    INSULIN SYRINGE 1CC/29G ([employer] INS SYRINGE 1CC/29G) 29G X 1/2\" 1 ML MISC, 1 each by Does not apply route 4 times daily, Disp: 200 each,

## 2020-04-08 ENCOUNTER — APPOINTMENT (OUTPATIENT)
Dept: GENERAL RADIOLOGY | Age: 65
DRG: 432 | End: 2020-04-08
Payer: MEDICARE

## 2020-04-08 ENCOUNTER — HOSPITAL ENCOUNTER (INPATIENT)
Age: 65
LOS: 9 days | Discharge: HOSPICE | DRG: 432 | End: 2020-04-17
Attending: EMERGENCY MEDICINE | Admitting: INTERNAL MEDICINE
Payer: MEDICARE

## 2020-04-08 ENCOUNTER — APPOINTMENT (OUTPATIENT)
Dept: CT IMAGING | Age: 65
DRG: 432 | End: 2020-04-08
Payer: MEDICARE

## 2020-04-08 ENCOUNTER — APPOINTMENT (OUTPATIENT)
Dept: ULTRASOUND IMAGING | Age: 65
DRG: 432 | End: 2020-04-08
Payer: MEDICARE

## 2020-04-08 LAB
ALBUMIN SERPL-MCNC: 2.6 G/DL (ref 3.5–5.2)
ALP BLD-CCNC: 151 U/L (ref 35–104)
ALT SERPL-CCNC: 35 U/L (ref 0–32)
AMMONIA: 51 UMOL/L (ref 11–51)
ANION GAP SERPL CALCULATED.3IONS-SCNC: 13 MMOL/L (ref 7–16)
ANION GAP SERPL CALCULATED.3IONS-SCNC: 14 MMOL/L (ref 7–16)
ANION GAP SERPL CALCULATED.3IONS-SCNC: 15 MMOL/L (ref 7–16)
APTT: 34.2 SEC (ref 24.5–35.1)
AST SERPL-CCNC: 68 U/L (ref 0–31)
BASOPHILS ABSOLUTE: 0.08 E9/L (ref 0–0.2)
BASOPHILS RELATIVE PERCENT: 0.9 % (ref 0–2)
BILIRUB SERPL-MCNC: 2.2 MG/DL (ref 0–1.2)
BUN BLDV-MCNC: 25 MG/DL (ref 8–23)
CALCIUM SERPL-MCNC: 8.5 MG/DL (ref 8.6–10.2)
CALCIUM SERPL-MCNC: 8.5 MG/DL (ref 8.6–10.2)
CALCIUM SERPL-MCNC: 8.6 MG/DL (ref 8.6–10.2)
CHLORIDE BLD-SCNC: 86 MMOL/L (ref 98–107)
CO2: 19 MMOL/L (ref 22–29)
CO2: 20 MMOL/L (ref 22–29)
CO2: 20 MMOL/L (ref 22–29)
CREAT SERPL-MCNC: 1 MG/DL (ref 0.5–1)
CREAT SERPL-MCNC: 1 MG/DL (ref 0.5–1)
CREAT SERPL-MCNC: 1.1 MG/DL (ref 0.5–1)
EKG ATRIAL RATE: 100 BPM
EKG P AXIS: 43 DEGREES
EKG P-R INTERVAL: 150 MS
EKG Q-T INTERVAL: 378 MS
EKG QRS DURATION: 86 MS
EKG QTC CALCULATION (BAZETT): 487 MS
EKG R AXIS: -19 DEGREES
EKG T AXIS: 48 DEGREES
EKG VENTRICULAR RATE: 100 BPM
EOSINOPHILS ABSOLUTE: 0.16 E9/L (ref 0.05–0.5)
EOSINOPHILS RELATIVE PERCENT: 1.9 % (ref 0–6)
GFR AFRICAN AMERICAN: >60
GFR NON-AFRICAN AMERICAN: 50 ML/MIN/1.73
GFR NON-AFRICAN AMERICAN: 56 ML/MIN/1.73
GFR NON-AFRICAN AMERICAN: 56 ML/MIN/1.73
GLUCOSE BLD-MCNC: 280 MG/DL (ref 74–99)
GLUCOSE BLD-MCNC: 281 MG/DL (ref 74–99)
GLUCOSE BLD-MCNC: 293 MG/DL (ref 74–99)
HCT VFR BLD CALC: 41.5 % (ref 34–48)
HEMOGLOBIN: 14.2 G/DL (ref 11.5–15.5)
IMMATURE GRANULOCYTES #: 0.13 E9/L
IMMATURE GRANULOCYTES %: 1.5 % (ref 0–5)
INR BLD: 1.4
LACTIC ACID: 3.8 MMOL/L (ref 0.5–2.2)
LACTIC ACID: 4.3 MMOL/L (ref 0.5–2.2)
LYMPHOCYTES ABSOLUTE: 1.39 E9/L (ref 1.5–4)
LYMPHOCYTES RELATIVE PERCENT: 16.2 % (ref 20–42)
MCH RBC QN AUTO: 30.9 PG (ref 26–35)
MCHC RBC AUTO-ENTMCNC: 34.2 % (ref 32–34.5)
MCV RBC AUTO: 90.2 FL (ref 80–99.9)
METER GLUCOSE: 246 MG/DL (ref 74–99)
METER GLUCOSE: 248 MG/DL (ref 74–99)
MONOCYTES ABSOLUTE: 1.09 E9/L (ref 0.1–0.95)
MONOCYTES RELATIVE PERCENT: 12.7 % (ref 2–12)
NEUTROPHILS ABSOLUTE: 5.75 E9/L (ref 1.8–7.3)
NEUTROPHILS RELATIVE PERCENT: 66.8 % (ref 43–80)
OSMOLALITY: 286 MOSM/KG (ref 285–310)
PDW BLD-RTO: 18.8 FL (ref 11.5–15)
PLATELET # BLD: 283 E9/L (ref 130–450)
PMV BLD AUTO: 9.9 FL (ref 7–12)
POTASSIUM SERPL-SCNC: 4.2 MMOL/L (ref 3.5–5)
POTASSIUM SERPL-SCNC: 4.5 MMOL/L (ref 3.5–5)
POTASSIUM SERPL-SCNC: 4.6 MMOL/L (ref 3.5–5)
PROTHROMBIN TIME: 17 SEC (ref 9.3–12.4)
RBC # BLD: 4.6 E12/L (ref 3.5–5.5)
SODIUM BLD-SCNC: 119 MMOL/L (ref 132–146)
SODIUM BLD-SCNC: 119 MMOL/L (ref 132–146)
SODIUM BLD-SCNC: 121 MMOL/L (ref 132–146)
TOTAL PROTEIN: 6.5 G/DL (ref 6.4–8.3)
TROPONIN: 0.06 NG/ML (ref 0–0.03)
TROPONIN: 0.06 NG/ML (ref 0–0.03)
WBC # BLD: 8.6 E9/L (ref 4.5–11.5)

## 2020-04-08 PROCEDURE — 84484 ASSAY OF TROPONIN QUANT: CPT

## 2020-04-08 PROCEDURE — 2060000000 HC ICU INTERMEDIATE R&B

## 2020-04-08 PROCEDURE — 2580000003 HC RX 258: Performed by: PHYSICIAN ASSISTANT

## 2020-04-08 PROCEDURE — 85610 PROTHROMBIN TIME: CPT

## 2020-04-08 PROCEDURE — 83930 ASSAY OF BLOOD OSMOLALITY: CPT

## 2020-04-08 PROCEDURE — 6360000002 HC RX W HCPCS: Performed by: PHYSICIAN ASSISTANT

## 2020-04-08 PROCEDURE — 80048 BASIC METABOLIC PNL TOTAL CA: CPT

## 2020-04-08 PROCEDURE — 97161 PT EVAL LOW COMPLEX 20 MIN: CPT | Performed by: PHYSICAL THERAPIST

## 2020-04-08 PROCEDURE — 82140 ASSAY OF AMMONIA: CPT

## 2020-04-08 PROCEDURE — 0W9G3ZZ DRAINAGE OF PERITONEAL CAVITY, PERCUTANEOUS APPROACH: ICD-10-PCS | Performed by: RADIOLOGY

## 2020-04-08 PROCEDURE — 85025 COMPLETE CBC W/AUTO DIFF WBC: CPT

## 2020-04-08 PROCEDURE — C1729 CATH, DRAINAGE: HCPCS

## 2020-04-08 PROCEDURE — 93005 ELECTROCARDIOGRAM TRACING: CPT | Performed by: PHYSICIAN ASSISTANT

## 2020-04-08 PROCEDURE — 85730 THROMBOPLASTIN TIME PARTIAL: CPT

## 2020-04-08 PROCEDURE — 94761 N-INVAS EAR/PLS OXIMETRY MLT: CPT

## 2020-04-08 PROCEDURE — 70450 CT HEAD/BRAIN W/O DYE: CPT

## 2020-04-08 PROCEDURE — 82962 GLUCOSE BLOOD TEST: CPT

## 2020-04-08 PROCEDURE — 93010 ELECTROCARDIOGRAM REPORT: CPT | Performed by: INTERNAL MEDICINE

## 2020-04-08 PROCEDURE — 80053 COMPREHEN METABOLIC PANEL: CPT

## 2020-04-08 PROCEDURE — 6370000000 HC RX 637 (ALT 250 FOR IP): Performed by: PHYSICIAN ASSISTANT

## 2020-04-08 PROCEDURE — 99285 EMERGENCY DEPT VISIT HI MDM: CPT

## 2020-04-08 PROCEDURE — 36415 COLL VENOUS BLD VENIPUNCTURE: CPT

## 2020-04-08 PROCEDURE — 83605 ASSAY OF LACTIC ACID: CPT

## 2020-04-08 PROCEDURE — 71045 X-RAY EXAM CHEST 1 VIEW: CPT

## 2020-04-08 RX ORDER — VENLAFAXINE HYDROCHLORIDE 37.5 MG/1
37.5 CAPSULE, EXTENDED RELEASE ORAL DAILY
Status: DISCONTINUED | OUTPATIENT
Start: 2020-04-08 | End: 2020-04-17 | Stop reason: HOSPADM

## 2020-04-08 RX ORDER — ONDANSETRON 2 MG/ML
4 INJECTION INTRAMUSCULAR; INTRAVENOUS EVERY 6 HOURS PRN
Status: DISCONTINUED | OUTPATIENT
Start: 2020-04-08 | End: 2020-04-17 | Stop reason: HOSPADM

## 2020-04-08 RX ORDER — PANTOPRAZOLE SODIUM 40 MG/1
40 TABLET, DELAYED RELEASE ORAL DAILY
Status: DISCONTINUED | OUTPATIENT
Start: 2020-04-08 | End: 2020-04-17 | Stop reason: HOSPADM

## 2020-04-08 RX ORDER — ATORVASTATIN CALCIUM 40 MG/1
40 TABLET, FILM COATED ORAL NIGHTLY
Status: DISCONTINUED | OUTPATIENT
Start: 2020-04-08 | End: 2020-04-17 | Stop reason: HOSPADM

## 2020-04-08 RX ORDER — NICOTINE POLACRILEX 4 MG
15 LOZENGE BUCCAL PRN
Status: DISCONTINUED | OUTPATIENT
Start: 2020-04-08 | End: 2020-04-17 | Stop reason: HOSPADM

## 2020-04-08 RX ORDER — POTASSIUM CHLORIDE 7.45 MG/ML
10 INJECTION INTRAVENOUS PRN
Status: DISCONTINUED | OUTPATIENT
Start: 2020-04-08 | End: 2020-04-17 | Stop reason: HOSPADM

## 2020-04-08 RX ORDER — PROMETHAZINE HYDROCHLORIDE 25 MG/1
12.5 TABLET ORAL EVERY 6 HOURS PRN
Status: DISCONTINUED | OUTPATIENT
Start: 2020-04-08 | End: 2020-04-17 | Stop reason: HOSPADM

## 2020-04-08 RX ORDER — HYDROCODONE BITARTRATE AND ACETAMINOPHEN 5; 325 MG/1; MG/1
1 TABLET ORAL EVERY 6 HOURS PRN
Status: DISCONTINUED | OUTPATIENT
Start: 2020-04-08 | End: 2020-04-17 | Stop reason: HOSPADM

## 2020-04-08 RX ORDER — MONTELUKAST SODIUM 10 MG/1
10 TABLET ORAL EVERY MORNING
Status: DISCONTINUED | OUTPATIENT
Start: 2020-04-09 | End: 2020-04-17 | Stop reason: HOSPADM

## 2020-04-08 RX ORDER — AMLODIPINE BESYLATE 5 MG/1
5 TABLET ORAL DAILY
Status: DISCONTINUED | OUTPATIENT
Start: 2020-04-08 | End: 2020-04-17 | Stop reason: HOSPADM

## 2020-04-08 RX ORDER — DEXTROSE MONOHYDRATE 25 G/50ML
12.5 INJECTION, SOLUTION INTRAVENOUS PRN
Status: DISCONTINUED | OUTPATIENT
Start: 2020-04-08 | End: 2020-04-17 | Stop reason: HOSPADM

## 2020-04-08 RX ORDER — INSULIN GLARGINE 100 [IU]/ML
30 INJECTION, SOLUTION SUBCUTANEOUS NIGHTLY
Status: DISCONTINUED | OUTPATIENT
Start: 2020-04-08 | End: 2020-04-17 | Stop reason: HOSPADM

## 2020-04-08 RX ORDER — ALBUTEROL SULFATE 2.5 MG/3ML
2.5 SOLUTION RESPIRATORY (INHALATION) EVERY 6 HOURS PRN
Status: DISCONTINUED | OUTPATIENT
Start: 2020-04-08 | End: 2020-04-17 | Stop reason: HOSPADM

## 2020-04-08 RX ORDER — ACETAMINOPHEN 325 MG/1
650 TABLET ORAL EVERY 6 HOURS PRN
Status: DISCONTINUED | OUTPATIENT
Start: 2020-04-08 | End: 2020-04-17 | Stop reason: HOSPADM

## 2020-04-08 RX ORDER — POTASSIUM CHLORIDE 20 MEQ/1
40 TABLET, EXTENDED RELEASE ORAL PRN
Status: DISCONTINUED | OUTPATIENT
Start: 2020-04-08 | End: 2020-04-17 | Stop reason: HOSPADM

## 2020-04-08 RX ORDER — AMITRIPTYLINE HYDROCHLORIDE 25 MG/1
25 TABLET, FILM COATED ORAL NIGHTLY PRN
Status: DISCONTINUED | OUTPATIENT
Start: 2020-04-08 | End: 2020-04-17 | Stop reason: HOSPADM

## 2020-04-08 RX ORDER — SODIUM CHLORIDE 0.9 % (FLUSH) 0.9 %
10 SYRINGE (ML) INJECTION EVERY 12 HOURS SCHEDULED
Status: DISCONTINUED | OUTPATIENT
Start: 2020-04-08 | End: 2020-04-17 | Stop reason: HOSPADM

## 2020-04-08 RX ORDER — LACTULOSE 10 G/15ML
30 SOLUTION ORAL 3 TIMES DAILY
Status: DISCONTINUED | OUTPATIENT
Start: 2020-04-08 | End: 2020-04-10

## 2020-04-08 RX ORDER — ACETAMINOPHEN 650 MG/1
650 SUPPOSITORY RECTAL EVERY 6 HOURS PRN
Status: DISCONTINUED | OUTPATIENT
Start: 2020-04-08 | End: 2020-04-17 | Stop reason: HOSPADM

## 2020-04-08 RX ORDER — SODIUM CHLORIDE 0.9 % (FLUSH) 0.9 %
10 SYRINGE (ML) INJECTION PRN
Status: DISCONTINUED | OUTPATIENT
Start: 2020-04-08 | End: 2020-04-17 | Stop reason: SDUPTHER

## 2020-04-08 RX ORDER — ALBUTEROL SULFATE 90 UG/1
2 AEROSOL, METERED RESPIRATORY (INHALATION) EVERY 6 HOURS PRN
Status: DISCONTINUED | OUTPATIENT
Start: 2020-04-08 | End: 2020-04-08 | Stop reason: CLARIF

## 2020-04-08 RX ORDER — SODIUM CHLORIDE 9 MG/ML
INJECTION, SOLUTION INTRAVENOUS CONTINUOUS
Status: DISCONTINUED | OUTPATIENT
Start: 2020-04-08 | End: 2020-04-08

## 2020-04-08 RX ORDER — DEXTROSE MONOHYDRATE 50 MG/ML
100 INJECTION, SOLUTION INTRAVENOUS PRN
Status: DISCONTINUED | OUTPATIENT
Start: 2020-04-08 | End: 2020-04-17 | Stop reason: HOSPADM

## 2020-04-08 RX ORDER — SODIUM CHLORIDE 9 MG/ML
INJECTION, SOLUTION INTRAVENOUS ONCE
Status: COMPLETED | OUTPATIENT
Start: 2020-04-08 | End: 2020-04-08

## 2020-04-08 RX ADMIN — ATORVASTATIN CALCIUM 40 MG: 40 TABLET, FILM COATED ORAL at 22:01

## 2020-04-08 RX ADMIN — INSULIN LISPRO 2 UNITS: 100 INJECTION, SOLUTION INTRAVENOUS; SUBCUTANEOUS at 17:36

## 2020-04-08 RX ADMIN — SODIUM CHLORIDE, PRESERVATIVE FREE 10 ML: 5 INJECTION INTRAVENOUS at 22:01

## 2020-04-08 RX ADMIN — LACTULOSE 20 G: 20 SOLUTION ORAL at 15:25

## 2020-04-08 RX ADMIN — INSULIN GLARGINE 30 UNITS: 100 INJECTION, SOLUTION SUBCUTANEOUS at 22:01

## 2020-04-08 RX ADMIN — HYDROCODONE BITARTRATE AND ACETAMINOPHEN 1 TABLET: 5; 325 TABLET ORAL at 23:55

## 2020-04-08 RX ADMIN — INSULIN LISPRO 1 UNITS: 100 INJECTION, SOLUTION INTRAVENOUS; SUBCUTANEOUS at 22:01

## 2020-04-08 RX ADMIN — LACTULOSE 20 G: 20 SOLUTION ORAL at 22:01

## 2020-04-08 RX ADMIN — SODIUM CHLORIDE: 9 INJECTION, SOLUTION INTRAVENOUS at 13:27

## 2020-04-08 RX ADMIN — RIFAXIMIN 550 MG: 550 TABLET ORAL at 22:01

## 2020-04-08 RX ADMIN — ONDANSETRON 4 MG: 2 INJECTION INTRAMUSCULAR; INTRAVENOUS at 22:01

## 2020-04-08 ASSESSMENT — PAIN SCALES - GENERAL
PAINLEVEL_OUTOF10: 0
PAINLEVEL_OUTOF10: 0
PAINLEVEL_OUTOF10: 4

## 2020-04-08 ASSESSMENT — PAIN DESCRIPTION - DESCRIPTORS: DESCRIPTORS: ACHING;DISCOMFORT;CONSTANT

## 2020-04-08 ASSESSMENT — PAIN DESCRIPTION - ONSET: ONSET: ON-GOING

## 2020-04-08 ASSESSMENT — PAIN DESCRIPTION - LOCATION: LOCATION: ABDOMEN

## 2020-04-08 ASSESSMENT — PAIN DESCRIPTION - PROGRESSION: CLINICAL_PROGRESSION: GRADUALLY WORSENING

## 2020-04-08 ASSESSMENT — PAIN DESCRIPTION - PAIN TYPE: TYPE: ACUTE PAIN;SURGICAL PAIN

## 2020-04-08 ASSESSMENT — PAIN - FUNCTIONAL ASSESSMENT: PAIN_FUNCTIONAL_ASSESSMENT: PREVENTS OR INTERFERES SOME ACTIVE ACTIVITIES AND ADLS

## 2020-04-08 ASSESSMENT — PAIN DESCRIPTION - FREQUENCY: FREQUENCY: CONTINUOUS

## 2020-04-08 ASSESSMENT — PAIN DESCRIPTION - ORIENTATION: ORIENTATION: RIGHT

## 2020-04-08 NOTE — ED PROVIDER NOTES
ED Attending  CC: Janelle      Department of Emergency Medicine   ED  Provider Note  Admit Date/RoomTime: 4/8/2020 10:58 AM  ED Room: 16/16  Chief Complaint:   Fatigue (began last night) and Bloated (abd distention, states usually gets paracentesis on Wednesday)    History of Present Illness   Source of history provided by:  patient. History/Exam Limitations: none. Son Connell is a 71 y.o. old female with a past medical history of:   Past Medical History:   Diagnosis Date    Arthritis     Cerebral artery occlusion with cerebral infarction ([facility] [address] 09/2018    some memory loss, some right side weakness.  Cough     post anesthesia    Diabetes mellitus ([facility]     Diverticulitis     GERD (gastroesophageal reflux disease)     Hyperlipidemia     Hypertension     Liver cirrhosis (HCC)     Polyp of esophagus     Polyp, stomach     PONV (postoperative nausea and vomiting)     Prolonged emergence from general anesthesia     Thyroid disease     no meds      presents to the emergency department by ambulance where the patient received see Ambulance Run Sheet prior to arrival., for complaints of generalized weakness/fatigue which began when she woke up this morning prior to arrival.  Since recognized her symptoms have been persistent and unchanged. She has no history of similar episodes in the past.  She has associated symptoms including abdominal bloating/distension which began over the last week and she states that she gets weekly paracentesis for liver failure on Wednesdays. She was supposed to get her paracentesis at 11 AM this morning. There has been NO history of fever, chills, headache, blurred vision, double vision, loss of vision, numbness/weakness of her extremities, slurred speech, shortness of breath, chest pain, palpitations, nausea, vomiting, diarrhea, abdominal pain, dysuria, urine frequency, urgency, or constipation. There has been no history of recent trauma.       ROS    Pertinent positives and negatives are stated within HPI, all other systems reviewed and are negative. Past Surgical History:   Procedure Laterality Date    CAROTID ENDARTERECTOMY      october 2018    CHOLECYSTECTOMY      COLONOSCOPY      COLONOSCOPY N/A 6/11/2019    COLONOSCOPY POLYPECTOMY SNARE/COLD BIOPSY performed by Mikayla Carrillo MD at [address] [facility] Left 10/29/2018    LEFT CAROTID ENDARTERECTOMY performed by Yeni Nguyen MD at [address] [facility] GASTROINTESTINAL ENDOSCOPY N/A 6/11/2019    EGD BIOPSY performed by Mikayla Carrillo MD at [place] [address] History:  reports that she is a non-smoker but has been exposed to tobacco smoke. The exposure started about 65 years ago. She has never used smokeless tobacco. She reports that she does not drink alcohol or use drugs. Family History: family history includes Diabetes in her mother and sister; Heart Disease in her sister; Heart Disease (age of onset: 28) in her mother. Allergies: Patient has no known allergies. Physical Exam         ED Triage Vitals [04/08/20 1111]   BP Temp Temp Source Pulse Resp SpO2 Height Weight   105/62 97.7 °F (36.5 °C) Oral 92 16 95 % 5' 7\" (1.702 m) 202 lb (91.6 kg)      Oxygen Saturation Interpretation: Normal.    Constitutional:  Alert, development consistent with age. HEENT:  NC/NT. Airway patent. Neck:  Normal ROM. Supple. Respiratory:  Clear to auscultation and breath sounds equal.  CV:  Regular rate and rhythm, normal heart sounds, without pathological murmurs, ectopy, gallops, or rubs. GI:  Abdomen Soft, significant distension with fluid wave, nontender, good bowel sounds. No firm or pulsatile mass. Back:  No costovertebral tenderness. Musculoskeletal: Trace pitting edema bilaterally. No tenderness. Integument:  Normal turgor.   Warm, dry, without visible rash, unless noted to be hyponatremic with a serum sodium of 119. Patient did also complain of abdominal bloating and had a paracentesis scheduled for 11:00 this morning. Patient did receive her paracentesis during her emergency department visit. She will be admitted for further evaluation treatment. Counseling:   I have spoken with the patient and discussed todays results, in addition to providing specific details for the plan of care and counseling regarding the diagnosis and prognosis and are agreeable with the plan. Assessment      1. Hyponatremia    2. Cirrhosis of liver with ascites, unspecified hepatic cirrhosis type ([address]    3. Hypochloremia    4. Hyperglycemia    5. Elevated lactic acid level    6. Generalized weakness      This patient's ED course included: a personal history and physicial examination, re-evaluation prior to disposition, IV medications, cardiac monitoring and continuous pulse oximetry  This patient has remained hemodynamically stable and been closely monitored during their ED course. Plan   Admit to telemetry. Patient condition is good. New Medications     New Prescriptions    No medications on file     Electronically signed by Amy Astorga   DD: 4/8/20  **This report was transcribed using voice recognition software. Every effort was made to ensure accuracy; however, inadvertent computerized transcription errors may be present.   END OF PROVIDER NOTE     Hieu Jaramillo PA-C  04/08/20 [id number]

## 2020-04-08 NOTE — PROGRESS NOTES
IRFLOWSHEET    Date: 4/8/2020    Time: 12:38 PM     Exam: Ultrasound Guided Paracentesis    Radiologist Performing Procedure: Diandra Jonas PA-C/Dr. Lauri Nolan ( Ashly Lin, Kevin Cuencadandy in room with all wearing surgical masks. )    Nurse Reporting/Phone: [phone]     Nurse Receiving: Sergio Bueno    Permit signed:      Yes    ID Band Checklist: Yes    Allergies: Patient has no known allergies. TIME OUT: 1250    PROCEDURE START TIME: 1251    Puncture Site: left Abdomen    Puncture Time: 8050    Catheters: 6Fr. LABS:   Lab Results   Component Value Date    INR 1.4 04/08/2020    PROTIME 17.0 (H) 04/08/2020           Lab Results   Component Value Date    CREATININE 1.1 (H) 04/08/2020    BUN 25 (H) 04/08/2020          Lab Results   Component Value Date    HGB 14.2 04/08/2020    HCT 41.5 04/08/2020     04/08/2020         PROCEDURE END TIME: 1320    Total Contrast: N/A    Fluoroscopy Time: N/A    Complications:  None    Comments: Pt brought to IR room for paracentesis from emergency department. Pt is alert and oriented, denies any pain prior to procedure, just abdominal pressure. Ultrasound images taken prior to procedure. Procedure is explained to patient, including possible risks. Pt verbalizes understanding and consent from signed. Procedure done under sterile technique and guidance of ultrasound imaging. 1% Lidocaine is used during procedure for comfort measures. A total of 5,500ml's of gold colored ascitic fluid drained during procedure. Catheter removed and op-site dressing applied to site with no bleeding noted. Albumin infusion given during procedure. Pt tolerated procedure well and denies any pain after. Pt transported back to emergency via cart by transport staff.

## 2020-04-08 NOTE — INTERVAL H&P NOTE
H&P Update    Patient's History and Physical  was reviewed. The patient appears likely to able to tolerate the procedure, image-guided paracentesis    Risk and benefits discussed including ultimate complications, possibly death and consent obtained.     Cyn Bass PA-C

## 2020-04-09 PROBLEM — E87.5 HYPERKALEMIA: Status: RESOLVED | Noted: 2020-03-10 | Resolved: 2020-04-09

## 2020-04-09 PROBLEM — E66.9 OBESITY (BMI 30-39.9): Chronic | Status: RESOLVED | Noted: 2020-02-11 | Resolved: 2020-04-09

## 2020-04-09 PROBLEM — E87.1 HYPONATREMIA: Status: ACTIVE | Noted: 2020-04-09

## 2020-04-09 PROBLEM — E87.1 HYPONATREMIA: Status: RESOLVED | Noted: 2020-03-14 | Resolved: 2020-04-09

## 2020-04-09 LAB
ANION GAP SERPL CALCULATED.3IONS-SCNC: 12 MMOL/L (ref 7–16)
ANION GAP SERPL CALCULATED.3IONS-SCNC: 13 MMOL/L (ref 7–16)
ANION GAP SERPL CALCULATED.3IONS-SCNC: 13 MMOL/L (ref 7–16)
ANION GAP SERPL CALCULATED.3IONS-SCNC: 14 MMOL/L (ref 7–16)
ANISOCYTOSIS: ABNORMAL
BACTERIA: ABNORMAL /HPF
BASOPHILS ABSOLUTE: 0.08 E9/L (ref 0–0.2)
BASOPHILS RELATIVE PERCENT: 0.5 % (ref 0–2)
BILIRUBIN URINE: ABNORMAL
BLOOD, URINE: ABNORMAL
BUN BLDV-MCNC: 25 MG/DL (ref 8–23)
BUN BLDV-MCNC: 26 MG/DL (ref 8–23)
BUN BLDV-MCNC: 27 MG/DL (ref 8–23)
BUN BLDV-MCNC: 27 MG/DL (ref 8–23)
BURR CELLS: ABNORMAL
CALCIUM SERPL-MCNC: 8.2 MG/DL (ref 8.6–10.2)
CALCIUM SERPL-MCNC: 8.3 MG/DL (ref 8.6–10.2)
CALCIUM SERPL-MCNC: 8.3 MG/DL (ref 8.6–10.2)
CALCIUM SERPL-MCNC: 8.4 MG/DL (ref 8.6–10.2)
CHLORIDE BLD-SCNC: 87 MMOL/L (ref 98–107)
CHLORIDE BLD-SCNC: 88 MMOL/L (ref 98–107)
CHLORIDE BLD-SCNC: 89 MMOL/L (ref 98–107)
CHLORIDE BLD-SCNC: 90 MMOL/L (ref 98–107)
CHLORIDE URINE RANDOM: 41 MMOL/L
CLARITY: ABNORMAL
CO2: 18 MMOL/L (ref 22–29)
CO2: 19 MMOL/L (ref 22–29)
COARSE CASTS, UA: ABNORMAL /LPF (ref 0–2)
COLOR: ABNORMAL
CREAT SERPL-MCNC: 0.9 MG/DL (ref 0.5–1)
CREAT SERPL-MCNC: 1 MG/DL (ref 0.5–1)
CREAT SERPL-MCNC: 1.1 MG/DL (ref 0.5–1)
CREAT SERPL-MCNC: 1.1 MG/DL (ref 0.5–1)
CREATININE URINE: 240 MG/DL (ref 29–226)
EOSINOPHILS ABSOLUTE: 0.08 E9/L (ref 0.05–0.5)
EOSINOPHILS RELATIVE PERCENT: 0.5 % (ref 0–6)
EPITHELIAL CELLS, UA: ABNORMAL /HPF
FINE CASTS, UA: ABNORMAL /LPF (ref 0–2)
GFR AFRICAN AMERICAN: >60
GFR NON-AFRICAN AMERICAN: 50 ML/MIN/1.73
GFR NON-AFRICAN AMERICAN: 50 ML/MIN/1.73
GFR NON-AFRICAN AMERICAN: 56 ML/MIN/1.73
GFR NON-AFRICAN AMERICAN: >60 ML/MIN/1.73
GLUCOSE BLD-MCNC: 207 MG/DL (ref 74–99)
GLUCOSE BLD-MCNC: 219 MG/DL (ref 74–99)
GLUCOSE BLD-MCNC: 221 MG/DL (ref 74–99)
GLUCOSE BLD-MCNC: 249 MG/DL (ref 74–99)
GLUCOSE URINE: NEGATIVE MG/DL
HCT VFR BLD CALC: 38.4 % (ref 34–48)
HEMOGLOBIN: 13.5 G/DL (ref 11.5–15.5)
HYALINE CASTS: ABNORMAL /LPF (ref 0–2)
IMMATURE GRANULOCYTES #: 0.11 E9/L
IMMATURE GRANULOCYTES %: 0.7 % (ref 0–5)
KETONES, URINE: NEGATIVE MG/DL
LEUKOCYTE ESTERASE, URINE: NEGATIVE
LYMPHOCYTES ABSOLUTE: 1.28 E9/L (ref 1.5–4)
LYMPHOCYTES RELATIVE PERCENT: 8.6 % (ref 20–42)
MAGNESIUM: 2 MG/DL (ref 1.6–2.6)
MCH RBC QN AUTO: 31 PG (ref 26–35)
MCHC RBC AUTO-ENTMCNC: 35.2 % (ref 32–34.5)
MCV RBC AUTO: 88.3 FL (ref 80–99.9)
METER GLUCOSE: 185 MG/DL (ref 74–99)
METER GLUCOSE: 204 MG/DL (ref 74–99)
METER GLUCOSE: 211 MG/DL (ref 74–99)
METER GLUCOSE: 213 MG/DL (ref 74–99)
MONOCYTES ABSOLUTE: 1.59 E9/L (ref 0.1–0.95)
MONOCYTES RELATIVE PERCENT: 10.7 % (ref 2–12)
NEUTROPHILS ABSOLUTE: 11.76 E9/L (ref 1.8–7.3)
NEUTROPHILS RELATIVE PERCENT: 79 % (ref 43–80)
NITRITE, URINE: NEGATIVE
OSMOLALITY URINE: 752 MOSM/KG (ref 300–900)
OVALOCYTES: ABNORMAL
PDW BLD-RTO: 18.3 FL (ref 11.5–15)
PH UA: 5 (ref 5–9)
PHOSPHORUS: 2.8 MG/DL (ref 2.5–4.5)
PLATELET # BLD: 275 E9/L (ref 130–450)
PMV BLD AUTO: 10.3 FL (ref 7–12)
POIKILOCYTES: ABNORMAL
POLYCHROMASIA: ABNORMAL
POTASSIUM REFLEX MAGNESIUM: 4.3 MMOL/L (ref 3.5–5)
POTASSIUM SERPL-SCNC: 4.6 MMOL/L (ref 3.5–5)
POTASSIUM SERPL-SCNC: 4.7 MMOL/L (ref 3.5–5)
POTASSIUM SERPL-SCNC: 4.8 MMOL/L (ref 3.5–5)
PROTEIN PROTEIN: 94 MG/DL (ref 0–12)
PROTEIN UA: 30 MG/DL
PROTEIN/CREAT RATIO: 0.4
PROTEIN/CREAT RATIO: 0.4 (ref 0–0.2)
RBC # BLD: 4.35 E12/L (ref 3.5–5.5)
RBC UA: ABNORMAL /HPF (ref 0–2)
RENAL EPITHELIAL, UA: ABNORMAL /HPF
SODIUM BLD-SCNC: 118 MMOL/L (ref 132–146)
SODIUM BLD-SCNC: 120 MMOL/L (ref 132–146)
SODIUM BLD-SCNC: 121 MMOL/L (ref 132–146)
SODIUM BLD-SCNC: 122 MMOL/L (ref 132–146)
SODIUM URINE: <20 MMOL/L
SPECIFIC GRAVITY UA: >=1.03 (ref 1–1.03)
UROBILINOGEN, URINE: 1 E.U./DL
WBC # BLD: 14.9 E9/L (ref 4.5–11.5)
WBC UA: ABNORMAL /HPF (ref 0–5)

## 2020-04-09 PROCEDURE — 84300 ASSAY OF URINE SODIUM: CPT

## 2020-04-09 PROCEDURE — 83935 ASSAY OF URINE OSMOLALITY: CPT

## 2020-04-09 PROCEDURE — 6370000000 HC RX 637 (ALT 250 FOR IP): Performed by: INTERNAL MEDICINE

## 2020-04-09 PROCEDURE — 36415 COLL VENOUS BLD VENIPUNCTURE: CPT

## 2020-04-09 PROCEDURE — 82570 ASSAY OF URINE CREATININE: CPT

## 2020-04-09 PROCEDURE — 82436 ASSAY OF URINE CHLORIDE: CPT

## 2020-04-09 PROCEDURE — 84100 ASSAY OF PHOSPHORUS: CPT

## 2020-04-09 PROCEDURE — 85025 COMPLETE CBC W/AUTO DIFF WBC: CPT

## 2020-04-09 PROCEDURE — 2580000003 HC RX 258: Performed by: PHYSICIAN ASSISTANT

## 2020-04-09 PROCEDURE — 97165 OT EVAL LOW COMPLEX 30 MIN: CPT

## 2020-04-09 PROCEDURE — 83735 ASSAY OF MAGNESIUM: CPT

## 2020-04-09 PROCEDURE — 2060000000 HC ICU INTERMEDIATE R&B

## 2020-04-09 PROCEDURE — 84156 ASSAY OF PROTEIN URINE: CPT

## 2020-04-09 PROCEDURE — 81001 URINALYSIS AUTO W/SCOPE: CPT

## 2020-04-09 PROCEDURE — 97530 THERAPEUTIC ACTIVITIES: CPT | Performed by: PHYSICAL THERAPIST

## 2020-04-09 PROCEDURE — 6370000000 HC RX 637 (ALT 250 FOR IP): Performed by: PHYSICIAN ASSISTANT

## 2020-04-09 PROCEDURE — 82962 GLUCOSE BLOOD TEST: CPT

## 2020-04-09 PROCEDURE — 80048 BASIC METABOLIC PNL TOTAL CA: CPT

## 2020-04-09 PROCEDURE — 97530 THERAPEUTIC ACTIVITIES: CPT

## 2020-04-09 PROCEDURE — 2580000003 HC RX 258: Performed by: INTERNAL MEDICINE

## 2020-04-09 RX ORDER — SODIUM CHLORIDE 9 MG/ML
INJECTION, SOLUTION INTRAVENOUS CONTINUOUS
Status: DISCONTINUED | OUTPATIENT
Start: 2020-04-09 | End: 2020-04-11

## 2020-04-09 RX ADMIN — RIFAXIMIN 550 MG: 550 TABLET ORAL at 20:42

## 2020-04-09 RX ADMIN — VENLAFAXINE HYDROCHLORIDE 37.5 MG: 37.5 CAPSULE, EXTENDED RELEASE ORAL at 08:37

## 2020-04-09 RX ADMIN — MONTELUKAST SODIUM 10 MG: 10 TABLET, FILM COATED ORAL at 08:37

## 2020-04-09 RX ADMIN — SODIUM CHLORIDE: 9 INJECTION, SOLUTION INTRAVENOUS at 16:38

## 2020-04-09 RX ADMIN — INSULIN LISPRO 1 UNITS: 100 INJECTION, SOLUTION INTRAVENOUS; SUBCUTANEOUS at 20:42

## 2020-04-09 RX ADMIN — MICONAZOLE NITRATE: 2 OINTMENT TOPICAL at 23:23

## 2020-04-09 RX ADMIN — INSULIN GLARGINE 30 UNITS: 100 INJECTION, SOLUTION SUBCUTANEOUS at 20:42

## 2020-04-09 RX ADMIN — SODIUM CHLORIDE, PRESERVATIVE FREE 10 ML: 5 INJECTION INTRAVENOUS at 08:37

## 2020-04-09 RX ADMIN — AMLODIPINE BESYLATE 5 MG: 5 TABLET ORAL at 08:37

## 2020-04-09 RX ADMIN — PANTOPRAZOLE SODIUM 40 MG: 40 TABLET, DELAYED RELEASE ORAL at 08:37

## 2020-04-09 RX ADMIN — RIFAXIMIN 550 MG: 550 TABLET ORAL at 08:37

## 2020-04-09 RX ADMIN — LACTULOSE 20 G: 20 SOLUTION ORAL at 15:10

## 2020-04-09 RX ADMIN — LACTULOSE 20 G: 20 SOLUTION ORAL at 08:37

## 2020-04-09 RX ADMIN — LACTULOSE 20 G: 20 SOLUTION ORAL at 20:42

## 2020-04-09 RX ADMIN — ATORVASTATIN CALCIUM 40 MG: 40 TABLET, FILM COATED ORAL at 20:42

## 2020-04-09 RX ADMIN — INSULIN LISPRO 2 UNITS: 100 INJECTION, SOLUTION INTRAVENOUS; SUBCUTANEOUS at 06:35

## 2020-04-09 RX ADMIN — INSULIN LISPRO 2 UNITS: 100 INJECTION, SOLUTION INTRAVENOUS; SUBCUTANEOUS at 16:38

## 2020-04-09 RX ADMIN — PETROLATUM: 420 OINTMENT TOPICAL at 23:23

## 2020-04-09 RX ADMIN — AMITRIPTYLINE HYDROCHLORIDE 25 MG: 25 TABLET, FILM COATED ORAL at 20:42

## 2020-04-09 RX ADMIN — SODIUM CHLORIDE: 9 INJECTION, SOLUTION INTRAVENOUS at 04:11

## 2020-04-09 RX ADMIN — INSULIN LISPRO 2 UNITS: 100 INJECTION, SOLUTION INTRAVENOUS; SUBCUTANEOUS at 11:46

## 2020-04-09 ASSESSMENT — PAIN SCALES - GENERAL
PAINLEVEL_OUTOF10: 0

## 2020-04-09 NOTE — CARE COORDINATION
4/9/2020  Social Work Discharge Planning:SW discussed discharge planning with Pt. Pt and her son reside together in a 2 story home with 3 steps. Pt stays on the first floor. Pt has a ww, bsc and all needed glucometer supplies. Pt has been to [facility] in the past. Pts  AM PAC is 8/24. Discussed [facility] ;however, Pt is declining and prefers to return [facility] with [facility], who shes active with. SW made a referral to [facility] [place]. JU order will be needed. Pharmacy is [facility] in [facility].  Electronically signed by CHELITA Marquez on 4/9/2020 at 1:39 PM

## 2020-04-09 NOTE — H&P
°C) (Oral)   Resp 18   Ht 5' 7\" (1.702 m)   Wt 131 lb (59.4 kg)   SpO2 93%   BMI 20.52 kg/m²     General:  Awake, alert, oriented X 3. Well developed, well nourished, well groomed. No apparent distress. HEENT:  Normocephalic, atraumatic. Pupils equal, round, reactive to light. No scleral icterus. No conjunctival injection. Normal lips, teeth, and gums. No nasal discharge. Neck:  Supple  Heart:  RRR, no murmurs, gallops, rubs  Lungs:  CTA bilaterally, bilat symmetrical expansion, no wheeze, rales, or rhonchi  Abdomen:  Obese. Distension. Fluid waves. Bowel sounds present, soft, nontender, no masses, no organomegaly, no peritoneal signs  Extremities:  No clubbing, cyanosis, or edema  Skin:  Warm and dry, no open lesions or rash  Neuro:  Cranial nerves 2-12 intact, no focal deficits.   General physical deconditioning  Breast: deferred  Rectal: deferred  Genitalia:  deferred    LABS:    CBC with Differential:    Lab Results   Component Value Date    WBC 14.9 04/09/2020    RBC 4.35 04/09/2020    HGB 13.5 04/09/2020    HCT 38.4 04/09/2020     04/09/2020    MCV 88.3 04/09/2020    MCH 31.0 04/09/2020    MCHC 35.2 04/09/2020    RDW 18.3 04/09/2020    LYMPHOPCT 8.6 04/09/2020    MONOPCT 10.7 04/09/2020    BASOPCT 0.5 04/09/2020    MONOSABS 1.59 04/09/2020    LYMPHSABS 1.28 04/09/2020    EOSABS 0.08 04/09/2020    BASOSABS 0.08 04/09/2020     CMP:    Lab Results   Component Value Date     04/09/2020    K 4.8 04/09/2020    K 4.3 04/09/2020    CL 89 04/09/2020    CO2 19 04/09/2020    BUN 26 04/09/2020    CREATININE 1.0 04/09/2020    GFRAA >60 04/09/2020    LABGLOM 56 04/09/2020    GLUCOSE 221 04/09/2020    PROT 6.5 04/08/2020    LABALBU 2.6 04/08/2020    CALCIUM 8.3 04/09/2020    BILITOT 2.2 04/08/2020    ALKPHOS 151 04/08/2020    AST 68 04/08/2020    ALT 35 04/08/2020     BMP:    Lab Results   Component Value Date     04/09/2020    K 4.8 04/09/2020    K 4.3 04/09/2020    CL 89 04/09/2020    CO2

## 2020-04-09 NOTE — HOME CARE
Current with [facility] for SN/PT/OT. Will need JU orders prior to discharge if appropriate. Jessica Reagan LPN, [facility].

## 2020-04-09 NOTE — PROGRESS NOTES
Initial Inpatient Wound Care    Admit Date: 4/8/2020 10:58 AM    Reason for consult:  scab right great toe, dark reddish-pind area on left upper buttocks    Significant history:  abd weakness and abd distention  Cirrhosis, hyponatremia, CM, CVA  Wound history: POA  Findings:  Awake, drowsy appearing. Dressing removed rt great toe. Dorsal. tiny brown scab noted. Not erythema or drainage. Dressing removed and left off  Sacral coccyx intact. Some areas of redness  abd fold left.  Red, sloughing skin- inner thigh red rash  Interventions in place:  Egg crate heel protectors, comfort glide  Plan: lo air loss, aquaphor, nystatin to abd folds and inner thighs  dietician    Elliot Andre 4/9/2020 4:31 PM

## 2020-04-09 NOTE — CONSULTS
TID  montelukast (SINGULAIR) tablet 10 mg, QAM  pantoprazole (PROTONIX) tablet 40 mg, Daily  rifaximin (XIFAXAN) tablet 550 mg, BID  venlafaxine (EFFEXOR XR) extended release capsule 37.5 mg, Daily  insulin lispro (HUMALOG) injection vial 0-6 Units, TID WC  insulin lispro (HUMALOG) injection vial 0-3 Units, Nightly  glucose (GLUTOSE) 40 % oral gel 15 g, PRN  dextrose 50 % IV solution, PRN  glucagon (rDNA) injection 1 mg, PRN  dextrose 5 % solution, PRN  sodium chloride flush 0.9 % injection 10 mL, 2 times per day  sodium chloride flush 0.9 % injection 10 mL, PRN  potassium chloride (KLOR-CON M) extended release tablet 40 mEq, PRN    Or  potassium bicarb-citric acid (EFFER-K) effervescent tablet 40 mEq, PRN    Or  potassium chloride 10 mEq/100 mL IVPB (Peripheral Line), PRN  acetaminophen (TYLENOL) tablet 650 mg, Q6H PRN    Or  acetaminophen (TYLENOL) suppository 650 mg, Q6H PRN  magnesium hydroxide (MILK OF MAGNESIA) 400 MG/5ML suspension 30 mL, Daily PRN  promethazine (PHENERGAN) tablet 12.5 mg, Q6H PRN    Or  ondansetron (ZOFRAN) injection 4 mg, Q6H PRN  insulin glargine (LANTUS) injection vial 30 Units, Nightly  albuterol (PROVENTIL) nebulizer solution 2.5 mg, Q6H PRN        Review of Systems:   Pertinent items are noted in HPI. Otherwise unobtainable. Physical exam:   BP (!) 102/58   Pulse 102   Temp 98.4 °F (36.9 °C) (Oral)   Resp 18   Ht 5' 7\" (1.702 m)   Wt 131 lb (59.4 kg)   SpO2 93%   BMI 20.52 kg/m²   Chronically ill-appearing elderly white woman in no apparent distress  NC/AT EOMI sclera conjunctive are clear and anicteric mucous membranes are moist  Neck soft supple trachea midline  CTA bilaterally with mildly decreased air entry bilateral at the bases due to splinting  Regular rhythm normal sinus to 3/6 to 4/6 systolic ejection murmur left upper right sternal border  Abdomen distended, soft, mildly diffusely tender without rebound guarding or referred pain. NABS.   Distal lower extremities stopped, though once urine studies results were back, resumed the IV fluid. Recommendations  Continue IV normal saline  Consider repeat paracentesis in the next 1 to 3 days  Continue lactulose, rifaximin  Once euvolemic, consider resumption of spironolactone and loop diuretic, as well as beta-blocker, though will coordinate with her hepatologist  Consider albumin infusions    Would not institute fluid restriction  Do not start salt tablets  In the absence of acute mental status change, hypertonic saline and increasing diuretics are not appropriate now  Tolvaptan also not appropriate in patient with cirrhosis    Every 6 hours BMP        Thank you for the opportunity to participate in the care of your patient. We look forward to following along with you.     Electronically signed by Megan Deleon MD on 4/9/2020

## 2020-04-10 ENCOUNTER — APPOINTMENT (OUTPATIENT)
Dept: GENERAL RADIOLOGY | Age: 65
DRG: 432 | End: 2020-04-10
Payer: MEDICARE

## 2020-04-10 PROBLEM — E43 SEVERE PROTEIN-CALORIE MALNUTRITION (HCC): Chronic | Status: ACTIVE | Noted: 2020-04-10

## 2020-04-10 LAB
ANION GAP SERPL CALCULATED.3IONS-SCNC: 13 MMOL/L (ref 7–16)
ANION GAP SERPL CALCULATED.3IONS-SCNC: 14 MMOL/L (ref 7–16)
ANION GAP SERPL CALCULATED.3IONS-SCNC: 17 MMOL/L (ref 7–16)
ANION GAP SERPL CALCULATED.3IONS-SCNC: 17 MMOL/L (ref 7–16)
ANISOCYTOSIS: ABNORMAL
BASOPHILS ABSOLUTE: 0.07 E9/L (ref 0–0.2)
BASOPHILS RELATIVE PERCENT: 0.4 % (ref 0–2)
BUN BLDV-MCNC: 28 MG/DL (ref 8–23)
BUN BLDV-MCNC: 29 MG/DL (ref 8–23)
BUN BLDV-MCNC: 30 MG/DL (ref 8–23)
BUN BLDV-MCNC: 31 MG/DL (ref 8–23)
BURR CELLS: ABNORMAL
CALCIUM SERPL-MCNC: 7.9 MG/DL (ref 8.6–10.2)
CALCIUM SERPL-MCNC: 8.2 MG/DL (ref 8.6–10.2)
CALCIUM SERPL-MCNC: 8.4 MG/DL (ref 8.6–10.2)
CALCIUM SERPL-MCNC: 8.4 MG/DL (ref 8.6–10.2)
CHLORIDE BLD-SCNC: 87 MMOL/L (ref 98–107)
CHLORIDE BLD-SCNC: 89 MMOL/L (ref 98–107)
CHLORIDE BLD-SCNC: 89 MMOL/L (ref 98–107)
CHLORIDE BLD-SCNC: 91 MMOL/L (ref 98–107)
CHLORIDE URINE RANDOM: 43 MMOL/L
CO2: 17 MMOL/L (ref 22–29)
CO2: 18 MMOL/L (ref 22–29)
CREAT SERPL-MCNC: 1.1 MG/DL (ref 0.5–1)
CREAT SERPL-MCNC: 1.1 MG/DL (ref 0.5–1)
CREAT SERPL-MCNC: 1.2 MG/DL (ref 0.5–1)
CREAT SERPL-MCNC: 1.2 MG/DL (ref 0.5–1)
EOSINOPHILS ABSOLUTE: 0.04 E9/L (ref 0.05–0.5)
EOSINOPHILS RELATIVE PERCENT: 0.2 % (ref 0–6)
GFR AFRICAN AMERICAN: 54
GFR AFRICAN AMERICAN: 54
GFR AFRICAN AMERICAN: >60
GFR AFRICAN AMERICAN: >60
GFR NON-AFRICAN AMERICAN: 45 ML/MIN/1.73
GFR NON-AFRICAN AMERICAN: 45 ML/MIN/1.73
GFR NON-AFRICAN AMERICAN: 50 ML/MIN/1.73
GFR NON-AFRICAN AMERICAN: 50 ML/MIN/1.73
GLUCOSE BLD-MCNC: 187 MG/DL (ref 74–99)
GLUCOSE BLD-MCNC: 208 MG/DL (ref 74–99)
GLUCOSE BLD-MCNC: 211 MG/DL (ref 74–99)
GLUCOSE BLD-MCNC: 220 MG/DL (ref 74–99)
HCT VFR BLD CALC: 40.1 % (ref 34–48)
HEMOGLOBIN: 13.7 G/DL (ref 11.5–15.5)
IMMATURE GRANULOCYTES #: 0.18 E9/L
IMMATURE GRANULOCYTES %: 0.9 % (ref 0–5)
LYMPHOCYTES ABSOLUTE: 1.77 E9/L (ref 1.5–4)
LYMPHOCYTES RELATIVE PERCENT: 9.2 % (ref 20–42)
MAGNESIUM: 2.3 MG/DL (ref 1.6–2.6)
MCH RBC QN AUTO: 31.1 PG (ref 26–35)
MCHC RBC AUTO-ENTMCNC: 34.2 % (ref 32–34.5)
MCV RBC AUTO: 90.9 FL (ref 80–99.9)
METER GLUCOSE: 174 MG/DL (ref 74–99)
METER GLUCOSE: 179 MG/DL (ref 74–99)
METER GLUCOSE: 198 MG/DL (ref 74–99)
METER GLUCOSE: 216 MG/DL (ref 74–99)
MONOCYTES ABSOLUTE: 1.66 E9/L (ref 0.1–0.95)
MONOCYTES RELATIVE PERCENT: 8.7 % (ref 2–12)
NEUTROPHILS ABSOLUTE: 15.43 E9/L (ref 1.8–7.3)
NEUTROPHILS RELATIVE PERCENT: 80.6 % (ref 43–80)
OSMOLALITY URINE: 766 MOSM/KG (ref 300–900)
OSMOLALITY: 282 MOSM/KG (ref 285–310)
PDW BLD-RTO: 18.9 FL (ref 11.5–15)
PLATELET # BLD: 253 E9/L (ref 130–450)
PMV BLD AUTO: 10.8 FL (ref 7–12)
POIKILOCYTES: ABNORMAL
POLYCHROMASIA: ABNORMAL
POTASSIUM SERPL-SCNC: 3.9 MMOL/L (ref 3.5–5)
POTASSIUM SERPL-SCNC: 4.1 MMOL/L (ref 3.5–5)
POTASSIUM SERPL-SCNC: 4.1 MMOL/L (ref 3.5–5)
POTASSIUM SERPL-SCNC: 4.2 MMOL/L (ref 3.5–5)
POTASSIUM, UR: 75.8 MMOL/L
RBC # BLD: 4.41 E12/L (ref 3.5–5.5)
SODIUM BLD-SCNC: 119 MMOL/L (ref 132–146)
SODIUM BLD-SCNC: 121 MMOL/L (ref 132–146)
SODIUM BLD-SCNC: 123 MMOL/L (ref 132–146)
SODIUM BLD-SCNC: 123 MMOL/L (ref 132–146)
SODIUM URINE: <20 MMOL/L
WBC # BLD: 19.2 E9/L (ref 4.5–11.5)

## 2020-04-10 PROCEDURE — P9047 ALBUMIN (HUMAN), 25%, 50ML: HCPCS | Performed by: INTERNAL MEDICINE

## 2020-04-10 PROCEDURE — 83935 ASSAY OF URINE OSMOLALITY: CPT

## 2020-04-10 PROCEDURE — 6370000000 HC RX 637 (ALT 250 FOR IP): Performed by: INTERNAL MEDICINE

## 2020-04-10 PROCEDURE — 6370000000 HC RX 637 (ALT 250 FOR IP): Performed by: PHYSICIAN ASSISTANT

## 2020-04-10 PROCEDURE — 82962 GLUCOSE BLOOD TEST: CPT

## 2020-04-10 PROCEDURE — 1200000000 HC SEMI PRIVATE

## 2020-04-10 PROCEDURE — 71046 X-RAY EXAM CHEST 2 VIEWS: CPT

## 2020-04-10 PROCEDURE — 82436 ASSAY OF URINE CHLORIDE: CPT

## 2020-04-10 PROCEDURE — 84300 ASSAY OF URINE SODIUM: CPT

## 2020-04-10 PROCEDURE — 80048 BASIC METABOLIC PNL TOTAL CA: CPT

## 2020-04-10 PROCEDURE — 97530 THERAPEUTIC ACTIVITIES: CPT | Performed by: PHYSICAL THERAPIST

## 2020-04-10 PROCEDURE — 36415 COLL VENOUS BLD VENIPUNCTURE: CPT

## 2020-04-10 PROCEDURE — 83735 ASSAY OF MAGNESIUM: CPT

## 2020-04-10 PROCEDURE — 85025 COMPLETE CBC W/AUTO DIFF WBC: CPT

## 2020-04-10 PROCEDURE — 83930 ASSAY OF BLOOD OSMOLALITY: CPT

## 2020-04-10 PROCEDURE — 84133 ASSAY OF URINE POTASSIUM: CPT

## 2020-04-10 PROCEDURE — 6360000002 HC RX W HCPCS: Performed by: PHYSICIAN ASSISTANT

## 2020-04-10 PROCEDURE — 6370000000 HC RX 637 (ALT 250 FOR IP): Performed by: NURSE PRACTITIONER

## 2020-04-10 PROCEDURE — 97530 THERAPEUTIC ACTIVITIES: CPT

## 2020-04-10 PROCEDURE — 2580000003 HC RX 258: Performed by: INTERNAL MEDICINE

## 2020-04-10 PROCEDURE — 6360000002 HC RX W HCPCS: Performed by: INTERNAL MEDICINE

## 2020-04-10 RX ORDER — LACTULOSE 10 G/15ML
20 SOLUTION ORAL 3 TIMES DAILY
Status: DISCONTINUED | OUTPATIENT
Start: 2020-04-10 | End: 2020-04-17 | Stop reason: HOSPADM

## 2020-04-10 RX ORDER — ALBUMIN (HUMAN) 12.5 G/50ML
25 SOLUTION INTRAVENOUS EVERY 8 HOURS
Status: COMPLETED | OUTPATIENT
Start: 2020-04-10 | End: 2020-04-11

## 2020-04-10 RX ADMIN — PETROLATUM: 420 OINTMENT TOPICAL at 21:36

## 2020-04-10 RX ADMIN — LACTULOSE 20 G: 20 SOLUTION ORAL at 09:27

## 2020-04-10 RX ADMIN — ATORVASTATIN CALCIUM 40 MG: 40 TABLET, FILM COATED ORAL at 21:36

## 2020-04-10 RX ADMIN — LACTULOSE 20 G: 20 SOLUTION ORAL at 14:37

## 2020-04-10 RX ADMIN — ONDANSETRON 4 MG: 2 INJECTION INTRAMUSCULAR; INTRAVENOUS at 16:34

## 2020-04-10 RX ADMIN — MICONAZOLE NITRATE: 2 OINTMENT TOPICAL at 21:36

## 2020-04-10 RX ADMIN — RIFAXIMIN 550 MG: 550 TABLET ORAL at 22:50

## 2020-04-10 RX ADMIN — SODIUM CHLORIDE: 9 INJECTION, SOLUTION INTRAVENOUS at 16:39

## 2020-04-10 RX ADMIN — RIFAXIMIN 550 MG: 550 TABLET ORAL at 09:27

## 2020-04-10 RX ADMIN — MICONAZOLE NITRATE: 2 OINTMENT TOPICAL at 09:27

## 2020-04-10 RX ADMIN — PANTOPRAZOLE SODIUM 40 MG: 40 TABLET, DELAYED RELEASE ORAL at 09:27

## 2020-04-10 RX ADMIN — ALBUMIN (HUMAN) 25 G: 0.25 INJECTION, SOLUTION INTRAVENOUS at 18:49

## 2020-04-10 RX ADMIN — PETROLATUM: 420 OINTMENT TOPICAL at 09:27

## 2020-04-10 RX ADMIN — AMLODIPINE BESYLATE 5 MG: 5 TABLET ORAL at 09:27

## 2020-04-10 RX ADMIN — HYDROCODONE BITARTRATE AND ACETAMINOPHEN 1 TABLET: 5; 325 TABLET ORAL at 00:16

## 2020-04-10 RX ADMIN — LACTULOSE 13.33 G: 20 SOLUTION ORAL at 21:36

## 2020-04-10 RX ADMIN — INSULIN LISPRO 3 UNITS: 100 INJECTION, SOLUTION INTRAVENOUS; SUBCUTANEOUS at 11:26

## 2020-04-10 RX ADMIN — VENLAFAXINE HYDROCHLORIDE 37.5 MG: 37.5 CAPSULE, EXTENDED RELEASE ORAL at 09:27

## 2020-04-10 RX ADMIN — INSULIN LISPRO 1 UNITS: 100 INJECTION, SOLUTION INTRAVENOUS; SUBCUTANEOUS at 06:25

## 2020-04-10 RX ADMIN — INSULIN LISPRO 1 UNITS: 100 INJECTION, SOLUTION INTRAVENOUS; SUBCUTANEOUS at 21:36

## 2020-04-10 RX ADMIN — INSULIN GLARGINE 30 UNITS: 100 INJECTION, SOLUTION SUBCUTANEOUS at 21:37

## 2020-04-10 RX ADMIN — INSULIN LISPRO 6 UNITS: 100 INJECTION, SOLUTION INTRAVENOUS; SUBCUTANEOUS at 16:33

## 2020-04-10 RX ADMIN — MONTELUKAST SODIUM 10 MG: 10 TABLET, FILM COATED ORAL at 09:27

## 2020-04-10 ASSESSMENT — PAIN SCALES - GENERAL
PAINLEVEL_OUTOF10: 0
PAINLEVEL_OUTOF10: 0
PAINLEVEL_OUTOF10: 8

## 2020-04-10 NOTE — PROGRESS NOTES
is making fair progress toward established Physical Therapy goals. Continue with physical therapy current plan of care.     Time in  0900  Time out  0911     Total Treatment Time  11 minutes      Evaluation Time includes thorough review of current medical information, gathering information on past medical history/social history and prior level of function, completion of standardized testing/informal observation of tasks, assessment of data and education on plan of care and goals.     CPT codes:  []? Low Complexity PT evaluation [id number]  []? Moderate Complexity PT evaluation 48971  []? High Complexity PT evaluation [id number]  []? PT Re-evaluation [id number]  []? Gait training 84089 ** minutes  []? Manual therapy 75659 ** minutes  [x]? Therapeutic activities 02558 11 minutes  []? Therapeutic exercises 05689 ** minutes  []?  Neuromuscular reeducation 58704 ** minutes      Riki Vargas PT, [place] [id number]

## 2020-04-10 NOTE — PROGRESS NOTES
WNL  · Wound Type: (toe scab and sloughing skin on inner thigh per wound care)  · Current Nutrition Therapies:  · Oral Diet Orders: Carb Control 4 Carbs/Meal   · Oral Diet intake: 1-25%(PTA)  · Anthropometric Measures:  · Ht: 5' 7\" (170.2 cm)   · Current Body Wt: 128 lb (58.1 kg)(4/10 bedwt)  · Admission Body Wt: 145 lb (65.8 kg)(4/8 bedwt)  · Usual Body Wt: 241 lb (109.3 kg)(per EMR x 6 mo)  · % Weight Change:  ,  47% loss in 6 mo- Very large wt loss with significant fluid component but also reported poor PO per pt and diarrhea losses  · Ideal Body Wt: 135 lb (61.2 kg), % Ideal Body 95%  · BMI Classification: BMI 18.5 - 24.9 Normal Weight    Nutrition Interventions:   Continue current diet, Start ONS  Continued Inpatient Monitoring, Education Not Indicated    Nutrition Evaluation:   · Evaluation: Goals set   · Goals: PO at least 50-75% at meals and ONS    · Monitoring: Meal Intake, Supplement Intake, Skin Integrity, Wound Healing, I&O, Mental Status/Confusion, Weight, Pertinent Labs, Nausea or Vomiting, Diarrhea      Electronically signed by Sandra Mcdonald RD, CNSC, LD on 4/10/20 at 3:33 PM EDT    Contact Number: [phone]

## 2020-04-10 NOTE — PROGRESS NOTES
[facility] MD Aileen Aguero MD Dotti Rosenthal, MD Janette Muñoz, CNP   Karla Han, TOMI  Progress Note    4/10/2020    SUBJECTIVE:   4/10: \"Tired. \"  Ongoing fatigue, malaise, generalized weakness. Anorexia with almost nothing to eat, though she is drinking \"when they bring the water. \"  No dyspnea at rest.  No chest pain or palpitations. PROBLEM LIST:    Principal Problem:    Hyponatremia  Active Problems:    Cirrhosis of liver with ascites (HCC)    Diabetes mellitus type 2, uncontrolled (HCC)    Hyperlipidemia LDL goal <100    History of CVA (cerebrovascular accident)    Essential hypertension    Severe protein-calorie malnutrition ([address]  Resolved Problems:    Hyponatremia         DIET:    DIET CARB CONTROL;   Dietary Nutrition Supplements: Diabetic Oral Supplement     MEDS (scheduled):    insulin lispro  0-18 Units Subcutaneous TID WC    white petrolatum   Topical BID    miconazole nitrate   Topical BID    amLODIPine  5 mg Oral Daily    atorvastatin  40 mg Oral Nightly    lactulose  30 mL Oral TID    montelukast  10 mg Oral QAM    pantoprazole  40 mg Oral Daily    rifaximin  550 mg Oral BID    venlafaxine  37.5 mg Oral Daily    insulin lispro  0-3 Units Subcutaneous Nightly    sodium chloride flush  10 mL Intravenous 2 times per day    insulin glargine  30 Units Subcutaneous Nightly       MEDS (infusions):   sodium chloride 50 mL/hr at 04/10/20 1639    dextrose         MEDS (prn):  amitriptyline, HYDROcodone-acetaminophen, glucose, dextrose, glucagon (rDNA), dextrose, sodium chloride flush, potassium chloride **OR** potassium alternative oral replacement **OR** potassium chloride, acetaminophen **OR** acetaminophen, magnesium hydroxide, promethazine **OR** ondansetron, albuterol    PHYSICAL EXAM:     Patient Vitals for the past 24 hrs:   BP Temp Temp src Pulse Resp SpO2 Weight   04/10/20 1337 (!) 92/58 98 °F (36.7 °C) Oral 95 -- 96 % --   04/10/20

## 2020-04-10 NOTE — CONSULTS
ascites. She reports undergoing paracentesis every 1 to 2 weeks. PSH: Cirrhosis, CVA, hypertension, dyslipidemia, diabetes, remote history of thyroid disease. Surgical history: Cholecystectomy, hysterectomy, tonsillectomy, EGD, colonoscopy. Family history: Mother with CAD/MI. Sister with diabetes. She is unaware of any other personal or family history of GI issues or malignancy. Social history: Patient was never a smoker. She drinks briefly for about 2 years when she , but otherwise denies significant alcohol use. She denies current or past recreational or illicit drug use. On arrival, WBC 8.6, hemoglobin 14.2, hematocrit 41.5. Normocytic/normochromic. Platelets 164. BUN 25, creatinine 1.1, sodium 119, potassium 4.2, chloride 86, CO2 20, calcium 8.5, lactic acid 4.3. Blood glucose 293. Troponin 0.06. Bilirubin 2.2, ALT 35, AST 68, alkaline phosphatase 151. INR 1.4. Chest x-ray on arrival showing possible atelectasis versus pneumonia in the left lower lung. CT of the head showing no acute findings. Paracentesis was performed with 5500 mL of fluid removed. TRIAGE VITALS  BP: (!) 92/58, Temp: 98 °F (36.7 °C), Pulse: 95, Resp: 18, SpO2: 96 %    Vitals:    04/09/20 2315 04/10/20 0630 04/10/20 0915 04/10/20 1337   BP: 125/68  (!) 102/58 (!) 92/58   Pulse: 91  99 95   Resp: 18  18    Temp: 97.8 °F (36.6 °C)  97.4 °F (36.3 °C) 98 °F (36.7 °C)   TempSrc:   Oral Oral   SpO2: 98%  98% 96%   Weight:  128 lb 14.4 oz (58.5 kg)     Height:             Histories  Past Medical History:   Diagnosis Date    Arthritis     Cerebral artery occlusion with cerebral infarction ([facility] 09/2018    some memory loss, some right side weakness.     Cough     post anesthesia    Diabetes mellitus ([facility]     Diverticulitis     GERD (gastroesophageal reflux disease)     Hyperlipidemia     Hypertension     Liver cirrhosis (HCC)     Polyp of esophagus     Polyp, stomach     PONV (postoperative nausea Social History Narrative    Not on file       Review of Systems  All bolded are positive; please see HPI  General:  Fever, chills, diaphoresis, fatigue, malaise, night sweats, weight loss  Psychological:  Anxiety, disorientation, hallucinations. ENT:  Epistaxis, headaches, vertigo, visual changes. Cardiovascular:  Chest pain, irregular heartbeats, palpitations, paroxysmal nocturnal dyspnea. Respiratory:  Shortness of breath, coughing, sputum production, hemoptysis, wheezing, orthopnea. Gastrointestinal:  Nausea, vomiting, diarrhea, heartburn, constipation, abdominal pain, hematemesis, hematochezia, melena, acholic stools  Genito-Urinary:  Dysuria, urgency, frequency, hematuria  Musculoskeletal:  Joint pain, joint stiffness, joint swelling, muscle pain  Neurology:  Headache, focal neurological deficits, weakness, numbness, paresthesia  Derm:  Rashes, ulcers, excoriations, bruising  Extremities:  Decreased ROM, peripheral edema, mottling    Physical Examination  Vitals:  BP (!) 92/58   Pulse 95   Temp 98 °F (36.7 °C) (Oral)   Resp 18   Ht 5' 7\" (1.702 m)   Wt 128 lb 14.4 oz (58.5 kg)   SpO2 96%   BMI 20.19 kg/m²   General Appearance:  awake, alert, and oriented to person, place, time, and purpose; appears stated age and cooperative; no apparent distress no labored breathing  HEENT:  NCAT; PERRL; conjunctiva pink, sclera clear  Neck:  no adenopathy, bruit, JVD, tenderness, masses, or nodules; supple, symmetrical, trachea midline, thyroid not enlarged  Lung:  clear to auscultation bilaterally; no use of accessory muscles; no rhonchi, rales, or crackles  Heart:  regular rate and regular rhythm without murmur, rub, or gallop  Abdomen:  soft, mildly tender, distended; normoactive bowel sounds; no organomegaly  Extremities:  extremities normal, atraumatic, +1 BLE edema  Musculokeletal:  no joint swelling, no muscle tenderness. ROM normal in all joints of extremities.    Neurologic:  mental status A&Ox3, thought content appropriate; CN II-XII grossly intact; sensation intact, motor strength 5/5 globally; no slurred speech    Laboratory Data  Recent Results (from the past 24 hour(s))   POCT Glucose    Collection Time: 04/09/20  8:41 PM   Result Value Ref Range    Meter Glucose 185 (H) 74 - 99 mg/dL   Basic Metabolic Panel    Collection Time: 04/09/20 10:25 PM   Result Value Ref Range    Sodium 121 (L) 132 - 146 mmol/L    Potassium 4.6 3.5 - 5.0 mmol/L    Chloride 88 (L) 98 - 107 mmol/L    CO2 19 (L) 22 - 29 mmol/L    Anion Gap 14 7 - 16 mmol/L    Glucose 207 (H) 74 - 99 mg/dL    BUN 27 (H) 8 - 23 mg/dL    CREATININE 1.1 (H) 0.5 - 1.0 mg/dL    GFR Non-African American 50 >=60 mL/min/1.73    GFR African American >60     Calcium 8.2 (L) 8.6 - 10.2 mg/dL   CBC Auto Differential    Collection Time: 04/10/20  3:41 AM   Result Value Ref Range    WBC 19.2 (H) 4.5 - 11.5 E9/L    RBC 4.41 3.50 - 5.50 E12/L    Hemoglobin 13.7 11.5 - 15.5 g/dL    Hematocrit 40.1 34.0 - 48.0 %    MCV 90.9 80.0 - 99.9 fL    MCH 31.1 26.0 - 35.0 pg    MCHC 34.2 32.0 - 34.5 %    RDW 18.9 (H) 11.5 - 15.0 fL    Platelets 327 344 - 019 E9/L    MPV 10.8 7.0 - 12.0 fL    Neutrophils % 80.6 (H) 43.0 - 80.0 %    Immature Granulocytes % 0.9 0.0 - 5.0 %    Lymphocytes % 9.2 (L) 20.0 - 42.0 %    Monocytes % 8.7 2.0 - 12.0 %    Eosinophils % 0.2 0.0 - 6.0 %    Basophils % 0.4 0.0 - 2.0 %    Neutrophils Absolute 15.43 (H) 1.80 - 7.30 E9/L    Immature Granulocytes # 0.18 E9/L    Lymphocytes Absolute 1.77 1.50 - 4.00 E9/L    Monocytes Absolute 1.66 (H) 0.10 - 0.95 E9/L    Eosinophils Absolute 0.04 (L) 0.05 - 0.50 E9/L    Basophils Absolute 0.07 0.00 - 0.20 E9/L    Anisocytosis 1+     Polychromasia 1+     Poikilocytes 2+     Nigel Cells 2+    Basic Metabolic Panel    Collection Time: 04/10/20  3:41 AM   Result Value Ref Range    Sodium 119 (LL) 132 - 146 mmol/L    Potassium 4.1 3.5 - 5.0 mmol/L    Chloride 89 (L) 98 - 107 mmol/L    CO2 17 (L) 22 - 29 mmol/L    Anion Gap 13 7 - 16 mmol/L    Glucose 208 (H) 74 - 99 mg/dL    BUN 28 (H) 8 - 23 mg/dL    CREATININE 1.1 (H) 0.5 - 1.0 mg/dL    GFR Non-African American 50 >=60 mL/min/1.73    GFR African American >60     Calcium 7.9 (L) 8.6 - 10.2 mg/dL   Magnesium    Collection Time: 04/10/20  3:41 AM   Result Value Ref Range    Magnesium 2.3 1.6 - 2.6 mg/dL   URINE ELECTROLYTES    Collection Time: 04/10/20  5:03 AM   Result Value Ref Range    Sodium, Ur <20 Not Established mmol/L    Potassium, Ur 75.8 Not Established mmol/L    Chloride 43 Not Established mmol/L   Osmolality, urine    Collection Time: 04/10/20  5:03 AM   Result Value Ref Range    Osmolality, Ur 766 300 - 900 mOsm/kg   POCT Glucose    Collection Time: 04/10/20  6:23 AM   Result Value Ref Range    Meter Glucose 179 (H) 74 - 99 mg/dL   Basic Metabolic Panel    Collection Time: 04/10/20  9:30 AM   Result Value Ref Range    Sodium 121 (L) 132 - 146 mmol/L    Potassium 4.1 3.5 - 5.0 mmol/L    Chloride 87 (L) 98 - 107 mmol/L    CO2 17 (L) 22 - 29 mmol/L    Anion Gap 17 (H) 7 - 16 mmol/L    Glucose 220 (H) 74 - 99 mg/dL    BUN 29 (H) 8 - 23 mg/dL    CREATININE 1.1 (H) 0.5 - 1.0 mg/dL    GFR Non-African American 50 >=60 mL/min/1.73    GFR African American >60     Calcium 8.2 (L) 8.6 - 10.2 mg/dL   OSMOLALITY, SERUM    Collection Time: 04/10/20  9:30 AM   Result Value Ref Range    Osmolality 282 (L) 285 - 310 mOsm/Kg   POCT Glucose    Collection Time: 04/10/20 11:25 AM   Result Value Ref Range    Meter Glucose 198 (H) 74 - 99 mg/dL   Basic Metabolic Panel    Collection Time: 04/10/20  3:20 PM   Result Value Ref Range    Sodium 123 (L) 132 - 146 mmol/L    Potassium 4.2 3.5 - 5.0 mmol/L    Chloride 89 (L) 98 - 107 mmol/L    CO2 17 (L) 22 - 29 mmol/L    Anion Gap 17 (H) 7 - 16 mmol/L    Glucose 211 (H) 74 - 99 mg/dL    BUN 30 (H) 8 - 23 mg/dL    CREATININE 1.2 (H) 0.5 - 1.0 mg/dL    GFR Non-African American 45 >=60 mL/min/1.73    GFR African American 54     Calcium 8.4 (L) 8.6 - 10.2 mg/dL

## 2020-04-11 LAB
ALBUMIN SERPL-MCNC: 2.5 G/DL (ref 3.5–5.2)
ALP BLD-CCNC: 127 U/L (ref 35–104)
ALT SERPL-CCNC: 33 U/L (ref 0–32)
ANION GAP SERPL CALCULATED.3IONS-SCNC: 11 MMOL/L (ref 7–16)
ANION GAP SERPL CALCULATED.3IONS-SCNC: 11 MMOL/L (ref 7–16)
ANION GAP SERPL CALCULATED.3IONS-SCNC: 14 MMOL/L (ref 7–16)
ANION GAP SERPL CALCULATED.3IONS-SCNC: 14 MMOL/L (ref 7–16)
ANION GAP SERPL CALCULATED.3IONS-SCNC: 17 MMOL/L (ref 7–16)
ANISOCYTOSIS: ABNORMAL
AST SERPL-CCNC: 76 U/L (ref 0–31)
BASOPHILS ABSOLUTE: 0.05 E9/L (ref 0–0.2)
BASOPHILS RELATIVE PERCENT: 0.3 % (ref 0–2)
BILIRUB SERPL-MCNC: 2.1 MG/DL (ref 0–1.2)
BUN BLDV-MCNC: 30 MG/DL (ref 8–23)
BUN BLDV-MCNC: 30 MG/DL (ref 8–23)
BUN BLDV-MCNC: 31 MG/DL (ref 8–23)
BUN BLDV-MCNC: 31 MG/DL (ref 8–23)
BUN BLDV-MCNC: 32 MG/DL (ref 8–23)
BURR CELLS: ABNORMAL
CALCIUM SERPL-MCNC: 8 MG/DL (ref 8.6–10.2)
CALCIUM SERPL-MCNC: 8.1 MG/DL (ref 8.6–10.2)
CALCIUM SERPL-MCNC: 8.3 MG/DL (ref 8.6–10.2)
CALCIUM SERPL-MCNC: 8.3 MG/DL (ref 8.6–10.2)
CALCIUM SERPL-MCNC: 8.4 MG/DL (ref 8.6–10.2)
CHLORIDE BLD-SCNC: 88 MMOL/L (ref 98–107)
CHLORIDE BLD-SCNC: 90 MMOL/L (ref 98–107)
CHLORIDE BLD-SCNC: 90 MMOL/L (ref 98–107)
CHLORIDE BLD-SCNC: 92 MMOL/L (ref 98–107)
CHLORIDE BLD-SCNC: 92 MMOL/L (ref 98–107)
CO2: 18 MMOL/L (ref 22–29)
CO2: 19 MMOL/L (ref 22–29)
CREAT SERPL-MCNC: 1.2 MG/DL (ref 0.5–1)
CREAT SERPL-MCNC: 1.3 MG/DL (ref 0.5–1)
EOSINOPHILS ABSOLUTE: 0.09 E9/L (ref 0.05–0.5)
EOSINOPHILS RELATIVE PERCENT: 0.5 % (ref 0–6)
GFR AFRICAN AMERICAN: 50
GFR AFRICAN AMERICAN: 54
GFR NON-AFRICAN AMERICAN: 41 ML/MIN/1.73
GFR NON-AFRICAN AMERICAN: 45 ML/MIN/1.73
GLUCOSE BLD-MCNC: 123 MG/DL (ref 74–99)
GLUCOSE BLD-MCNC: 151 MG/DL (ref 74–99)
GLUCOSE BLD-MCNC: 156 MG/DL (ref 74–99)
GLUCOSE BLD-MCNC: 172 MG/DL (ref 74–99)
GLUCOSE BLD-MCNC: 178 MG/DL (ref 74–99)
HCT VFR BLD CALC: 37.1 % (ref 34–48)
HEMOGLOBIN: 12.8 G/DL (ref 11.5–15.5)
IMMATURE GRANULOCYTES #: 0.15 E9/L
IMMATURE GRANULOCYTES %: 0.8 % (ref 0–5)
LYMPHOCYTES ABSOLUTE: 1.58 E9/L (ref 1.5–4)
LYMPHOCYTES RELATIVE PERCENT: 8 % (ref 20–42)
MAGNESIUM: 2.2 MG/DL (ref 1.6–2.6)
MCH RBC QN AUTO: 31.1 PG (ref 26–35)
MCHC RBC AUTO-ENTMCNC: 34.5 % (ref 32–34.5)
MCV RBC AUTO: 90 FL (ref 80–99.9)
METER GLUCOSE: 117 MG/DL (ref 74–99)
METER GLUCOSE: 133 MG/DL (ref 74–99)
METER GLUCOSE: 166 MG/DL (ref 74–99)
METER GLUCOSE: 168 MG/DL (ref 74–99)
MONOCYTES ABSOLUTE: 1.72 E9/L (ref 0.1–0.95)
MONOCYTES RELATIVE PERCENT: 8.7 % (ref 2–12)
NEUTROPHILS ABSOLUTE: 16.28 E9/L (ref 1.8–7.3)
NEUTROPHILS RELATIVE PERCENT: 81.7 % (ref 43–80)
OVALOCYTES: ABNORMAL
PDW BLD-RTO: 18.6 FL (ref 11.5–15)
PHOSPHORUS: 3.6 MG/DL (ref 2.5–4.5)
PLATELET # BLD: 220 E9/L (ref 130–450)
PMV BLD AUTO: 10.1 FL (ref 7–12)
POIKILOCYTES: ABNORMAL
POTASSIUM SERPL-SCNC: 3.2 MMOL/L (ref 3.5–5)
POTASSIUM SERPL-SCNC: 3.7 MMOL/L (ref 3.5–5)
POTASSIUM SERPL-SCNC: 3.7 MMOL/L (ref 3.5–5)
POTASSIUM SERPL-SCNC: 5.1 MMOL/L (ref 3.5–5)
POTASSIUM SERPL-SCNC: 5.2 MMOL/L (ref 3.5–5)
RBC # BLD: 4.12 E12/L (ref 3.5–5.5)
SODIUM BLD-SCNC: 122 MMOL/L (ref 132–146)
SODIUM BLD-SCNC: 122 MMOL/L (ref 132–146)
SODIUM BLD-SCNC: 123 MMOL/L (ref 132–146)
TOTAL PROTEIN: 5.9 G/DL (ref 6.4–8.3)
WBC # BLD: 19.9 E9/L (ref 4.5–11.5)

## 2020-04-11 PROCEDURE — 89051 BODY FLUID CELL COUNT: CPT

## 2020-04-11 PROCEDURE — 82962 GLUCOSE BLOOD TEST: CPT

## 2020-04-11 PROCEDURE — 2580000003 HC RX 258: Performed by: NURSE PRACTITIONER

## 2020-04-11 PROCEDURE — 6370000000 HC RX 637 (ALT 250 FOR IP): Performed by: PHYSICIAN ASSISTANT

## 2020-04-11 PROCEDURE — 2580000003 HC RX 258: Performed by: PHYSICIAN ASSISTANT

## 2020-04-11 PROCEDURE — 83735 ASSAY OF MAGNESIUM: CPT

## 2020-04-11 PROCEDURE — 6360000002 HC RX W HCPCS: Performed by: INTERNAL MEDICINE

## 2020-04-11 PROCEDURE — 6370000000 HC RX 637 (ALT 250 FOR IP): Performed by: INTERNAL MEDICINE

## 2020-04-11 PROCEDURE — 1200000000 HC SEMI PRIVATE

## 2020-04-11 PROCEDURE — 2580000003 HC RX 258: Performed by: INTERNAL MEDICINE

## 2020-04-11 PROCEDURE — 2500000003 HC RX 250 WO HCPCS: Performed by: INTERNAL MEDICINE

## 2020-04-11 PROCEDURE — 85025 COMPLETE CBC W/AUTO DIFF WBC: CPT

## 2020-04-11 PROCEDURE — P9047 ALBUMIN (HUMAN), 25%, 50ML: HCPCS | Performed by: INTERNAL MEDICINE

## 2020-04-11 PROCEDURE — 6370000000 HC RX 637 (ALT 250 FOR IP): Performed by: NURSE PRACTITIONER

## 2020-04-11 PROCEDURE — 80053 COMPREHEN METABOLIC PANEL: CPT

## 2020-04-11 PROCEDURE — 36415 COLL VENOUS BLD VENIPUNCTURE: CPT

## 2020-04-11 PROCEDURE — 84100 ASSAY OF PHOSPHORUS: CPT

## 2020-04-11 PROCEDURE — 80048 BASIC METABOLIC PNL TOTAL CA: CPT

## 2020-04-11 PROCEDURE — 6360000002 HC RX W HCPCS: Performed by: NURSE PRACTITIONER

## 2020-04-11 RX ORDER — POTASSIUM CHLORIDE 20 MEQ/1
20 TABLET, EXTENDED RELEASE ORAL ONCE
Status: COMPLETED | OUTPATIENT
Start: 2020-04-11 | End: 2020-04-12

## 2020-04-11 RX ORDER — ALBUMIN (HUMAN) 12.5 G/50ML
25 SOLUTION INTRAVENOUS ONCE
Status: DISCONTINUED | OUTPATIENT
Start: 2020-04-11 | End: 2020-04-12

## 2020-04-11 RX ORDER — BUMETANIDE 0.25 MG/ML
0.5 INJECTION, SOLUTION INTRAMUSCULAR; INTRAVENOUS EVERY 8 HOURS
Status: COMPLETED | OUTPATIENT
Start: 2020-04-11 | End: 2020-04-11

## 2020-04-11 RX ORDER — POTASSIUM CHLORIDE 20 MEQ/1
20 TABLET, EXTENDED RELEASE ORAL ONCE
Status: COMPLETED | OUTPATIENT
Start: 2020-04-11 | End: 2020-04-11

## 2020-04-11 RX ADMIN — BUMETANIDE 0.5 MG: 0.25 INJECTION INTRAMUSCULAR; INTRAVENOUS at 14:25

## 2020-04-11 RX ADMIN — ALBUMIN (HUMAN) 25 G: 0.25 INJECTION, SOLUTION INTRAVENOUS at 10:46

## 2020-04-11 RX ADMIN — PANTOPRAZOLE SODIUM 40 MG: 40 TABLET, DELAYED RELEASE ORAL at 09:14

## 2020-04-11 RX ADMIN — MICONAZOLE NITRATE: 2 OINTMENT TOPICAL at 21:25

## 2020-04-11 RX ADMIN — ALBUMIN (HUMAN) 25 G: 0.25 INJECTION, SOLUTION INTRAVENOUS at 03:26

## 2020-04-11 RX ADMIN — MONTELUKAST SODIUM 10 MG: 10 TABLET, FILM COATED ORAL at 09:14

## 2020-04-11 RX ADMIN — LACTULOSE 13.33 G: 20 SOLUTION ORAL at 09:15

## 2020-04-11 RX ADMIN — PETROLATUM: 420 OINTMENT TOPICAL at 21:25

## 2020-04-11 RX ADMIN — MICONAZOLE NITRATE: 2 OINTMENT TOPICAL at 09:16

## 2020-04-11 RX ADMIN — SODIUM CHLORIDE, PRESERVATIVE FREE 2 G: 5 INJECTION INTRAVENOUS at 14:26

## 2020-04-11 RX ADMIN — HYDROCODONE BITARTRATE AND ACETAMINOPHEN 1 TABLET: 5; 325 TABLET ORAL at 21:24

## 2020-04-11 RX ADMIN — SODIUM CHLORIDE, PRESERVATIVE FREE 10 ML: 5 INJECTION INTRAVENOUS at 10:46

## 2020-04-11 RX ADMIN — ATORVASTATIN CALCIUM 40 MG: 40 TABLET, FILM COATED ORAL at 21:24

## 2020-04-11 RX ADMIN — LACTULOSE 13.33 G: 20 SOLUTION ORAL at 13:46

## 2020-04-11 RX ADMIN — RIFAXIMIN 550 MG: 550 TABLET ORAL at 21:24

## 2020-04-11 RX ADMIN — RIFAXIMIN 550 MG: 550 TABLET ORAL at 09:14

## 2020-04-11 RX ADMIN — POTASSIUM CHLORIDE 20 MEQ: 1500 TABLET, EXTENDED RELEASE ORAL at 14:25

## 2020-04-11 RX ADMIN — INSULIN LISPRO 3 UNITS: 100 INJECTION, SOLUTION INTRAVENOUS; SUBCUTANEOUS at 11:51

## 2020-04-11 RX ADMIN — LACTULOSE 13.33 G: 20 SOLUTION ORAL at 21:24

## 2020-04-11 RX ADMIN — INSULIN LISPRO 3 UNITS: 100 INJECTION, SOLUTION INTRAVENOUS; SUBCUTANEOUS at 16:15

## 2020-04-11 RX ADMIN — INSULIN GLARGINE 30 UNITS: 100 INJECTION, SOLUTION SUBCUTANEOUS at 21:31

## 2020-04-11 RX ADMIN — BUMETANIDE 0.5 MG: 0.25 INJECTION INTRAMUSCULAR; INTRAVENOUS at 21:24

## 2020-04-11 RX ADMIN — AMLODIPINE BESYLATE 5 MG: 5 TABLET ORAL at 09:14

## 2020-04-11 RX ADMIN — VENLAFAXINE HYDROCHLORIDE 37.5 MG: 37.5 CAPSULE, EXTENDED RELEASE ORAL at 09:14

## 2020-04-11 RX ADMIN — SODIUM CHLORIDE, PRESERVATIVE FREE 10 ML: 5 INJECTION INTRAVENOUS at 21:25

## 2020-04-11 RX ADMIN — PETROLATUM: 420 OINTMENT TOPICAL at 09:16

## 2020-04-11 RX ADMIN — SODIUM CHLORIDE: 9 INJECTION, SOLUTION INTRAVENOUS at 13:31

## 2020-04-11 RX ADMIN — HYDROCODONE BITARTRATE AND ACETAMINOPHEN 1 TABLET: 5; 325 TABLET ORAL at 00:40

## 2020-04-11 ASSESSMENT — PAIN - FUNCTIONAL ASSESSMENT
PAIN_FUNCTIONAL_ASSESSMENT: ACTIVITIES ARE NOT PREVENTED
PAIN_FUNCTIONAL_ASSESSMENT: ACTIVITIES ARE NOT PREVENTED

## 2020-04-11 ASSESSMENT — PAIN DESCRIPTION - PROGRESSION
CLINICAL_PROGRESSION: NOT CHANGED
CLINICAL_PROGRESSION: NOT CHANGED

## 2020-04-11 ASSESSMENT — PAIN SCALES - GENERAL
PAINLEVEL_OUTOF10: 7
PAINLEVEL_OUTOF10: 0
PAINLEVEL_OUTOF10: 0
PAINLEVEL_OUTOF10: 8
PAINLEVEL_OUTOF10: 0

## 2020-04-11 ASSESSMENT — PAIN DESCRIPTION - PAIN TYPE
TYPE: ACUTE PAIN
TYPE: ACUTE PAIN

## 2020-04-11 ASSESSMENT — PAIN DESCRIPTION - ONSET
ONSET: ON-GOING
ONSET: ON-GOING

## 2020-04-11 ASSESSMENT — PAIN DESCRIPTION - LOCATION
LOCATION: ABDOMEN
LOCATION: BACK

## 2020-04-11 ASSESSMENT — PAIN DESCRIPTION - DESCRIPTORS
DESCRIPTORS: ACHING
DESCRIPTORS: ACHING

## 2020-04-11 ASSESSMENT — PAIN DESCRIPTION - FREQUENCY
FREQUENCY: INTERMITTENT
FREQUENCY: INTERMITTENT

## 2020-04-11 ASSESSMENT — PAIN DESCRIPTION - ORIENTATION
ORIENTATION: LEFT
ORIENTATION: RIGHT;LEFT

## 2020-04-11 NOTE — PROGRESS NOTES
[facility] MD Roya Al, MD Natalio Schumacher, MD Betsy Fuller, CNP   Karla Han, TOMI  Progress Note    4/11/2020    SUBJECTIVE:   4/10: \"Tired. \"  Ongoing fatigue, malaise, generalized weakness. Anorexia with almost nothing to eat, though she is drinking \"when they bring the water. \"  No dyspnea at rest.  No chest pain or palpitations. 4/11: Asleep, easily awakened. No new complaint. PROBLEM LIST:    Principal Problem:    Hyponatremia  Active Problems:    Cirrhosis of liver with ascites (HCC)    Diabetes mellitus type 2, uncontrolled (HCC)    Hyperlipidemia LDL goal <100    History of CVA (cerebrovascular accident)    Essential hypertension    Severe protein-calorie malnutrition ([address]  Resolved Problems:    Hyponatremia         DIET:    DIET CARB CONTROL;   Dietary Nutrition Supplements: Diabetic Oral Supplement     MEDS (scheduled):    albumin human  25 g Intravenous Once    cefTRIAXone (ROCEPHIN) IV  2 g Intravenous Q24H    insulin lispro  0-18 Units Subcutaneous TID WC    lactulose  20 mL Oral TID    white petrolatum   Topical BID    miconazole nitrate   Topical BID    amLODIPine  5 mg Oral Daily    atorvastatin  40 mg Oral Nightly    montelukast  10 mg Oral QAM    pantoprazole  40 mg Oral Daily    rifaximin  550 mg Oral BID    venlafaxine  37.5 mg Oral Daily    insulin lispro  0-3 Units Subcutaneous Nightly    sodium chloride flush  10 mL Intravenous 2 times per day    insulin glargine  30 Units Subcutaneous Nightly       MEDS (infusions):   sodium chloride 50 mL/hr at 04/11/20 1331    dextrose         MEDS (prn):  amitriptyline, HYDROcodone-acetaminophen, glucose, dextrose, glucagon (rDNA), dextrose, sodium chloride flush, potassium chloride **OR** potassium alternative oral replacement **OR** potassium chloride, acetaminophen **OR** acetaminophen, magnesium hydroxide, promethazine **OR** ondansetron, albuterol    PHYSICAL EXAM: y.o. longstanding history of cirrhosis, chronic/recurrent severe ascites, who undergoes weekly paracenteses q. Wednesday, presented with recurrent and severe hyponatremia     1.  Hyponatremia, severe hypervolemic, secondary to cirrhosis and water overload, exacerbated (mildly so) by hyperglycemia, and diarrhea, further exacerbated by venlafaxine. Urinary indices do not reflect prerenal scenario. Not certain of the timing of when the furosemide was administered relative to when the urine sample was sent.     2.  Cirrhosis, with recurrent and severe ascites.     3.  Hypertension    4. Hepatorenal syndrome type II. Contributing to the persistent hyponatremia.     Urine studies reflect prerenal azotemia, likely due to intravascular volume contraction status post large-volume paracentesis, recent poor intake, loose stools, though also hepatorenal syndrome. Volume resuscitation had done little initially to correct hyponatremia, though improving slowly over the past 36 hours. Repeat urinary indices are little different than at the time of admission. Difficult problem.         Recommendations  Stop IV fluid  Bumex IV  Salt poor albumin infusions  Consider repeat paracentesis in the next 1 to 3 days  Continue lactulose, rifaximin  Once euvolemic, consider resumption of spironolactone and loop diuretic, as well as beta-blocker, though will coordinate with her hepatologist  Consider albumin infusions      Would not institute fluid restriction  Do not start salt tablets  In the absence of acute mental status change, hypertonic saline and increasing diuretics are not appropriate now  Tolvaptan also not appropriate in patient with cirrhosis    Every 6 hours BMP    Electronically signed by Megan Deleon MD on 4/11/2020 at 2:06 PM

## 2020-04-11 NOTE — PLAN OF CARE
Problem: Pain:  Goal: Control of chronic pain  Description: Control of chronic pain  Outcome: Met This Shift     Problem: Pain:  Goal: Control of acute pain  Description: Control of acute pain  Outcome: Met This Shift     Problem: Pain:  Goal: Pain level will decrease  Description: Pain level will decrease  Outcome: Met This Shift     Problem: Falls - Risk of:  Goal: Absence of physical injury  Description: Absence of physical injury  Outcome: Met This Shift     Problem: Falls - Risk of:  Goal: Will remain free from falls  Description: Will remain free from falls  Outcome: Met This Shift

## 2020-04-11 NOTE — PROGRESS NOTES
Collection Time: 04/11/20  3:33 AM   Result Value Ref Range    Sodium 122 (L) 132 - 146 mmol/L    Potassium 5.2 (H) 3.5 - 5.0 mmol/L    Chloride 92 (L) 98 - 107 mmol/L    CO2 19 (L) 22 - 29 mmol/L    Anion Gap 11 7 - 16 mmol/L    Glucose 151 (H) 74 - 99 mg/dL    BUN 31 (H) 8 - 23 mg/dL    CREATININE 1.2 (H) 0.5 - 1.0 mg/dL    GFR Non-African American 45 >=60 mL/min/1.73    GFR African American 54     Calcium 8.0 (L) 8.6 - 10.2 mg/dL   CBC Auto Differential    Collection Time: 04/11/20  3:33 AM   Result Value Ref Range    WBC 19.9 (H) 4.5 - 11.5 E9/L    RBC 4.12 3.50 - 5.50 E12/L    Hemoglobin 12.8 11.5 - 15.5 g/dL    Hematocrit 37.1 34.0 - 48.0 %    MCV 90.0 80.0 - 99.9 fL    MCH 31.1 26.0 - 35.0 pg    MCHC 34.5 32.0 - 34.5 %    RDW 18.6 (H) 11.5 - 15.0 fL    Platelets 313 734 - 469 E9/L    MPV 10.1 7.0 - 12.0 fL    Neutrophils % 81.7 (H) 43.0 - 80.0 %    Immature Granulocytes % 0.8 0.0 - 5.0 %    Lymphocytes % 8.0 (L) 20.0 - 42.0 %    Monocytes % 8.7 2.0 - 12.0 %    Eosinophils % 0.5 0.0 - 6.0 %    Basophils % 0.3 0.0 - 2.0 %    Neutrophils Absolute 16.28 (H) 1.80 - 7.30 E9/L    Immature Granulocytes # 0.15 E9/L    Lymphocytes Absolute 1.58 1.50 - 4.00 E9/L    Monocytes Absolute 1.72 (H) 0.10 - 0.95 E9/L    Eosinophils Absolute 0.09 0.05 - 0.50 E9/L    Basophils Absolute 0.05 0.00 - 0.20 E9/L    Anisocytosis 2+     Poikilocytes 1+     [place] Cells 1+     Ovalocytes 1+    Comprehensive Metabolic Panel    Collection Time: 04/11/20  3:33 AM   Result Value Ref Range    Sodium 122 (L) 132 - 146 mmol/L    Potassium 5.1 (H) 3.5 - 5.0 mmol/L    Chloride 92 (L) 98 - 107 mmol/L    CO2 19 (L) 22 - 29 mmol/L    Anion Gap 11 7 - 16 mmol/L    Glucose 156 (H) 74 - 99 mg/dL    BUN 32 (H) 8 - 23 mg/dL    CREATININE 1.3 (H) 0.5 - 1.0 mg/dL    GFR Non-African American 41 >=60 mL/min/1.73    GFR African American 50     Calcium 8.1 (L) 8.6 - 10.2 mg/dL    Total Protein 5.9 (L) 6.4 - 8.3 g/dL    Alb 2.5 (L) 3.5 - 5.2 g/dL    Total [id number] : 1955 Age: 71 years Gender: Female Order Date:  2020 11:30 AM EXAM: CT HEAD WO CONTRAST INDICATION:  weakness weakness  COMPARISON: CT head without contrast, 2019 FINDINGS: PARENCHYMA:No mass effect, edema or hemorrhage. Mild volume loss is seen in the brain with mild chronic microvascular ischemic changes. VENTRICLES:No hydrocephalus. No extra-axial fluid. CALVARIUM:The calvarium is intact without fracture or focal lesion. SINUSES:The visualized paranasal sinuses and mastoids are clear. No acute intracranial abnormality. Xr Chest Portable    Result Date: 2020  Patient MRN: [id number] : 1955 Age:  71 years Gender: Female Order Date: 2020 11:30 AM Exam: XR CHEST PORTABLE Number of Images: 1 view Indication:  Weakness, fatigue Comparison: Anterior upright chest studies March 10, 2020 2020 and February 10, 2020 Findings: The lungs are symmetrically underinflated, suggesting expiratory imaging artifact, but otherwise are clear. Patient is of large habitus, which can contribute diminished pulmonary expansion. There is some interstitial density in the left lower lung which is unchanged from the prior exam, possibly representing subtle interstitial pneumonitis or subsegmental atelectasis. No progressive airspace disease is identified. . There is no evidence of pneumothorax or pleural effusion. Cardiovascular shadows are normal in appearance. There is no evidence of cardiac enlargement or decompensation. Skeletal structures show no evidence of acute pathology. Density in the left neck may be surgical clips. Overlying EKG leads are present. Subtle interstitial density in the left lower lung is new since February 10, 2020, but is unchanged from March 10, 2020 study and could represent subsegmental atelectasis or minimal left lower lobe interstitial pneumonia. There is no evidence of consolidation or effusion, no evidence of cardiac enlargement or decompensation.     Us Guided Doppler ultrasound were used. Using direct real-time ultrasound imaging  access was obtained. An image was stored and copy was transmitted to PACS. After obtaining informed consent and after the routine sterile prep and drape and after the administration of a local anesthesia, a system of needles and cannulas was guided by ultrasound control into Right lower quadrant of the peritoneal cavity. Subsequently with real-time guidance a Pig-tailed 6 Fr. Safe-T-Centesis Drainage Catheter was inserted at Right lower quadrant and the inner stylet was removed and peritoneal access was achieved. The catheter was removed at the end of the procedure. The patient tolerated the procedure well. There were no complications. The patient was released in stable condition. A total of 5000cc of clear yellow colored fluid was removed. Successful paracentesis. . This examination was performed and dictated by Vanna Mendez PA-C with indirect supervision and Emerson Smart MD reviewed and concurred with the findings. Us Guided Paracentesis    Result Date: 3/25/2020  Patient MRN:  [id number] : 1955 Age: 71 years Gender: Female Order Date:  3/25/2020 11:27 AM Exam: US GUIDED PARACENTESIS Number of Images:  Five Medication: Lidocaine 1% subcutaneous. Indication: R18.8 Other ascites Other ascites Comparison: None. PROCEDURE DETAILS AND FINDINGS: Informed consent was obtained prior to the procedure. Ultrasound examination of the abdomen demonstrated ascites. Images were saved to PACS. The patient's abdomen was sterilely prepped and draped. A preprocedure timeout was called. Lidocaine was subcutaneously administered for local anesthesia. Under ultrasound guidance, a 5-[place] Yueh centesis catheter with inner stylet was percutaneously advanced into the peritoneal space in the right lower abdomen. The stylet was removed, and peritoneal fluid was drained. A total of approximately 5000 milliliters of dark tom fluid was removed.  The be given with paracentesis. Start Rocephin for concern of SBP. Continue to monitor labs. Will follow. Munir Glez, BENITO - CNP  1:22 PM  4/11/2020    Agree with above. Pt with elevating WBC. Pt had paracentesis prior to our consult and no fluid studies were ordered. Would question SBP? Will add rocephin 2 gram IV daily and see if any further fluid can be removed for diagnostic purposes. Will check stool  C diff. Low sodium per nephrology and diuretic management and appreciate their input. Our service will follow.     Conrado Mckeon D.O.  4/11/2020

## 2020-04-11 NOTE — PROGRESS NOTES
Phosphorus:    Lab Results   Component Value Date    PHOS 3.6 04/11/2020     PT/INR:    Lab Results   Component Value Date    PROTIME 17.0 04/08/2020    INR 1.4 04/08/2020     PTT:    Lab Results   Component Value Date    APTT 34.2 04/08/2020   [APTT}     Assessment:    Patient Active Problem List   Diagnosis    [facility]rhosis of liver with ascites ([facility]    Diabetes mellitus type 2, uncontrolled ([facility]    Hyperlipidemia LDL goal <100    History of CVA (cerebrovascular accident)    Essential hypertension    Hyponatremia    Severe protein-calorie malnutrition (HCC)       Plan:    Stable. Paracentesis again. Blood glucose ok, continue to adjust basal/bolus insulin therapy  Continue aggressive lipid therapy  Continue Pt/Ot and risk factor modifications. Blood pressure ok, continue current medications  Sodium improved a little. Appreciate nephrology assist.  Hepatorenal syndrome? Pt/Ot evaluations for discharge planning. Patient remains afebrile. Elevated WBC. No clear source of infection. UA negative. CXR negative. Continue to follow.       Maynor Saravia    11:02 AM  4/11/2020

## 2020-04-12 LAB
ALBUMIN SERPL-MCNC: 3.1 G/DL (ref 3.5–5.2)
ALP BLD-CCNC: 123 U/L (ref 35–104)
ALT SERPL-CCNC: 31 U/L (ref 0–32)
AMMONIA: 24 UMOL/L (ref 11–51)
ANION GAP SERPL CALCULATED.3IONS-SCNC: 14 MMOL/L (ref 7–16)
ANION GAP SERPL CALCULATED.3IONS-SCNC: 15 MMOL/L (ref 7–16)
ANION GAP SERPL CALCULATED.3IONS-SCNC: 16 MMOL/L (ref 7–16)
ANION GAP SERPL CALCULATED.3IONS-SCNC: 16 MMOL/L (ref 7–16)
ANISOCYTOSIS: ABNORMAL
AST SERPL-CCNC: 58 U/L (ref 0–31)
BASOPHILS ABSOLUTE: 0.07 E9/L (ref 0–0.2)
BASOPHILS RELATIVE PERCENT: 0.4 % (ref 0–2)
BILIRUB SERPL-MCNC: 1.3 MG/DL (ref 0–1.2)
BILIRUBIN DIRECT: 0.6 MG/DL (ref 0–0.3)
BILIRUBIN, INDIRECT: 0.7 MG/DL (ref 0–1)
BUN BLDV-MCNC: 30 MG/DL (ref 8–23)
BUN BLDV-MCNC: 31 MG/DL (ref 8–23)
BURR CELLS: ABNORMAL
CALCIUM SERPL-MCNC: 8.1 MG/DL (ref 8.6–10.2)
CALCIUM SERPL-MCNC: 8.3 MG/DL (ref 8.6–10.2)
CALCIUM SERPL-MCNC: 8.5 MG/DL (ref 8.6–10.2)
CALCIUM SERPL-MCNC: 8.5 MG/DL (ref 8.6–10.2)
CHLORIDE BLD-SCNC: 90 MMOL/L (ref 98–107)
CHLORIDE BLD-SCNC: 90 MMOL/L (ref 98–107)
CHLORIDE BLD-SCNC: 91 MMOL/L (ref 98–107)
CHLORIDE BLD-SCNC: 91 MMOL/L (ref 98–107)
CO2: 18 MMOL/L (ref 22–29)
CO2: 18 MMOL/L (ref 22–29)
CO2: 19 MMOL/L (ref 22–29)
CO2: 19 MMOL/L (ref 22–29)
CREAT SERPL-MCNC: 1.3 MG/DL (ref 0.5–1)
EOSINOPHILS ABSOLUTE: 0.25 E9/L (ref 0.05–0.5)
EOSINOPHILS RELATIVE PERCENT: 1.4 % (ref 0–6)
GFR AFRICAN AMERICAN: 50
GFR NON-AFRICAN AMERICAN: 41 ML/MIN/1.73
GLUCOSE BLD-MCNC: 149 MG/DL (ref 74–99)
GLUCOSE BLD-MCNC: 178 MG/DL (ref 74–99)
GLUCOSE BLD-MCNC: 180 MG/DL (ref 74–99)
GLUCOSE BLD-MCNC: 187 MG/DL (ref 74–99)
HCT VFR BLD CALC: 38.1 % (ref 34–48)
HEMOGLOBIN: 13 G/DL (ref 11.5–15.5)
IMMATURE GRANULOCYTES #: 0.18 E9/L
IMMATURE GRANULOCYTES %: 1 % (ref 0–5)
LYMPHOCYTES ABSOLUTE: 1.59 E9/L (ref 1.5–4)
LYMPHOCYTES RELATIVE PERCENT: 8.8 % (ref 20–42)
MAGNESIUM: 2.3 MG/DL (ref 1.6–2.6)
MCH RBC QN AUTO: 31 PG (ref 26–35)
MCHC RBC AUTO-ENTMCNC: 34.1 % (ref 32–34.5)
MCV RBC AUTO: 90.9 FL (ref 80–99.9)
METER GLUCOSE: 132 MG/DL (ref 74–99)
METER GLUCOSE: 154 MG/DL (ref 74–99)
METER GLUCOSE: 158 MG/DL (ref 74–99)
METER GLUCOSE: 165 MG/DL (ref 74–99)
MONOCYTES ABSOLUTE: 1.93 E9/L (ref 0.1–0.95)
MONOCYTES RELATIVE PERCENT: 10.7 % (ref 2–12)
NEUTROPHILS ABSOLUTE: 14.09 E9/L (ref 1.8–7.3)
NEUTROPHILS RELATIVE PERCENT: 77.7 % (ref 43–80)
PDW BLD-RTO: 18.7 FL (ref 11.5–15)
PHOSPHORUS: 3.2 MG/DL (ref 2.5–4.5)
PLATELET # BLD: 187 E9/L (ref 130–450)
PMV BLD AUTO: 10.5 FL (ref 7–12)
POIKILOCYTES: ABNORMAL
POLYCHROMASIA: ABNORMAL
POTASSIUM SERPL-SCNC: 3.7 MMOL/L (ref 3.5–5)
POTASSIUM SERPL-SCNC: 3.7 MMOL/L (ref 3.5–5)
POTASSIUM SERPL-SCNC: 3.8 MMOL/L (ref 3.5–5)
POTASSIUM SERPL-SCNC: 3.9 MMOL/L (ref 3.5–5)
RBC # BLD: 4.19 E12/L (ref 3.5–5.5)
SODIUM BLD-SCNC: 124 MMOL/L (ref 132–146)
SODIUM BLD-SCNC: 125 MMOL/L (ref 132–146)
TOTAL PROTEIN: 5.9 G/DL (ref 6.4–8.3)
WBC # BLD: 18.1 E9/L (ref 4.5–11.5)

## 2020-04-12 PROCEDURE — 6370000000 HC RX 637 (ALT 250 FOR IP): Performed by: NURSE PRACTITIONER

## 2020-04-12 PROCEDURE — 87449 NOS EACH ORGANISM AG IA: CPT

## 2020-04-12 PROCEDURE — 87077 CULTURE AEROBIC IDENTIFY: CPT

## 2020-04-12 PROCEDURE — 84100 ASSAY OF PHOSPHORUS: CPT

## 2020-04-12 PROCEDURE — 6360000002 HC RX W HCPCS: Performed by: INTERNAL MEDICINE

## 2020-04-12 PROCEDURE — 87045 FECES CULTURE AEROBIC BACT: CPT

## 2020-04-12 PROCEDURE — 80076 HEPATIC FUNCTION PANEL: CPT

## 2020-04-12 PROCEDURE — 1200000000 HC SEMI PRIVATE

## 2020-04-12 PROCEDURE — 80048 BASIC METABOLIC PNL TOTAL CA: CPT

## 2020-04-12 PROCEDURE — 2500000003 HC RX 250 WO HCPCS: Performed by: INTERNAL MEDICINE

## 2020-04-12 PROCEDURE — 6370000000 HC RX 637 (ALT 250 FOR IP): Performed by: INTERNAL MEDICINE

## 2020-04-12 PROCEDURE — 82140 ASSAY OF AMMONIA: CPT

## 2020-04-12 PROCEDURE — 6370000000 HC RX 637 (ALT 250 FOR IP): Performed by: PHYSICIAN ASSISTANT

## 2020-04-12 PROCEDURE — 2580000003 HC RX 258: Performed by: PHYSICIAN ASSISTANT

## 2020-04-12 PROCEDURE — 36415 COLL VENOUS BLD VENIPUNCTURE: CPT

## 2020-04-12 PROCEDURE — 87324 CLOSTRIDIUM AG IA: CPT

## 2020-04-12 PROCEDURE — 2580000003 HC RX 258: Performed by: NURSE PRACTITIONER

## 2020-04-12 PROCEDURE — P9047 ALBUMIN (HUMAN), 25%, 50ML: HCPCS | Performed by: INTERNAL MEDICINE

## 2020-04-12 PROCEDURE — 6370000000 HC RX 637 (ALT 250 FOR IP)

## 2020-04-12 PROCEDURE — 83735 ASSAY OF MAGNESIUM: CPT

## 2020-04-12 PROCEDURE — 85025 COMPLETE CBC W/AUTO DIFF WBC: CPT

## 2020-04-12 PROCEDURE — 6360000002 HC RX W HCPCS: Performed by: NURSE PRACTITIONER

## 2020-04-12 PROCEDURE — 82962 GLUCOSE BLOOD TEST: CPT

## 2020-04-12 RX ORDER — ALBUMIN (HUMAN) 12.5 G/50ML
25 SOLUTION INTRAVENOUS EVERY 8 HOURS
Status: COMPLETED | OUTPATIENT
Start: 2020-04-12 | End: 2020-04-13

## 2020-04-12 RX ORDER — BUMETANIDE 0.25 MG/ML
2 INJECTION, SOLUTION INTRAMUSCULAR; INTRAVENOUS EVERY 8 HOURS
Status: DISCONTINUED | OUTPATIENT
Start: 2020-04-12 | End: 2020-04-12

## 2020-04-12 RX ORDER — DIMETHICONE, OXYBENZONE, AND PADIMATE O 2; 2.5; 6.6 G/100G; G/100G; G/100G
STICK TOPICAL
Status: COMPLETED
Start: 2020-04-12 | End: 2020-04-12

## 2020-04-12 RX ORDER — BUMETANIDE 0.25 MG/ML
0.5 INJECTION, SOLUTION INTRAMUSCULAR; INTRAVENOUS EVERY 8 HOURS
Status: DISCONTINUED | OUTPATIENT
Start: 2020-04-12 | End: 2020-04-12

## 2020-04-12 RX ORDER — BUMETANIDE 0.25 MG/ML
2 INJECTION, SOLUTION INTRAMUSCULAR; INTRAVENOUS EVERY 8 HOURS
Status: COMPLETED | OUTPATIENT
Start: 2020-04-13 | End: 2020-04-13

## 2020-04-12 RX ORDER — SPIRONOLACTONE 25 MG/1
25 TABLET ORAL DAILY
Status: DISCONTINUED | OUTPATIENT
Start: 2020-04-12 | End: 2020-04-17

## 2020-04-12 RX ADMIN — SODIUM CHLORIDE, PRESERVATIVE FREE 10 ML: 5 INJECTION INTRAVENOUS at 08:36

## 2020-04-12 RX ADMIN — PANTOPRAZOLE SODIUM 40 MG: 40 TABLET, DELAYED RELEASE ORAL at 08:37

## 2020-04-12 RX ADMIN — POTASSIUM CHLORIDE 20 MEQ: 1500 TABLET, EXTENDED RELEASE ORAL at 03:22

## 2020-04-12 RX ADMIN — AMLODIPINE BESYLATE 5 MG: 5 TABLET ORAL at 08:37

## 2020-04-12 RX ADMIN — LACTULOSE 13.33 G: 20 SOLUTION ORAL at 19:57

## 2020-04-12 RX ADMIN — LACTULOSE 13.33 G: 20 SOLUTION ORAL at 14:23

## 2020-04-12 RX ADMIN — HYDROCODONE BITARTRATE AND ACETAMINOPHEN 1 TABLET: 5; 325 TABLET ORAL at 03:41

## 2020-04-12 RX ADMIN — SPIRONOLACTONE 25 MG: 25 TABLET ORAL at 20:01

## 2020-04-12 RX ADMIN — VENLAFAXINE HYDROCHLORIDE 37.5 MG: 37.5 CAPSULE, EXTENDED RELEASE ORAL at 08:37

## 2020-04-12 RX ADMIN — ATORVASTATIN CALCIUM 40 MG: 40 TABLET, FILM COATED ORAL at 20:01

## 2020-04-12 RX ADMIN — MICONAZOLE NITRATE: 2 OINTMENT TOPICAL at 08:36

## 2020-04-12 RX ADMIN — RIFAXIMIN 550 MG: 550 TABLET ORAL at 08:37

## 2020-04-12 RX ADMIN — MONTELUKAST SODIUM 10 MG: 10 TABLET, FILM COATED ORAL at 08:37

## 2020-04-12 RX ADMIN — HYDROCODONE BITARTRATE AND ACETAMINOPHEN 1 TABLET: 5; 325 TABLET ORAL at 20:01

## 2020-04-12 RX ADMIN — RIFAXIMIN 550 MG: 550 TABLET ORAL at 20:00

## 2020-04-12 RX ADMIN — SODIUM CHLORIDE, PRESERVATIVE FREE 10 ML: 5 INJECTION INTRAVENOUS at 20:01

## 2020-04-12 RX ADMIN — INSULIN GLARGINE 30 UNITS: 100 INJECTION, SOLUTION SUBCUTANEOUS at 20:02

## 2020-04-12 RX ADMIN — SODIUM CHLORIDE, PRESERVATIVE FREE 2 G: 5 INJECTION INTRAVENOUS at 14:23

## 2020-04-12 RX ADMIN — MICONAZOLE NITRATE: 2 OINTMENT TOPICAL at 20:01

## 2020-04-12 RX ADMIN — INSULIN LISPRO 3 UNITS: 100 INJECTION, SOLUTION INTRAVENOUS; SUBCUTANEOUS at 16:22

## 2020-04-12 RX ADMIN — INSULIN LISPRO 3 UNITS: 100 INJECTION, SOLUTION INTRAVENOUS; SUBCUTANEOUS at 11:17

## 2020-04-12 RX ADMIN — PETROLATUM: 420 OINTMENT TOPICAL at 19:57

## 2020-04-12 RX ADMIN — BUMETANIDE 0.5 MG: 0.25 INJECTION INTRAMUSCULAR; INTRAVENOUS at 11:16

## 2020-04-12 RX ADMIN — PETROLATUM: 420 OINTMENT TOPICAL at 08:36

## 2020-04-12 RX ADMIN — INSULIN LISPRO 1 UNITS: 100 INJECTION, SOLUTION INTRAVENOUS; SUBCUTANEOUS at 20:02

## 2020-04-12 RX ADMIN — LACTULOSE 13.33 G: 20 SOLUTION ORAL at 08:42

## 2020-04-12 RX ADMIN — BUMETANIDE 2 MG: 0.25 INJECTION INTRAMUSCULAR; INTRAVENOUS at 20:01

## 2020-04-12 RX ADMIN — Medication: at 14:36

## 2020-04-12 RX ADMIN — ALBUMIN (HUMAN) 25 G: 0.25 INJECTION, SOLUTION INTRAVENOUS at 21:39

## 2020-04-12 ASSESSMENT — PAIN DESCRIPTION - DESCRIPTORS
DESCRIPTORS: ACHING
DESCRIPTORS: ACHING

## 2020-04-12 ASSESSMENT — PAIN SCALES - GENERAL
PAINLEVEL_OUTOF10: 7
PAINLEVEL_OUTOF10: 7
PAINLEVEL_OUTOF10: 0
PAINLEVEL_OUTOF10: 0

## 2020-04-12 ASSESSMENT — PAIN DESCRIPTION - PROGRESSION
CLINICAL_PROGRESSION: NOT CHANGED

## 2020-04-12 ASSESSMENT — PAIN DESCRIPTION - ONSET
ONSET: ON-GOING
ONSET: ON-GOING

## 2020-04-12 ASSESSMENT — PAIN DESCRIPTION - LOCATION
LOCATION: ABDOMEN
LOCATION: ABDOMEN

## 2020-04-12 ASSESSMENT — PAIN DESCRIPTION - PAIN TYPE
TYPE: ACUTE PAIN
TYPE: ACUTE PAIN

## 2020-04-12 ASSESSMENT — PAIN DESCRIPTION - ORIENTATION
ORIENTATION: RIGHT;LEFT
ORIENTATION: RIGHT;LEFT

## 2020-04-12 ASSESSMENT — PAIN DESCRIPTION - FREQUENCY
FREQUENCY: INTERMITTENT
FREQUENCY: INTERMITTENT

## 2020-04-12 NOTE — PROGRESS NOTES
Subjective: The patient is awake and alert. No problems overnight. Denies chest pain, angina, and dyspnea. Denies abdominal pain. Tolerating diet. Does not eat muchNo nausea or vomiting. Still weak. Objective:    /60   Pulse 99   Temp 97.7 °F (36.5 °C) (Oral)   Resp 18   Ht 5' 7\" (1.702 m)   Wt 216 lb 0.8 oz (98 kg)   SpO2 98%   BMI 33.84 kg/m²     Current medications that patient is taking have been reviewed. Heart:  RRR, no murmurs, gallops, or rubs.   Lungs:  CTA bilaterally, no wheeze, rales or rhonchi  Abd: bowel sounds present, soft, nontender, distended, no masses  Extrem:  No cyanosis or edema    CBC with Differential:    Lab Results   Component Value Date    WBC 18.1 04/12/2020    RBC 4.19 04/12/2020    HGB 13.0 04/12/2020    HCT 38.1 04/12/2020     04/12/2020    MCV 90.9 04/12/2020    MCH 31.0 04/12/2020    MCHC 34.1 04/12/2020    RDW 18.7 04/12/2020    LYMPHOPCT 8.8 04/12/2020    MONOPCT 10.7 04/12/2020    BASOPCT 0.4 04/12/2020    MONOSABS 1.93 04/12/2020    LYMPHSABS 1.59 04/12/2020    EOSABS 0.25 04/12/2020    BASOSABS 0.07 04/12/2020     CMP:    Lab Results   Component Value Date     04/12/2020    K 3.8 04/12/2020    K 4.3 04/09/2020    CL 91 04/12/2020    CO2 19 04/12/2020    BUN 31 04/12/2020    CREATININE 1.3 04/12/2020    GFRAA 50 04/12/2020    LABGLOM 41 04/12/2020    GLUCOSE 149 04/12/2020    PROT 5.9 04/12/2020    LABALBU 3.1 04/12/2020    CALCIUM 8.5 04/12/2020    BILITOT 1.3 04/12/2020    ALKPHOS 123 04/12/2020    AST 58 04/12/2020    ALT 31 04/12/2020     BMP:    Lab Results   Component Value Date     04/12/2020    K 3.8 04/12/2020    K 4.3 04/09/2020    CL 91 04/12/2020    CO2 19 04/12/2020    BUN 31 04/12/2020    LABALBU 3.1 04/12/2020    CREATININE 1.3 04/12/2020    CALCIUM 8.5 04/12/2020    GFRAA 50 04/12/2020    LABGLOM 41 04/12/2020    GLUCOSE 149 04/12/2020     Magnesium:    Lab Results   Component Value Date    MG 2.3 04/12/2020

## 2020-04-12 NOTE — PROGRESS NOTES
albuterol    PHYSICAL EXAM:     Patient Vitals for the past 24 hrs:   BP Temp Temp src Pulse Resp SpO2 Weight   04/12/20 0835 112/60 97.7 °F (36.5 °C) Oral 99 18 98 % --   04/12/20 0029 -- -- -- -- -- -- 216 lb 0.8 oz (98 kg)   04/11/20 2115 116/71 97.9 °F (36.6 °C) Oral 90 20 97 % --   @      Intake/Output Summary (Last 24 hours) at 4/12/2020 1822  Last data filed at 4/12/2020 1708  Gross per 24 hour   Intake 480 ml   Output 900 ml   Net -420 ml         Wt Readings from Last 3 Encounters:   04/12/20 216 lb 0.8 oz (98 kg)   04/01/20 202 lb (91.6 kg)   03/25/20 201 lb (91.2 kg)       Constitutional:  in no acute distress  Oral: mucus membranes moist  Neck: no JVD  Cardiovascular: S1, S2 regular rhythm, no murmur,or rub  Respiratory:  No crackles, no wheeze. Poor air entry at the bases  Gastrointestinal:  Soft, nontender, markedly distended, NABS  Ext: no edema, feet warm  Skin: dry, no rash  Neuro: awake, alert, interactive      DATA:    Recent Labs     04/10/20  0341 04/11/20  0333 04/12/20  0334   WBC 19.2* 19.9* 18.1*   HGB 13.7 12.8 13.0   HCT 40.1 37.1 38.1   MCV 90.9 90.0 90.9    220 187     Recent Labs     04/10/20  0341  04/11/20  0333  04/12/20  0334 04/12/20  0907 04/12/20  1451   *   < > 122*  122*   < > 124* 125* 124*   K 4.1   < > 5.1*  5.2*   < > 3.8 3.9 3.7   CL 89*   < > 92*  92*   < > 91* 91* 90*   CO2 17*   < > 19*  19*   < > 19* 19* 18*   MG 2.3  --  2.2  --  2.3  --   --    PHOS  --   --  3.6  --  3.2  --   --    BUN 28*   < > 32*  31*   < > 31* 31* 31*   CREATININE 1.1*   < > 1.3*  1.2*   < > 1.3* 1.3* 1.3*   ALT  --   --  33*  --  31  --   --    AST  --   --  76*  --  58*  --   --    BILIDIR  --   --   --   --  0.6*  --   --    BILITOT  --   --  2.1*  --  1.3*  --   --    ALKPHOS  --   --  127*  --  123*  --   --     < > = values in this interval not displayed.        Lab Results   Component Value Date    LABPROT 0.4 (H) 04/09/2020    LABPROT 0.4 04/09/2020    LABPROT 0.2 03/11/2020    LABPROT 0.2 03/11/2020       Assessment  71 y. o. longstanding history of cirrhosis, chronic/recurrent severe ascites, who undergoes weekly paracenteses q. Wednesday, presented with recurrent and severe hyponatremia     1.  Hyponatremia, severe hypervolemic, secondary to cirrhosis and water overload, exacerbated (mildly so) by hyperglycemia, and diarrhea, further exacerbated by venlafaxine. Urinary indices do not reflect prerenal scenario. Not certain of the timing of when the furosemide was administered relative to when the urine sample was sent.     2.  Cirrhosis, with recurrent and severe ascites.     3.  Hypertension    4. Hepatorenal syndrome type II. Contributing to the persistent hyponatremia.     Urine studies reflect prerenal azotemia, likely due to intravascular volume contraction status post large-volume paracentesis, recent poor intake, loose stools, though also hepatorenal syndrome. Volume resuscitation had done little initially to correct hyponatremia, though improving slowly over the past 36 hours. Repeat urinary indices are little different than at the time of admission. Difficult problem. Recommendations  Stop IV fluid  Bumex IV 2 mg q 8 x 3   Salt poor albumin infusions  Resume spironolactone  ?:  D/c the venlafaxine?   Consider repeat paracentesis in the next 1 to 3 days  Continue lactulose, rifaximin  Once euvolemic, consider resumption of spironolactone and loop diuretic, as well as beta-blocker, though will coordinate with her hepatologist  Consider albumin infusions      Would not institute fluid restriction  Do not start salt tablets  In the absence of acute mental status change, hypertonic saline and increasing diuretics are not appropriate now  Tolvaptan also not appropriate in patient with cirrhosis    Every 6 hours BMP    Electronically signed by Abe Brandon MD on 4/12/2020 at 6:22 PM

## 2020-04-13 ENCOUNTER — APPOINTMENT (OUTPATIENT)
Dept: ULTRASOUND IMAGING | Age: 65
DRG: 432 | End: 2020-04-13
Payer: MEDICARE

## 2020-04-13 LAB
ALBUMIN FLUID: 0.7 G/DL
ALBUMIN SERPL-MCNC: 3.1 G/DL (ref 3.5–5.2)
ALP BLD-CCNC: 265 U/L (ref 35–104)
ALT SERPL-CCNC: 32 U/L (ref 0–32)
AMYLASE FLUID: 25 U/L
ANION GAP SERPL CALCULATED.3IONS-SCNC: 13 MMOL/L (ref 7–16)
ANION GAP SERPL CALCULATED.3IONS-SCNC: 14 MMOL/L (ref 7–16)
ANION GAP SERPL CALCULATED.3IONS-SCNC: 15 MMOL/L (ref 7–16)
ANION GAP SERPL CALCULATED.3IONS-SCNC: 16 MMOL/L (ref 7–16)
APPEARANCE FLUID: CLEAR
AST SERPL-CCNC: 64 U/L (ref 0–31)
BASOPHILS ABSOLUTE: 0.07 E9/L (ref 0–0.2)
BASOPHILS RELATIVE PERCENT: 0.5 % (ref 0–2)
BILIRUB SERPL-MCNC: 1.4 MG/DL (ref 0–1.2)
BUN BLDV-MCNC: 29 MG/DL (ref 8–23)
BUN BLDV-MCNC: 29 MG/DL (ref 8–23)
BUN BLDV-MCNC: 31 MG/DL (ref 8–23)
BUN BLDV-MCNC: 32 MG/DL (ref 8–23)
C DIFF TOXIN/ANTIGEN: NORMAL
CALCIUM SERPL-MCNC: 8.3 MG/DL (ref 8.6–10.2)
CALCIUM SERPL-MCNC: 8.3 MG/DL (ref 8.6–10.2)
CALCIUM SERPL-MCNC: 8.4 MG/DL (ref 8.6–10.2)
CALCIUM SERPL-MCNC: 8.5 MG/DL (ref 8.6–10.2)
CELL COUNT FLUID TYPE: NORMAL
CHLORIDE BLD-SCNC: 92 MMOL/L (ref 98–107)
CHLORIDE BLD-SCNC: 93 MMOL/L (ref 98–107)
CHLORIDE BLD-SCNC: 94 MMOL/L (ref 98–107)
CHLORIDE BLD-SCNC: 95 MMOL/L (ref 98–107)
CO2: 18 MMOL/L (ref 22–29)
CO2: 19 MMOL/L (ref 22–29)
CO2: 20 MMOL/L (ref 22–29)
CO2: 20 MMOL/L (ref 22–29)
COLOR FLUID: YELLOW
CREAT SERPL-MCNC: 1.3 MG/DL (ref 0.5–1)
CREAT SERPL-MCNC: 1.4 MG/DL (ref 0.5–1)
EOSINOPHILS ABSOLUTE: 0.33 E9/L (ref 0.05–0.5)
EOSINOPHILS RELATIVE PERCENT: 2.4 % (ref 0–6)
GFR AFRICAN AMERICAN: 46
GFR AFRICAN AMERICAN: 50
GFR NON-AFRICAN AMERICAN: 38 ML/MIN/1.73
GFR NON-AFRICAN AMERICAN: 41 ML/MIN/1.73
GLUCOSE BLD-MCNC: 104 MG/DL (ref 74–99)
GLUCOSE BLD-MCNC: 127 MG/DL (ref 74–99)
GLUCOSE BLD-MCNC: 167 MG/DL (ref 74–99)
GLUCOSE BLD-MCNC: 180 MG/DL (ref 74–99)
GLUCOSE, FLUID: 127 MG/DL
HCT VFR BLD CALC: 34.9 % (ref 34–48)
HEMOGLOBIN: 12.2 G/DL (ref 11.5–15.5)
IMMATURE GRANULOCYTES #: 0.13 E9/L
IMMATURE GRANULOCYTES %: 0.9 % (ref 0–5)
INR BLD: 1.6
LD, FLUID: 47 U/L
LYMPHOCYTES ABSOLUTE: 1.67 E9/L (ref 1.5–4)
LYMPHOCYTES RELATIVE PERCENT: 12 % (ref 20–42)
MAGNESIUM: 2.2 MG/DL (ref 1.6–2.6)
MCH RBC QN AUTO: 31.2 PG (ref 26–35)
MCHC RBC AUTO-ENTMCNC: 35 % (ref 32–34.5)
MCV RBC AUTO: 89.3 FL (ref 80–99.9)
METER GLUCOSE: 116 MG/DL (ref 74–99)
METER GLUCOSE: 150 MG/DL (ref 74–99)
METER GLUCOSE: 151 MG/DL (ref 74–99)
METER GLUCOSE: 91 MG/DL (ref 74–99)
MONOCYTE, FLUID: 58 %
MONOCYTES ABSOLUTE: 1.36 E9/L (ref 0.1–0.95)
MONOCYTES RELATIVE PERCENT: 9.8 % (ref 2–12)
NEUTROPHIL, FLUID: 42 %
NEUTROPHILS ABSOLUTE: 10.33 E9/L (ref 1.8–7.3)
NEUTROPHILS RELATIVE PERCENT: 74.4 % (ref 43–80)
NUCLEATED CELLS FLUID: 245 /UL
PDW BLD-RTO: 18.3 FL (ref 11.5–15)
PLATELET # BLD: 200 E9/L (ref 130–450)
PMV BLD AUTO: 10.3 FL (ref 7–12)
POTASSIUM SERPL-SCNC: 3.4 MMOL/L (ref 3.5–5)
POTASSIUM SERPL-SCNC: 3.4 MMOL/L (ref 3.5–5)
POTASSIUM SERPL-SCNC: 3.7 MMOL/L (ref 3.5–5)
POTASSIUM SERPL-SCNC: 3.8 MMOL/L (ref 3.5–5)
PROTEIN FLUID: 1 G/DL
PROTHROMBIN TIME: 19.7 SEC (ref 9.3–12.4)
RBC # BLD: 3.91 E12/L (ref 3.5–5.5)
RBC FLUID: <2000 /UL
SODIUM BLD-SCNC: 124 MMOL/L (ref 132–146)
SODIUM BLD-SCNC: 127 MMOL/L (ref 132–146)
SODIUM BLD-SCNC: 128 MMOL/L (ref 132–146)
SODIUM BLD-SCNC: 130 MMOL/L (ref 132–146)
TOTAL PROTEIN: 5.9 G/DL (ref 6.4–8.3)
WBC # BLD: 13.9 E9/L (ref 4.5–11.5)

## 2020-04-13 PROCEDURE — 6370000000 HC RX 637 (ALT 250 FOR IP): Performed by: INTERNAL MEDICINE

## 2020-04-13 PROCEDURE — 36415 COLL VENOUS BLD VENIPUNCTURE: CPT

## 2020-04-13 PROCEDURE — 88112 CYTOPATH CELL ENHANCE TECH: CPT

## 2020-04-13 PROCEDURE — 97530 THERAPEUTIC ACTIVITIES: CPT

## 2020-04-13 PROCEDURE — 0W9G3ZZ DRAINAGE OF PERITONEAL CAVITY, PERCUTANEOUS APPROACH: ICD-10-PCS | Performed by: RADIOLOGY

## 2020-04-13 PROCEDURE — 2500000003 HC RX 250 WO HCPCS: Performed by: INTERNAL MEDICINE

## 2020-04-13 PROCEDURE — 83735 ASSAY OF MAGNESIUM: CPT

## 2020-04-13 PROCEDURE — 2580000003 HC RX 258: Performed by: NURSE PRACTITIONER

## 2020-04-13 PROCEDURE — 82962 GLUCOSE BLOOD TEST: CPT

## 2020-04-13 PROCEDURE — 84157 ASSAY OF PROTEIN OTHER: CPT

## 2020-04-13 PROCEDURE — 2709999900 US GUIDED PARACENTESIS

## 2020-04-13 PROCEDURE — 87070 CULTURE OTHR SPECIMN AEROBIC: CPT

## 2020-04-13 PROCEDURE — 85610 PROTHROMBIN TIME: CPT

## 2020-04-13 PROCEDURE — 80053 COMPREHEN METABOLIC PANEL: CPT

## 2020-04-13 PROCEDURE — 83615 LACTATE (LD) (LDH) ENZYME: CPT

## 2020-04-13 PROCEDURE — 85025 COMPLETE CBC W/AUTO DIFF WBC: CPT

## 2020-04-13 PROCEDURE — 6360000002 HC RX W HCPCS: Performed by: NURSE PRACTITIONER

## 2020-04-13 PROCEDURE — 88305 TISSUE EXAM BY PATHOLOGIST: CPT

## 2020-04-13 PROCEDURE — 82042 OTHER SOURCE ALBUMIN QUAN EA: CPT

## 2020-04-13 PROCEDURE — 6360000002 HC RX W HCPCS: Performed by: INTERNAL MEDICINE

## 2020-04-13 PROCEDURE — 80048 BASIC METABOLIC PNL TOTAL CA: CPT

## 2020-04-13 PROCEDURE — 1200000000 HC SEMI PRIVATE

## 2020-04-13 PROCEDURE — 2580000003 HC RX 258: Performed by: PHYSICIAN ASSISTANT

## 2020-04-13 PROCEDURE — 82150 ASSAY OF AMYLASE: CPT

## 2020-04-13 PROCEDURE — 87205 SMEAR GRAM STAIN: CPT

## 2020-04-13 PROCEDURE — 6370000000 HC RX 637 (ALT 250 FOR IP): Performed by: NURSE PRACTITIONER

## 2020-04-13 PROCEDURE — 82947 ASSAY GLUCOSE BLOOD QUANT: CPT

## 2020-04-13 PROCEDURE — 6370000000 HC RX 637 (ALT 250 FOR IP): Performed by: PHYSICIAN ASSISTANT

## 2020-04-13 PROCEDURE — P9047 ALBUMIN (HUMAN), 25%, 50ML: HCPCS | Performed by: INTERNAL MEDICINE

## 2020-04-13 RX ORDER — POTASSIUM CHLORIDE 20 MEQ/1
40 TABLET, EXTENDED RELEASE ORAL ONCE
Status: COMPLETED | OUTPATIENT
Start: 2020-04-13 | End: 2020-04-13

## 2020-04-13 RX ADMIN — SODIUM CHLORIDE, PRESERVATIVE FREE 10 ML: 5 INJECTION INTRAVENOUS at 10:03

## 2020-04-13 RX ADMIN — SPIRONOLACTONE 25 MG: 25 TABLET ORAL at 11:43

## 2020-04-13 RX ADMIN — RIFAXIMIN 550 MG: 550 TABLET ORAL at 11:42

## 2020-04-13 RX ADMIN — INSULIN LISPRO 3 UNITS: 100 INJECTION, SOLUTION INTRAVENOUS; SUBCUTANEOUS at 16:22

## 2020-04-13 RX ADMIN — ALBUMIN (HUMAN) 25 G: 0.25 INJECTION, SOLUTION INTRAVENOUS at 04:37

## 2020-04-13 RX ADMIN — MONTELUKAST SODIUM 10 MG: 10 TABLET, FILM COATED ORAL at 11:42

## 2020-04-13 RX ADMIN — PETROLATUM: 420 OINTMENT TOPICAL at 21:38

## 2020-04-13 RX ADMIN — PETROLATUM: 420 OINTMENT TOPICAL at 11:39

## 2020-04-13 RX ADMIN — MICONAZOLE NITRATE: 2 OINTMENT TOPICAL at 11:45

## 2020-04-13 RX ADMIN — HYDROCODONE BITARTRATE AND ACETAMINOPHEN 1 TABLET: 5; 325 TABLET ORAL at 18:39

## 2020-04-13 RX ADMIN — BUMETANIDE 2 MG: 0.25 INJECTION INTRAMUSCULAR; INTRAVENOUS at 04:37

## 2020-04-13 RX ADMIN — INSULIN LISPRO 1 UNITS: 100 INJECTION, SOLUTION INTRAVENOUS; SUBCUTANEOUS at 21:32

## 2020-04-13 RX ADMIN — MICONAZOLE NITRATE: 2 OINTMENT TOPICAL at 21:37

## 2020-04-13 RX ADMIN — VENLAFAXINE HYDROCHLORIDE 37.5 MG: 37.5 CAPSULE, EXTENDED RELEASE ORAL at 11:43

## 2020-04-13 RX ADMIN — SODIUM CHLORIDE, PRESERVATIVE FREE 10 ML: 5 INJECTION INTRAVENOUS at 21:38

## 2020-04-13 RX ADMIN — LACTULOSE 13.33 G: 20 SOLUTION ORAL at 11:40

## 2020-04-13 RX ADMIN — ALBUMIN (HUMAN) 25 G: 0.25 INJECTION, SOLUTION INTRAVENOUS at 11:44

## 2020-04-13 RX ADMIN — SODIUM CHLORIDE, PRESERVATIVE FREE 2 G: 5 INJECTION INTRAVENOUS at 14:50

## 2020-04-13 RX ADMIN — INSULIN GLARGINE 30 UNITS: 100 INJECTION, SOLUTION SUBCUTANEOUS at 21:32

## 2020-04-13 RX ADMIN — POTASSIUM CHLORIDE 40 MEQ: 20 TABLET, EXTENDED RELEASE ORAL at 11:41

## 2020-04-13 RX ADMIN — PANTOPRAZOLE SODIUM 40 MG: 40 TABLET, DELAYED RELEASE ORAL at 11:43

## 2020-04-13 RX ADMIN — ATORVASTATIN CALCIUM 40 MG: 40 TABLET, FILM COATED ORAL at 21:28

## 2020-04-13 RX ADMIN — RIFAXIMIN 550 MG: 550 TABLET ORAL at 21:28

## 2020-04-13 RX ADMIN — BUMETANIDE 2 MG: 0.25 INJECTION INTRAMUSCULAR; INTRAVENOUS at 11:43

## 2020-04-13 ASSESSMENT — PAIN SCALES - GENERAL
PAINLEVEL_OUTOF10: 9
PAINLEVEL_OUTOF10: 8

## 2020-04-13 NOTE — PROGRESS NOTES
lispro  0-18 Units Subcutaneous TID WC    lactulose  20 mL Oral TID    white petrolatum   Topical BID    miconazole nitrate   Topical BID    amLODIPine  5 mg Oral Daily    atorvastatin  40 mg Oral Nightly    montelukast  10 mg Oral QAM    pantoprazole  40 mg Oral Daily    rifaximin  550 mg Oral BID    venlafaxine  37.5 mg Oral Daily    insulin lispro  0-3 Units Subcutaneous Nightly    sodium chloride flush  10 mL Intravenous 2 times per day    insulin glargine  30 Units Subcutaneous Nightly     Infusion Meds    dextrose       PRN Meds amitriptyline, HYDROcodone-acetaminophen, glucose, dextrose, glucagon (rDNA), dextrose, sodium chloride flush, potassium chloride **OR** potassium alternative oral replacement **OR** potassium chloride, acetaminophen **OR** acetaminophen, magnesium hydroxide, promethazine **OR** ondansetron, albuterol    Laboratory Data  Recent Results (from the past 24 hour(s))   Basic Metabolic Panel    Collection Time: 04/12/20  9:07 AM   Result Value Ref Range    Sodium 125 (L) 132 - 146 mmol/L    Potassium 3.9 3.5 - 5.0 mmol/L    Chloride 91 (L) 98 - 107 mmol/L    CO2 19 (L) 22 - 29 mmol/L    Anion Gap 15 7 - 16 mmol/L    Glucose 178 (H) 74 - 99 mg/dL    BUN 31 (H) 8 - 23 mg/dL    CREATININE 1.3 (H) 0.5 - 1.0 mg/dL    GFR Non-African American 41 >=60 mL/min/1.73    GFR African American 50     Calcium 8.5 (L) 8.6 - 10.2 mg/dL   POCT Glucose    Collection Time: 04/12/20 11:17 AM   Result Value Ref Range    Meter Glucose 154 (H) 74 - 99 mg/dL   Basic Metabolic Panel    Collection Time: 04/12/20  2:51 PM   Result Value Ref Range    Sodium 124 (L) 132 - 146 mmol/L    Potassium 3.7 3.5 - 5.0 mmol/L    Chloride 90 (L) 98 - 107 mmol/L    CO2 18 (L) 22 - 29 mmol/L    Anion Gap 16 7 - 16 mmol/L    Glucose 180 (H) 74 - 99 mg/dL    BUN 31 (H) 8 - 23 mg/dL    CREATININE 1.3 (H) 0.5 - 1.0 mg/dL    GFR Non-African American 41 >=60 mL/min/1.73    GFR African American 50     Calcium 8.3 (L) 8.6 - 10.2 the brain with mild chronic microvascular ischemic changes. VENTRICLES:No hydrocephalus. No extra-axial fluid. CALVARIUM:The calvarium is intact without fracture or focal lesion. SINUSES:The visualized paranasal sinuses and mastoids are clear. No acute intracranial abnormality. Xr Chest Portable    Result Date: 2020  Patient MRN: [id number] : 1955 Age:  71 years Gender: Female Order Date: 2020 11:30 AM Exam: XR CHEST PORTABLE Number of Images: 1 view Indication:  Weakness, fatigue Comparison: Anterior upright chest studies March 10, 2020 2020 and February 10, 2020 Findings: The lungs are symmetrically underinflated, suggesting expiratory imaging artifact, but otherwise are clear. Patient is of large habitus, which can contribute diminished pulmonary expansion. There is some interstitial density in the left lower lung which is unchanged from the prior exam, possibly representing subtle interstitial pneumonitis or subsegmental atelectasis. No progressive airspace disease is identified. . There is no evidence of pneumothorax or pleural effusion. Cardiovascular shadows are normal in appearance. There is no evidence of cardiac enlargement or decompensation. Skeletal structures show no evidence of acute pathology. Density in the left neck may be surgical clips. Overlying EKG leads are present. Subtle interstitial density in the left lower lung is new since February 10, 2020, but is unchanged from March 10, 2020 study and could represent subsegmental atelectasis or minimal left lower lobe interstitial pneumonia. There is no evidence of consolidation or effusion, no evidence of cardiac enlargement or decompensation.     Us Guided Paracentesis    Result Date: 2020  Patient MRN:  [id number] : 1955 Age: 71 years Gender: Female Order Date:  2020 12:45 PM EXAM: US GUIDED PARACENTESIS NUMBER OF IMAGES:  7 INDICATION:  paracentesis COMPARISON: None PARACENTESIS: Prior to start of procedure, routine scanning of the abdomen was performed using real-time ultrasound and revealed moderate amount ascites detected. Images were saved into PACs. Informed consent was obtained. A preprocedure timeout occurred at 1250 hours. The patient's abdomen was sterilely prepped and draped. Lidocaine was subcutaneously administered for local anesthesia. Under ultrasound guidance, a 6-[place]ip[place] Safe-T-Centesis catheter with inner stylet was percutaneously advanced into the peritoneal space in the left lower abdomen. The stylet was removed, and ascites was drained. A total of 5500 mL of serous fluid was removed. The catheter was removed at the end of the procedure. The patient tolerated the procedure well. There were no complications. The patient was released in stable condition. Successful paracentesis. This procedure was performed and dictated by Sona Georges PA-C with indirect supervision, and Penny Monique. [place] reviewed and concurred with the findings. Us Guided Paracentesis    Result Date: 2020  Patient MRN:  [id number] : 1955 Age: 71 years Gender: Female Order Date:  2020 10:51 AM EXAM: US GUIDED PARACENTESIS NUMBER OF IMAGES:  3 INDICATION: R18.8 Other ascites Other ascites COMPARISON: None PARACENTESIS: Prior to start of procedure, routine scanning of the abdomen was performed using real-time ultrasound and revealed moderate amount ascites detected. Images were saved into PACs. After obtaining images of the abdomen, it was determined that there is ample amount of fluid to be removed safely. . After obtaining informed consent a time out occurred at 1135 hours, ultrasound was performed demonstrating ascites. Routine ultrasound and Doppler ultrasound were used. Using direct real-time ultrasound imaging  access was obtained. An image was stored and copy was transmitted to PACS.  After obtaining informed consent and after the routine sterile prep and drape and after the administration of a local anesthesia, a system of needles and cannulas was guided by ultrasound control into Right lower quadrant of the peritoneal cavity. Subsequently with real-time guidance a Pig-tailed 6 Fr. Safe-T-Centesis Drainage Catheter was inserted at Right lower quadrant and the inner stylet was removed and peritoneal access was achieved. The catheter was removed at the end of the procedure. The patient tolerated the procedure well. There were no complications. The patient was released in stable condition. A total of 5000cc of clear yellow colored fluid was removed. Successful paracentesis. . This examination was performed and dictated by Poly Zacarias PA-C with indirect supervision and Catarino Beth MD reviewed and concurred with the findings. Us Guided Paracentesis    Result Date: 3/25/2020  Patient MRN:  [id number] : 1955 Age: 71 years Gender: Female Order Date:  3/25/2020 11:27 AM Exam: US GUIDED PARACENTESIS Number of Images:  Five Medication: Lidocaine 1% subcutaneous. Indication: R18.8 Other ascites Other ascites Comparison: None. PROCEDURE DETAILS AND FINDINGS: Informed consent was obtained prior to the procedure. Ultrasound examination of the abdomen demonstrated ascites. Images were saved to PACS. The patient's abdomen was sterilely prepped and draped. A preprocedure timeout was called. Lidocaine was subcutaneously administered for local anesthesia. Under ultrasound guidance, a 5-[place] Yueh centesis catheter with inner stylet was percutaneously advanced into the peritoneal space in the right lower abdomen. The stylet was removed, and peritoneal fluid was drained. A total of approximately 5000 milliliters of dark tom fluid was removed. The catheter was removed from the abdomen. The site was cleaned, and a sterile dressing was applied. There were no complications. Ultrasound-guided paracentesis. This examination was performed by Tian De Jesus, APRN-CNP with indirect supervision by Shaina Guzmán MD.     Us Guided anesthesia. Under ultrasound guidance, a 6-Citizen of [place] Safe-T-Centesis catheter with inner stylet was percutaneously advanced into the peritoneal space in the right mid abdomen. The stylet was removed, and ascites was drained. A total of 8100 mL of serous colored fluid was removed. The catheter was removed at the end of the procedure. The patient tolerated the procedure well. There were no complications. The patient was released in stable condition. Successful paracentesis. This procedure was performed and dictated by Chet Perez PA-C with indirect supervision, and Kvng Villareal. [facility] reviewed and concurred with the findings. Assessment and Plan  Patient is a 71 y.o. female on consult for cirrhosis and ascites.       1. Cirrhosis  -Viral and autoimmune serology negative  -No evidence of iron overload  -AFP = 5 (2/5/2020)  -US liver with no hepatic mass (2/11/2020)  -Most recent EGD 2018 with one varix not amendable to banding - will require repeat EGD - likely as outpatient  -Initial MELD = 14     2. Ascites  -Defer diuretics to nephrology  -Plan to continue serial paracentesis as outpatient  -Repeat paracentesis now given reaccumulation - fluid evaluation ordered  -Previously with borderline low protein in ascites  -With associated leukocytosis and mild abdominal pain, start Rocephin  -Can consider evaluation at tertiary center for TIPS     3.  Hepatic encephalopathy  -Continue Xifaxan  -Titrate lactulose for 3-4 soft stools daily     4. Hyponatremia  -Per nephrology     5. Comorbidities  -History of CVA, hypertension, dyslipidemia, diabetes  -Per admitting / consultants      Pending repeat paracentesis. Diuretics per nephrology. Continue to monitor labs. Will follow.       Alma Souza, APRN - CNP  8:20 AM  4/13/2020

## 2020-04-13 NOTE — BRIEF OP NOTE
Brief Postoperative Note  ______________________________________________________________      PARACENTESIS BRIEF PROCEDURE NOTE    Patient Name: Valery Dubin   YOB: 1955  Medical Record Number: [id number]  Date of Procedure: 4/13/20  Room/Bed: [facility]7469-J    Pre-operative Diagnosis: Ascites    Post-operative Diagnosis: Ascites    H and P reviewed prior to the procedure without change. Sonographic images were saved and stored in PACS. See radiology dictation in PACs for image review and additional procedural information. Performed by: Kishan Ramesh PA-C under indirect supervision by Felipe Mccarty. Nery Butterfield MD.    Procedure: Prior to start of procedure, routine scanning of all four abdominal quadrants was performed using real-time ultrasound and revealed moderate amount of ascites present. The patient was sterilely prepped and draped for paracentesis. Lidocaine was subcutaneously administered for local anesthesia. Under ultrasound guidance, a 6 Fr. Safe-T-Centesis Catheter was advanced into the peritoneal space in the right lower abdomen with return of ascitic fluid. The inner stylet was removed, and the ascitic fluid was aspirated. A total of approximately 3000 mL of fluid was removed. The catheter was withdrawn, and a sterile dressing applied to the percutaneous access site. There were no complications. Complications: None. Estimated Blood Loss: < 10 cc.       Electronically signed by Abdon Rosales   DD: 4/13/20  10:45 AM EDT

## 2020-04-13 NOTE — PROGRESS NOTES
cart, but reported she feels weak. Pt has decreased strength and endurance and required MOD A x2/MAX A x2 for mobility today. Pt walked with ww very slow gait and short shuffle steps. After 4 feet she c/o fatigue and was unable to walk any farther. A chair was placed behind pt and she sat down for short rest while transport cart was brought in close to pt. She stood and pivoted to cart with MAX A x2 and was then assisted onto cart. Treatment:  Patient practiced and was instructed in the following treatment:     Bed mobility, transfers, and gait to improve functional strength and endurance. Pt was left supine on transport cart with transported present. PLAN:    Pt is making fair progress toward established Physical Therapy goals. Continue with physical therapy current plan of care. Time in  10:15  Time out  10:30    Total Treatment Time  15 minutes     Evaluation Time includes thorough review of current medical information, gathering information on past medical history/social history and prior level of function, completion of standardized testing/informal observation of tasks, assessment of data and education on plan of care and goals. CPT codes:  [] Low Complexity PT evaluation 79720  [] Moderate Complexity PT evaluation 36407  [] High Complexity PT evaluation 49409  [] PT Re-evaluation 57681  [] Gait training 62759 ** minutes  [] Manual therapy 28877 ** minutes  [x] Therapeutic activities 65038 15 minutes  [] Therapeutic exercises 61313 ** minutes  [] Neuromuscular reeducation 12805 ** minutes     Yosi Montero., P.T.   License Number: [id number]

## 2020-04-13 NOTE — PROGRESS NOTES
Subjective: The patient is awake and alert. No problems overnight. Denies chest pain, angina, and dyspnea. Denies abdominal pain. Tolerating diet. Does not eat muchNo nausea or vomiting. Still weak. Objective:    BP (!) 114/59   Pulse 91   Temp 98 °F (36.7 °C) (Oral)   Resp 18   Ht 5' 7\" (1.702 m)   Wt 211 lb 10.3 oz (96 kg)   SpO2 96%   BMI 33.15 kg/m²     Current medications that patient is taking have been reviewed. Heart:  RRR, no murmurs, gallops, or rubs.   Lungs:  CTA bilaterally, no wheeze, rales or rhonchi  Abd: bowel sounds present, soft, nontender, distended, no masses  Extrem:  No cyanosis or edema    CBC with Differential:    Lab Results   Component Value Date    WBC 13.9 04/13/2020    RBC 3.91 04/13/2020    HGB 12.2 04/13/2020    HCT 34.9 04/13/2020     04/13/2020    MCV 89.3 04/13/2020    MCH 31.2 04/13/2020    MCHC 35.0 04/13/2020    RDW 18.3 04/13/2020    LYMPHOPCT 12.0 04/13/2020    MONOPCT 9.8 04/13/2020    BASOPCT 0.5 04/13/2020    MONOSABS 1.36 04/13/2020    LYMPHSABS 1.67 04/13/2020    EOSABS 0.33 04/13/2020    BASOSABS 0.07 04/13/2020     CMP:    Lab Results   Component Value Date     04/13/2020    K 3.4 04/13/2020    K 4.3 04/09/2020    CL 94 04/13/2020    CO2 20 04/13/2020    BUN 31 04/13/2020    CREATININE 1.3 04/13/2020    GFRAA 50 04/13/2020    LABGLOM 41 04/13/2020    GLUCOSE 104 04/13/2020    PROT 5.9 04/13/2020    LABALBU 3.1 04/13/2020    CALCIUM 8.3 04/13/2020    BILITOT 1.4 04/13/2020    ALKPHOS 265 04/13/2020    AST 64 04/13/2020    ALT 32 04/13/2020     BMP:    Lab Results   Component Value Date     04/13/2020    K 3.4 04/13/2020    K 4.3 04/09/2020    CL 94 04/13/2020    CO2 20 04/13/2020    BUN 31 04/13/2020    LABALBU 3.1 04/13/2020    CREATININE 1.3 04/13/2020    CALCIUM 8.3 04/13/2020    GFRAA 50 04/13/2020    LABGLOM 41 04/13/2020    GLUCOSE 104 04/13/2020     Magnesium:    Lab Results   Component Value Date    MG 2.2 04/13/2020

## 2020-04-13 NOTE — CARE COORDINATION
Received phone call from John Paul campos at [facility]; states son Reymundo Anne contacted them today; re; his wish for pt to return home with [facility]; spoke to Loretta by phone, who verified this. Dr. Rosy Nevarez made aware. Order for Hospice consult on chart. Informed manuel,charge nurse. Spoke to preet from [facility] , informed her of consult. [facility] is meeting with family at their residence at 2pm today. Hospice rep will do bedside eval  At the hospital around 3:30 pm today. will follow. Yanelis Grant.

## 2020-04-13 NOTE — PROGRESS NOTES
[facility] MD Gabino Munoz, MD Waqar Robbins, MD Duffy Bowels, CNP   Karla Han, TOMI  Progress Note    4/13/2020    SUBJECTIVE:   4/10: \"Tired. \"  Ongoing fatigue, malaise, generalized weakness. Anorexia with almost nothing to eat, though she is drinking \"when they bring the water. \"  No dyspnea at rest.  No chest pain or palpitations. 4/11: Asleep, easily awakened. No new complaint. 4/12: Ongoing fatigue, malaise. Tired. Sleeping most of the day. Abdominal discomfort developed over the course of today, distention getting much worse. No dyspnea at rest on room air. No peripheral swelling. Appetite and intake remain poor  4/13:  Laying in bed. Continues to feel weak,malaise and fatigued. NAD. Appetite and intake remain poor. Denies chest pain or SOB. Denies abd pain.     PROBLEM LIST:    Principal Problem:    Hyponatremia  Active Problems:    Cirrhosis of liver with ascites (HCC)    Diabetes mellitus type 2, uncontrolled (HCC)    Hyperlipidemia LDL goal <100    History of CVA (cerebrovascular accident)    Essential hypertension    Severe protein-calorie malnutrition (HCC)  Resolved Problems:    Hyponatremia         DIET:    DIET CARB CONTROL;     MEDS (scheduled):    spironolactone  25 mg Oral Daily    cefTRIAXone (ROCEPHIN) IV  2 g Intravenous Q24H    insulin lispro  0-18 Units Subcutaneous TID WC    lactulose  20 mL Oral TID    white petrolatum   Topical BID    miconazole nitrate   Topical BID    amLODIPine  5 mg Oral Daily    atorvastatin  40 mg Oral Nightly    montelukast  10 mg Oral QAM    pantoprazole  40 mg Oral Daily    rifaximin  550 mg Oral BID    venlafaxine  37.5 mg Oral Daily    insulin lispro  0-3 Units Subcutaneous Nightly    sodium chloride flush  10 mL Intravenous 2 times per day    insulin glargine  30 Units Subcutaneous Nightly       MEDS (infusions):   dextrose         MEDS (prn):  amitriptyline, PHOS 3.6  --  3.2  --   --   --   --    BUN 32*  31*   < > 31*   < > 32* 31* 29*   CREATININE 1.3*  1.2*   < > 1.3*   < > 1.4* 1.3* 1.3*   ALT 33*  --  31  --  32  --   --    AST 76*  --  58*  --  64*  --   --    BILIDIR  --   --  0.6*  --   --   --   --    BILITOT 2.1*  --  1.3*  --  1.4*  --   --    ALKPHOS 127*  --  123*  --  265*  --   --     < > = values in this interval not displayed. Lab Results   Component Value Date    LABPROT 0.4 (H) 04/09/2020    LABPROT 0.4 04/09/2020    LABPROT 0.2 03/11/2020    LABPROT 0.2 03/11/2020       Assessment  71 y. o. longstanding history of cirrhosis, chronic/recurrent severe ascites, who undergoes weekly paracenteses q. Wednesday, presented with recurrent and severe hyponatremia     1.  Hyponatremia, severe hypervolemic, secondary to cirrhosis and water overload, exacerbated (mildly so) by hyperglycemia, and diarrhea, further exacerbated by venlafaxine. Urinary indices do not reflect prerenal scenario. Not certain of the timing of when the furosemide was administered relative to when the urine sample was sent.     2.  Cirrhosis, with recurrent and severe ascites.     3.  Hypertension    4. Hepatorenal syndrome type II. Contributing to the persistent hyponatremia.     Urine studies reflect prerenal azotemia, likely due to intravascular volume contraction status post large-volume paracentesis, recent poor intake, loose stools, though also hepatorenal syndrome. Volume resuscitation had done little initially to correct hyponatremia, though improving slowly over the past 36 hours. Repeat urinary indices are little different than at the time of admission. Difficult problem.     -Hyponatremia improving-  130  -Potassium wnl   -Paracentesis today- removal of 3L fluid  -[facility] consulted today      Recommendations  -Continue to hold IV fluid  - IV bumex today x 2 doses  -Monitor labs- adjust tx as warranted  -Monitor I & O  -Await results of Hospice

## 2020-04-13 NOTE — INTERVAL H&P NOTE
H&P Update    Patient's History and Physical  was reviewed. The patient appears likely to able to tolerate the procedure, image-guided paracentesis. Risk and benefits discussed including ultimate complications, possibly death and consent obtained.     Sona Georges PA-C

## 2020-04-13 NOTE — CARE COORDINATION
Attempted to reach pt by phone;no answer. Spoke to pt's son Joaquín Jiménez, with whom pt resides. Introduced myself as an RN case manager and explained my role. Discussed current course of treatment/plan of care. heide understands. [facility] pt's health has been declining over past few mos. had recent stay at [facility] for 21 days , did well, but  Has run out of skilled days per Joaquín Her and cannot afford private pay. [place] Her states with pt's last admission; he was initiating evaluation through [employer] at home for passport services, but was unable to complete d/t closure of offices d/t Covid pandemic.he plans to further pursue once offices re-open. \[facility]\"" pt has ww/BSC/glucometer supplies, and is awaiting delivery of wheelchair any day now through [employer]. Plan is for pt to return home with [facility]. JU orders on chart. Joaquín Her states he transports pt home by car. I advised him that wheelchair ambulette is available through private pay should he need it. Will follow. Maralee Klinefelter.

## 2020-04-13 NOTE — PROGRESS NOTES
[facility] MD Jennifer Cavazos, MD Barbara Paris, MD Nolan Almendarez, CNP   Karla Han, TOMI  Progress Note    4/12/2020    SUBJECTIVE:   4/10: \"Tired. \"  Ongoing fatigue, malaise, generalized weakness. Anorexia with almost nothing to eat, though she is drinking \"when they bring the water. \"  No dyspnea at rest.  No chest pain or palpitations. 4/11: Asleep, easily awakened. No new complaint. 4/12: Ongoing fatigue, malaise. Tired. Sleeping most of the day. Abdominal discomfort developed over the course of today, distention getting much worse. No dyspnea at rest on room air. No peripheral swelling. Appetite and intake remain poor    PROBLEM LIST:    Principal Problem:    Hyponatremia  Active Problems:    Cirrhosis of liver with ascites (HCC)    Diabetes mellitus type 2, uncontrolled (HCC)    Hyperlipidemia LDL goal <100    History of CVA (cerebrovascular accident)    Essential hypertension    Severe protein-calorie malnutrition ([address]  Resolved Problems:    Hyponatremia         DIET:    DIET CARB CONTROL;   Dietary Nutrition Supplements: Diabetic Oral Supplement     MEDS (scheduled):    bumetanide  2 mg Intravenous Q8H    spironolactone  25 mg Oral Daily    albumin human  25 g Intravenous Once    cefTRIAXone (ROCEPHIN) IV  2 g Intravenous Q24H    insulin lispro  0-18 Units Subcutaneous TID WC    lactulose  20 mL Oral TID    white petrolatum   Topical BID    miconazole nitrate   Topical BID    amLODIPine  5 mg Oral Daily    atorvastatin  40 mg Oral Nightly    montelukast  10 mg Oral QAM    pantoprazole  40 mg Oral Daily    rifaximin  550 mg Oral BID    venlafaxine  37.5 mg Oral Daily    insulin lispro  0-3 Units Subcutaneous Nightly    sodium chloride flush  10 mL Intravenous 2 times per day    insulin glargine  30 Units Subcutaneous Nightly       MEDS (infusions):   dextrose         MEDS (prn):  amitriptyline, HYDROcodone-acetaminophen, glucose, dextrose, glucagon (rDNA), dextrose, sodium chloride flush, potassium chloride **OR** potassium alternative oral replacement **OR** potassium chloride, acetaminophen **OR** acetaminophen, magnesium hydroxide, promethazine **OR** ondansetron, albuterol    PHYSICAL EXAM:     Patient Vitals for the past 24 hrs:   BP Temp Temp src Pulse Resp SpO2 Weight   04/12/20 1945 126/71 97.7 °F (36.5 °C) Oral 104 18 95 % --   04/12/20 0835 112/60 97.7 °F (36.5 °C) Oral 99 18 98 % --   04/12/20 0029 -- -- -- -- -- -- 216 lb 0.8 oz (98 kg)   04/11/20 2115 116/71 97.9 °F (36.6 °C) Oral 90 20 97 % --   @      Intake/Output Summary (Last 24 hours) at 4/12/2020 2002  Last data filed at 4/12/2020 1708  Gross per 24 hour   Intake 480 ml   Output 550 ml   Net -70 ml         Wt Readings from Last 3 Encounters:   04/12/20 216 lb 0.8 oz (98 kg)   04/01/20 202 lb (91.6 kg)   03/25/20 201 lb (91.2 kg)       Constitutional:  in no acute distress  Oral: mucus membranes moist  Neck: no JVD  Cardiovascular: S1, S2 regular rhythm, no murmur,or rub  Respiratory:  No crackles, no wheeze.   Poor air entry at the bases  Gastrointestinal:  Soft, mildly diffusely tender without rebound guarding or referred pain, markedly distended, NABS  Ext: no edema, feet warm  Skin: dry, no rash  Neuro: awake, alert, interactive      DATA:    Recent Labs     04/10/20  0341 04/11/20  0333 04/12/20  0334   WBC 19.2* 19.9* 18.1*   HGB 13.7 12.8 13.0   HCT 40.1 37.1 38.1   MCV 90.9 90.0 90.9    220 187     Recent Labs     04/10/20  0341  04/11/20  0333  04/12/20  0334 04/12/20  0907 04/12/20  1451   *   < > 122*  122*   < > 124* 125* 124*   K 4.1   < > 5.1*  5.2*   < > 3.8 3.9 3.7   CL 89*   < > 92*  92*   < > 91* 91* 90*   CO2 17*   < > 19*  19*   < > 19* 19* 18*   MG 2.3  --  2.2  --  2.3  --   --    PHOS  --   --  3.6  --  3.2  --   --    BUN 28*   < > 32*  31*   < > 31* 31* 31*   CREATININE 1.1*   < > 1.3*  1.2*   < > 1.3* 1.3* 1.3*   ALT  --   -- tomorrow    Would not institute fluid restriction  Do not start salt tablets  In the absence of acute mental status change, hypertonic saline and increasing diuretics are not appropriate now  Tolvaptan also not appropriate in patient with cirrhosis    Every 6 hours BMP    Electronically signed by Gilma Moss MD on 4/12/2020 at 8:02 PM

## 2020-04-14 LAB
ALBUMIN SERPL-MCNC: 3.1 G/DL (ref 3.5–5.2)
ALP BLD-CCNC: 130 U/L (ref 35–104)
ALT SERPL-CCNC: 30 U/L (ref 0–32)
AMMONIA: 48.7 UMOL/L (ref 11–51)
ANION GAP SERPL CALCULATED.3IONS-SCNC: 10 MMOL/L (ref 7–16)
ANION GAP SERPL CALCULATED.3IONS-SCNC: 12 MMOL/L (ref 7–16)
ANION GAP SERPL CALCULATED.3IONS-SCNC: 13 MMOL/L (ref 7–16)
ANISOCYTOSIS: ABNORMAL
AST SERPL-CCNC: 62 U/L (ref 0–31)
BASOPHILS ABSOLUTE: 0.11 E9/L (ref 0–0.2)
BASOPHILS RELATIVE PERCENT: 0.8 % (ref 0–2)
BILIRUB SERPL-MCNC: 1.2 MG/DL (ref 0–1.2)
BILIRUBIN DIRECT: 0.5 MG/DL (ref 0–0.3)
BILIRUBIN, INDIRECT: 0.7 MG/DL (ref 0–1)
BUN BLDV-MCNC: 30 MG/DL (ref 8–23)
BURR CELLS: ABNORMAL
CALCIUM SERPL-MCNC: 8.2 MG/DL (ref 8.6–10.2)
CALCIUM SERPL-MCNC: 8.3 MG/DL (ref 8.6–10.2)
CALCIUM SERPL-MCNC: 8.4 MG/DL (ref 8.6–10.2)
CHLORIDE BLD-SCNC: 90 MMOL/L (ref 98–107)
CHLORIDE BLD-SCNC: 93 MMOL/L (ref 98–107)
CHLORIDE BLD-SCNC: 94 MMOL/L (ref 98–107)
CHLORIDE URINE RANDOM: 30 MMOL/L
CO2: 19 MMOL/L (ref 22–29)
CO2: 19 MMOL/L (ref 22–29)
CO2: 20 MMOL/L (ref 22–29)
CREAT SERPL-MCNC: 1.2 MG/DL (ref 0.5–1)
CREAT SERPL-MCNC: 1.3 MG/DL (ref 0.5–1)
CREAT SERPL-MCNC: 1.3 MG/DL (ref 0.5–1)
EOSINOPHILS ABSOLUTE: 0.42 E9/L (ref 0.05–0.5)
EOSINOPHILS RELATIVE PERCENT: 3.1 % (ref 0–6)
GFR AFRICAN AMERICAN: 50
GFR AFRICAN AMERICAN: 50
GFR AFRICAN AMERICAN: 54
GFR NON-AFRICAN AMERICAN: 41 ML/MIN/1.73
GFR NON-AFRICAN AMERICAN: 41 ML/MIN/1.73
GFR NON-AFRICAN AMERICAN: 45 ML/MIN/1.73
GLUCOSE BLD-MCNC: 158 MG/DL (ref 74–99)
GLUCOSE BLD-MCNC: 202 MG/DL (ref 74–99)
GLUCOSE BLD-MCNC: 242 MG/DL (ref 74–99)
GRAM STAIN ORDERABLE: NORMAL
HCT VFR BLD CALC: 33.9 % (ref 34–48)
HEMOGLOBIN: 11.7 G/DL (ref 11.5–15.5)
IMMATURE GRANULOCYTES #: 0.29 E9/L
IMMATURE GRANULOCYTES %: 2.1 % (ref 0–5)
INR BLD: 1.7
LYMPHOCYTES ABSOLUTE: 1.87 E9/L (ref 1.5–4)
LYMPHOCYTES RELATIVE PERCENT: 13.7 % (ref 20–42)
MAGNESIUM: 2 MG/DL (ref 1.6–2.6)
MCH RBC QN AUTO: 30.8 PG (ref 26–35)
MCHC RBC AUTO-ENTMCNC: 34.5 % (ref 32–34.5)
MCV RBC AUTO: 89.2 FL (ref 80–99.9)
METER GLUCOSE: 133 MG/DL (ref 74–99)
METER GLUCOSE: 182 MG/DL (ref 74–99)
METER GLUCOSE: 214 MG/DL (ref 74–99)
METER GLUCOSE: 218 MG/DL (ref 74–99)
MONOCYTES ABSOLUTE: 1.96 E9/L (ref 0.1–0.95)
MONOCYTES RELATIVE PERCENT: 14.4 % (ref 2–12)
NEUTROPHILS ABSOLUTE: 8.96 E9/L (ref 1.8–7.3)
NEUTROPHILS RELATIVE PERCENT: 65.9 % (ref 43–80)
OSMOLALITY URINE: 382 MOSM/KG (ref 300–900)
OVALOCYTES: ABNORMAL
PAPPENHEIMER BODIES: ABNORMAL
PDW BLD-RTO: 18.3 FL (ref 11.5–15)
PHOSPHORUS: 3.1 MG/DL (ref 2.5–4.5)
PLATELET # BLD: 178 E9/L (ref 130–450)
PMV BLD AUTO: 9.9 FL (ref 7–12)
POIKILOCYTES: ABNORMAL
POLYCHROMASIA: ABNORMAL
POTASSIUM SERPL-SCNC: 3.8 MMOL/L (ref 3.5–5)
POTASSIUM SERPL-SCNC: 3.8 MMOL/L (ref 3.5–5)
POTASSIUM SERPL-SCNC: 3.9 MMOL/L (ref 3.5–5)
PROTHROMBIN TIME: 20.5 SEC (ref 9.3–12.4)
RBC # BLD: 3.8 E12/L (ref 3.5–5.5)
SODIUM BLD-SCNC: 122 MMOL/L (ref 132–146)
SODIUM BLD-SCNC: 122 MMOL/L (ref 132–146)
SODIUM BLD-SCNC: 126 MMOL/L (ref 132–146)
SODIUM URINE: 20 MMOL/L
TOTAL PROTEIN: 5.5 G/DL (ref 6.4–8.3)
WBC # BLD: 13.6 E9/L (ref 4.5–11.5)

## 2020-04-14 PROCEDURE — 83935 ASSAY OF URINE OSMOLALITY: CPT

## 2020-04-14 PROCEDURE — 2580000003 HC RX 258: Performed by: NURSE PRACTITIONER

## 2020-04-14 PROCEDURE — 36415 COLL VENOUS BLD VENIPUNCTURE: CPT

## 2020-04-14 PROCEDURE — 85610 PROTHROMBIN TIME: CPT

## 2020-04-14 PROCEDURE — 2580000003 HC RX 258: Performed by: PHYSICIAN ASSISTANT

## 2020-04-14 PROCEDURE — 6370000000 HC RX 637 (ALT 250 FOR IP): Performed by: NURSE PRACTITIONER

## 2020-04-14 PROCEDURE — 1200000000 HC SEMI PRIVATE

## 2020-04-14 PROCEDURE — 84100 ASSAY OF PHOSPHORUS: CPT

## 2020-04-14 PROCEDURE — 97530 THERAPEUTIC ACTIVITIES: CPT | Performed by: PHYSICAL THERAPIST

## 2020-04-14 PROCEDURE — 6370000000 HC RX 637 (ALT 250 FOR IP): Performed by: INTERNAL MEDICINE

## 2020-04-14 PROCEDURE — 6360000002 HC RX W HCPCS: Performed by: NURSE PRACTITIONER

## 2020-04-14 PROCEDURE — 80048 BASIC METABOLIC PNL TOTAL CA: CPT

## 2020-04-14 PROCEDURE — 6370000000 HC RX 637 (ALT 250 FOR IP): Performed by: PHYSICIAN ASSISTANT

## 2020-04-14 PROCEDURE — 82140 ASSAY OF AMMONIA: CPT

## 2020-04-14 PROCEDURE — 83735 ASSAY OF MAGNESIUM: CPT

## 2020-04-14 PROCEDURE — 84300 ASSAY OF URINE SODIUM: CPT

## 2020-04-14 PROCEDURE — 97535 SELF CARE MNGMENT TRAINING: CPT

## 2020-04-14 PROCEDURE — 2580000003 HC RX 258: Performed by: INTERNAL MEDICINE

## 2020-04-14 PROCEDURE — 80076 HEPATIC FUNCTION PANEL: CPT

## 2020-04-14 PROCEDURE — 82962 GLUCOSE BLOOD TEST: CPT

## 2020-04-14 PROCEDURE — 85025 COMPLETE CBC W/AUTO DIFF WBC: CPT

## 2020-04-14 PROCEDURE — 82436 ASSAY OF URINE CHLORIDE: CPT

## 2020-04-14 RX ORDER — SODIUM CHLORIDE 9 MG/ML
INJECTION, SOLUTION INTRAVENOUS CONTINUOUS
Status: DISCONTINUED | OUTPATIENT
Start: 2020-04-14 | End: 2020-04-16

## 2020-04-14 RX ADMIN — RIFAXIMIN 550 MG: 550 TABLET ORAL at 20:11

## 2020-04-14 RX ADMIN — INSULIN GLARGINE 30 UNITS: 100 INJECTION, SOLUTION SUBCUTANEOUS at 20:11

## 2020-04-14 RX ADMIN — MONTELUKAST SODIUM 10 MG: 10 TABLET, FILM COATED ORAL at 08:33

## 2020-04-14 RX ADMIN — MICONAZOLE NITRATE: 2 OINTMENT TOPICAL at 20:10

## 2020-04-14 RX ADMIN — LACTULOSE 13.33 G: 20 SOLUTION ORAL at 14:43

## 2020-04-14 RX ADMIN — SODIUM CHLORIDE, PRESERVATIVE FREE 2 G: 5 INJECTION INTRAVENOUS at 14:43

## 2020-04-14 RX ADMIN — SPIRONOLACTONE 25 MG: 25 TABLET ORAL at 08:32

## 2020-04-14 RX ADMIN — PETROLATUM: 420 OINTMENT TOPICAL at 20:10

## 2020-04-14 RX ADMIN — LACTULOSE 13.33 G: 20 SOLUTION ORAL at 08:31

## 2020-04-14 RX ADMIN — RIFAXIMIN 550 MG: 550 TABLET ORAL at 08:33

## 2020-04-14 RX ADMIN — SODIUM CHLORIDE: 9 INJECTION, SOLUTION INTRAVENOUS at 22:41

## 2020-04-14 RX ADMIN — PETROLATUM: 420 OINTMENT TOPICAL at 08:31

## 2020-04-14 RX ADMIN — INSULIN LISPRO 6 UNITS: 100 INJECTION, SOLUTION INTRAVENOUS; SUBCUTANEOUS at 16:18

## 2020-04-14 RX ADMIN — LACTULOSE 13.33 G: 20 SOLUTION ORAL at 20:10

## 2020-04-14 RX ADMIN — INSULIN LISPRO 3 UNITS: 100 INJECTION, SOLUTION INTRAVENOUS; SUBCUTANEOUS at 11:16

## 2020-04-14 RX ADMIN — INSULIN LISPRO 1 UNITS: 100 INJECTION, SOLUTION INTRAVENOUS; SUBCUTANEOUS at 20:13

## 2020-04-14 RX ADMIN — HYDROCODONE BITARTRATE AND ACETAMINOPHEN 1 TABLET: 5; 325 TABLET ORAL at 20:21

## 2020-04-14 RX ADMIN — ATORVASTATIN CALCIUM 40 MG: 40 TABLET, FILM COATED ORAL at 20:11

## 2020-04-14 RX ADMIN — PANTOPRAZOLE SODIUM 40 MG: 40 TABLET, DELAYED RELEASE ORAL at 08:33

## 2020-04-14 RX ADMIN — MICONAZOLE NITRATE: 2 OINTMENT TOPICAL at 08:31

## 2020-04-14 RX ADMIN — VENLAFAXINE HYDROCHLORIDE 37.5 MG: 37.5 CAPSULE, EXTENDED RELEASE ORAL at 08:33

## 2020-04-14 RX ADMIN — SODIUM CHLORIDE, PRESERVATIVE FREE 10 ML: 5 INJECTION INTRAVENOUS at 08:32

## 2020-04-14 RX ADMIN — SODIUM CHLORIDE: 9 INJECTION, SOLUTION INTRAVENOUS at 12:37

## 2020-04-14 ASSESSMENT — PAIN DESCRIPTION - PAIN TYPE: TYPE: ACUTE PAIN

## 2020-04-14 ASSESSMENT — PAIN SCALES - GENERAL: PAINLEVEL_OUTOF10: 8

## 2020-04-14 ASSESSMENT — PAIN DESCRIPTION - DESCRIPTORS: DESCRIPTORS: ACHING

## 2020-04-14 ASSESSMENT — PAIN - FUNCTIONAL ASSESSMENT: PAIN_FUNCTIONAL_ASSESSMENT: ACTIVITIES ARE NOT PREVENTED

## 2020-04-14 ASSESSMENT — PAIN DESCRIPTION - PROGRESSION: CLINICAL_PROGRESSION: NOT CHANGED

## 2020-04-14 ASSESSMENT — PAIN DESCRIPTION - ONSET: ONSET: ON-GOING

## 2020-04-14 ASSESSMENT — PAIN DESCRIPTION - FREQUENCY: FREQUENCY: INTERMITTENT

## 2020-04-14 ASSESSMENT — PAIN DESCRIPTION - ORIENTATION: ORIENTATION: LEFT

## 2020-04-14 ASSESSMENT — PAIN DESCRIPTION - LOCATION: LOCATION: ABDOMEN

## 2020-04-14 NOTE — PROGRESS NOTES
Perfect serve message sent to Dr. Cotter Presser regarding sodium level of 122.     Electronically signed by Jh Pinzon RN on 4/14/2020 at 11:51 AM

## 2020-04-14 NOTE — CARE COORDINATION
Per QFR--- [place] met with family yesterday. Pt sleeping quietly at this time. Await family decisions. Will continue to follow.

## 2020-04-14 NOTE — PROGRESS NOTES
Name:  Georgia Mandujano  :  1955  MRN:  [id number]  Room:  [address] [place] [address]  DOS:  2020    [facility]  [facility]  Dr. Symone Sharpe M.D. Dr. Antonia Huffman M.D. DAYRON GreerO. Dr. Brian Lake M.D. DAYRON HensleyO.    -NP Progress Note-    PCP:  Margaux Hernandez DO  Admitting Physician:  Bryant Rollins MD  Chief Complaint:    Chief Complaint   Patient presents with    Fatigue     began last night    Bloated     abd distention, states usually gets paracentesis on Wednesday       Subjective  Patient resting in bed. Alert, oriented. Some abdominal pain. Denies nausea or vomiting. BM formed yesterday - no BM yet today.       Physical Examination  Vitals:  BP (!) 109/59   Pulse 100   Temp 98.1 °F (36.7 °C) (Oral)   Resp 18   Ht 5' 7\" (1.702 m)   Wt 211 lb 10.3 oz (96 kg)   SpO2 96%   BMI 33.15 kg/m²   General Appearance:  awake, alert, and oriented to person, place, time, and purpose; appears stated age and cooperative; no apparent distress no labored breathing  HEENT:  PERRL; EOMI; sclera clear; buccal mucosa moist  Neck:  supple; trachea midline; no thyromegaly; no JVD; no bruits  Heart:  rhythm regular; rate controlled; no murmurs  Lungs:  symmetrical; clear to auscultation bilaterally; no wheezes; no rhonchi; no rales  Abdomen:  soft, mild generalized tenderness without rebound tenderness, less distended; bowel sounds positive; no palpable organomegaly or masses  Extremities:  peripheral pulses present; BLE edema; no ulcers  Neurologic:  alert and oriented x 3; no focal deficit; cranial nerves grossly intact  Skin:  no petechia; no hemorrhage; no wounds    Medications  Scheduled Meds    spironolactone  25 mg Oral Daily    cefTRIAXone (ROCEPHIN) IV  2 g Intravenous Q24H    insulin lispro  0-18 Units Subcutaneous TID WC    lactulose  20 mL Oral TID    white petrolatum   Topical BID    miconazole nitrate   Topical BID    amLODIPine  5 mg Oral Daily    atorvastatin  40 mg Oral Nightly    montelukast  10 mg Oral QAM    pantoprazole  40 mg Oral Daily    rifaximin  550 mg Oral BID    venlafaxine  37.5 mg Oral Daily    insulin lispro  0-3 Units Subcutaneous Nightly    sodium chloride flush  10 mL Intravenous 2 times per day    insulin glargine  30 Units Subcutaneous Nightly     Infusion Meds    dextrose       PRN Meds amitriptyline, HYDROcodone-acetaminophen, glucose, dextrose, glucagon (rDNA), dextrose, sodium chloride flush, potassium chloride **OR** potassium alternative oral replacement **OR** potassium chloride, acetaminophen **OR** acetaminophen, magnesium hydroxide, promethazine **OR** ondansetron, albuterol    Laboratory Data  Recent Results (from the past 24 hour(s))   Basic Metabolic Panel    Collection Time: 04/13/20  9:20 AM   Result Value Ref Range    Sodium 128 (L) 132 - 146 mmol/L    Potassium 3.4 (L) 3.5 - 5.0 mmol/L    Chloride 94 (L) 98 - 107 mmol/L    CO2 20 (L) 22 - 29 mmol/L    Anion Gap 14 7 - 16 mmol/L    Glucose 104 (H) 74 - 99 mg/dL    BUN 31 (H) 8 - 23 mg/dL    CREATININE 1.3 (H) 0.5 - 1.0 mg/dL    GFR Non-African American 41 >=60 mL/min/1.73    GFR African American 50     Calcium 8.3 (L) 8.6 - 10.2 mg/dL   Body fluid cell count with differential    Collection Time: 04/13/20 11:05 AM   Result Value Ref Range    Cell Count Fluid Type Peritoneal     Color, Fluid Yellow     Appearance, Fluid Clear     Nucl Cell, Fluid 245 /uL    RBC, Fluid <2,000 /uL    Neutrophil Count, Fluid 42 %    Monocyte Count, Fluid 58 %   Culture, Body Fluid    Collection Time: 04/13/20 11:05 AM   Result Value Ref Range    Body Fluid Culture, Sterile Growth not present, incubation continues     Gram Stain Result Refer to ordered Gram stain for results    Amylase, body fluid    Collection Time: 04/13/20 11:05 AM   Result Value Ref Range    Amylase, Fluid 25 Not Established U/L   Albumin, fluid    Collection Time: 04/13/20 11:05 AM   Result Value Ref Range Albumin, Fluid 0.7 Not Established g/dL   Protein, body fluid    Collection Time: 04/13/20 11:05 AM   Result Value Ref Range    Protein, Fluid 1.0 Not Established g/dL   Lactate dehydrogenase, body fluid    Collection Time: 04/13/20 11:05 AM   Result Value Ref Range    LD, Fluid 47 Not Established U/L   Gram stain    Collection Time: 04/13/20 11:05 AM   Result Value Ref Range    Gram Stain Orderable       Gram stain performed on unspun fluid  Polymorphonuclear leukocytes not seen  Epithelial cells not seen  Rare Mononuclear leukocytes  No organisms seen.      Glucose, body fluid    Collection Time: 04/13/20 11:05 AM   Result Value Ref Range    Glucose, Fluid 127 Not Established mg/dL   POCT Glucose    Collection Time: 04/13/20 11:40 AM   Result Value Ref Range    Meter Glucose 91 74 - 99 mg/dL   Basic Metabolic Panel    Collection Time: 04/13/20  2:55 PM   Result Value Ref Range    Sodium 130 (L) 132 - 146 mmol/L    Potassium 3.8 3.5 - 5.0 mmol/L    Chloride 95 (L) 98 - 107 mmol/L    CO2 20 (L) 22 - 29 mmol/L    Anion Gap 15 7 - 16 mmol/L    Glucose 167 (H) 74 - 99 mg/dL    BUN 29 (H) 8 - 23 mg/dL    CREATININE 1.3 (H) 0.5 - 1.0 mg/dL    GFR Non-African American 41 >=60 mL/min/1.73    GFR African American 50     Calcium 8.5 (L) 8.6 - 10.2 mg/dL   POCT Glucose    Collection Time: 04/13/20  4:22 PM   Result Value Ref Range    Meter Glucose 151 (H) 74 - 99 mg/dL   Basic Metabolic Panel    Collection Time: 04/13/20  9:20 PM   Result Value Ref Range    Sodium 127 (L) 132 - 146 mmol/L    Potassium 3.7 3.5 - 5.0 mmol/L    Chloride 93 (L) 98 - 107 mmol/L    CO2 18 (L) 22 - 29 mmol/L    Anion Gap 16 7 - 16 mmol/L    Glucose 180 (H) 74 - 99 mg/dL    BUN 29 (H) 8 - 23 mg/dL    CREATININE 1.3 (H) 0.5 - 1.0 mg/dL    GFR Non-African American 41 >=60 mL/min/1.73    GFR African American 50     Calcium 8.3 (L) 8.6 - 10.2 mg/dL   POCT Glucose    Collection Time: 04/13/20  9:26 PM   Result Value Ref Range    Meter Glucose 150 (H) 74 - 99 mg/dL   Basic Metabolic Panel    Collection Time: 04/14/20  3:10 AM   Result Value Ref Range    Sodium 126 (L) 132 - 146 mmol/L    Potassium 3.9 3.5 - 5.0 mmol/L    Chloride 94 (L) 98 - 107 mmol/L    CO2 19 (L) 22 - 29 mmol/L    Anion Gap 13 7 - 16 mmol/L    Glucose 158 (H) 74 - 99 mg/dL    BUN 30 (H) 8 - 23 mg/dL    CREATININE 1.3 (H) 0.5 - 1.0 mg/dL    GFR Non-African American 41 >=60 mL/min/1.73    GFR African American 50     Calcium 8.3 (L) 8.6 - 10.2 mg/dL   CBC WITH AUTO DIFFERENTIAL    Collection Time: 04/14/20  6:15 AM   Result Value Ref Range    WBC 13.6 (H) 4.5 - 11.5 E9/L    RBC 3.80 3.50 - 5.50 E12/L    Hemoglobin 11.7 11.5 - 15.5 g/dL    Hematocrit 33.9 (L) 34.0 - 48.0 %    MCV 89.2 80.0 - 99.9 fL    MCH 30.8 26.0 - 35.0 pg    MCHC 34.5 32.0 - 34.5 %    RDW 18.3 (H) 11.5 - 15.0 fL    Platelets 526 563 - 872 E9/L    MPV 9.9 7.0 - 12.0 fL    Neutrophils % 65.9 43.0 - 80.0 %    Immature Granulocytes % 2.1 0.0 - 5.0 %    Lymphocytes % 13.7 (L) 20.0 - 42.0 %    Monocytes % 14.4 (H) 2.0 - 12.0 %    Eosinophils % 3.1 0.0 - 6.0 %    Basophils % 0.8 0.0 - 2.0 %    Neutrophils Absolute 8.96 (H) 1.80 - 7.30 E9/L    Immature Granulocytes # 0.29 E9/L    Lymphocytes Absolute 1.87 1.50 - 4.00 E9/L    Monocytes Absolute 1.96 (H) 0.10 - 0.95 E9/L    Eosinophils Absolute 0.42 0.05 - 0.50 E9/L    Basophils Absolute 0.11 0.00 - 0.20 E9/L    Anisocytosis 1+     Polychromasia 1+     Poikilocytes 2+     Gable Cells 2+     Ovalocytes 1+     Pappenheimer Bodies 1+    Hepatic function panel    Collection Time: 04/14/20  6:15 AM   Result Value Ref Range    Total Protein 5.5 (L) 6.4 - 8.3 g/dL    Alb 3.1 (L) 3.5 - 5.2 g/dL    Alkaline Phosphatase 130 (H) 35 - 104 U/L    ALT 30 0 - 32 U/L    AST 62 (H) 0 - 31 U/L    Total Bilirubin 1.2 0.0 - 1.2 mg/dL    Bilirubin, Direct 0.5 (H) 0.0 - 0.3 mg/dL    Bilirubin, Indirect 0.7 0.0 - 1.0 mg/dL   Protime-INR    Collection Time: 04/14/20  6:15 AM   Result Value Ref Range    Protime 20.5 Chest Portable    Result Date: 2020  Patient MRN: [id number] : 1955 Age:  71 years Gender: Female Order Date: 2020 11:30 AM Exam: XR CHEST PORTABLE Number of Images: 1 view Indication:  Weakness, fatigue Comparison: Anterior upright chest studies March 10, 2020 2020 and February 10, 2020 Findings: The lungs are symmetrically underinflated, suggesting expiratory imaging artifact, but otherwise are clear. Patient is of large habitus, which can contribute diminished pulmonary expansion. There is some interstitial density in the left lower lung which is unchanged from the prior exam, possibly representing subtle interstitial pneumonitis or subsegmental atelectasis. No progressive airspace disease is identified. . There is no evidence of pneumothorax or pleural effusion. Cardiovascular shadows are normal in appearance. There is no evidence of cardiac enlargement or decompensation. Skeletal structures show no evidence of acute pathology. Density in the left neck may be surgical clips. Overlying EKG leads are present. Subtle interstitial density in the left lower lung is new since February 10, 2020, but is unchanged from March 10, 2020 study and could represent subsegmental atelectasis or minimal left lower lobe interstitial pneumonia. There is no evidence of consolidation or effusion, no evidence of cardiac enlargement or decompensation. Us Guided Paracentesis    Result Date: 2020  Patient MRN:  [id number] : 1955 Age: 71 years Gender: Female Order Date:  2020 12:45 PM EXAM: US GUIDED PARACENTESIS NUMBER OF IMAGES:  7 INDICATION:  paracentesis COMPARISON: None PARACENTESIS: Prior to start of procedure, routine scanning of the abdomen was performed using real-time ultrasound and revealed moderate amount ascites detected. Images were saved into PACs. Informed consent was obtained. A preprocedure timeout occurred at 1250 hours. The patient's abdomen was sterilely prepped and draped. peritoneal access was achieved. The catheter was removed at the end of the procedure. The patient tolerated the procedure well. There were no complications. The patient was released in stable condition. A total of 5000cc of clear yellow colored fluid was removed. Successful paracentesis. . This examination was performed and dictated by Franchesca Shahid PA-C with indirect supervision and Jordan Bah MD reviewed and concurred with the findings. Us Guided Paracentesis    Result Date: 3/25/2020  Patient MRN:  [id number] : 1955 Age: 71 years Gender: Female Order Date:  3/25/2020 11:27 AM Exam: US GUIDED PARACENTESIS Number of Images:  Five Medication: Lidocaine 1% subcutaneous. Indication: R18.8 Other ascites Other ascites Comparison: None. PROCEDURE DETAILS AND FINDINGS: Informed consent was obtained prior to the procedure. Ultrasound examination of the abdomen demonstrated ascites. Images were saved to PACS. The patient's abdomen was sterilely prepped and draped. A preprocedure timeout was called. Lidocaine was subcutaneously administered for local anesthesia. Under ultrasound guidance, a 5-[place] Yueh centesis catheter with inner stylet was percutaneously advanced into the peritoneal space in the right lower abdomen. The stylet was removed, and peritoneal fluid was drained. A total of approximately 5000 milliliters of dark tom fluid was removed. The catheter was removed from the abdomen. The site was cleaned, and a sterile dressing was applied. There were no complications. Ultrasound-guided paracentesis. This examination was performed by Emilee Solorzano.  TIM Joshi with indirect supervision by Lm Acosta MD.     Us Guided Paracentesis    Result Date: 3/16/2020  Patient MRN:  [id number] : 1955 Age: 71 years Gender: Female Order Date:  3/16/2020 8:45 AM EXAM: US GUIDED PARACENTESIS NUMBER OF IMAGES:  2 INDICATION: R18.8 Other ascites therapeutic paracentesis COMPARISON: None PROCEDURE DETAILS was removed at the end of the procedure. The patient tolerated the procedure well. There were no complications. The patient was released in stable condition. Successful paracentesis. This procedure was performed and dictated by Madhu Tavares PA-C with indirect supervision, and Bo Ahuja. Flavia vAendano reviewed and concurred with the findings. Assessment and Plan  Patient is a 71 y.o. female on consult for cirrhosis and ascites.       1. Cirrhosis  -Viral and autoimmune serology negative  -No evidence of iron overload  -AFP = 5 (2/5/2020)  -US liver with no hepatic mass (2/11/2020)  -Most recent EGD 2018 with one varix not amendable to banding - will require repeat EGD - likely as outpatient  -Initial MELD = 14     2. Ascites  -Defer diuretics to nephrology  -Plan to continue serial paracentesis as outpatient  -Repeat paracentesis with 3L removed - no evidence of SBP, but borderline protein - recommend continuing Rocephin for now - Cipro or Bactrim daily on discharge for SBP prophylaxis  -Previously with borderline low protein in ascites as well  -Can consider evaluation at tertiary center for TIPS     3.  Hepatic encephalopathy  -Continue Xifaxan  -Titrate lactulose for 3-4 soft stools daily     4. Hyponatremia  -Per nephrology     5. Comorbidities  -History of CVA, hypertension, dyslipidemia, diabetes  -Per admitting / consultants      Continue current from GI POV. Defer diuretics to nephrology. Recommend daily Cipro or Bactrim on discharge for SBP prophylaxis. Continue lactulose and Xifaxan on discharge. Discussed need for compliance with these medications on discharge. Patient verbalizes understanding. No immediate interventions planned from GI POV. Will follow as needed while in the hospital.  Please call with any additional concerns. Patient to follow in our office in 3-4 weeks. Patient to call for appointment.  [phone].   Thank you for the opportunity to participate in the care of Ms.

## 2020-04-14 NOTE — PROGRESS NOTES
[facility] MD Janny Alvarez, MD Barbara Bustillo, CNP   Karla Han, TOMI  Progress Note    4/14/2020    SUBJECTIVE:   4/10: \"Tired. \"  Ongoing fatigue, malaise, generalized weakness. Anorexia with almost nothing to eat, though she is drinking \"when they bring the water. \"  No dyspnea at rest.  No chest pain or palpitations. 4/11: Asleep, easily awakened. No new complaint. 4/12: Ongoing fatigue, malaise. Tired. Sleeping most of the day. Abdominal discomfort developed over the course of today, distention getting much worse. No dyspnea at rest on room air. No peripheral swelling. Appetite and intake remain poor  4/13:  Laying in bed. Continues to feel weak,malaise and fatigued. NAD. Appetite and intake remain poor. Denies chest pain or SOB. Denies abd pain  4/14:  Crying in chair. Requesting to get back into bed. Feels weak and malaise. Denies chest pa in or SOB. Denies nausea, vomiting or abdominal pain. Delta Quick     PROBLEM LIST:    Principal Problem:    Hyponatremia  Active Problems:    Cirrhosis of liver with ascites (HCC)    Diabetes mellitus type 2, uncontrolled (HCC)    Hyperlipidemia LDL goal <100    History of CVA (cerebrovascular accident)    Essential hypertension    Severe protein-calorie malnutrition (HCC)  Resolved Problems:    Hyponatremia         DIET:    DIET CARB CONTROL; Daily Fluid Restriction: 1500 ml     MEDS (scheduled):    spironolactone  25 mg Oral Daily    cefTRIAXone (ROCEPHIN) IV  2 g Intravenous Q24H    insulin lispro  0-18 Units Subcutaneous TID WC    lactulose  20 mL Oral TID    white petrolatum   Topical BID    miconazole nitrate   Topical BID    amLODIPine  5 mg Oral Daily    atorvastatin  40 mg Oral Nightly    montelukast  10 mg Oral QAM    pantoprazole  40 mg Oral Daily    rifaximin  550 mg Oral BID    venlafaxine  37.5 mg Oral Daily    insulin lispro  0-3 Units Subcutaneous Nightly    sodium Abe Terry CNP on 4/14/2020 at 5:03 PM

## 2020-04-14 NOTE — PROGRESS NOTES
Perfect serve message sent to Dr. Chary Young regarding sodium level of 122 this evening.     Electronically signed by Cristina Durbin RN on 4/14/2020 at 6:34 PM

## 2020-04-15 LAB
ANION GAP SERPL CALCULATED.3IONS-SCNC: 11 MMOL/L (ref 7–16)
ANION GAP SERPL CALCULATED.3IONS-SCNC: 12 MMOL/L (ref 7–16)
BODY FLUID CULTURE, STERILE: NORMAL
BUN BLDV-MCNC: 28 MG/DL (ref 8–23)
BUN BLDV-MCNC: 29 MG/DL (ref 8–23)
CALCIUM SERPL-MCNC: 8.3 MG/DL (ref 8.6–10.2)
CALCIUM SERPL-MCNC: 8.4 MG/DL (ref 8.6–10.2)
CHLORIDE BLD-SCNC: 94 MMOL/L (ref 98–107)
CHLORIDE BLD-SCNC: 95 MMOL/L (ref 98–107)
CO2: 19 MMOL/L (ref 22–29)
CO2: 19 MMOL/L (ref 22–29)
CREAT SERPL-MCNC: 1.1 MG/DL (ref 0.5–1)
CREAT SERPL-MCNC: 1.2 MG/DL (ref 0.5–1)
GFR AFRICAN AMERICAN: 54
GFR AFRICAN AMERICAN: >60
GFR NON-AFRICAN AMERICAN: 45 ML/MIN/1.73
GFR NON-AFRICAN AMERICAN: 50 ML/MIN/1.73
GLUCOSE BLD-MCNC: 153 MG/DL (ref 74–99)
GLUCOSE BLD-MCNC: 179 MG/DL (ref 74–99)
GRAM STAIN RESULT: NORMAL
METER GLUCOSE: 140 MG/DL (ref 74–99)
METER GLUCOSE: 140 MG/DL (ref 74–99)
METER GLUCOSE: 144 MG/DL (ref 74–99)
METER GLUCOSE: 221 MG/DL (ref 74–99)
POTASSIUM SERPL-SCNC: 3.6 MMOL/L (ref 3.5–5)
POTASSIUM SERPL-SCNC: 3.7 MMOL/L (ref 3.5–5)
SODIUM BLD-SCNC: 124 MMOL/L (ref 132–146)
SODIUM BLD-SCNC: 126 MMOL/L (ref 132–146)

## 2020-04-15 PROCEDURE — 6370000000 HC RX 637 (ALT 250 FOR IP): Performed by: PHYSICIAN ASSISTANT

## 2020-04-15 PROCEDURE — 6370000000 HC RX 637 (ALT 250 FOR IP): Performed by: INTERNAL MEDICINE

## 2020-04-15 PROCEDURE — 2580000003 HC RX 258: Performed by: INTERNAL MEDICINE

## 2020-04-15 PROCEDURE — 6360000002 HC RX W HCPCS: Performed by: PHYSICIAN ASSISTANT

## 2020-04-15 PROCEDURE — 97530 THERAPEUTIC ACTIVITIES: CPT | Performed by: PHYSICAL THERAPIST

## 2020-04-15 PROCEDURE — 80048 BASIC METABOLIC PNL TOTAL CA: CPT

## 2020-04-15 PROCEDURE — 6360000002 HC RX W HCPCS: Performed by: NURSE PRACTITIONER

## 2020-04-15 PROCEDURE — 36415 COLL VENOUS BLD VENIPUNCTURE: CPT

## 2020-04-15 PROCEDURE — 6370000000 HC RX 637 (ALT 250 FOR IP): Performed by: NURSE PRACTITIONER

## 2020-04-15 PROCEDURE — 97530 THERAPEUTIC ACTIVITIES: CPT

## 2020-04-15 PROCEDURE — 82962 GLUCOSE BLOOD TEST: CPT

## 2020-04-15 PROCEDURE — 2580000003 HC RX 258: Performed by: NURSE PRACTITIONER

## 2020-04-15 PROCEDURE — 1200000000 HC SEMI PRIVATE

## 2020-04-15 RX ADMIN — LACTULOSE 13.33 G: 20 SOLUTION ORAL at 20:15

## 2020-04-15 RX ADMIN — VENLAFAXINE HYDROCHLORIDE 37.5 MG: 37.5 CAPSULE, EXTENDED RELEASE ORAL at 09:05

## 2020-04-15 RX ADMIN — HYDROCODONE BITARTRATE AND ACETAMINOPHEN 1 TABLET: 5; 325 TABLET ORAL at 20:16

## 2020-04-15 RX ADMIN — INSULIN LISPRO 6 UNITS: 100 INJECTION, SOLUTION INTRAVENOUS; SUBCUTANEOUS at 11:54

## 2020-04-15 RX ADMIN — LACTULOSE 13.33 G: 20 SOLUTION ORAL at 14:36

## 2020-04-15 RX ADMIN — SODIUM CHLORIDE, PRESERVATIVE FREE 2 G: 5 INJECTION INTRAVENOUS at 14:35

## 2020-04-15 RX ADMIN — PETROLATUM: 420 OINTMENT TOPICAL at 09:07

## 2020-04-15 RX ADMIN — RIFAXIMIN 550 MG: 550 TABLET ORAL at 09:05

## 2020-04-15 RX ADMIN — SODIUM CHLORIDE: 9 INJECTION, SOLUTION INTRAVENOUS at 18:46

## 2020-04-15 RX ADMIN — MICONAZOLE NITRATE: 2 OINTMENT TOPICAL at 20:17

## 2020-04-15 RX ADMIN — PETROLATUM: 420 OINTMENT TOPICAL at 20:17

## 2020-04-15 RX ADMIN — PANTOPRAZOLE SODIUM 40 MG: 40 TABLET, DELAYED RELEASE ORAL at 09:05

## 2020-04-15 RX ADMIN — MICONAZOLE NITRATE: 2 OINTMENT TOPICAL at 09:07

## 2020-04-15 RX ADMIN — SODIUM CHLORIDE: 9 INJECTION, SOLUTION INTRAVENOUS at 07:44

## 2020-04-15 RX ADMIN — INSULIN GLARGINE 30 UNITS: 100 INJECTION, SOLUTION SUBCUTANEOUS at 20:14

## 2020-04-15 RX ADMIN — MONTELUKAST SODIUM 10 MG: 10 TABLET, FILM COATED ORAL at 09:05

## 2020-04-15 RX ADMIN — ONDANSETRON 4 MG: 2 INJECTION INTRAMUSCULAR; INTRAVENOUS at 16:03

## 2020-04-15 RX ADMIN — RIFAXIMIN 550 MG: 550 TABLET ORAL at 20:16

## 2020-04-15 RX ADMIN — ATORVASTATIN CALCIUM 40 MG: 40 TABLET, FILM COATED ORAL at 20:16

## 2020-04-15 RX ADMIN — INSULIN LISPRO 1 UNITS: 100 INJECTION, SOLUTION INTRAVENOUS; SUBCUTANEOUS at 06:29

## 2020-04-15 RX ADMIN — SPIRONOLACTONE 25 MG: 25 TABLET ORAL at 09:05

## 2020-04-15 RX ADMIN — LACTULOSE 13.33 G: 20 SOLUTION ORAL at 09:06

## 2020-04-15 ASSESSMENT — PAIN SCALES - GENERAL
PAINLEVEL_OUTOF10: 0
PAINLEVEL_OUTOF10: 8

## 2020-04-15 ASSESSMENT — PAIN DESCRIPTION - ONSET: ONSET: ON-GOING

## 2020-04-15 ASSESSMENT — PAIN DESCRIPTION - PROGRESSION: CLINICAL_PROGRESSION: NOT CHANGED

## 2020-04-15 ASSESSMENT — PAIN DESCRIPTION - FREQUENCY: FREQUENCY: INTERMITTENT

## 2020-04-15 ASSESSMENT — PAIN DESCRIPTION - ORIENTATION: ORIENTATION: LEFT

## 2020-04-15 ASSESSMENT — PAIN DESCRIPTION - PAIN TYPE: TYPE: ACUTE PAIN

## 2020-04-15 ASSESSMENT — PAIN DESCRIPTION - DESCRIPTORS: DESCRIPTORS: ACHING

## 2020-04-15 ASSESSMENT — PAIN DESCRIPTION - LOCATION: LOCATION: ABDOMEN

## 2020-04-15 ASSESSMENT — PAIN - FUNCTIONAL ASSESSMENT: PAIN_FUNCTIONAL_ASSESSMENT: ACTIVITIES ARE NOT PREVENTED

## 2020-04-15 NOTE — PLAN OF CARE
Problem: Falls - Risk of:  Goal: Will remain free from falls  Description: Will remain free from falls  Outcome: Met This Shift  Goal: Absence of physical injury  Description: Absence of physical injury  Outcome: Met This Shift     Problem: Pain:  Goal: Pain level will decrease  Description: Pain level will decrease  Outcome: Met This Shift  Goal: Control of acute pain  Description: Control of acute pain  Outcome: Met This Shift

## 2020-04-15 NOTE — PROGRESS NOTES
< >  --    < > 3.8 3.7 3.6   CL 92*   < >  --    < > 90* 94* 95*   CO2 19*   < >  --    < > 20* 19* 19*   MG 2.2  --  2.0  --   --   --   --    PHOS  --   --  3.1  --   --   --   --    BUN 32*   < >  --    < > 30* 29* 28*   CREATININE 1.4*   < >  --    < > 1.3* 1.2* 1.1*   ALT 32  --  30  --   --   --   --    AST 64*  --  62*  --   --   --   --    BILIDIR  --   --  0.5*  --   --   --   --    BILITOT 1.4*  --  1.2  --   --   --   --    ALKPHOS 265*  --  130*  --   --   --   --     < > = values in this interval not displayed. Lab Results   Component Value Date    LABPROT 0.4 (H) 04/09/2020    LABPROT 0.4 04/09/2020    LABPROT 0.2 03/11/2020    LABPROT 0.2 03/11/2020       Assessment  71 y. o. longstanding history of cirrhosis, chronic/recurrent severe ascites, who undergoes weekly paracenteses q. Wednesday, presented with recurrent and severe hyponatremia     1.  Hyponatremia, severe hypervolemic, secondary to cirrhosis and water overload, exacerbated (mildly so) by hyperglycemia, and diarrhea, further exacerbated by venlafaxine. Urinary indices do not reflect prerenal scenario. Not certain of the timing of when the furosemide was administered relative to when the urine sample was sent.     2.  Cirrhosis, with recurrent and severe ascites.     3.  Hypertension    4. Hepatorenal syndrome type II.   Contributing to the persistent hyponatremia.     Urine studies reflect prerenal azotemia, likely due to intravascular volume contraction status post large-volume paracentesis, recent poor intake, loose stools, though also hepatorenal syndrome.        -Hyponatremia - dropped to 122 yesterday, recovering to 126 mmol/L as of this morning.  -Potassium wnl   -BP stable but on low side  -[facility] consulted today      Recommendations  -Continue IVF- NS at 100/hr  -BMP 2100- adjust tx as warranted  -Monitor I & O  -Await family decision on Hospice Care      -Would not institute fluid restriction  -Do not start salt

## 2020-04-15 NOTE — PATIENT CARE CONFERENCE
[facility] Quality Flow/Interdisciplinary Rounds Progress Note        Quality Flow Rounds held on April 15, 2020    Disciplines Attending:  Bedside Nurse, ,  and Nursing Unit Leadership    Chu Kimball was admitted on 4/8/2020 10:58 AM    Anticipated Discharge Date:  Expected Discharge Date: 04/11/20    Disposition:    Tarik Score:  Tarik Scale Score: 14    Readmission Risk              Risk of Unplanned Readmission:        43           Discussed patient goal for the day, patient clinical progression, and barriers to discharge.   The following Goal(s) of the Day/Commitment(s) have been identified:  Labs - Report Results      Devin García  April 15, 2020

## 2020-04-15 NOTE — PROGRESS NOTES
Physical Therapy goals. Continue with physical therapy current plan of care.     Time in  0922  Time YHI  2346     Total Treatment Time  13 minutes      Evaluation Time includes thorough review of current medical information, gathering information on past medical history/social history and prior level of function, completion of standardized testing/informal observation of tasks, assessment of data and education on plan of care and goals.     CPT codes:  []?? Low Complexity PT evaluation 90919  []? ? Moderate Complexity PT evaluation [id number]  []? Charlesetta Laws Complexity PT evaluation [id number]  []?? PT Re-evaluation [id number]  []? ? Gait training 79182 ** minutes  []? ? Manual therapy 29309 ** minutes  [x]? ? Therapeutic activities 33767 13  minutes  []? ? Therapeutic exercises 47585 ** minutes  []? ? Neuromuscular reeducation 48916 ** minutes      Joan Marroquin PT, [place] [id number]

## 2020-04-16 LAB
ANION GAP SERPL CALCULATED.3IONS-SCNC: 12 MMOL/L (ref 7–16)
BUN BLDV-MCNC: 25 MG/DL (ref 8–23)
CALCIUM SERPL-MCNC: 8.2 MG/DL (ref 8.6–10.2)
CHLORIDE BLD-SCNC: 99 MMOL/L (ref 98–107)
CO2: 17 MMOL/L (ref 22–29)
CREAT SERPL-MCNC: 1 MG/DL (ref 0.5–1)
CULTURE, STOOL: NORMAL
GFR AFRICAN AMERICAN: >60
GFR NON-AFRICAN AMERICAN: 56 ML/MIN/1.73
GLUCOSE BLD-MCNC: 150 MG/DL (ref 74–99)
METER GLUCOSE: 116 MG/DL (ref 74–99)
METER GLUCOSE: 122 MG/DL (ref 74–99)
METER GLUCOSE: 138 MG/DL (ref 74–99)
METER GLUCOSE: 139 MG/DL (ref 74–99)
POTASSIUM SERPL-SCNC: 3.9 MMOL/L (ref 3.5–5)
SODIUM BLD-SCNC: 128 MMOL/L (ref 132–146)

## 2020-04-16 PROCEDURE — 2580000003 HC RX 258: Performed by: INTERNAL MEDICINE

## 2020-04-16 PROCEDURE — 1200000000 HC SEMI PRIVATE

## 2020-04-16 PROCEDURE — 82962 GLUCOSE BLOOD TEST: CPT

## 2020-04-16 PROCEDURE — 6360000002 HC RX W HCPCS: Performed by: NURSE PRACTITIONER

## 2020-04-16 PROCEDURE — 36415 COLL VENOUS BLD VENIPUNCTURE: CPT

## 2020-04-16 PROCEDURE — 80048 BASIC METABOLIC PNL TOTAL CA: CPT

## 2020-04-16 PROCEDURE — 2580000003 HC RX 258: Performed by: NURSE PRACTITIONER

## 2020-04-16 PROCEDURE — 6370000000 HC RX 637 (ALT 250 FOR IP): Performed by: INTERNAL MEDICINE

## 2020-04-16 PROCEDURE — 6370000000 HC RX 637 (ALT 250 FOR IP): Performed by: NURSE PRACTITIONER

## 2020-04-16 PROCEDURE — 6370000000 HC RX 637 (ALT 250 FOR IP): Performed by: PHYSICIAN ASSISTANT

## 2020-04-16 PROCEDURE — 2580000003 HC RX 258: Performed by: PHYSICIAN ASSISTANT

## 2020-04-16 RX ORDER — FUROSEMIDE 20 MG/1
20 TABLET ORAL DAILY
Status: DISCONTINUED | OUTPATIENT
Start: 2020-04-16 | End: 2020-04-17 | Stop reason: HOSPADM

## 2020-04-16 RX ADMIN — PETROLATUM: 420 OINTMENT TOPICAL at 09:44

## 2020-04-16 RX ADMIN — PETROLATUM: 420 OINTMENT TOPICAL at 21:52

## 2020-04-16 RX ADMIN — FUROSEMIDE 20 MG: 20 TABLET ORAL at 16:43

## 2020-04-16 RX ADMIN — SODIUM CHLORIDE, PRESERVATIVE FREE 2 G: 5 INJECTION INTRAVENOUS at 16:43

## 2020-04-16 RX ADMIN — RIFAXIMIN 550 MG: 550 TABLET ORAL at 21:51

## 2020-04-16 RX ADMIN — MICONAZOLE NITRATE: 2 OINTMENT TOPICAL at 21:52

## 2020-04-16 RX ADMIN — RIFAXIMIN 550 MG: 550 TABLET ORAL at 09:44

## 2020-04-16 RX ADMIN — SODIUM CHLORIDE: 9 INJECTION, SOLUTION INTRAVENOUS at 05:02

## 2020-04-16 RX ADMIN — MONTELUKAST SODIUM 10 MG: 10 TABLET, FILM COATED ORAL at 09:44

## 2020-04-16 RX ADMIN — VENLAFAXINE HYDROCHLORIDE 37.5 MG: 37.5 CAPSULE, EXTENDED RELEASE ORAL at 09:44

## 2020-04-16 RX ADMIN — AMLODIPINE BESYLATE 5 MG: 5 TABLET ORAL at 09:44

## 2020-04-16 RX ADMIN — LACTULOSE 13.33 G: 20 SOLUTION ORAL at 09:45

## 2020-04-16 RX ADMIN — LACTULOSE 13.33 G: 20 SOLUTION ORAL at 16:43

## 2020-04-16 RX ADMIN — PANTOPRAZOLE SODIUM 40 MG: 40 TABLET, DELAYED RELEASE ORAL at 09:44

## 2020-04-16 RX ADMIN — SPIRONOLACTONE 25 MG: 25 TABLET ORAL at 09:44

## 2020-04-16 RX ADMIN — MICONAZOLE NITRATE: 2 OINTMENT TOPICAL at 09:45

## 2020-04-16 RX ADMIN — AMITRIPTYLINE HYDROCHLORIDE 25 MG: 25 TABLET, FILM COATED ORAL at 21:51

## 2020-04-16 RX ADMIN — ATORVASTATIN CALCIUM 40 MG: 40 TABLET, FILM COATED ORAL at 21:51

## 2020-04-16 RX ADMIN — INSULIN GLARGINE 30 UNITS: 100 INJECTION, SOLUTION SUBCUTANEOUS at 21:52

## 2020-04-16 RX ADMIN — LACTULOSE 13.33 G: 20 SOLUTION ORAL at 21:50

## 2020-04-16 RX ADMIN — SODIUM CHLORIDE, PRESERVATIVE FREE 10 ML: 5 INJECTION INTRAVENOUS at 21:51

## 2020-04-16 ASSESSMENT — PAIN SCALES - GENERAL
PAINLEVEL_OUTOF10: 0
PAINLEVEL_OUTOF10: 0

## 2020-04-16 NOTE — PROGRESS NOTES
[facility] Nabor Hutchison MD  Governor Talat, MD Alf Guerra, CNP   Karla Han, TOMI  Progress Note    4/16/2020    SUBJECTIVE:   4/10: \"Tired. \"  Ongoing fatigue, malaise, generalized weakness. Anorexia with almost nothing to eat, though she is drinking \"when they bring the water. \"  No dyspnea at rest.  No chest pain or palpitations. 4/11: Asleep, easily awakened. No new complaint. 4/12: Ongoing fatigue, malaise. Tired. Sleeping most of the day. Abdominal discomfort developed over the course of today, distention getting much worse. No dyspnea at rest on room air. No peripheral swelling. Appetite and intake remain poor  4/13:  Laying in bed. Continues to feel weak,malaise and fatigued. NAD. Appetite and intake remain poor. Denies chest pain or SOB. Denies abd pain  4/14:  Crying in chair. Requesting to get back into bed. Feels weak and malaise. Denies chest pain or SOB. Denies nausea, vomiting or abdominal pain. .  4/15: Ongoing fatigue, malaise, depressed mood. Poor intake. Generalized weakness. Abdomen distended. 4/16: \"I am tired. \"  Thirsty, too.  ongoing fatigue, malaise, depression poor appetite and intake. Generalized weakness. Symptoms all persist.  Abdomen becoming increasingly distended since the time of her last paracentesis on Monday.       PROBLEM LIST:    Principal Problem:    Hyponatremia  Active Problems:    Cirrhosis of liver with ascites (HCC)    Diabetes mellitus type 2, uncontrolled ([facility] [address]    Hyperlipidemia LDL goal <100    History of CVA (cerebrovascular accident)    Essential hypertension    Severe protein-calorie malnutrition (HCC)  Resolved Problems:    Hyponatremia         DIET:    Dietary Nutrition Supplements: Frozen Oral Supplement  DIET CARB CONTROL; Daily Fluid Restriction: 1800 ml     MEDS (scheduled):    furosemide  20 mg Oral Daily    spironolactone  25 mg Oral Daily    cefTRIAXone (ROCEPHIN) IV  2 g 3.6 3.9   CL  --    < > 94* 95* 99   CO2  --    < > 19* 19* 17*   MG 2.0  --   --   --   --    PHOS 3.1  --   --   --   --    BUN  --    < > 29* 28* 25*   CREATININE  --    < > 1.2* 1.1* 1.0   ALT 30  --   --   --   --    AST 62*  --   --   --   --    BILIDIR 0.5*  --   --   --   --    BILITOT 1.2  --   --   --   --    ALKPHOS 130*  --   --   --   --     < > = values in this interval not displayed. Lab Results   Component Value Date    LABPROT 0.4 (H) 04/09/2020    LABPROT 0.4 04/09/2020    LABPROT 0.2 03/11/2020    LABPROT 0.2 03/11/2020       Assessment  71 y. o. longstanding history of cirrhosis, chronic/recurrent severe ascites, who undergoes weekly paracenteses q. Wednesday, presented with recurrent and severe hyponatremia     1.  Hyponatremia, severe hypervolemic, secondary to cirrhosis and water overload, exacerbated (mildly so) by hyperglycemia, and diarrhea, further exacerbated by venlafaxine. Urinary indices do not reflect prerenal scenario. Not certain of the timing of when the furosemide was administered relative to when the urine sample was sent.     2.  Cirrhosis, with recurrent and severe ascites.     3.  Hypertension    4. Hepatorenal syndrome type II.   Contributing to the persistent hyponatremia.     Urine studies reflect prerenal azotemia, likely due to intravascular volume contraction status post large-volume paracentesis, recent poor intake, loose stools, though also hepatorenal syndrome.        -Hyponatremia - dropped to 122 yesterday, recovering to 126 mmol/L as of this morning.  -Potassium wnl   -BP stable but on low side  [facility]      Recommendations  -Stop IV fluid  -Resume loop diuretic, Lasix 20 mg daily  -Titrate the Lasix dose to effect  -Continue spironolactone  -Monitor I & O  -Placement of pigtail catheter in peritoneal space has been entertained in the past consider doing so, perhaps just for palliation  -Await family decision on Hospice

## 2020-04-16 NOTE — PROGRESS NOTES
Occupational Therapy  Patient treatment attempted this PM.  Patient declined all activity/ ADLs despite encouragement from therapist due to feeling \"weak. \"  Therapist explained benefits of tx but pt continued to decline session. Will attempt at a later time.                                                  Miriam HUNT/YUN [id number]

## 2020-04-16 NOTE — PLAN OF CARE
Problem: Falls - Risk of:  Goal: Will remain free from falls  Description: Will remain free from falls  4/16/2020 0214 by Lynda Wang RN  Outcome: Met This Shift  4/15/2020 1357 by Judson Sanchez RN  Outcome: Met This Shift  Goal: Absence of physical injury  Description: Absence of physical injury  4/16/2020 0214 by Lynda Wang RN  Outcome: Met This Shift  4/15/2020 1357 by Judson Sanchez RN  Outcome: Met This Shift     Problem: Pain:  Goal: Pain level will decrease  Description: Pain level will decrease  4/16/2020 0214 by Lynda Wang RN  Outcome: Met This Shift  4/15/2020 1357 by Judson Sanchez RN  Outcome: Met This Shift  Goal: Control of acute pain  Description: Control of acute pain  4/16/2020 0214 by Lynda Wang RN  Outcome: Met This Shift  4/15/2020 1357 by Judson Sanchez RN  Outcome: Met This Shift  Goal: Control of chronic pain  Description: Control of chronic pain  Outcome: Met This Shift     Problem: Malnutrition  (NI-5.2)  Goal: Food and/or Nutrient Delivery  Description: Individualized approach for food/nutrient provision.   4/15/2020 1444 by Elier King RD, LD  Outcome: Met This Shift

## 2020-04-16 NOTE — PROGRESS NOTES
Subjective: The patient is awake and alert. Status post paracentesis (4/13/2020). No problems overnight. Denies chest pain, angina, and dyspnea. Denies abdominal pain. Tolerating diet. Does not eat muchNo nausea or vomiting. Still weak. Objective:    /63   Pulse 108   Temp 97.9 °F (36.6 °C) (Oral)   Resp 18   Ht 5' 7\" (1.702 m)   Wt 205 lb 0.4 oz (93 kg)   SpO2 95%   BMI 32.11 kg/m²     Current medications that patient is taking have been reviewed. Heart:  RRR, no murmurs, gallops, or rubs.   Lungs:  CTA bilaterally, no wheeze, rales or rhonchi  Abd: bowel sounds present, soft, nontender, distended, no masses  Extrem:  No cyanosis or edema    CBC with Differential:    Lab Results   Component Value Date    WBC 13.6 04/14/2020    RBC 3.80 04/14/2020    HGB 11.7 04/14/2020    HCT 33.9 04/14/2020     04/14/2020    MCV 89.2 04/14/2020    MCH 30.8 04/14/2020    MCHC 34.5 04/14/2020    RDW 18.3 04/14/2020    LYMPHOPCT 13.7 04/14/2020    MONOPCT 14.4 04/14/2020    BASOPCT 0.8 04/14/2020    MONOSABS 1.96 04/14/2020    LYMPHSABS 1.87 04/14/2020    EOSABS 0.42 04/14/2020    BASOSABS 0.11 04/14/2020     CMP:    Lab Results   Component Value Date     04/15/2020    K 3.6 04/15/2020    K 4.3 04/09/2020    CL 95 04/15/2020    CO2 19 04/15/2020    BUN 28 04/15/2020    CREATININE 1.1 04/15/2020    GFRAA >60 04/15/2020    LABGLOM 50 04/15/2020    GLUCOSE 153 04/15/2020    PROT 5.5 04/14/2020    LABALBU 3.1 04/14/2020    CALCIUM 8.3 04/15/2020    BILITOT 1.2 04/14/2020    ALKPHOS 130 04/14/2020    AST 62 04/14/2020    ALT 30 04/14/2020     BMP:    Lab Results   Component Value Date     04/15/2020    K 3.6 04/15/2020    K 4.3 04/09/2020    CL 95 04/15/2020    CO2 19 04/15/2020    BUN 28 04/15/2020    LABALBU 3.1 04/14/2020    CREATININE 1.1 04/15/2020    CALCIUM 8.3 04/15/2020    GFRAA >60 04/15/2020    LABGLOM 50 04/15/2020    GLUCOSE 153 04/15/2020     Magnesium:    Lab Results

## 2020-04-16 NOTE — PROGRESS NOTES
[facility] Quality Flow/Interdisciplinary Rounds Progress Note        Quality Flow Rounds held on April 16, 2020    Disciplines Attending:  Bedside Nurse, ,  and Nursing Unit Leadership    Brent Solo was admitted on 4/8/2020 10:58 AM    Anticipated Discharge Date:  Expected Discharge Date: 04/11/20    Disposition:    Tarik Score:  Tarik Scale Score: 14    Readmission Risk              Risk of Unplanned Readmission:        44           Discussed patient goal for the day, patient clinical progression, and barriers to discharge.   The following Goal(s) of the Day/Commitment(s) have been identified:  Diagnostics - Report Results      Janny Matson  April 16, 2020

## 2020-04-17 ENCOUNTER — APPOINTMENT (OUTPATIENT)
Dept: ULTRASOUND IMAGING | Age: 65
DRG: 432 | End: 2020-04-17
Payer: MEDICARE

## 2020-04-17 VITALS
OXYGEN SATURATION: 95 % | HEART RATE: 95 BPM | TEMPERATURE: 98 F | RESPIRATION RATE: 16 BRPM | BODY MASS INDEX: 33.91 KG/M2 | SYSTOLIC BLOOD PRESSURE: 117 MMHG | DIASTOLIC BLOOD PRESSURE: 62 MMHG | HEIGHT: 67 IN | WEIGHT: 216.05 LBS

## 2020-04-17 LAB
ANION GAP SERPL CALCULATED.3IONS-SCNC: 13 MMOL/L (ref 7–16)
BUN BLDV-MCNC: 26 MG/DL (ref 8–23)
CALCIUM SERPL-MCNC: 8.5 MG/DL (ref 8.6–10.2)
CHLORIDE BLD-SCNC: 100 MMOL/L (ref 98–107)
CO2: 19 MMOL/L (ref 22–29)
CREAT SERPL-MCNC: 1 MG/DL (ref 0.5–1)
GFR AFRICAN AMERICAN: >60
GFR NON-AFRICAN AMERICAN: 56 ML/MIN/1.73
GLUCOSE BLD-MCNC: 160 MG/DL (ref 74–99)
MAGNESIUM: 2 MG/DL (ref 1.6–2.6)
METER GLUCOSE: 137 MG/DL (ref 74–99)
METER GLUCOSE: 155 MG/DL (ref 74–99)
METER GLUCOSE: 214 MG/DL (ref 74–99)
PHOSPHORUS: 3.4 MG/DL (ref 2.5–4.5)
POTASSIUM SERPL-SCNC: 3.9 MMOL/L (ref 3.5–5)
SODIUM BLD-SCNC: 132 MMOL/L (ref 132–146)

## 2020-04-17 PROCEDURE — 97530 THERAPEUTIC ACTIVITIES: CPT

## 2020-04-17 PROCEDURE — 6370000000 HC RX 637 (ALT 250 FOR IP): Performed by: NURSE PRACTITIONER

## 2020-04-17 PROCEDURE — 97110 THERAPEUTIC EXERCISES: CPT

## 2020-04-17 PROCEDURE — 6360000002 HC RX W HCPCS: Performed by: INTERNAL MEDICINE

## 2020-04-17 PROCEDURE — 97535 SELF CARE MNGMENT TRAINING: CPT

## 2020-04-17 PROCEDURE — P9047 ALBUMIN (HUMAN), 25%, 50ML: HCPCS | Performed by: INTERNAL MEDICINE

## 2020-04-17 PROCEDURE — 0W9G3ZZ DRAINAGE OF PERITONEAL CAVITY, PERCUTANEOUS APPROACH: ICD-10-PCS | Performed by: RADIOLOGY

## 2020-04-17 PROCEDURE — 6360000002 HC RX W HCPCS: Performed by: NURSE PRACTITIONER

## 2020-04-17 PROCEDURE — 2580000003 HC RX 258: Performed by: NURSE PRACTITIONER

## 2020-04-17 PROCEDURE — 6370000000 HC RX 637 (ALT 250 FOR IP): Performed by: PHYSICIAN ASSISTANT

## 2020-04-17 PROCEDURE — 49083 ABD PARACENTESIS W/IMAGING: CPT

## 2020-04-17 PROCEDURE — 2580000003 HC RX 258: Performed by: PHYSICIAN ASSISTANT

## 2020-04-17 PROCEDURE — 36415 COLL VENOUS BLD VENIPUNCTURE: CPT

## 2020-04-17 PROCEDURE — 83735 ASSAY OF MAGNESIUM: CPT

## 2020-04-17 PROCEDURE — 6370000000 HC RX 637 (ALT 250 FOR IP): Performed by: INTERNAL MEDICINE

## 2020-04-17 PROCEDURE — 84100 ASSAY OF PHOSPHORUS: CPT

## 2020-04-17 PROCEDURE — 80048 BASIC METABOLIC PNL TOTAL CA: CPT

## 2020-04-17 PROCEDURE — 82962 GLUCOSE BLOOD TEST: CPT

## 2020-04-17 RX ORDER — SPIRONOLACTONE 25 MG/1
25 TABLET ORAL 2 TIMES DAILY
Qty: 30 TABLET | Refills: 0 | Status: SHIPPED | OUTPATIENT
Start: 2020-04-17

## 2020-04-17 RX ORDER — SPIRONOLACTONE 25 MG/1
25 TABLET ORAL 2 TIMES DAILY
Status: DISCONTINUED | OUTPATIENT
Start: 2020-04-17 | End: 2020-04-17 | Stop reason: HOSPADM

## 2020-04-17 RX ORDER — SODIUM CHLORIDE 0.9 % (FLUSH) 0.9 %
10 SYRINGE (ML) INJECTION PRN
Status: DISCONTINUED | OUTPATIENT
Start: 2020-04-17 | End: 2020-04-17 | Stop reason: HOSPADM

## 2020-04-17 RX ORDER — ALBUMIN (HUMAN) 12.5 G/50ML
25 SOLUTION INTRAVENOUS ONCE
Status: COMPLETED | OUTPATIENT
Start: 2020-04-17 | End: 2020-04-17

## 2020-04-17 RX ORDER — FUROSEMIDE 20 MG/1
20 TABLET ORAL DAILY
Qty: 30 TABLET | Refills: 0 | Status: SHIPPED | OUTPATIENT
Start: 2020-04-18

## 2020-04-17 RX ADMIN — INSULIN LISPRO 6 UNITS: 100 INJECTION, SOLUTION INTRAVENOUS; SUBCUTANEOUS at 11:30

## 2020-04-17 RX ADMIN — RIFAXIMIN 550 MG: 550 TABLET ORAL at 09:08

## 2020-04-17 RX ADMIN — MICONAZOLE NITRATE: 2 OINTMENT TOPICAL at 09:10

## 2020-04-17 RX ADMIN — SODIUM CHLORIDE, PRESERVATIVE FREE 2 G: 5 INJECTION INTRAVENOUS at 14:30

## 2020-04-17 RX ADMIN — PETROLATUM: 420 OINTMENT TOPICAL at 09:10

## 2020-04-17 RX ADMIN — MONTELUKAST SODIUM 10 MG: 10 TABLET, FILM COATED ORAL at 09:08

## 2020-04-17 RX ADMIN — ALBUMIN (HUMAN) 25 G: 0.25 INJECTION, SOLUTION INTRAVENOUS at 13:39

## 2020-04-17 RX ADMIN — SODIUM CHLORIDE, PRESERVATIVE FREE 10 ML: 5 INJECTION INTRAVENOUS at 14:30

## 2020-04-17 RX ADMIN — SODIUM CHLORIDE, PRESERVATIVE FREE 10 ML: 5 INJECTION INTRAVENOUS at 09:10

## 2020-04-17 RX ADMIN — LACTULOSE 13.33 G: 20 SOLUTION ORAL at 14:30

## 2020-04-17 RX ADMIN — FUROSEMIDE 20 MG: 20 TABLET ORAL at 09:08

## 2020-04-17 RX ADMIN — VENLAFAXINE HYDROCHLORIDE 37.5 MG: 37.5 CAPSULE, EXTENDED RELEASE ORAL at 09:08

## 2020-04-17 RX ADMIN — INSULIN LISPRO 3 UNITS: 100 INJECTION, SOLUTION INTRAVENOUS; SUBCUTANEOUS at 16:41

## 2020-04-17 RX ADMIN — PANTOPRAZOLE SODIUM 40 MG: 40 TABLET, DELAYED RELEASE ORAL at 09:08

## 2020-04-17 RX ADMIN — SPIRONOLACTONE 25 MG: 25 TABLET ORAL at 09:08

## 2020-04-17 RX ADMIN — HYDROCODONE BITARTRATE AND ACETAMINOPHEN 1 TABLET: 5; 325 TABLET ORAL at 09:08

## 2020-04-17 RX ADMIN — LACTULOSE 13.33 G: 20 SOLUTION ORAL at 09:09

## 2020-04-17 RX ADMIN — HYDROMORPHONE HYDROCHLORIDE 1 MG: 1 INJECTION, SOLUTION INTRAMUSCULAR; INTRAVENOUS; SUBCUTANEOUS at 13:09

## 2020-04-17 ASSESSMENT — PAIN SCALES - GENERAL
PAINLEVEL_OUTOF10: 3
PAINLEVEL_OUTOF10: 7
PAINLEVEL_OUTOF10: 6
PAINLEVEL_OUTOF10: 0

## 2020-04-17 ASSESSMENT — PAIN - FUNCTIONAL ASSESSMENT: PAIN_FUNCTIONAL_ASSESSMENT: 0-10

## 2020-04-17 NOTE — PROGRESS NOTES
[facility] MD Sujatha Rosales, MD Rupal Garcia, MD Katy Romberg, CNP   Karla Han, TOMI  Progress Note    4/17/2020    SUBJECTIVE:   4/10: \"Tired. \"  Ongoing fatigue, malaise, generalized weakness. Anorexia with almost nothing to eat, though she is drinking \"when they bring the water. \"  No dyspnea at rest.  No chest pain or palpitations. 4/11: Asleep, easily awakened. No new complaint. 4/12: Ongoing fatigue, malaise. Tired. Sleeping most of the day. Abdominal discomfort developed over the course of today, distention getting much worse. No dyspnea at rest on room air. No peripheral swelling. Appetite and intake remain poor  4/13:  Laying in bed. Continues to feel weak,malaise and fatigued. NAD. Appetite and intake remain poor. Denies chest pain or SOB. Denies abd pain  4/14:  Crying in chair. Requesting to get back into bed. Feels weak and malaise. Denies chest pain or SOB. Denies nausea, vomiting or abdominal pain. .  4/15: Ongoing fatigue, malaise, depressed mood. Poor intake. Generalized weakness. Abdomen distended. 4/16: \"I am tired. \"  Thirsty, too.  ongoing fatigue, malaise, depression poor appetite and intake. Generalized weakness. Symptoms all persist.  Abdomen becoming increasingly distended since the time of her last paracentesis on Monday. 4/17 : abdomen distended . 1+ edema LE , hardly get out of bed . Weak, tired .        PROBLEM LIST:    Principal Problem:    Hyponatremia  Active Problems:    Cirrhosis of liver with ascites (HCC)    Diabetes mellitus type 2, uncontrolled ([facility] [address]    Hyperlipidemia LDL goal <100    History of CVA (cerebrovascular accident)    Essential hypertension    Severe protein-calorie malnutrition (HCC)  Resolved Problems:    Hyponatremia         DIET:    Dietary Nutrition Supplements: Frozen Oral Supplement  DIET CARB CONTROL; Daily Fluid Restriction: 1800 ml     MEDS (scheduled):    furosemide  20 mg Oral Daily    spironolactone  25 mg Oral Daily    cefTRIAXone (ROCEPHIN) IV  2 g Intravenous Q24H    insulin lispro  0-18 Units Subcutaneous TID WC    lactulose  20 mL Oral TID    white petrolatum   Topical BID    miconazole nitrate   Topical BID    amLODIPine  5 mg Oral Daily    atorvastatin  40 mg Oral Nightly    montelukast  10 mg Oral QAM    pantoprazole  40 mg Oral Daily    rifaximin  550 mg Oral BID    venlafaxine  37.5 mg Oral Daily    insulin lispro  0-3 Units Subcutaneous Nightly    sodium chloride flush  10 mL Intravenous 2 times per day    insulin glargine  30 Units Subcutaneous Nightly       MEDS (infusions):   dextrose         MEDS (prn):  amitriptyline, HYDROcodone-acetaminophen, glucose, dextrose, glucagon (rDNA), dextrose, sodium chloride flush, potassium chloride **OR** potassium alternative oral replacement **OR** potassium chloride, acetaminophen **OR** acetaminophen, magnesium hydroxide, promethazine **OR** ondansetron, albuterol    PHYSICAL EXAM:     Patient Vitals for the past 24 hrs:   BP Temp Temp src Pulse Resp SpO2 Weight   04/17/20 0815 (!) 107/57 97.8 °F (36.6 °C) Oral 99 16 95 % --   04/17/20 0001 -- -- -- -- -- -- 216 lb 0.8 oz (98 kg)   04/16/20 2145 126/64 98.1 °F (36.7 °C) Oral 104 16 97 % --   04/16/20 1230 106/64 98 °F (36.7 °C) Oral 98 16 95 % --   @      Intake/Output Summary (Last 24 hours) at 4/17/2020 1026  Last data filed at 4/17/2020 0856  Gross per 24 hour   Intake 940 ml   Output 350 ml   Net 590 ml         Wt Readings from Last 3 Encounters:   04/17/20 216 lb 0.8 oz (98 kg)   04/01/20 202 lb (91.6 kg)   03/25/20 201 lb (91.2 kg)       Constitutional:  in no acute distress  Oral: mucus membranes moist  Neck: no JVD. Trachea in midline. Supple  Cardiovascular: S1, S2 regular rhythm, no murmur,or rub  Respiratory:  No crackles, no wheeze.   Poor air entry at the bases  Gastrointestinal:  Soft, distended with NABS  Ext: no edema, feet warm  Skin: dry, no restriction to 1800 cc/day  -no salt tablets  -In the absence of acute mental status change, hypertonic saline and increasing diuretics are not appropriate now  -Tolvaptan also not appropriate in patient with cirrhosis      Electronically signed by Claribel Harp MD on 4/17/2020

## 2020-04-17 NOTE — BRIEF OP NOTE
Brief Postoperative Note    Sylvester Toussaint  YOB: 1955  [id number]    Pre-operative Diagnosis: ascites    Post-operative Diagnosis: Same    Procedure: Paracentesis    Anesthesia: Local    Estimated Blood Loss: < 10 cc    Provider: Janine Dalal Junior, CNP under the indirect supervision of Mary Lim. Leandra Villegas M.D.     Complications: none    Specimen obtained: Yes, fluid, serous    Findings: fluid, drained with catheter drainage      Electronically signed by BENITO Levin CNP  4/17/20 1:41 PM EDT

## 2020-04-17 NOTE — PROGRESS NOTES
Physical Therapy  Facility/Department: [address] MED SURG  Daily Treatment Note  NAME: Jenney Schilder  : 1955  MRN: [id number]    Date of Service: 2020    Attending Wallace [place] MD     Evaluating PT:  Gabbie BISHOP PT DPT [id number]     Room #:  626  Diagnosis:  Hyponatremia , ascites   Pertinent PMHx/PSHx:  Weekly paracentesis   Procedure/Surgery:  Paracentesis   Precautions:  Fall risk   Equipment Needs:  w/w     SUBJECTIVE:     Pt lives with son in a 2 story home with 3 stairs and 1 rail to enter.  Pt stays on first floor and son assists pt on steps to enter.  Pt ambulates with rollator.       OBJECTIVE:    Initial Evaluation  Date:  Treatment    Short Term/ Long Term   Goals   Was pt agreeable to Eval/treatment? yes  yes     Does pt have pain? No c/o pain No c/o pain     Bed Mobility  Rolling: SBA  Supine to sit: min A   Sit to supine: SBA  Scooting: NT Rolling: MAX A  Supine to sit: NA  Sit to supine: MAX A x2  Scooting: MAX A x 2 SBA   Transfers Sit to stand: mod A   Stand to sit: min A   Stand pivot: NT Sit to stand: MAX A x2  Stand to sit: MAX A x2  Stand pivot: MAX A x2 SBA   Ambulation   5 sidesteps with w/w min A  3 feet with no AD MAX A x2 50 feet with w/w SBA   Stair negotiation: ascended and descended NT NA, pt unable at this time.  3 steps with 1 rail min A    AM-PAC 6 Clicks 67/34 74/92        BLE ROM is WFL  BLE Strength is grossly 2/5 to 3-/5  Balance: sitting EOB SBA and standing with no AD MAX A x2    Patient education  Pt educated on transfers. Patient response to education:   Pt verbalized understanding Pt demonstrated skill Pt requires further education in this area   yes Yes with VCs and MAX A x2 yes     ASSESSMENT:    Comments:  Pt was found sitting up in chair and wanting to return to bed. Pt has decreased strength and endurance limiting her functional mobility and [place] at this time.       Treatment:  Patient practiced and was instructed in the following treatment:     Bed mobility, transfers, and gait to improve functional strength and endurance. Pt was left supine in bed with call light left by patient. Chair/bed alarm: bed alarm was activated. PLAN:    Pt is making fair progress toward established Physical Therapy goals. Continue with physical therapy current plan of care. Time in  08:40  Time out  08:50    Total Treatment Time  10 minutes     Evaluation Time includes thorough review of current medical information, gathering information on past medical history/social history and prior level of function, completion of standardized testing/informal observation of tasks, assessment of data and education on plan of care and goals. CPT codes:  [] Low Complexity PT evaluation 28599  [] Moderate Complexity PT evaluation 41106  [] High Complexity PT evaluation 91732  [] PT Re-evaluation 10367  [] Gait training 08341 ** minutes  [] Manual therapy 56237 ** minutes  [x] Therapeutic activities 31943 10 minutes  [] Therapeutic exercises 19807 ** minutes  [] Neuromuscular reeducation 14856 ** minutes     Yosi Castro., P.T.   License Number: [id number]

## 2020-04-17 NOTE — PROGRESS NOTES
nor toward [facility] (with walker). NT due to limited activity tolerance. Mod I - with use of device, as needed/appropriate   Balance Sitting: Good-  (at EOB)  Standing: Fair-  (with walker) Sitting: Sup  Standing: Max A x2 Fair+ dynamic standing balance during completion of ADLs and other functional tasks. Activity Tolerance Limited Poor Patient will demonstrate Good understanding and consistent implementation of energy conservation techniques and work simplification techniques into ADL/IADL routines.          Limited B shoulder ROM    Comments: Upon arrival pt was sitting in arm chair. At end of session pt was transferred to bed with alarm on, all lines and tubes intact and call light within reach. Treatment: Instructed pt on 1x10 reps of AROM shoulder flexion, shoulder horizontal abduction, scapular retraction and elbow flexion/ extension requiring mod vc and demeos for correct form. Pt c/o slight discomfort in B shoulders but stated it was \"tolerable\" during exercises. Exercises were performed to improve strength during ADLs    Education: Importance of performing ADLs as independently as possible to improve activity tolerance. · Pt has made fair progress towards set goals.    · Continue with current plan of care      Treatment Charges: Mins Units   Ther Ex  [id number] 15 1   Manual Therapy 59400     Thera Activities 97203     ADL/Home Mgt 86564 9 1   Neuro Re-ed 44986     Group Therapy      Orthotic manage/training  91433     Non-Billable Time     Total Timed Treatment 23 2       Livia HUNT/YUN [id number]

## 2020-04-17 NOTE — PROGRESS NOTES
IRFLOWSHEET    Date: 4/17/2020    Time: 1:25 PM     Exam: ultrasound guided paracentesis    Radiologist Performing Procedure: SUDHAKAR De Anda NP    Nurse Reporting/Phone: [phone]     Nurse Receiving: Viktoriya Aleman    Permit signed:      Yes    ID Band Checklist: Yes    Allergies: Patient has no known allergies. TIME OUT: 1341    PROCEDURE START TIME: 1344    Puncture Site: RLQ    Puncture Time: 1344    Catheters: 6 La[place]    LABS:   Lab Results   Component Value Date    INR 1.7 04/14/2020    PROTIME 20.5 (H) 04/14/2020           Lab Results   Component Value Date    CREATININE 1.0 04/17/2020    BUN 26 (H) 04/17/2020          Lab Results   Component Value Date    HGB 11.7 04/14/2020    HCT 33.9 (L) 04/14/2020     04/14/2020         PROCEDURE END TIME: 1410    Total Contrast: na    Fluoroscopy Time: na    Complications:  None    Comments: Patient arrival @ 1300  From floor to IR for paracentesis. Vitals taken and consent signed. Silvio Jiang NP  in to speak with the patient about the procedure, all questions answered. Abdomen scanned, prepped and centesis catheter inserted with ultrasound guidance @ 1348. Patient tolerated well. 5000 ml drained of clear tom colored ascitic fluid. Centesis catheter removed @1410   . Puncture site cleansed and dry dressing applied. No bleeding, swelling or complications noted. Discharge instructions reviewed and place in epic for future discharge, report called to floor. Patient transferred back to room.

## 2020-04-17 NOTE — DISCHARGE SUMMARY
5-325 MG per tablet Take 1 tablet by mouth every 6 hours as needed for Pain. rifaximin (XIFAXAN) 550 MG tablet Take 1 tablet by mouth 2 times daily  Qty: 42 tablet, Refills: 0      insulin aspart (NOVOLOG FLEXPEN) 100 UNIT/ML injection pen Inject into the skin 3 times daily (before meals) 10 units tid with meals and sliding scale with max of 20 units total.   150-199=2  200-249=4  250-299=6  300-349=8  350-400=10      calcium carbonate (OSCAL) 500 MG TABS tablet Take 1,000 mg by mouth daily      amitriptyline (ELAVIL) 25 MG tablet TAKE 1 TABLET BY MOUTH NIGHTLY AS NEEDED FOR SLEEP  Qty: 30 tablet, Refills: 3      venlafaxine (EFFEXOR XR) 37.5 MG extended release capsule TAKE ONE CAPSULE BY MOUTH EVERY DAY  Qty: 90 capsule, Refills: 2      montelukast (SINGULAIR) 10 MG tablet TAKE 1 TABLET BY MOUTH EVERY DAY AT NIGHT  Qty: 90 tablet, Refills: 2    Associated Diagnoses: Chronic rhinitis      amLODIPine (NORVASC) 5 MG tablet Take 1 tablet by mouth daily  Qty: 30 tablet, Refills: 0      lactulose (CHRONULAC) 10 GM/15ML solution Take 30 mLs by mouth 3 times daily  Qty: 1 Bottle, Refills: 1      Insulin Syringe-Needle U-100 ([employer] INSULIN SYR 1CC/30G) 30G X 5/16\" 1 ML MISC Use with insulin 4 times a day  Qty: 250 each, Refills: 12    Associated Diagnoses: IDDM (insulin dependent diabetes mellitus) ([facility])      INSULIN SYRINGE 1CC/29G ([employer] INS SYRINGE 1CC/29G) 29G X 1/2\" 1 ML MISC 1 each by Does not apply route 4 times daily  Qty: 200 each, Refills: 5    Associated Diagnoses: IDDM (insulin dependent diabetes mellitus) ([facility])      Insulin Syringe-Needle U-100 29G X 5/16\" 1 ML MISC Use with insulin 4 times a day  Qty: 250 each, Refills: 12      !! blood glucose monitor strips One-Touch Verio strips. Checks 3  times/day before meals and at bedtime and as needed for symptoms of irregular blood glucose  Qty: 250 strip, Refills: 5    Associated Diagnoses: IDDM (insulin dependent diabetes mellitus) ([facility]      ! ! blood glucose test strips (ONETOUCH VERIO) strip 1 each by In Vitro route 4 times daily As needed. Qty: 150 each, Refills: 5    Associated Diagnoses: IDDM (insulin dependent diabetes mellitus) ([facility])      Blood Glucose Monitoring Suppl (ONETOUCH VERIO) w/Device KIT To test 4x/day  Qty: 1 kit, Refills: 0    Associated Diagnoses: IDDM (insulin dependent diabetes mellitus) ([facility])      albuterol sulfate  (90 Base) MCG/ACT inhaler INHALE 2 PUFFS THREE TIMES A DAY AS NEEDED FOR WHEEZING  Refills: 0      Insulin Syringe-Needle U-100 ([employer] INS SYR .3CC/29G) 29G X 1/2\" 0.3 ML MISC Four times a day with insulin  Qty: 250 each, Refills: 3    Associated Diagnoses: IDDM (insulin dependent diabetes mellitus) ([facility])      pantoprazole (PROTONIX) 40 MG tablet Take 40 mg by mouth daily Take morning of surgery with a sip of water      !! Misc. Devices (ADJUST BATH/SHOWER SEAT) MISC Use daily  Qty: 1 each, Refills: 0    Associated Diagnoses: Cerebrovascular accident (CVA), unspecified mechanism ([address]      ! ! Misc. Devices (COMMODE BEDSIDE) MISC Use daily  Qty: 1 each, Refills: 0    Associated Diagnoses: Cerebrovascular accident (CVA), unspecified mechanism ([address]      ! ! blood glucose test strips (ASCENSIA AUTODISC VI;ONE TOUCH ULTRA TEST VI) strip 1 each by In Vitro route daily As needed. Qty: 100 each, Refills: 3    Associated Diagnoses: Type 2 diabetes mellitus with diabetic polyneuropathy, with long-term current use of insulin ([address]       ! ! - Potential duplicate medications found. Please discuss with provider. STOP taking these medications       atorvastatin (LIPITOR) 40 MG tablet Comments:   Reason for Stopping:             Activity: activity as tolerated  Diet: low fat, low cholesterol diet    Follow up with dr Caro Abbott in 1 week. Follow up with dr Brendon Benavidez in 2-3 weeks. Follow up with dr Sergio Bermeo in 2-3 weeks. Continue outpatient paracentesis as scheduled for palliative reasons.     Note that over 30 minutes was spent in preparing discharge papers, discussing discharge with patient, medication review, etc.    Signed:  Tej Das    4/17/2020  3:10 PM

## 2020-04-17 NOTE — H&P
Interventional Radiology  Attending Pre-operative History and Physical    DIAGNOSIS:    Patient Active Problem List   Diagnosis    Cirrhosis of liver with ascites ([facility]    Diabetes mellitus type 2, uncontrolled ([facility]    Hyperlipidemia LDL goal <100    History of CVA (cerebrovascular accident)    Essential hypertension    Hyponatremia    Severe protein-calorie malnutrition ([facility]       CHIEF COMPLAINT: Ascites    Current Facility-Administered Medications:     spironolactone (ALDACTONE) tablet 25 mg, 25 mg, Oral, BID, Jazmyn Hill MD    albumin human 25 % IV solution 25 g, 25 g, Intravenous, Once, Doreen Kennedy MD, Last Rate: 100 mL/hr at 04/17/20 1339, 25 g at 04/17/20 1339    sodium chloride flush 0.9 % injection 10 mL, 10 mL, Intravenous, PRN, BENITO Mercer - CNP    furosemide (LASIX) tablet 20 mg, 20 mg, Oral, Daily, Vivi Murphy MD, 20 mg at 04/17/20 0908    cefTRIAXone (ROCEPHIN) 2 g in sodium chloride flush 20 mL IV syringe, 2 g, Intravenous, Q24H, BENITO Viramontes - CNP, 2 g at 04/16/20 1643    insulin lispro (HUMALOG) injection vial 0-18 Units, 0-18 Units, Subcutaneous, TID WC, Doreen Kennedy MD, 6 Units at 04/17/20 1130    lactulose (CHRONULAC) 10 GM/15ML solution 13.3333 g, 20 mL, Oral, TID, BENITO Viramontes CNP, 13.3333 g at 04/17/20 0909    white petrolatum ointment, , Topical, BID, Doreen Kennedy MD    miconazole nitrate 2 % ointment, , Topical, BID, Doreen Kennedy MD    amitriptyline (ELAVIL) tablet 25 mg, 25 mg, Oral, Nightly PRN, AMENA Hamilton, 25 mg at 04/16/20 2151    amLODIPine (NORVASC) tablet 5 mg, 5 mg, Oral, Daily, AMENA Hamilton, Stopped at 04/17/20 0825    atorvastatin (LIPITOR) tablet 40 mg, 40 mg, Oral, Nightly, AMENA Yancey, 40 mg at 04/16/20 2151    HYDROcodone-acetaminophen (NORCO) 5-325 MG per tablet 1 tablet, 1 tablet, Oral, Q6H PRN, AMENA Hamilton, 1 tablet at 04/17/20 0908    montelukast (SINGULAIR) Nebulization, Q6H PRN, AMENA Knowles    No Known Allergies    Past Medical History:   Diagnosis Date    Arthritis     Cerebral artery occlusion with cerebral infarction ([facility] 09/2018    some memory loss, some right side weakness.     Cough     post anesthesia    Diabetes mellitus ([facility]     Diverticulitis     GERD (gastroesophageal reflux disease)     Hyperlipidemia     Hypertension     Liver cirrhosis (HCC)     Polyp of esophagus     Polyp, stomach     PONV (postoperative nausea and vomiting)     Prolonged emergence from general anesthesia     Thyroid disease     no meds       Past Surgical History:   Procedure Laterality Date    CAROTID ENDARTERECTOMY      october 2018    CHOLECYSTECTOMY      COLONOSCOPY      COLONOSCOPY N/A 6/11/2019    COLONOSCOPY POLYPECTOMY SNARE/COLD BIOPSY performed by Clyde Cain MD at [address] [place] [address]  2003    CO THROMBOENDARTECTMY Ivon Castro Left 10/29/2018    LEFT CAROTID ENDARTERECTOMY performed by Luisito Matson MD at [address] [place] [address] ENDOSCOPY      UPPER GASTROINTESTINAL ENDOSCOPY N/A 6/11/2019    EGD BIOPSY performed by [facility]de [facility] at [facility] ENDOSCOPY    VASCULAR SURGERY         Family History   Problem Relation Age of Onset    Heart Disease Mother 28    Diabetes Mother     Heart Disease Sister     Diabetes Sister        Social History     Socioeconomic History    Marital status:      Spouse name: Not on file    Number of children: Not on file    Years of education: Not on file    Highest education level: Not on file   Occupational History    Not on file   Social Needs    Financial resource strain: Not on file    Food insecurity     Worry: Not on file     Inability: Not on file    Transportation needs     Medical: Not on file     Non-medical: Not on file   Tobacco Use    Smoking status: Passive Smoke Exposure - Never Smoker    Smokeless tobacco: Never Used   Substance

## 2020-04-22 ENCOUNTER — HOSPITAL ENCOUNTER (OUTPATIENT)
Dept: ULTRASOUND IMAGING | Age: 65
Discharge: HOME | End: 2020-04-24
Payer: COMMERCIAL

## 2020-04-22 VITALS
RESPIRATION RATE: 16 BRPM | DIASTOLIC BLOOD PRESSURE: 57 MMHG | TEMPERATURE: 97.5 F | SYSTOLIC BLOOD PRESSURE: 110 MMHG | HEART RATE: 97 BPM | OXYGEN SATURATION: 96 %

## 2020-04-22 PROCEDURE — 6360000002 HC RX W HCPCS: Performed by: INTERNAL MEDICINE

## 2020-04-22 PROCEDURE — P9047 ALBUMIN (HUMAN), 25%, 50ML: HCPCS | Performed by: INTERNAL MEDICINE

## 2020-04-22 PROCEDURE — C1729 CATH, DRAINAGE: HCPCS

## 2020-04-22 RX ORDER — SODIUM CHLORIDE 0.9 % (FLUSH) 0.9 %
10 SYRINGE (ML) INJECTION PRN
Status: DISCONTINUED | OUTPATIENT
Start: 2020-04-22 | End: 2020-04-25 | Stop reason: HOSPADM

## 2020-04-22 RX ORDER — ALBUMIN (HUMAN) 12.5 G/50ML
25 SOLUTION INTRAVENOUS ONCE
Status: COMPLETED | OUTPATIENT
Start: 2020-04-22 | End: 2020-04-22

## 2020-04-22 RX ADMIN — ALBUMIN (HUMAN) 25 G: 0.25 INJECTION, SOLUTION INTRAVENOUS at 13:03

## 2020-04-22 NOTE — H&P
Facility-Administered Medications:     sodium chloride flush 0.9 % injection 10 mL, 10 mL, Intravenous, PRN, Tyree Faith, APRN - CNP    albumin human 25 % IV solution 25 g, 25 g, Intravenous, Once, Z Twylla Bloch, MD    No Known Allergies    Past Medical History:   Diagnosis Date    Arthritis     Cerebral artery occlusion with cerebral infarction ([facility] [address] 09/2018    some memory loss, some right side weakness.     Cough     post anesthesia    Diabetes mellitus ([facility] [address]     Diverticulitis     GERD (gastroesophageal reflux disease)     Hyperlipidemia     Hypertension     Liver cirrhosis (HCC)     Polyp of esophagus     Polyp, stomach     PONV (postoperative nausea and vomiting)     Prolonged emergence from general anesthesia     Thyroid disease     no meds       Past Surgical History:   Procedure Laterality Date    CAROTID ENDARTERECTOMY      october 2018    CHOLECYSTECTOMY      COLONOSCOPY      COLONOSCOPY N/A 6/11/2019    COLONOSCOPY POLYPECTOMY SNARE/COLD BIOPSY performed by Bryan Coppola MD at [address] [place] [address]    DE Diania Stockton INCIS Left 10/29/2018    LEFT CAROTID ENDARTERECTOMY performed by Pee Moura MD at [address] [place] [address] ENDOSCOPY      UPPER GASTROINTESTINAL ENDOSCOPY N/A 6/11/2019    EGD BIOPSY performed by Bryan Coppola MD at [facility] ENDOSCOPY    VASCULAR SURGERY         Family History   Problem Relation Age of Onset    Heart Disease Mother 28    Diabetes Mother     Heart Disease Sister     Diabetes Sister        Social History     Socioeconomic History    Marital status:      Spouse name: Not on file    Number of children: Not on file    Years of education: Not on file    Highest education level: Not on file   Occupational History    Not on file   Social Needs    Financial resource strain: Not on file    Food insecurity     Worry: Not on file     Inability: Not on file   Jeanie Perez

## 2020-07-02 NOTE — PROGRESS NOTES
Hold Glucophage/Metformin  for 48 hours due to IV contrast given in  CT,Radiology. Cortland Nissen in ER  notified. Resume meds if BUN/ Creat are WNL after 48 hr lab-work. 61

## 2024-01-14 NOTE — PROGRESS NOTES
Physical Therapy  Facility/Department: [address] MED SURG  Daily Treatment Note  NAME: Ynes Glass  : 1955  MRN: [id number]    Date of Service: 2020       Attending Vincent Ko MD     Evaluating PT:  Gabbie BISHOP PT DPT [id number]     Room #:  2723/9989-U  Diagnosis:  Hyponatremia , ascites   Pertinent PMHx/PSHx:  Weekly paracentesis   Procedure/Surgery:  Paracentesis   Precautions:  Fall risk   Equipment Needs:  w/w     SUBJECTIVE:     Pt lives with son in a 2 story home with 3 stairs and 1 rail to enter.  Pt stays on first floor and son assists pt on steps to enter.  Pt ambulates with rollator.       OBJECTIVE:    Initial Evaluation  Date:  Treatment    Short Term/ Long Term   Goals   Was pt agreeable to Eval/treatment? yes  yes     Does pt have pain? No c/o pain C/o fatigue     Bed Mobility  Rolling: SBA  Supine to sit: min A   Sit to supine: SBA  Scooting: NT Rolling: MAX A  Supine to sit: MAX A x2  Sit to supine: MAX A x2  Scooting: MAX A x 2 SBA   Transfers Sit to stand: mod A   Stand to sit: min A   Stand pivot: NT Sit to stand: MAX A x2  Stand to sit: MAX A x2  Stand pivot: MAX A x2 SBA   Ambulation   5 sidesteps with w/w min A  NT - see below. 50 feet with w/w SBA   Stair negotiation: ascended and descended NT NA, pt unable at this time. 3 steps with 1 rail min A    AM-PAC 6 Clicks /17 054        BLE ROM is WFL  BLE Strength is grossly 3-/5  Balance: standing max A x 2     Patient education  Pt educated on mobility.     Patient response to education:   Pt verbalized understanding Pt demonstrated skill Pt requires further education in this area   yes  yes      ASSESSMENT:     Comments:  pt found laying in bed agreeable to treatment. Pt reports has to have a bowel movement. First attempt to pivot to [facility] using w/w but pt unable to advance LE's. Thus assisted pt to sitting back down on bed. Stand pivot without an assistive device max A x 2 to [facility].   Afterwards pt assisted Yes

## 2024-04-29 NOTE — PROGRESS NOTES
[facility] Quality Flow/Interdisciplinary Rounds Progress Note        Quality Flow Rounds held on 2020    Disciplines Attending:  Bedside Nurse, ,  and Nursing Unit Leadership    Ayesha Hawley was admitted on 2020 12:03 PM    Anticipated Discharge Date:  Expected Discharge Date: 01/15/20    Disposition:    Tarik Score:  Tarik Scale Score: 21    Readmission Risk              Risk of Unplanned Readmission:        17           Discussed patient goal for the day, patient clinical progression, and barriers to discharge. The following Goal(s) of the Day/Commitment(s) have been identified:  Discharge.       Bernardo Lowndes  2020 Render In Strict Bullet Format?: No Plan: In the morning, \\n-Wash face with gentle/hydrating cleanser \\n-Apply a thin film of Azelaic Acid to the entire face\\n-Apply Clindamycin-Benzoyl Peroxide as a spot treatment if needed\\n-Apply a thin layer of non-comedogenic moisturizer with SPF 30+\\n\\[place] night, \\n-Wash face with gentle/hydrating cleanser\\n-Apply a pea-sized amount of Tretinoin 0.025% Cream\\n-Apply a thin layer of non-comedogenic moisturizer\\n\\nAuthorized patient to receive refills on acne medications for one year of today’s office visit. Detail Level: Zone Continue Regimen: Spironolactone 150mg (SENT TO LOCAL PHARMACY)\\nPlexion Cleanser (AT HOME)\\nClindamycin-Benzoyl Peroxide Gel (AT HOME)\\nTretinoin 0.025% Cream (AT HOME) Initiate Treatment: Azelaic Acid 15% Gel (SENT TO [facility])

## (undated) DEVICE — Z INACTIVE USE 2535480 CLIP LIG M BLU TI HRT SHP WIRE HORZ 180 PER BX

## (undated) DEVICE — PATIENT RETURN ELECTRODE, SINGLE-USE, CONTACT QUALITY MONITORING, ADULT, WITH 9FT CORD, FOR PATIENTS WEIGING OVER 33LBS. (15KG): Brand: MEGADYNE

## (undated) DEVICE — TOWEL,OR,DSP,ST,BLUE,STD,6/PK,12PK/CS: Brand: MEDLINE

## (undated) DEVICE — LOOP VES W25MM THK1MM MAXI RED SIL FLD REPELLENT 100 PER

## (undated) DEVICE — STAPLER SKIN L39MM DIA0.53MM CRWN 5.7MM S STL FIX HD PROX

## (undated) DEVICE — SOLUTION IV IRRIG WATER 1000ML POUR BRL 2F7114

## (undated) DEVICE — BLADE CLIPPER GEN PURP NS

## (undated) DEVICE — 24" (61 CM) ARTERIAL PRESSURE TUBING: Brand: ICU MEDICAL

## (undated) DEVICE — 1.5L THIN WALL CAN: Brand: CRD

## (undated) DEVICE — TOWEL SURG W16XL26IN WHT NONFENESTRATED ST 2 PER PK

## (undated) DEVICE — STANDARD HYPODERMIC NEEDLE,POLYPROPYLENE HUB: Brand: MONOJECT

## (undated) DEVICE — CAROTID ARTERY SHUNT KIT,RADIOPAQUE LINE, STRAIGHT: Brand: ARGYLE

## (undated) DEVICE — GOWN,SIRUS,FABRNF,L,20/CS: Brand: MEDLINE

## (undated) DEVICE — GOWN,AURORA,NONREINF,RAGLAN,L,STERILE: Brand: MEDLINE

## (undated) DEVICE — GRADUATE TRIANG MEASURE 1000ML BLK PRNT

## (undated) DEVICE — Z DISCONTINUED PER MEDLINE USE 2425483 TAPE UMB L30IN DIA1/8IN WHT COT NONABSORBABLE W/O NDL FOR

## (undated) DEVICE — DOUBLE BASIN SET: Brand: MEDLINE INDUSTRIES, INC.

## (undated) DEVICE — SKIN AFFIX SURG ADHESIVE 72/CS 0.55ML: Brand: MEDLINE

## (undated) DEVICE — LABEL MED 4 IN SURG PANEL W/ PEN STRL

## (undated) DEVICE — 3 ML SYRINGE LUER-LOCK TIP: Brand: MONOJECT

## (undated) DEVICE — PACK,LAPAROTOMY,NO GOWNS: Brand: MEDLINE

## (undated) DEVICE — FORCEPS BX OVL CUP FEN DISPOSABLE CAP L 160CM RAD JAW 4

## (undated) DEVICE — BLOCK BITE 60FR RUBBER ADLT DENTAL

## (undated) DEVICE — ELECTRODE PT RET AD L9FT HI MOIST COND ADH HYDRGEL CORDED

## (undated) DEVICE — MAGNETIC INSTR DRAPE 20X16: Brand: MEDLINE INDUSTRIES, INC.

## (undated) DEVICE — NEEDLE HYPO 25GA L0.625IN BLU POLYPR HUB S STL REG BVL STR

## (undated) DEVICE — AGENT HEMSTAT W4XL4IN OXIDIZED REGENERATED CELOS STRUCTURED

## (undated) DEVICE — 72" ARTERIAL PRESSURE TUBING: Brand: ICU MEDICAL

## (undated) DEVICE — GOWN,SIRUS,FABRNF,XL,20/CS: Brand: MEDLINE

## (undated) DEVICE — SET SURG INSTR ART III

## (undated) DEVICE — SURGICAL PROCEDURE PACK VASC MAJ CUST

## (undated) DEVICE — SPONGE GZ W4XL4IN RAYON POLY FILL CVR W/ NONWOVEN FAB

## (undated) DEVICE — NEEDLE HYPO 26GA L0.625IN TAN POLYPR HUB S STL REG BVL STR

## (undated) DEVICE — TEN SHOOTER SAEED MULTI-BAND LIGATOR: Brand: SAEED

## (undated) DEVICE — GLOVE SURG SZ 75 L12IN FNGR THK94MIL TRNSLUC YEL LTX

## (undated) DEVICE — CLIP INT SM TI EZ LD LIG SYS WECK HORZ

## (undated) DEVICE — TOTAL TRAY, 16FR 10ML SIL FOLEY, URN: Brand: MEDLINE

## (undated) DEVICE — CATHETER ETER IV 20GA L1IN POLYUR STR RADPQ INTROCAN SFTY

## (undated) DEVICE — SNARE ENDOSCP L240CM LOOP W13MM SHTH DIA2.4MM SM OVL FLX

## (undated) DEVICE — SET INSTR ART 1

## (undated) DEVICE — TRAP POLYP ETRAP

## (undated) DEVICE — GAUZE,SPONGE,4"X4",16PLY,XRAY,STRL,LF: Brand: MEDLINE

## (undated) DEVICE — SOLUTION IV 500ML 0.9% SOD CHL INJ USP CONT EXCEL